# Patient Record
Sex: MALE | Race: WHITE | Employment: OTHER | URBAN - METROPOLITAN AREA
[De-identification: names, ages, dates, MRNs, and addresses within clinical notes are randomized per-mention and may not be internally consistent; named-entity substitution may affect disease eponyms.]

---

## 2021-01-07 ENCOUNTER — HOSPITAL ENCOUNTER (EMERGENCY)
Age: 86
Discharge: ANOTHER HOSPITAL | DRG: 177 | End: 2021-01-08
Attending: EMERGENCY MEDICINE
Payer: MEDICARE

## 2021-01-07 ENCOUNTER — APPOINTMENT (OUTPATIENT)
Dept: CT IMAGING | Age: 86
DRG: 177 | End: 2021-01-07
Attending: EMERGENCY MEDICINE
Payer: MEDICARE

## 2021-01-07 ENCOUNTER — APPOINTMENT (OUTPATIENT)
Dept: GENERAL RADIOLOGY | Age: 86
DRG: 177 | End: 2021-01-07
Attending: EMERGENCY MEDICINE
Payer: MEDICARE

## 2021-01-07 DIAGNOSIS — R41.82 ALTERED MENTAL STATUS, UNSPECIFIED ALTERED MENTAL STATUS TYPE: ICD-10-CM

## 2021-01-07 DIAGNOSIS — J12.82 PNEUMONIA DUE TO COVID-19 VIRUS: Primary | ICD-10-CM

## 2021-01-07 DIAGNOSIS — R09.02 HYPOXIA: ICD-10-CM

## 2021-01-07 DIAGNOSIS — U07.1 PNEUMONIA DUE TO COVID-19 VIRUS: Primary | ICD-10-CM

## 2021-01-07 PROBLEM — J96.01 ACUTE RESPIRATORY FAILURE WITH HYPOXIA (HCC): Status: ACTIVE | Noted: 2021-01-07

## 2021-01-07 PROBLEM — Z79.4 TYPE 2 DIABETES MELLITUS, WITH LONG-TERM CURRENT USE OF INSULIN (HCC): Status: ACTIVE | Noted: 2021-01-07

## 2021-01-07 PROBLEM — G93.41 ACUTE METABOLIC ENCEPHALOPATHY: Status: ACTIVE | Noted: 2021-01-07

## 2021-01-07 PROBLEM — E11.9 TYPE 2 DIABETES MELLITUS, WITH LONG-TERM CURRENT USE OF INSULIN (HCC): Status: ACTIVE | Noted: 2021-01-07

## 2021-01-07 LAB
ALBUMIN SERPL-MCNC: 2.8 G/DL (ref 3.2–4.6)
ALBUMIN/GLOB SERPL: 0.7 {RATIO} (ref 1.2–3.5)
ALP SERPL-CCNC: 84 U/L (ref 50–136)
ALT SERPL-CCNC: 22 U/L (ref 12–65)
ANION GAP SERPL CALC-SCNC: 8 MMOL/L (ref 7–16)
AST SERPL-CCNC: 41 U/L (ref 15–37)
BACTERIA URNS QL MICRO: NORMAL /HPF
BASOPHILS # BLD: 0 K/UL (ref 0–0.2)
BASOPHILS NFR BLD: 0 % (ref 0–2)
BILIRUB SERPL-MCNC: 0.6 MG/DL (ref 0.2–1.1)
BUN SERPL-MCNC: 16 MG/DL (ref 8–23)
CALCIUM SERPL-MCNC: 8.3 MG/DL (ref 8.3–10.4)
CASTS URNS QL MICRO: NORMAL /LPF
CHLORIDE SERPL-SCNC: 101 MMOL/L (ref 98–107)
CO2 SERPL-SCNC: 29 MMOL/L (ref 21–32)
COVID-19 RAPID TEST, COVR: DETECTED
CREAT SERPL-MCNC: 1.3 MG/DL (ref 0.8–1.5)
D DIMER PPP FEU-MCNC: 2.44 UG/ML(FEU)
DIFFERENTIAL METHOD BLD: ABNORMAL
EOSINOPHIL # BLD: 0.1 K/UL (ref 0–0.8)
EOSINOPHIL NFR BLD: 2 % (ref 0.5–7.8)
EPI CELLS #/AREA URNS HPF: NORMAL /HPF
ERYTHROCYTE [DISTWIDTH] IN BLOOD BY AUTOMATED COUNT: 16.4 % (ref 11.9–14.6)
EST. AVERAGE GLUCOSE BLD GHB EST-MCNC: 169 MG/DL
GLOBULIN SER CALC-MCNC: 3.8 G/DL (ref 2.3–3.5)
GLUCOSE BLD STRIP.AUTO-MCNC: 148 MG/DL (ref 65–100)
GLUCOSE SERPL-MCNC: 161 MG/DL (ref 65–100)
HBA1C MFR BLD: 7.5 % (ref 4.2–6.3)
HCT VFR BLD AUTO: 42 % (ref 41.1–50.3)
HGB BLD-MCNC: 13.4 G/DL (ref 13.6–17.2)
IMM GRANULOCYTES # BLD AUTO: 0 K/UL (ref 0–0.5)
IMM GRANULOCYTES NFR BLD AUTO: 0 % (ref 0–5)
LACTATE SERPL-SCNC: 2 MMOL/L (ref 0.4–2)
LACTATE SERPL-SCNC: 2.2 MMOL/L (ref 0.4–2)
LYMPHOCYTES # BLD: 0.5 K/UL (ref 0.5–4.6)
LYMPHOCYTES NFR BLD: 7 % (ref 13–44)
MCH RBC QN AUTO: 27.1 PG (ref 26.1–32.9)
MCHC RBC AUTO-ENTMCNC: 31.9 G/DL (ref 31.4–35)
MCV RBC AUTO: 85 FL (ref 79.6–97.8)
MONOCYTES # BLD: 0.3 K/UL (ref 0.1–1.3)
MONOCYTES NFR BLD: 5 % (ref 4–12)
NEUTS SEG # BLD: 6.6 K/UL (ref 1.7–8.2)
NEUTS SEG NFR BLD: 87 % (ref 43–78)
NRBC # BLD: 0 K/UL (ref 0–0.2)
PLATELET # BLD AUTO: 155 K/UL (ref 150–450)
PMV BLD AUTO: 11.1 FL (ref 9.4–12.3)
POTASSIUM SERPL-SCNC: 3.1 MMOL/L (ref 3.5–5.1)
PROCALCITONIN SERPL-MCNC: 0.07 NG/ML
PROT SERPL-MCNC: 6.6 G/DL (ref 6.3–8.2)
RBC # BLD AUTO: 4.94 M/UL (ref 4.23–5.6)
RBC #/AREA URNS HPF: NORMAL /HPF
SODIUM SERPL-SCNC: 138 MMOL/L (ref 136–145)
SOURCE, COVRS: ABNORMAL
T4 FREE SERPL-MCNC: 1.2 NG/DL (ref 0.78–1.46)
TROPONIN-HIGH SENSITIVITY: 26.8 PG/ML (ref 0–14)
TSH SERPL DL<=0.005 MIU/L-ACNC: 1.38 UIU/ML
WBC # BLD AUTO: 7.6 K/UL (ref 4.3–11.1)
WBC URNS QL MICRO: NORMAL /HPF

## 2021-01-07 PROCEDURE — 87088 URINE BACTERIA CULTURE: CPT

## 2021-01-07 PROCEDURE — 87040 BLOOD CULTURE FOR BACTERIA: CPT

## 2021-01-07 PROCEDURE — 83036 HEMOGLOBIN GLYCOSYLATED A1C: CPT

## 2021-01-07 PROCEDURE — 84439 ASSAY OF FREE THYROXINE: CPT

## 2021-01-07 PROCEDURE — 70450 CT HEAD/BRAIN W/O DYE: CPT

## 2021-01-07 PROCEDURE — 85379 FIBRIN DEGRADATION QUANT: CPT

## 2021-01-07 PROCEDURE — 84443 ASSAY THYROID STIM HORMONE: CPT

## 2021-01-07 PROCEDURE — 96365 THER/PROPH/DIAG IV INF INIT: CPT

## 2021-01-07 PROCEDURE — 74011250637 HC RX REV CODE- 250/637: Performed by: INTERNAL MEDICINE

## 2021-01-07 PROCEDURE — 80053 COMPREHEN METABOLIC PANEL: CPT

## 2021-01-07 PROCEDURE — 83605 ASSAY OF LACTIC ACID: CPT

## 2021-01-07 PROCEDURE — 81015 MICROSCOPIC EXAM OF URINE: CPT

## 2021-01-07 PROCEDURE — 87186 SC STD MICRODIL/AGAR DIL: CPT

## 2021-01-07 PROCEDURE — 84484 ASSAY OF TROPONIN QUANT: CPT

## 2021-01-07 PROCEDURE — 85025 COMPLETE CBC W/AUTO DIFF WBC: CPT

## 2021-01-07 PROCEDURE — 84145 PROCALCITONIN (PCT): CPT

## 2021-01-07 PROCEDURE — 96375 TX/PRO/DX INJ NEW DRUG ADDON: CPT

## 2021-01-07 PROCEDURE — 82962 GLUCOSE BLOOD TEST: CPT

## 2021-01-07 PROCEDURE — 74011250636 HC RX REV CODE- 250/636: Performed by: INTERNAL MEDICINE

## 2021-01-07 PROCEDURE — 93005 ELECTROCARDIOGRAM TRACING: CPT | Performed by: EMERGENCY MEDICINE

## 2021-01-07 PROCEDURE — 87086 URINE CULTURE/COLONY COUNT: CPT

## 2021-01-07 PROCEDURE — 96372 THER/PROPH/DIAG INJ SC/IM: CPT

## 2021-01-07 PROCEDURE — 87635 SARS-COV-2 COVID-19 AMP PRB: CPT

## 2021-01-07 PROCEDURE — 81003 URINALYSIS AUTO W/O SCOPE: CPT

## 2021-01-07 PROCEDURE — 74011000258 HC RX REV CODE- 258: Performed by: INTERNAL MEDICINE

## 2021-01-07 PROCEDURE — 96368 THER/DIAG CONCURRENT INF: CPT

## 2021-01-07 PROCEDURE — 99285 EMERGENCY DEPT VISIT HI MDM: CPT

## 2021-01-07 PROCEDURE — 71045 X-RAY EXAM CHEST 1 VIEW: CPT

## 2021-01-07 RX ORDER — DEXAMETHASONE SODIUM PHOSPHATE 100 MG/10ML
6 INJECTION INTRAMUSCULAR; INTRAVENOUS EVERY 24 HOURS
Status: DISCONTINUED | OUTPATIENT
Start: 2021-01-07 | End: 2021-01-08 | Stop reason: HOSPADM

## 2021-01-07 RX ORDER — INSULIN LISPRO 100 [IU]/ML
INJECTION, SOLUTION INTRAVENOUS; SUBCUTANEOUS
Status: DISCONTINUED | OUTPATIENT
Start: 2021-01-07 | End: 2021-01-08 | Stop reason: HOSPADM

## 2021-01-07 RX ORDER — ENOXAPARIN SODIUM 100 MG/ML
40 INJECTION SUBCUTANEOUS EVERY 24 HOURS
Status: DISCONTINUED | OUTPATIENT
Start: 2021-01-07 | End: 2021-01-08 | Stop reason: HOSPADM

## 2021-01-07 RX ORDER — POTASSIUM CHLORIDE 20 MEQ/1
40 TABLET, EXTENDED RELEASE ORAL EVERY 4 HOURS
Status: DISCONTINUED | OUTPATIENT
Start: 2021-01-07 | End: 2021-01-08 | Stop reason: HOSPADM

## 2021-01-07 RX ADMIN — DOXYCYCLINE 100 MG: 100 INJECTION, POWDER, LYOPHILIZED, FOR SOLUTION INTRAVENOUS at 23:32

## 2021-01-07 RX ADMIN — DEXAMETHASONE SODIUM PHOSPHATE 6 MG: 10 INJECTION INTRAMUSCULAR; INTRAVENOUS at 23:07

## 2021-01-07 RX ADMIN — ENOXAPARIN SODIUM 40 MG: 40 INJECTION SUBCUTANEOUS at 23:14

## 2021-01-07 RX ADMIN — CEFTRIAXONE 1 G: 1 INJECTION, POWDER, FOR SOLUTION INTRAMUSCULAR; INTRAVENOUS at 23:20

## 2021-01-07 RX ADMIN — POTASSIUM CHLORIDE 40 MEQ: 1500 TABLET, EXTENDED RELEASE ORAL at 23:08

## 2021-01-07 NOTE — ED NOTES
Patient's wife call ems today after she noted increased confusion and weakness, fall of unknown origin 3 days ago has been complaining of left rib area pain,  with EMS, patient with no complaints at this time. Patient told EMS he takes a pain medication for knee, noted to be with pinpoint pupils at this time. Noted with fever in triage.

## 2021-01-07 NOTE — ED PROVIDER NOTES
Mask was worn during the entire patient examination. Alice Sheldon is a 80 y.o. male who presents to the ED with a chief complaint of AMS. Patient has some baseline dementia but per report was having increased confusion and weakness over the last 3 days. He has had a fall with some left rib pain and he really does not complain of anything to me. He is able to give me limited history. He does report a cough when asked about it. He did not know he had a fever. Past Medical History:   Diagnosis Date    Acquired hallux valgus of left foot     Arthritis     OA- shoulders, knees, toes    CAD (coronary artery disease)     MI in 1980, no c/o - no stents or surgery    Diabetes ([facility] [address]     Type 2 Insulin Dep-Flexpen (started 2010), average AM sugar -   today in -   hypo- < 80    High cholesterol     takes Crestor    Nausea & vomiting     with anesthesia- none with previous 2 foot surgeries    TIA 2010    temporarily lost sight in RIGHT eye, lost hearing RIGHT ear, denies deficits at this time, \"everything is back to normal now\"    Tinnitus     Unspecified adverse effect of anesthesia     difficulty waking up- stays very sleepy- pt states \"Easy to put out\"    Unspecified sleep apnea     no C-PAP use. Past Surgical History:   Procedure Laterality Date    FOOT/TOES SURGERY PROC UNLISTED      LEFT (for hammer toe, bunion)    HX CATARACT REMOVAL  2010    LEFT, denies implant- wife states iol    HX MOHS PROCEDURES  1990's    Rt.      HX ORTHOPAEDIC  2011    plate in left foot and then repair of broken plate (2 surg)    HX OTHER SURGICAL  1980s    michael under the chin     HX OTHER SURGICAL      had nose surgery for rosacea    HX TONSILLECTOMY  as child    T&A         Family History:   Problem Relation Age of Onset   Keeley [employer] Arthritis-rheumatoid Mother     Arthritis-rheumatoid Father     Malignant Hyperthermia Neg Hx     Pseudocholinesterase Deficiency Neg Hx     Delayed Awakening Neg Hx     Post-op Nausea/Vomiting Neg Hx     Emergence Delirium Neg Hx     Post-op Cognitive Dysfunction Neg Hx     Other Neg Hx        Social History     Socioeconomic History    Marital status:      Spouse name: Not on file    Number of children: Not on file    Years of education: Not on file    Highest education level: Not on file   Occupational History    Not on file   Social Needs    Financial resource strain: Not on file    Food insecurity     Worry: Not on file     Inability: Not on file    Transportation needs     Medical: Not on file     Non-medical: Not on file   Tobacco Use    Smoking status: Former Smoker     Packs/day: 1.00     Years: 25.00     Pack years: 25.00     Quit date: 2/10/1956     Years since quittin.9    Smokeless tobacco: Former User   Substance and Sexual Activity    Alcohol use: No    Drug use: No    Sexual activity: Not on file   Lifestyle    Physical activity     Days per week: Not on file     Minutes per session: Not on file    Stress: Not on file   Relationships    Social connections     Talks on phone: Not on file     Gets together: Not on file     Attends Gnosticism service: Not on file     Active member of club or organization: Not on file     Attends meetings of clubs or organizations: Not on file     Relationship status: Not on file    Intimate partner violence     Fear of current or ex partner: Not on file     Emotionally abused: Not on file     Physically abused: Not on file     Forced sexual activity: Not on file   Other Topics Concern    Not on file   Social History Narrative    Not on file         ALLERGIES: Betadine [povidone-iodine]    Review of Systems   Unable to perform ROS: Mental status change       Vitals:    21 1408   BP: (!) 152/68   Pulse: 65   Resp: 16   Temp: (!) 100.9 °F (38.3 °C)   SpO2: 92%   Weight: 77.1 kg (170 lb)   Height: 5' 6\" (1.676 m)            Physical Exam  Vitals signs and nursing note reviewed. Constitutional:       General: He is not in acute distress. Appearance: Normal appearance. He is well-developed. He is not ill-appearing, toxic-appearing or diaphoretic. HENT:      Head: Normocephalic and atraumatic. Nose: No congestion or rhinorrhea. Mouth/Throat:      Mouth: Mucous membranes are moist.   Eyes:      General: No scleral icterus. Conjunctiva/sclera: Conjunctivae normal.   Neck:      Musculoskeletal: Normal range of motion. No neck rigidity or muscular tenderness. Trachea: No tracheal deviation. Cardiovascular:      Rate and Rhythm: Normal rate and regular rhythm. Pulmonary:      Effort: Pulmonary effort is normal. No respiratory distress. Breath sounds: No stridor. No wheezing, rhonchi or rales. Chest:      Chest wall: No tenderness. Abdominal:      General: Abdomen is flat. There is no distension. Tenderness: There is no abdominal tenderness. There is no guarding or rebound. Hernia: No hernia is present. Musculoskeletal:      Comments: I have palpated all long bones and there was no tenderness present. I have ranged all extremity joints as well and there was full range of motion and no pain when ranging the joints. Lymphadenopathy:      Cervical: No cervical adenopathy. Skin:     General: Skin is warm. Capillary Refill: Capillary refill takes less than 2 seconds. Coloration: Skin is not jaundiced or pale. Findings: No bruising, erythema or rash. Neurological:      General: No focal deficit present. Mental Status: He is alert. Mental status is at baseline. GCS: GCS eye subscore is 4. GCS verbal subscore is 5. GCS motor subscore is 6. Comments: Oriented to person and thought he was at [facility] and answered the year as 2012.      Psychiatric:         Mood and Affect: Mood normal.         Behavior: Behavior normal.          MDM  Number of Diagnoses or Management Options  Altered mental status, unspecified altered mental status type  Hypoxia  Pneumonia due to COVID-19 virus  Diagnosis management comments: Became hypoxic while in the ED he was also febrile so I added a rapid Covid on which tested positive. He is doing better on nasal cannula. Given his oxygen level and altered mental status I have called hospitalist and they have agreed to admit patient. Angy Huffman MD; 1/7/2021 @7:59 PM Voice dictation software was used during the making of this note. This software is not perfect and grammatical and other typographical errors may be present.   This note has not been proofread for errors.  ===================================================================            Procedures

## 2021-01-07 NOTE — ACP (ADVANCE CARE PLANNING)
Patient tested for [address]. Altered at this time, no family at the bedside, no ACP docs in the chart.      GEE Montes    [facility]    * [email or web address]

## 2021-01-08 ENCOUNTER — HOSPITAL ENCOUNTER (INPATIENT)
Age: 86
LOS: 30 days | Discharge: NURSING, REHAB OR LONG-TERM CARE | DRG: 177 | End: 2021-02-07
Attending: HOSPITALIST | Admitting: INTERNAL MEDICINE
Payer: MEDICARE

## 2021-01-08 VITALS
BODY MASS INDEX: 27.32 KG/M2 | TEMPERATURE: 100.9 F | SYSTOLIC BLOOD PRESSURE: 130 MMHG | OXYGEN SATURATION: 92 % | HEART RATE: 117 BPM | WEIGHT: 170 LBS | DIASTOLIC BLOOD PRESSURE: 71 MMHG | RESPIRATION RATE: 34 BRPM | HEIGHT: 66 IN

## 2021-01-08 LAB
ABO + RH BLD: NORMAL
ALBUMIN SERPL-MCNC: 2.6 G/DL (ref 3.2–4.6)
ALBUMIN/GLOB SERPL: 0.8 {RATIO} (ref 1.2–3.5)
ALP SERPL-CCNC: 83 U/L (ref 50–136)
ALT SERPL-CCNC: 21 U/L (ref 12–65)
AMORPH CRY URNS QL MICRO: ABNORMAL
ANION GAP SERPL CALC-SCNC: 10 MMOL/L (ref 7–16)
APPEARANCE UR: CLEAR
AST SERPL-CCNC: 45 U/L (ref 15–37)
ATRIAL RATE: 66 BPM
BACTERIA URNS QL MICRO: 0 /HPF
BASOPHILS # BLD: 0 K/UL (ref 0–0.2)
BASOPHILS NFR BLD: 0 % (ref 0–2)
BILIRUB SERPL-MCNC: 0.6 MG/DL (ref 0.2–1.1)
BILIRUB UR QL: ABNORMAL
BLOOD GROUP ANTIBODIES SERPL: NORMAL
BUN SERPL-MCNC: 21 MG/DL (ref 8–23)
CALCIUM SERPL-MCNC: 8 MG/DL (ref 8.3–10.4)
CALCULATED P AXIS, ECG09: 55 DEGREES
CALCULATED R AXIS, ECG10: -26 DEGREES
CALCULATED T AXIS, ECG11: -7 DEGREES
CASTS URNS QL MICRO: ABNORMAL /LPF
CHLORIDE SERPL-SCNC: 105 MMOL/L (ref 98–107)
CO2 SERPL-SCNC: 24 MMOL/L (ref 21–32)
COLOR UR: YELLOW
CREAT SERPL-MCNC: 1.27 MG/DL (ref 0.8–1.5)
DIAGNOSIS, 93000: NORMAL
DIFFERENTIAL METHOD BLD: ABNORMAL
EOSINOPHIL # BLD: 0 K/UL (ref 0–0.8)
EOSINOPHIL NFR BLD: 0 % (ref 0.5–7.8)
ERYTHROCYTE [DISTWIDTH] IN BLOOD BY AUTOMATED COUNT: 16.6 % (ref 11.9–14.6)
GLOBULIN SER CALC-MCNC: 3.4 G/DL (ref 2.3–3.5)
GLUCOSE BLD STRIP.AUTO-MCNC: 186 MG/DL (ref 65–100)
GLUCOSE BLD STRIP.AUTO-MCNC: 250 MG/DL (ref 65–100)
GLUCOSE BLD STRIP.AUTO-MCNC: 290 MG/DL (ref 65–100)
GLUCOSE BLD STRIP.AUTO-MCNC: 312 MG/DL (ref 65–100)
GLUCOSE SERPL-MCNC: 268 MG/DL (ref 65–100)
GLUCOSE UR STRIP.AUTO-MCNC: NEGATIVE MG/DL
HCT VFR BLD AUTO: 40.7 % (ref 41.1–50.3)
HGB BLD-MCNC: 13.4 G/DL (ref 13.6–17.2)
HGB UR QL STRIP: NEGATIVE
IMM GRANULOCYTES # BLD AUTO: 0.1 K/UL (ref 0–0.5)
IMM GRANULOCYTES NFR BLD AUTO: 1 % (ref 0–5)
KETONES UR QL STRIP.AUTO: 15 MG/DL
LEUKOCYTE ESTERASE UR QL STRIP.AUTO: NEGATIVE
LYMPHOCYTES # BLD: 0.3 K/UL (ref 0.5–4.6)
LYMPHOCYTES NFR BLD: 5 % (ref 13–44)
MAGNESIUM SERPL-MCNC: 1.9 MG/DL (ref 1.8–2.4)
MCH RBC QN AUTO: 27.7 PG (ref 26.1–32.9)
MCHC RBC AUTO-ENTMCNC: 32.9 G/DL (ref 31.4–35)
MCV RBC AUTO: 84.1 FL (ref 79.6–97.8)
MONOCYTES # BLD: 0.1 K/UL (ref 0.1–1.3)
MONOCYTES NFR BLD: 1 % (ref 4–12)
MUCOUS THREADS URNS QL MICRO: ABNORMAL /LPF
NEUTS SEG # BLD: 5.2 K/UL (ref 1.7–8.2)
NEUTS SEG NFR BLD: 92 % (ref 43–78)
NITRITE UR QL STRIP.AUTO: NEGATIVE
NRBC # BLD: 0 K/UL (ref 0–0.2)
P-R INTERVAL, ECG05: 222 MS
PH UR STRIP: 5.5 [PH] (ref 5–9)
PLATELET # BLD AUTO: 164 K/UL (ref 150–450)
PMV BLD AUTO: 11.2 FL (ref 9.4–12.3)
POTASSIUM SERPL-SCNC: 4.1 MMOL/L (ref 3.5–5.1)
PROT SERPL-MCNC: 6 G/DL (ref 6.3–8.2)
PROT UR STRIP-MCNC: 100 MG/DL
Q-T INTERVAL, ECG07: 418 MS
QRS DURATION, ECG06: 98 MS
QTC CALCULATION (BEZET), ECG08: 438 MS
RBC # BLD AUTO: 4.84 M/UL (ref 4.23–5.6)
SODIUM SERPL-SCNC: 139 MMOL/L (ref 136–145)
SP GR UR REFRACTOMETRY: 1.02 (ref 1–1.02)
SPECIMEN EXP DATE BLD: NORMAL
UROBILINOGEN UR QL STRIP.AUTO: 1 EU/DL (ref 0.2–1)
VENTRICULAR RATE, ECG03: 66 BPM
WBC # BLD AUTO: 5.7 K/UL (ref 4.3–11.1)

## 2021-01-08 PROCEDURE — 87086 URINE CULTURE/COLONY COUNT: CPT

## 2021-01-08 PROCEDURE — 82962 GLUCOSE BLOOD TEST: CPT

## 2021-01-08 PROCEDURE — 74011250637 HC RX REV CODE- 250/637: Performed by: INTERNAL MEDICINE

## 2021-01-08 PROCEDURE — 2709999900 HC NON-CHARGEABLE SUPPLY

## 2021-01-08 PROCEDURE — 74011636637 HC RX REV CODE- 636/637: Performed by: INTERNAL MEDICINE

## 2021-01-08 PROCEDURE — 81001 URINALYSIS AUTO W/SCOPE: CPT

## 2021-01-08 PROCEDURE — 97162 PT EVAL MOD COMPLEX 30 MIN: CPT

## 2021-01-08 PROCEDURE — 80053 COMPREHEN METABOLIC PANEL: CPT

## 2021-01-08 PROCEDURE — XW033E5 INTRODUCTION OF REMDESIVIR ANTI-INFECTIVE INTO PERIPHERAL VEIN, PERCUTANEOUS APPROACH, NEW TECHNOLOGY GROUP 5: ICD-10-PCS | Performed by: INTERNAL MEDICINE

## 2021-01-08 PROCEDURE — 74011000250 HC RX REV CODE- 250: Performed by: INTERNAL MEDICINE

## 2021-01-08 PROCEDURE — 74011000258 HC RX REV CODE- 258: Performed by: INTERNAL MEDICINE

## 2021-01-08 PROCEDURE — 65270000029 HC RM PRIVATE

## 2021-01-08 PROCEDURE — 86901 BLOOD TYPING SEROLOGIC RH(D): CPT

## 2021-01-08 PROCEDURE — 85025 COMPLETE CBC W/AUTO DIFF WBC: CPT

## 2021-01-08 PROCEDURE — 97116 GAIT TRAINING THERAPY: CPT

## 2021-01-08 PROCEDURE — 74011250636 HC RX REV CODE- 250/636: Performed by: INTERNAL MEDICINE

## 2021-01-08 PROCEDURE — 83735 ASSAY OF MAGNESIUM: CPT

## 2021-01-08 PROCEDURE — 36415 COLL VENOUS BLD VENIPUNCTURE: CPT

## 2021-01-08 PROCEDURE — 97110 THERAPEUTIC EXERCISES: CPT

## 2021-01-08 PROCEDURE — 97165 OT EVAL LOW COMPLEX 30 MIN: CPT

## 2021-01-08 RX ORDER — ASCORBIC ACID 500 MG
1000 TABLET ORAL 2 TIMES DAILY
Status: DISCONTINUED | OUTPATIENT
Start: 2021-01-08 | End: 2021-01-16

## 2021-01-08 RX ORDER — PROMETHAZINE HYDROCHLORIDE 25 MG/1
12.5 TABLET ORAL
Status: DISCONTINUED | OUTPATIENT
Start: 2021-01-08 | End: 2021-01-16

## 2021-01-08 RX ORDER — FUROSEMIDE 10 MG/ML
20 INJECTION INTRAMUSCULAR; INTRAVENOUS
Status: ACTIVE | OUTPATIENT
Start: 2021-01-08 | End: 2021-01-09

## 2021-01-08 RX ORDER — SENNOSIDES 8.6 MG/1
1 TABLET ORAL DAILY PRN
Status: DISCONTINUED | OUTPATIENT
Start: 2021-01-08 | End: 2021-02-07 | Stop reason: HOSPADM

## 2021-01-08 RX ORDER — DEXAMETHASONE SODIUM PHOSPHATE 100 MG/10ML
6 INJECTION INTRAMUSCULAR; INTRAVENOUS EVERY 24 HOURS
Status: DISCONTINUED | OUTPATIENT
Start: 2021-01-08 | End: 2021-01-11

## 2021-01-08 RX ORDER — ACETAMINOPHEN 650 MG/1
650 SUPPOSITORY RECTAL
Status: DISCONTINUED | OUTPATIENT
Start: 2021-01-08 | End: 2021-02-07 | Stop reason: HOSPADM

## 2021-01-08 RX ORDER — ROSUVASTATIN CALCIUM 10 MG/1
10 TABLET, COATED ORAL
Status: DISCONTINUED | OUTPATIENT
Start: 2021-01-08 | End: 2021-02-07 | Stop reason: HOSPADM

## 2021-01-08 RX ORDER — ONDANSETRON 2 MG/ML
4 INJECTION INTRAMUSCULAR; INTRAVENOUS
Status: DISCONTINUED | OUTPATIENT
Start: 2021-01-08 | End: 2021-02-07 | Stop reason: HOSPADM

## 2021-01-08 RX ORDER — FUROSEMIDE 10 MG/ML
20 INJECTION INTRAMUSCULAR; INTRAVENOUS ONCE
Status: DISCONTINUED | OUTPATIENT
Start: 2021-01-08 | End: 2021-01-08

## 2021-01-08 RX ORDER — SODIUM CHLORIDE 0.9 % (FLUSH) 0.9 %
5-40 SYRINGE (ML) INJECTION EVERY 8 HOURS
Status: DISCONTINUED | OUTPATIENT
Start: 2021-01-08 | End: 2021-02-07 | Stop reason: HOSPADM

## 2021-01-08 RX ORDER — INSULIN LISPRO 100 [IU]/ML
INJECTION, SOLUTION INTRAVENOUS; SUBCUTANEOUS
Status: DISCONTINUED | OUTPATIENT
Start: 2021-01-08 | End: 2021-02-07 | Stop reason: HOSPADM

## 2021-01-08 RX ORDER — ACETAMINOPHEN 325 MG/1
650 TABLET ORAL
Status: DISCONTINUED | OUTPATIENT
Start: 2021-01-08 | End: 2021-02-07 | Stop reason: HOSPADM

## 2021-01-08 RX ORDER — SODIUM CHLORIDE 9 MG/ML
250 INJECTION, SOLUTION INTRAVENOUS AS NEEDED
Status: DISCONTINUED | OUTPATIENT
Start: 2021-01-08 | End: 2021-02-07 | Stop reason: HOSPADM

## 2021-01-08 RX ORDER — UREA 10 %
220 LOTION (ML) TOPICAL DAILY
Status: DISCONTINUED | OUTPATIENT
Start: 2021-01-09 | End: 2021-01-16

## 2021-01-08 RX ORDER — ASPIRIN 81 MG/1
81 TABLET ORAL
Status: DISCONTINUED | OUTPATIENT
Start: 2021-01-08 | End: 2021-02-07 | Stop reason: HOSPADM

## 2021-01-08 RX ORDER — SODIUM CHLORIDE 0.9 % (FLUSH) 0.9 %
5-40 SYRINGE (ML) INJECTION AS NEEDED
Status: DISCONTINUED | OUTPATIENT
Start: 2021-01-08 | End: 2021-02-07 | Stop reason: HOSPADM

## 2021-01-08 RX ORDER — ENOXAPARIN SODIUM 100 MG/ML
40 INJECTION SUBCUTANEOUS DAILY
Status: DISCONTINUED | OUTPATIENT
Start: 2021-01-08 | End: 2021-01-13

## 2021-01-08 RX ADMIN — Medication 10 ML: at 21:19

## 2021-01-08 RX ADMIN — Medication 10 ML: at 05:17

## 2021-01-08 RX ADMIN — INSULIN LISPRO 2 UNITS: 100 INJECTION, SOLUTION INTRAVENOUS; SUBCUTANEOUS at 21:18

## 2021-01-08 RX ADMIN — INSULIN LISPRO 8 UNITS: 100 INJECTION, SOLUTION INTRAVENOUS; SUBCUTANEOUS at 11:17

## 2021-01-08 RX ADMIN — Medication 10 ML: at 14:00

## 2021-01-08 RX ADMIN — OXYCODONE HYDROCHLORIDE AND ACETAMINOPHEN 1000 MG: 500 TABLET ORAL at 17:27

## 2021-01-08 RX ADMIN — ENOXAPARIN SODIUM 40 MG: 40 INJECTION SUBCUTANEOUS at 17:27

## 2021-01-08 RX ADMIN — REMDESIVIR 200 MG: 100 INJECTION, POWDER, LYOPHILIZED, FOR SOLUTION INTRAVENOUS at 05:18

## 2021-01-08 RX ADMIN — INSULIN LISPRO 6 UNITS: 100 INJECTION, SOLUTION INTRAVENOUS; SUBCUTANEOUS at 07:40

## 2021-01-08 RX ADMIN — DEXAMETHASONE SODIUM PHOSPHATE 6 MG: 10 INJECTION INTRAMUSCULAR; INTRAVENOUS at 22:00

## 2021-01-08 RX ADMIN — CEFTRIAXONE SODIUM 1 G: 1 INJECTION, POWDER, FOR SOLUTION INTRAMUSCULAR; INTRAVENOUS at 21:17

## 2021-01-08 RX ADMIN — ASPIRIN 81 MG: 81 TABLET ORAL at 21:18

## 2021-01-08 RX ADMIN — Medication 10 ML: at 02:41

## 2021-01-08 RX ADMIN — ROSUVASTATIN 10 MG: 10 TABLET, FILM COATED ORAL at 21:18

## 2021-01-08 RX ADMIN — OXYCODONE HYDROCHLORIDE AND ACETAMINOPHEN 1000 MG: 500 TABLET ORAL at 12:34

## 2021-01-08 RX ADMIN — INSULIN LISPRO 6 UNITS: 100 INJECTION, SOLUTION INTRAVENOUS; SUBCUTANEOUS at 17:26

## 2021-01-08 RX ADMIN — DOXYCYCLINE 100 MG: 100 INJECTION, POWDER, LYOPHILIZED, FOR SOLUTION INTRAVENOUS at 21:17

## 2021-01-08 NOTE — PROGRESS NOTES
ACUTE OT GOALS:  (Developed with and agreed upon by patient and/or caregiver.)  1. Patient will complete lower body bathing and dressing with Mod I and adaptive equipment as needed. 2. Patient will complete toileting with Mod I and adaptive equipment as needed. 3. Patient will complete seated ADL for at least 8 minutes with good static and good dynamic seated balance. 4. Patient will complete functional transfers with Mod I and adaptive equipment as needed. 5. Patient will complete standing ADL with Mod I and good static and good dynamic standing balance. Timeframe: 7 visits     OCCUPATIONAL THERAPY ASSESSMENT: Initial Assessment, Daily Note and AM OT Treatment Day # 1    German Sams is a 80 y.o. male   PRIMARY DIAGNOSIS: <principal problem not specified>  Acute respiratory failure with hypoxia ([facility] [address] [J96.01]    Reason for Referral:  Generalized Weakness  ICD-10: Treatment Diagnosis: Generalized Muscle Weakness (M62.81)  Difficulty in walking, Not elsewhere classified (R26.2)  INPATIENT: Payor: [employer] MEDICARE / Plan: [facility] [employer] MEDICARE HMO / Product Type: [employer] Medicare /   ASSESSMENT:     REHAB RECOMMENDATIONS:   Recommendation to date pending progress:  Setting:   No further skilled therapy ; will see during acute care stay and defer to PT for HHPT following d/c. Equipment:    None     PRIOR LEVEL OF FUNCTION:  (Prior to Hospitalization)  INITIAL/CURRENT LEVEL OF FUNCTION:  (Most Recently Demonstrated)   Bathing:   Modified Independent  Dressing:   Modified Independent  Feeding/Grooming:   Independent  Toileting:   Modified Independent  Functional Mobility:   Modified Independent with cane Bathing:   Not tested  Dressing:   Standby Assistance  Feeding/Grooming:   Not tested  To[address] for transfers with use of BHR. Functional Mobility:   Contact Guard Assistance/Min A     ASSESSMENT:  Mr. River Gonzalez was admitted for acute respiratory failure and is C19+. Pt presents with decreased balance, strength, and activity tolerance for performance of ADLs and functional mobility. Pt is requiring Min A to CGA to complete functional transfers/functional mobility. Pt would benefit from skilled OT during acute care stay to increase independence and safety for performance of ADLs and functional mobility. SUBJECTIVE:   Mr. Coretta Álvarez states, \"I use the HR in one hand and my cane in the other. \"    SOCIAL HISTORY/LIVING ENVIRONMENT: Pt lives with spouse in two story home (all needs met on first floor) with 4 YVROSE. Pt is typically Mod I for performance of ADLs and Mod I for functional mobility with use of SPC. Pt reports one fall and no home O2 use. OBJECTIVE:     PAIN: VITAL SIGNS: LINES/DRAINS:   Pre Treatment: Pain Screen  Pain Scale 1: Numeric (0 - 10)  Pain Intensity 1: 0  Post Treatment: Unchanged Vital Signs  O2 Sat (%): 94 %  O2 Device: Nasal cannula  O2 Flow Rate (L/min): 2 l/min O2 Device: Nasal cannula     GROSS EVALUATION:  BUE Within Functional Limits Abnormal/ Functional Abnormal/ Non-Functional (see comments) Not Tested Comments:   AROM [x] [] [] []    PROM [] [] [] [x]    Strength [] [x] [] []    Balance [] [x] [] []    Posture [] [x] [] []    Sensation [] [] [] [x]    Coordination [x] [] [] []    Tone [] [] [] [x]    Edema [] [] [] [x]    Activity Tolerance [] [x] [] []     [] [] [] []      COGNITION/  PERCEPTION: Intact Impaired   (see comments) Comments:   Orientation [x] []    Vision [x] []    Hearing [x] []    Judgment/ Insight [] [x] Impulsive with transfers/functional mobility. Attention [] [x]    Memory [x] []    Command Following [] [x] Requires additional cueing.    Emotional Regulation [x] []     [] []      ACTIVITIES OF DAILY LIVING: I Mod I S SBA CGA Min Mod Max Total NT Comments   BASIC ADLs:              Bathing/ Showering [] [] [] [] [] [] [] [] [] [x]    Toileting [] [] [] [] [x] [] [] [] [] [] For transfer   Dressing [] [] [] [x] [] [] [] [] [] [] Feeding [] [] [] [] [] [] [] [] [] [x]    Grooming [] [] [] [] [] [] [] [] [] [x]    Personal Device Care [] [] [] [] [] [] [] [] [] [x]    Functional Mobility [] [] [] [] [x] [x] [] [] [] [] With use of RW   I=Independent, Mod I=Modified Independent, S=Supervision, SBA=Standby Assistance, CGA=Contact Guard Assistance,   Min=Minimal Assistance, Mod=Moderate Assistance, Max=Maximal Assistance, Total=Total Assistance, NT=Not Tested    MOBILITY: I Mod I S SBA CGA Min Mod Max Total  NT x2 Comments:   Supine to sit [] [] [] [] [] [x] [] [] [] [] []    Sit to supine [] [] [] [] [] [] [] [] [] [x] []    Sit to stand [] [] [] [] [x] [x] [] [] [] [] []    Bed to chair [] [] [] [] [] [x] [] [] [] [] [] With use of RW   I=Independent, Mod I=Modified Independent, S=Supervision, SBA=Standby Assistance, CGA=Contact Guard Assistance,   Min=Minimal Assistance, Mod=Moderate Assistance, Max=Maximal Assistance, Total=Total Assistance, NT=Not [facility] 6 Clicks   Daily Activity Inpatient Short Form        How much help from another person does the patient currently need. .. Total A Lot A Little None   1. Putting on and taking off regular lower body clothing? [] 1   [] 2   [x] 3   [] 4   2. Bathing (including washing, rinsing, drying)? [] 1   [] 2   [x] 3   [] 4   3. Toileting, which includes using toilet, bedpan or urinal?   [] 1   [] 2   [x] 3   [] 4   4. Putting on and taking off regular upper body clothing? [] 1   [] 2   [x] 3   [] 4   5. Taking care of personal grooming such as brushing teeth? [] 1   [] 2   [x] 3   [] 4   6. Eating meals? [] 1   [] 2   [] 3   [x] 4   © 2007, Trustees of [address] [place] [address] [address] [id number], under license to [employer]. All rights reserved     Score:  Initial: 19, completed, 1/8/2021 Most Recent: X (Date: -- )   Interpretation of Tool:  Represents activities that are increasingly more difficult (i.e. Bed mobility, Transfers, Gait).     PLAN:   FREQUENCY/DURATION: OT Plan of Care: 3 times/week for duration of hospital stay or until stated goals are met, whichever comes first.    PROBLEM LIST:   (Skilled intervention is medically necessary to address:)  1. Decreased ADL/Functional Activities  2. Decreased Activity Tolerance  3. Decreased AROM/PROM  4. Decreased Balance  5. Decreased Gait Ability  6. Decreased Strength  7. Decreased Transfer Abilities   INTERVENTIONS PLANNED:   (Benefits and precautions of occupational therapy have been discussed with the patient.)  1. Self Care Training  2. Therapeutic Activity  3. Therapeutic Exercise/HEP  4. Neuromuscular Re-education  5. Education     TREATMENT:     EVALUATION: Low Complexity : (Untimed Charge)    TREATMENT:   Therapeutic Exercise (23 Minutes): Therapeutic exercises noted below to improve functional activity tolerance, strength and mobility. Pt requires CGA/Min A with use of BHR to complete sit to stand from commode and uses grab bars to assist with bathroom and in room mobility. Pt requires CGA/Min A to complete functional transfer from sit to stand edge of bed and walk from bed to chair. Pt does present with fast cameron and slightly impulsive behavior when completing functional transfers and in room mobility and requires increased cueing to maintain a steady pace. Today's treatment session addressed Decreased Strength, Decreased ADL/Functional Activities, Decreased Transfer Abilities, Decreased Ambulation Ability/Technique, Decreased Activity Tolerance and Decreased Flexibility/Joint Mobility to progress towards achieving goal(s) listed above. During this session, Physical Therapy addressed  Balance to progress towards their discipline specific goal(s). Co-treatment was necessary to improve patient's ability to increase activity demands and ability to return to normal functional activity.     AFTER TREATMENT POSITION/PRECAUTIONS:  Alarm Activated, Chair, Needs within reach and RN notified    INTERDISCIPLINARY COLLABORATION:  RN/PCT, PT/PTA and OT/PETIT    TOTAL TREATMENT DURATION:  OT Patient Time In/Time Out  Time In: 0930  Time Out: 1003    AMADOR Underwood/L

## 2021-01-08 NOTE — PROGRESS NOTES
Progress Note    Patient: Garrett Burn MRN: [id number]  SSN: [id number]    YOB: 1933  Age: 80 y.o. Sex: male      Admit Date: 1/8/2021    LOS: 0 days     Subjective:     Patient with past medical history of   CAD  Diabetes  Some cognitive impairment    Came in for Acute metabolic encephalopathy     Found to have hypoxia with saturation of 86% on room air. This morning, patient kept saying that he did not have COVID infection, when I was telling that. He is on oxygen cannula. Objective:     Vitals:    01/08/21 0214 01/08/21 0817 01/08/21 0930 01/08/21 1003   BP: 137/71 135/70     Pulse: 69 73     Resp: 21 20     Temp: 97.9 °F (36.6 °C) 98.1 °F (36.7 °C)     SpO2: 94% 93% 94% 95%        Intake and Output:  Current Shift: 01/08 0701 - 01/08 1900  In: 100 [P.O.:100]  Out: -   Last three shifts: No intake/output data recorded. Physical Exam:     General:                    The patient is a mildly confused elderly male in mild acute respiratory distress. On oxygen cannula. Head:                                   Normocephalic/atraumatic. Eyes:                                   No palpebral pallor or scleral icterus. ENT:                                    External auricular and nasal exam within normal limits. Mucous membranes are moist.  Neck:                                   Supple, non-tender, no JVD. Lungs:                       decreased to auscultation bilaterally without wheezes or crackles. No respiratory distress or accessory muscle use. Heart:                                  Regular rate and rhythm, without murmurs, rubs, or gallops. Abdomen:                  Soft, non-tender, non-distended with normoactive bowel sounds. Genitourinary:           No tenderness over the bladder or bilateral CVAs. Extremities:               Without clubbing, cyanosis, or edema.   Skin: Normal color, texture, and turgor. No rashes, lesions, or jaundice. Pulses:                      Radial and dorsalis pedis pulses present 2+ bilaterally. Capillary refill <2s. Neurologic:                CN II-XII grossly intact and symmetrical.                                               Moving all four extremities well with no focal deficits. Psychiatric:                confused. Argumentative. Lab/Data Review:    Recent Results (from the past 24 hour(s))   URINE MICROSCOPIC    Collection Time: 01/07/21  3:11 PM   Result Value Ref Range    WBC 5-10 0 /hpf    RBC 0-3 0 /hpf    Epithelial cells 5-10 0 /hpf    Bacteria TRACE 0 /hpf    Casts 3-5 0 /lpf   D DIMER    Collection Time: 01/07/21  3:36 PM   Result Value Ref Range    D DIMER 2.44 (H) <0.56 ug/ml(FEU)   TROPONIN-HIGH SENSITIVITY    Collection Time: 01/07/21  3:36 PM   Result Value Ref Range    Troponin-High Sensitivity 26.8 (H) 0 - 14 pg/mL   CULTURE, BLOOD    Collection Time: 01/07/21  3:37 PM    Specimen: Blood   Result Value Ref Range    Special Requests: RIGHT  FOREARM        Culture result: NO GROWTH AFTER 15 HOURS     LACTIC ACID    Collection Time: 01/07/21  3:37 PM   Result Value Ref Range    Lactic acid 2.0 0.4 - 2.0 MMOL/L   CBC WITH AUTOMATED DIFF    Collection Time: 01/07/21  3:37 PM   Result Value Ref Range    WBC 7.6 4.3 - 11.1 K/uL    RBC 4.94 4.23 - 5.6 M/uL    HGB 13.4 (L) 13.6 - 17.2 g/dL    HCT 42.0 41.1 - 50.3 %    MCV 85.0 79.6 - 97.8 FL    MCH 27.1 26.1 - 32.9 PG    MCHC 31.9 31.4 - 35.0 g/dL    RDW 16.4 (H) 11.9 - 14.6 %    PLATELET 311 605 - 553 K/uL    MPV 11.1 9.4 - 12.3 FL    ABSOLUTE NRBC 0.00 0.0 - 0.2 K/uL    DF AUTOMATED      NEUTROPHILS 87 (H) 43 - 78 %    LYMPHOCYTES 7 (L) 13 - 44 %    MONOCYTES 5 4.0 - 12.0 %    EOSINOPHILS 2 0.5 - 7.8 %    BASOPHILS 0 0.0 - 2.0 %    IMMATURE GRANULOCYTES 0 0.0 - 5.0 %    ABS.  NEUTROPHILS 6.6 1.7 - 8.2 K/UL ABS. LYMPHOCYTES 0.5 0.5 - 4.6 K/UL    ABS. MONOCYTES 0.3 0.1 - 1.3 K/UL    ABS. EOSINOPHILS 0.1 0.0 - 0.8 K/UL    ABS. BASOPHILS 0.0 0.0 - 0.2 K/UL    ABS. IMM. GRANS. 0.0 0.0 - 0.5 K/UL   METABOLIC PANEL, COMPREHENSIVE    Collection Time: 01/07/21  3:37 PM   Result Value Ref Range    Sodium 138 136 - 145 mmol/L    Potassium 3.1 (L) 3.5 - 5.1 mmol/L    Chloride 101 98 - 107 mmol/L    CO2 29 21 - 32 mmol/L    Anion gap 8 7 - 16 mmol/L    Glucose 161 (H) 65 - 100 mg/dL    BUN 16 8 - 23 MG/DL    Creatinine 1.30 0.8 - 1.5 MG/DL    GFR est AA >60 >60 ml/min/1.73m2    GFR est non-AA 56 (L) >60 ml/min/1.73m2    Calcium 8.3 8.3 - 10.4 MG/DL    Bilirubin, total 0.6 0.2 - 1.1 MG/DL    ALT (SGPT) 22 12 - 65 U/L    AST (SGOT) 41 (H) 15 - 37 U/L    Alk.  phosphatase 84 50 - 136 U/L    Protein, total 6.6 6.3 - 8.2 g/dL    Albumin 2.8 (L) 3.2 - 4.6 g/dL    Globulin 3.8 (H) 2.3 - 3.5 g/dL    A-G Ratio 0.7 (L) 1.2 - 3.5     HEMOGLOBIN A1C WITH EAG    Collection Time: 01/07/21  3:37 PM   Result Value Ref Range    Hemoglobin A1c 7.5 (H) 4.20 - 6.30 %    Est. average glucose 169 mg/dL   TSH 3RD GENERATION    Collection Time: 01/07/21  3:37 PM   Result Value Ref Range    TSH 1.380 uIU/mL   T4, FREE    Collection Time: 01/07/21  3:37 PM   Result Value Ref Range    T4, Free 1.2 0.78 - 1.46 NG/DL   PROCALCITONIN    Collection Time: 01/07/21  3:38 PM   Result Value Ref Range    Procalcitonin 0.07 ng/mL   EKG, 12 LEAD, INITIAL    Collection Time: 01/07/21  3:45 PM   Result Value Ref Range    Ventricular Rate 66 BPM    Atrial Rate 66 BPM    P-R Interval 222 ms    QRS Duration 98 ms    Q-T Interval 418 ms    QTC Calculation (Bezet) 438 ms    Calculated P Axis 55 degrees    Calculated R Axis -26 degrees    Calculated T Axis -7 degrees    Diagnosis       Sinus rhythm with 1st degree A-V block  Septal infarct , age undetermined  Abnormal ECG  No previous ECGs available  Confirmed by ARELY THOMPSON (), Megan Matt ([id number]) on 1/8/2021 5:56:00 AM CULTURE, BLOOD    Collection Time: 01/07/21  5:21 PM    Specimen: Blood   Result Value Ref Range    Special Requests: RIGHT  Antecubital        Culture result: NO GROWTH AFTER 13 HOURS     LACTIC ACID    Collection Time: 01/07/21  5:21 PM   Result Value Ref Range    Lactic acid 2.2 (H) 0.4 - 2.0 MMOL/L   SARS-COV-2    Collection Time: 01/07/21  5:47 PM   Result Value Ref Range    Specimen source Nasopharyngeal      COVID-19 rapid test Detected (AA) NOTD     GLUCOSE, POC    Collection Time: 01/07/21 11:13 PM   Result Value Ref Range    Glucose (POC) 148 (H) 65 - 100 mg/dL   CONVALESCENT PLASMA, ALLOCATE    Collection Time: 01/08/21  2:00 AM   Result Value Ref Range    Comment       [facility] ON 6TH NOTIFIED PLASMA READY AT 1029 1.8.21 1150 [facility]    Unit number [id number]     Blood component type ConvPls,Th1     Unit division 00     Status of unit ALLOCATED    URINALYSIS W/ RFLX MICROSCOPIC    Collection Time: 01/08/21  4:00 AM   Result Value Ref Range    Color YELLOW      Appearance CLEAR      Specific gravity 1.021 1.001 - 1.023      pH (UA) 5.5 5.0 - 9.0      Protein 100 (A) NEG mg/dL    Glucose Negative mg/dL    Ketone 15 (A) NEG mg/dL    Bilirubin SMALL (A) NEG      Blood Negative NEG      Urobilinogen 1.0 0.2 - 1.0 EU/dL    Nitrites Negative NEG      Leukocyte Esterase Negative NEG      Bacteria 0 0 /hpf    Casts GRANULAR 0 /lpf    Amorphous Crystals TRACE 0      Mucus TRACE 0 /lpf   TYPE & SCREEN    Collection Time: 01/08/21  6:22 AM   Result Value Ref Range    Crossmatch Expiration 01/11/2021,2359     ABO/Rh(D) A POSITIVE     Antibody screen NEG    CBC WITH AUTOMATED DIFF    Collection Time: 01/08/21  6:29 AM   Result Value Ref Range    WBC 5.7 4.3 - 11.1 K/uL    RBC 4.84 4.23 - 5.6 M/uL    HGB 13.4 (L) 13.6 - 17.2 g/dL    HCT 40.7 (L) 41.1 - 50.3 %    MCV 84.1 79.6 - 97.8 FL    MCH 27.7 26.1 - 32.9 PG    MCHC 32.9 31.4 - 35.0 g/dL    RDW 16.6 (H) 11.9 - 14.6 %    PLATELET 909 019 - 601 K/uL    MPV 11.2 9.4 - 12.3 FL    ABSOLUTE NRBC 0.00 0.0 - 0.2 K/uL    DF AUTOMATED      NEUTROPHILS 92 (H) 43 - 78 %    LYMPHOCYTES 5 (L) 13 - 44 %    MONOCYTES 1 (L) 4.0 - 12.0 %    EOSINOPHILS 0 (L) 0.5 - 7.8 %    BASOPHILS 0 0.0 - 2.0 %    IMMATURE GRANULOCYTES 1 0.0 - 5.0 %    ABS. NEUTROPHILS 5.2 1.7 - 8.2 K/UL    ABS. LYMPHOCYTES 0.3 (L) 0.5 - 4.6 K/UL    ABS. MONOCYTES 0.1 0.1 - 1.3 K/UL    ABS. EOSINOPHILS 0.0 0.0 - 0.8 K/UL    ABS. BASOPHILS 0.0 0.0 - 0.2 K/UL    ABS. IMM. GRANS. 0.1 0.0 - 0.5 K/UL   METABOLIC PANEL, COMPREHENSIVE    Collection Time: 01/08/21  6:29 AM   Result Value Ref Range    Sodium 139 136 - 145 mmol/L    Potassium 4.1 3.5 - 5.1 mmol/L    Chloride 105 98 - 107 mmol/L    CO2 24 21 - 32 mmol/L    Anion gap 10 7 - 16 mmol/L    Glucose 268 (H) 65 - 100 mg/dL    BUN 21 8 - 23 MG/DL    Creatinine 1.27 0.8 - 1.5 MG/DL    GFR est AA >60 >60 ml/min/1.73m2    GFR est non-AA 57 (L) >60 ml/min/1.73m2    Calcium 8.0 (L) 8.3 - 10.4 MG/DL    Bilirubin, total 0.6 0.2 - 1.1 MG/DL    ALT (SGPT) 21 12 - 65 U/L    AST (SGOT) 45 (H) 15 - 37 U/L    Alk. phosphatase 83 50 - 136 U/L    Protein, total 6.0 (L) 6.3 - 8.2 g/dL    Albumin 2.6 (L) 3.2 - 4.6 g/dL    Globulin 3.4 2.3 - 3.5 g/dL    A-G Ratio 0.8 (L) 1.2 - 3.5     MAGNESIUM    Collection Time: 01/08/21  6:29 AM   Result Value Ref Range    Magnesium 1.9 1.8 - 2.4 mg/dL   GLUCOSE, POC    Collection Time: 01/08/21  7:49 AM   Result Value Ref Range    Glucose (POC) 250 (H) 65 - 100 mg/dL   GLUCOSE, POC    Collection Time: 01/08/21 11:12 AM   Result Value Ref Range    Glucose (POC) 312 (H) 65 - 100 mg/dL     CT head   1-7-2021  IMPRESSION:     1. No acute intracranial abnormality. XR chest   1-7-2021  Impression:       1. Predominantly right basilar and peripheral groundglass infiltrates.       Current Facility-Administered Medications:     aspirin delayed-release tablet 81 mg, 81 mg, Oral, QHS, Himanshu Cevallos MD    rosuvastatin (CRESTOR) tablet 10 mg, 10 mg, Oral, QHS, Dilia Alonzo, MD Himanshu    sodium chloride (NS) flush 5-40 mL, 5-40 mL, IntraVENous, Q8H, Himanshu Cevallos MD, 10 mL at 01/08/21 0517    sodium chloride (NS) flush 5-40 mL, 5-40 mL, IntraVENous, PRN, Himanshu Patterson MD    acetaminophen (TYLENOL) tablet 650 mg, 650 mg, Oral, Q6H PRN **OR** acetaminophen (TYLENOL) suppository 650 mg, 650 mg, Rectal, Q6H PRN, Himanshu Patterson MD    promethazine (PHENERGAN) tablet 12.5 mg, 12.5 mg, Oral, Q6H PRN **OR** ondansetron (ZOFRAN) injection 4 mg, 4 mg, IntraVENous, Q6H PRN, Himanshu Patterson MD    enoxaparin (LOVENOX) injection 40 mg, 40 mg, SubCUTAneous, DAILY, Himanshu Patterson MD    senna (SENOKOT) tablet 8.6 mg, 1 Tab, Oral, DAILY PRN, Himanshu Patterson MD    cefTRIAXone (ROCEPHIN) 1 g in 0.9% sodium chloride (MBP/ADV) 50 mL MBP, 1 g, IntraVENous, Q24H, Himanshu Cevallos MD    doxycycline (VIBRAMYCIN) 100 mg in 0.9% sodium chloride (MBP/ADV) 100 mL MBP, 100 mg, IntraVENous, Q12H, Himanshu Cevallos MD    dexamethasone (DECADRON) 10 mg/mL injection 6 mg, 6 mg, IntraVENous, Q24H, Himanshu Cevallos MD    [COMPLETED] remdesivir 200 mg in 0.9% sodium chloride 250 mL IVPB, 200 mg, IntraVENous, ONCE, 200 mg at 01/08/21 0518 **FOLLOWED BY** [START ON 1/9/2021] remdesivir 100 mg in 0.9% sodium chloride 250 mL IVPB, 100 mg, IntraVENous, Q24H, Himanshu Cevallos MD    0.9% sodium chloride infusion 250 mL, 250 mL, IntraVENous, PRN, Himanshu Patterson MD    insulin lispro (HUMALOG) injection, , SubCUTAneous, AC&HS, Himanshu Patterson MD    furosemide (LASIX) injection 20 mg, 20 mg, IntraVENous, ONCE PRN, Merary Mcintyre MD          Assessment:     Active Problems:    Acute respiratory failure with hypoxia ([facility] (1/7/2021)      COVID-19 (1/7/2021)      Acute metabolic encephalopathy (1/3/0068)      Type 2 diabetes mellitus, with long-term current use of insulin ([facility] (1/7/2021)        Plan:     Acute respiratory failure with hypoxia   Acute metabolic encephalopathy   From COVID pneumonia   Continue with Remdesivir   Oxygen Dexamethasone   Empiric IV antibiotics. Diabetes mellitus type 2  Monitor blood sugar. Cover with insulin sliding scale accordingly. I have discussed the plan of care with patient.       DVT prophylaxis : Lovenox SC     Signed By: Chito Rodriguez MD     January 8, 2021

## 2021-01-08 NOTE — ED NOTES
Report from Batool Silva, [facility]. Assumed care of pt at this time. Pt lying on stretcher on full cardiac monitoring. Pt VSS. Awaiting room assignment at DT facility. Will continue to monitor.

## 2021-01-08 NOTE — PROGRESS NOTES
Care Management Interventions  PCP Verified by CM: Yes  Discharge Durable Medical Equipment: No  Physical Therapy Consult: Yes  Occupational Therapy Consult: Yes  Current Support Network: Lives with Spouse, Own Home  Discharge Location  Discharge Placement: Unable to determine at this time    CM attempt to reach patient in room; no answer. CM placed several calls to his wife and received busy signal. CM will follow-up at later time.

## 2021-01-08 NOTE — PROGRESS NOTES
01/08/21 0150   Dual Skin Pressure Injury Assessment   Dual Skin Pressure Injury Assessment X   Second Care Provider (Based on [address] [place] [address]) Christa Murray RN   Skin Integumentary   Skin Integumentary (WDL) X    Pressure  Injury Documentation Pressure Injury Noted-See Wound LDA to Document   Skin Color Ecchymosis (comment)   Skin Condition/Temp Dry;Warm;Fragile   Skin Integrity Scars (comment); Other (comment)  (scattered bruises BUE and BLE and redness on buttocks)   Turgor Epidermis thin w/ loss of subcut tissue   Hair Growth Present   Wound Prevention and Protection Methods   Orientation of Wound Prevention Posterior   Location of Wound Prevention Sacrum/Coccyx   Dressing Present  Yes   Dressing Status Other (comment)  (barium cream)   Wound Offloading (Prevention Methods) Bed, pressure reduction mattress

## 2021-01-08 NOTE — ED NOTES
TRANSFER - OUT REPORT:    Verbal report given to Candice Milian on [facility]  being transferred to [phone] for routine progression of care       Report consisted of patients Situation, Background, Assessment and   Recommendations(SBAR). Information from the following report(s) SBAR, ED Summary and MAR was reviewed with the receiving nurse. Lines:   Peripheral IV 01/07/21 (Active)        Opportunity for questions and clarification was provided.       Patient transported with:   O2 @ 2 liters   [facility]

## 2021-01-08 NOTE — PROGRESS NOTES
TRANSFER - IN REPORT:    Verbal report received from [place], RN(name) on [facility]  being received from [facility](unit) for routine progression of care      Report consisted of patients Situation, Background, Assessment and   Recommendations(SBAR). Information from the following report(s) SBAR was reviewed with the receiving nurse. Opportunity for questions and clarification was provided. Assessment completed upon patients arrival to unit and care assumed.

## 2021-01-08 NOTE — PROGRESS NOTES
Consent for Plasma transfusion obtained over the phone from pt's wife Media Older at [phone], by this RN and witnessed by Rea Rivero.

## 2021-01-08 NOTE — PROGRESS NOTES
ACUTE PHYSICAL THERAPY GOALS:  (Developed with and agreed upon by patient and/or caregiver.)  (1.) Darien Berry  will move from supine to sit and sit to supine , scoot up and down and roll side to side with MODIFIED INDEPENDENCE within 7 treatment day(s). (2.) Darien Berry will transfer from bed to chair and chair to bed with MODIFIED INDEPENDENCE using the least restrictive device within 7 treatment day(s). (3.) Darien Berry will ambulate with MODIFIED INDEPENDENCE for 300 feet with the least restrictive device within 7 treatment day(s). (4.) Darien Berry will perform standing static and dynamic balance activities x 25 minutes with MODIFIED INDEPENDENCE to improve safety and activity tolerance within 7 treatment day(s). (5.) Darien Berry will ascend and descend 4 stairs using one hand rail(s) with MODIFIED INDEPENDENCE to improve functional mobility and safety within 7 treatment day(s). (6.) Darien Berry will perform bilateral lower extremity exercises x 20 min for HEP with INDEPENDENCE to improve strength, endurance, and functional mobility within 7 treatment day(s).      PHYSICAL THERAPY ASSESSMENT: Initial Assessment PT Treatment Day # 1      Darien Berry is a 80 y.o. male   PRIMARY DIAGNOSIS: <principal problem not specified>  Acute [facility] with [facility] ([facility] [J96.01]       Reason for Referral:  Weakness, SOB, hx falling  ICD-10: Treatment Diagnosis: Generalized Muscle Weakness (M62.81)  Difficulty in walking, Not elsewhere classified (R26.2)  Other abnormalities of gait and mobility (R26.89)  INPATIENT: Payor: /     ASSESSMENT:     REHAB RECOMMENDATIONS:   Recommendation to date pending progress:  [address] [place] [address]  Equipment:    None     PRIOR LEVEL OF FUNCTION:  (Prior to Hospitalization) INITIAL/CURRENT LEVEL OF FUNCTION:  (Most Recently Demonstrated)   Bed Mobility:   Modified Independent  Sit to Stand:   Modified Independent  Transfers:   Modified Independent  Gait/Mobility:   Modified Independent Bed Mobility:   Minimal Assistance  Sit to Stand:   Contact Guard Assistance  Transfers:   Minimal Assistance  Gait/Mobility:   Contact Guard Assistance     ASSESSMENT:  Mr. Ajit Adams is an 80year old M who presents to hospital after fall at home earlier this week, SOB and weakness. This date pt performs mobility including bed mobility, sitting and standing balance activities, sit <> stand transfers, and ambulation in room with CGA/Min A and RW. Pt on 2L O2, does de-sat with exertion, but has R sided posterior rib pain which is affecting his breath depth; when cued to take deeper breaths and for proper activity pacing, SpO2 gaurang back to 95%. Pt demonstrated understanding with use of IS. Pt presents as functioning below his baseline, with deficits in mobility including transfers, gait, balance, and activity tolerance. Pt will benefit from skilled therapy services to address stated deficits to promote return to highest level of function, independence, and safety. Will continue to follow. SUBJECTIVE:   Mr. Ajit Adams states, \"I want to go home. \"    SOCIAL HISTORY/LIVING ENVIRONMENT: Lives with wife in one story home with 4 YVROSE. Uses SPC for mobility. 1 fall this past Monday. Doesn't wear O2 at baseline.      OBJECTIVE:     PAIN: VITAL SIGNS: LINES/DRAINS:   Pre Treatment: Pain Screen  Pain Scale 1: Numeric (0 - 10)  Pain Intensity 1: 0  Post Treatment: 0/10 Vital Signs  O2 Sat (%): 95 %  O2 Device: Nasal cannula  O2 Flow Rate (L/min): 2 l/min None  O2 Device: Nasal cannula     GROSS EVALUATION:  BLE Within Functional Limits Abnormal/ Functional Abnormal/ Non-Functional (see comments) Not Tested Comments:   AROM [x] [] [] []    PROM [x] [] [] []    Strength [] [x] [] []  4/5 BLE   Balance [] [x] [] []  decreased standing baalnce   Posture [] [x] [] []  forward head, rounded shoulders   Sensation [x] [] [] []    Coordination [x] [] [] []    Tone [] [] [] [x]    Edema [] [] [] [x]    Activity Tolerance [] [x] [] []  GARCIA    [] [] [] []      COGNITION/  PERCEPTION: Intact Impaired   (see comments) Comments:   Orientation [x] []    Vision [x] []    Hearing [] [x]    Command Following [x] []    Safety Awareness [x] []     [] []      MOBILITY: I Mod I S SBA CGA Min Mod Max Total  NT x2 Comments:   Bed Mobility    Rolling [] [] [] [] [x] [] [] [] [] [] []    Supine to Sit [] [] [] [] [] [x] [] [] [] [] []    Scooting [] [] [] [] [x] [] [] [] [] [] []    Sit to Supine [] [] [] [] [x] [] [] [] [] [] []    Transfers    Sit to Stand [] [] [] [] [x] [] [] [] [] [] []    Bed to Chair [] [] [] [] [] [x] [] [] [] [] []    Stand to Sit [] [] [] [] [x] [] [] [] [] [] []    I=Independent, Mod I=Modified Independent, S=Supervision, SBA=Standby Assistance, CGA=Contact Guard Assistance,   Min=Minimal Assistance, Mod=Moderate Assistance, Max=Maximal Assistance, Total=Total Assistance, NT=Not Tested  GAIT: I Mod I S SBA CGA Min Mod Max Total  NT x2 Comments:   Level of Assistance [] [] [] [] [x] [x] [] [] [] [] []    Distance 2 x 30 ft    DME Rolling Walker    Gait Quality Shuffled, shortened steps    I=Independent, Mod I=Modified Independent, S=Supervision, SBA=Standby Assistance, CGA=Contact Guard Assistance,   Min=Minimal Assistance, Mod=Moderate Assistance, Max=Maximal Assistance, Total=Total Assistance, NT=Not Tested    [facility] Form       How much difficulty does the patient currently have. .. Unable A Lot A Little None   1. Turning over in bed (including adjusting bedclothes, sheets and blankets)? [] 1   [] 2   [] 3   [x] 4   2. Sitting down on and standing up from a chair with arms ( e.g., wheelchair, bedside commode, etc.)   [] 1   [] 2   [x] 3   [] 4   3. Moving from lying on back to sitting on the side of the bed? [] 1   [] 2   [x] 3   [] 4   How much help from another person does the patient currently need. ..  Total A Lot A Little None   4. Moving to and from a bed to a chair (including a wheelchair)? [] 1   [] 2   [x] 3   [] 4   5. Need to walk in hospital room? [] 1   [] 2   [x] 3   [] 4   6. Climbing 3-5 steps with a railing? [] 1   [] 2   [x] 3   [] 4   © , Trustees of [address] [place] [address] [address] [id number], under license to [employer]. All rights reserved     Score:  Initial: 19 Most Recent: X (Date: -- )    Interpretation of Tool:  Represents activities that are increasingly more difficult (i.e. Bed mobility, Transfers, Gait). PLAN:   FREQUENCY/DURATION: PT Plan of Care: 3 times/week for duration of hospital stay or until stated goals are met, whichever comes first.    PROBLEM LIST:   (Skilled intervention is medically necessary to address:)  1. Decreased Activity Tolerance  2. Decreased Balance  3. Decreased Coordination  4. Decreased Gait Ability  5. Decreased Strength  6. Decreased Transfer Abilities   INTERVENTIONS PLANNED:   (Benefits and precautions of physical therapy have been discussed with the patient.)  1. Therapeutic Activity  2. Therapeutic Exercise/HEP  3. Neuromuscular Re-education  4. Gait Training  5. Education     TREATMENT:     EVALUATION: Moderate Complexity : (Untimed Charge)    TREATMENT:   ($$ Gait Trainin-37 mins    )  Gait Training (25 Minutes): Pre-gait activities including sit <> stand transfers, standing balance activities, and gait training for 2 x 30 feet utilizing [address] [place] [address]. Patient required Tactile and Verbal cueing to improve Activity Pacing, Assistive Device Utilization, Dynamic Standing Balance and Gait Mechanics.      AFTER TREATMENT POSITION/PRECAUTIONS:  Alarm Activated, Chair, Needs within reach and RN notified    INTERDISCIPLINARY COLLABORATION:  RN/PCT, PT/PTA and OT/PETIT    TOTAL TREATMENT DURATION:  PT Patient Time In/Time Out  Time In: 930  Time Out: [address], PT

## 2021-01-08 NOTE — H&P
Hospitalist Note     Admit Date:  2021  2:07 PM   Name:  Steven Robles   Age:  80 y.o.  :  1933   MRN:  [id number]   PCP:  Uday Bob MD  Treatment Team: Attending Provider: Christiana Campbell MD; Primary Nurse: Sudheer Marinelli, RN; : Emilie Puentes    Chief complaint: Change in mental status. HPI/Subjective:   59-year-old male with significant past medical history of coronary artery disease, diabetes mellitus presented to emergency room with altered mental status. Patient is AO x3 with cues and able to provide only limited history. He is aware that he is being admitted with coronavirus infection. Most of the history obtained from the wife. As per wife, patient has mild cognitive impairment but it has not impaired his daily activities or instrumental activities of daily living. Patient was at normal mental status 3 days ago. She has noticed that slowly patient is speaking less and was getting more confused. Today morning patient was not talking. Patient has history of diabetes. Wife thought patient blood sugar was low so she gave him sugar solution. Patient is speaking less but conversing fine. Given his mental status, patient was transferred to the emergency room. In the emergency room, patient found to have hypoxia with saturation 86% on room air. He was placed on 2 L oxygen. Also he was febrile. Patient tested positive for Covid. CT head was negative. Hospitalist service consulted to admit the patient for further evaluation and management. Patient's wife denied any history of fever, shortness of breath or cough. As per patient's wife, they are following social distancing and she is not sure how patient got Covid. Patient has history of MI long time ago but not not taking aspirin given his bruising. She is not sure about patient dosing of insulin.     Past medical history, surgical history, home medication [as informed by wife], social history, family history, emergency room course and old record reviewed. Review of systems limited secondary to patient mental status. 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 Past Surgical History:   Procedure Laterality Date    FOOT/TOES SURGERY PROC UNLISTED      LEFT (for hammer toe, bunion)    HX CATARACT REMOVAL      LEFT, denies implant- wife states iol    HX MOHS PROCEDURES      Rt.      HX ORTHOPAEDIC  2011    plate in left foot and then repair of broken plate (2 surg)    HX OTHER SURGICAL      michael under the chin     HX OTHER SURGICAL      had nose surgery for rosacea    HX TONSILLECTOMY  as child    T&A      Allergies   Allergen Reactions    Betadine [Povidone-Iodine] Swelling     And Blisters on skin       Social History     Tobacco Use    Smoking status: Former Smoker     Packs/day: 1.00     Years: 25.00     Pack years: 25.00     Quit date: 2/10/1956     Years since quittin.9    Smokeless tobacco: Former User   Substance Use Topics    Alcohol use: No      Family History   Problem Relation Age of Onset    Arthritis-rheumatoid Mother     Arthritis-rheumatoid Father     Malignant Hyperthermia Neg Hx     Pseudocholinesterase Deficiency Neg Hx     Delayed Awakening Neg Hx     Post-op Nausea/Vomiting Neg Hx     Emergence Delirium Neg Hx     Post-op Cognitive Dysfunction Neg Hx     Other Neg Hx       Family history reviewed and noncontributory. There is no immunization history on file for this patient. PTA Medications:  Prior to Admission Medications   Prescriptions Last Dose Informant Patient Reported? Taking? HYDROcodone-acetaminophen (LORTAB 7.5) 7.5-500 mg per tablet   Yes No   Sig: Take 1 Tab by mouth every six (6) hours as needed. aspirin 81 mg tablet   Yes No   Sig: Take 81 mg by mouth nightly. \"doesnt take regularly\" per pt  Indications: MYOCARDIAL INFARCTION   cetirizine (ZYRTEC) 10 mg tablet   Yes Yes   Sig: Take 10 mg by mouth nightly. chlorhexidine (HIBICLENS) 4 % liquid   Yes No   Sig: Apply 1 Each to affected area as directed. Wash left foot with Hibiclens and leave on 1 minute then rinse on morning of surgery   Indications: Wash left foot with Hibiclens and leave on 1 minute then rinse on morning of surgery   insulin detemir (LEVEMIR FLEXPEN) 100 unit/mL flexpen   Yes No   Si Units by SubCUTAneous route every morning. TAKE HALF DOSE ON AM OF SURGERY  Indications: Take 12 units morning  of surgery   metformin (GLUCOPHAGE) 500 mg tablet   Yes No   Sig: Take 500 mg by mouth two (2) times daily (with meals). rosuvastatin (CRESTOR) 10 mg tablet   Yes Yes   Sig: Take 10 mg by mouth nightly. Facility-Administered Medications: None       Objective:     Patient Vitals for the past 24 hrs:   Temp Pulse Resp BP SpO2   21 180  (!) 117 (!) 51  (!) 89 %   21 1709  87 (!) 34  (!) 86 %   21 1408 (!) 100.9 °F (38.3 °C) 65 16 (!) 152/68 92 %     Oxygen Therapy  O2 Sat (%): (!) 89 % (21)  Pulse via Oximetry: 127 beats per minute (21)  O2 Device: Room air (21 1408)    Estimated body mass index is 27.44 kg/m² as calculated from the following:    Height as of this encounter: 5' 6\" (1.676 m). Weight as of this encounter: 77.1 kg (170 lb). No intake or output data in the 24 hours ending 01/07/21 2059    *Note that automatically entered I/Os may not be accurate; dependent on patient compliance with collection and accurate  by assistants. Physical Exam:  General:    Well nourished. AO x3. Eyes:   Normal sclerae. Extraocular movements intact. HENT:  Normocephalic, atraumatic. Moist mucous membranes  CV:   RRR. No m/r/g. Lungs:  Operatory distress, decreased air entry bilateral lower lobe otherwise CTAB. No wheezing, rhonchi, or rales. Abdomen: Soft, nontender, nondistended. Extremities: Warm and dry. No cyanosis or edema. Neurologic: CN II-XII grossly intact. Following commands. Strength 5 out of 5 everywhere. Skin:     No rashes or jaundice. Normal coloration  Psych:  Mild cognitive impairment noted. I reviewed the labs, imaging, EKGs, telemetry, and other studies done this admission. Data Reviewed:   Recent Results (from the past 24 hour(s))   URINE MICROSCOPIC    Collection Time: 01/07/21  3:11 PM   Result Value Ref Range    WBC 5-10 0 /hpf    RBC 0-3 0 /hpf    Epithelial cells 5-10 0 /hpf    Bacteria TRACE 0 /hpf    Casts 3-5 0 /lpf   LACTIC ACID    Collection Time: 01/07/21  3:37 PM   Result Value Ref Range    Lactic acid 2.0 0.4 - 2.0 MMOL/L   CBC WITH AUTOMATED DIFF    Collection Time: 01/07/21  3:37 PM   Result Value Ref Range    WBC 7.6 4.3 - 11.1 K/uL    RBC 4.94 4.23 - 5.6 M/uL    HGB 13.4 (L) 13.6 - 17.2 g/dL    HCT 42.0 41.1 - 50.3 %    MCV 85.0 79.6 - 97.8 FL    MCH 27.1 26.1 - 32.9 PG    MCHC 31.9 31.4 - 35.0 g/dL    RDW 16.4 (H) 11.9 - 14.6 %    PLATELET 801 105 - 897 K/uL    MPV 11.1 9.4 - 12.3 FL    ABSOLUTE NRBC 0.00 0.0 - 0.2 K/uL    DF AUTOMATED      NEUTROPHILS 87 (H) 43 - 78 %    LYMPHOCYTES 7 (L) 13 - 44 %    MONOCYTES 5 4.0 - 12.0 %    EOSINOPHILS 2 0.5 - 7.8 %    BASOPHILS 0 0.0 - 2.0 %    IMMATURE GRANULOCYTES 0 0.0 - 5.0 %    ABS.  NEUTROPHILS 6. 6 1.7 - 8.2 K/UL    ABS. LYMPHOCYTES 0.5 0.5 - 4.6 K/UL    ABS. MONOCYTES 0.3 0.1 - 1.3 K/UL    ABS. EOSINOPHILS 0.1 0.0 - 0.8 K/UL    ABS. BASOPHILS 0.0 0.0 - 0.2 K/UL    ABS. IMM. GRANS. 0.0 0.0 - 0.5 K/UL   METABOLIC PANEL, COMPREHENSIVE    Collection Time: 01/07/21  3:37 PM   Result Value Ref Range    Sodium 138 136 - 145 mmol/L    Potassium 3.1 (L) 3.5 - 5.1 mmol/L    Chloride 101 98 - 107 mmol/L    CO2 29 21 - 32 mmol/L    Anion gap 8 7 - 16 mmol/L    Glucose 161 (H) 65 - 100 mg/dL    BUN 16 8 - 23 MG/DL    Creatinine 1.30 0.8 - 1.5 MG/DL    GFR est AA >60 >60 ml/min/1.73m2    GFR est non-AA 56 (L) >60 ml/min/1.73m2    Calcium 8.3 8.3 - 10.4 MG/DL    Bilirubin, total 0.6 0.2 - 1.1 MG/DL    ALT (SGPT) 22 12 - 65 U/L    AST (SGOT) 41 (H) 15 - 37 U/L    Alk.  phosphatase 84 50 - 136 U/L    Protein, total 6.6 6.3 - 8.2 g/dL    Albumin 2.8 (L) 3.2 - 4.6 g/dL    Globulin 3.8 (H) 2.3 - 3.5 g/dL    A-G Ratio 0.7 (L) 1.2 - 3.5     LACTIC ACID    Collection Time: 01/07/21  5:21 PM   Result Value Ref Range    Lactic acid 2.2 (H) 0.4 - 2.0 MMOL/L   SARS-COV-2    Collection Time: 01/07/21  5:47 PM   Result Value Ref Range    Specimen source Nasopharyngeal      COVID-19 rapid test Detected (AA) NOTD         All Micro Results     Procedure Component Value Units Date/Time    CULTURE, URINE [[id number]]     Order Status: Sent Specimen: Urine from Clean catch     BLOOD CULTURE [[id number]] Collected: 01/07/21 1721    Order Status: Completed Specimen: Blood Updated: 01/07/21 1731    BLOOD CULTURE [[id number]] Collected: 01/07/21 1537    Order Status: Completed Specimen: Blood Updated: 01/07/21 1554          Current Facility-Administered Medications   Medication Dose Route Frequency    potassium chloride (K-DUR, KLOR-CON) SR tablet 40 mEq  40 mEq Oral Q4H    cefTRIAXone (ROCEPHIN) 1 g in 0.9% sodium chloride (MBP/ADV) 50 mL MBP  1 g IntraVENous Q24H    doxycycline (VIBRAMYCIN) 100 mg in 0.9% sodium chloride (MBP/ADV) 100 mL MBP 100 mg IntraVENous Q12H    dexamethasone (DECADRON) 10 mg/mL injection 6 mg  6 mg IntraVENous Q24H    insulin lispro (HUMALOG) injection   SubCUTAneous AC&HS    enoxaparin (LOVENOX) injection 40 mg  40 mg SubCUTAneous Q24H     Current Outpatient Medications   Medication Sig    cetirizine (ZYRTEC) 10 mg tablet Take 10 mg by mouth nightly.  rosuvastatin (CRESTOR) 10 mg tablet Take 10 mg by mouth nightly.  HYDROcodone-acetaminophen (LORTAB 7.5) 7.5-500 mg per tablet Take 1 Tab by mouth every six (6) hours as needed.  chlorhexidine (HIBICLENS) 4 % liquid Apply 1 Each to affected area as directed. Wash left foot with Hibiclens and leave on 1 minute then rinse on morning of surgery   Indications: Wash left foot with Hibiclens and leave on 1 minute then rinse on morning of surgery    insulin detemir (LEVEMIR FLEXPEN) 100 unit/mL flexpen 25 Units by SubCUTAneous route every morning. TAKE HALF DOSE ON AM OF SURGERY  Indications: Take 12 units morning  of surgery    aspirin 81 mg tablet Take 81 mg by mouth nightly. \"doesnt take regularly\" per pt  Indications: MYOCARDIAL INFARCTION    metformin (GLUCOPHAGE) 500 mg tablet Take 500 mg by mouth two (2) times daily (with meals). Other Studies:  No results found for this visit on 01/07/21. Ct Head Wo Cont    Result Date: 1/7/2021  Exam: CT HEAD WO CONT on 1/7/2021 3:10 PM Clinical History: The Male patient is 80years old  presenting for confusion and weakness with multiple falls Technique: Thin slice axial CT images through the brain were obtained. All CT scans at this facility are performed using dose reduction/dose modulation techniques, as appropriate the performed exam, including the following: Automated Exposure Control; Adjustment of the mA and/or kV according to patient size (this includes techniques or standardized protocols for targeted exams where dose is matched to indication/reason for exam); and Use of Iterative Reconstruction Technique. Radiation Exposure Indices: Reference Air Kerma (Ka,r) = 1006 mGy-cm Comparison:  None. Findings:  Cerebrum: Age-related cortical involutional changes are seen. There is chronic periventricular white matter disease. No evidence of intracranial hemorrhage, mass, or other space-occupying lesion is seen. There are no abnormal extra-axial fluid collections. Cerebellum: Involutional changes. CSF spaces: The ventricular system is within normal limits. The basilar cisterns are unremarkable. Brainstem: No evidence of ischemia, hemorrhage, or mass. Extracranial tissues: Visualized orbits and extracranial soft tissues are unremarkable. Paranasal sinuses/Mastoids: Well-pneumatized and aerated. . Calvarium: No acute osseous abnormality. IMPRESSION: 1. No acute intracranial abnormality. CPT code 50248     Xr Chest Port    Result Date: 1/7/2021  Exam: XR CHEST PORT on 1/7/2021 2:42 PM Clinical History: The Male patient is 80years old  presenting for meets SIRS criteria. Comparison:  Chest x-ray 5/9/2013 Findings:  Frontal view of the chest was obtained. There are groundglass infiltrates throughout the right peripheral lung base and minimally at the left base. No pleural effusions are demonstrated. The cardiomediastinal silhouette is within normal limits. There are no acute osseous abnormalities. A right shoulder prosthesis is in place. Impression:  1. Predominantly right basilar and peripheral groundglass infiltrates.  CPT code(s) 59271       SARS-CoV-2 Lab Results  \"Novel Coronavirus\" Test: No results found for: COV2NT   \"Emergent Disease\" Test: No results found for: EDPR  \"SARS-COV-2\" Test: No results found for: XGCOVT  \"[facility] Labs\" Test: No results found for: RSLT  Rapid Test:   Lab Results   Component Value Date/Time    COVR Detected (AA) 01/07/2021 05:47 PM              Assessment and Plan:     Hospital Problems as of 1/7/2021 Never Reviewed          Codes Class Noted - Resolved POA    Acute respiratory failure with hypoxia [facility]) ICD-10-CM: J96.01  ICD-9-CM: 518.81  1/7/2021 - Present Unknown        COVID-19 ICD-10-CM: U07.1  ICD-9-CM: 079.89  1/7/2021 - Present Unknown        Acute metabolic encephalopathy SBS-70-: G93.41  ICD-9-CM: 348.31  1/7/2021 - Present Unknown        Type 2 diabetes mellitus, with long-term current use of insulin ([facility]) ICD-10-CM: E11.9, Z79.4  ICD-9-CM: 250.00, V58.67  1/7/2021 - Present Unknown              Plan:    #Acute hypoxic respiratory failure: Likely secondary to COVID-19 pneumonia. Side effects and benefit of dexamethasone, remdesivir and plasma therapy discussed with patient's wife and she would like patient to get all 3. Dexamethasone started here. Remdesivir and plasma therapy ordered in the admission order. Cannot rule out bacterial pneumonia so started patient on ceftriaxone and dexamethasone. Panculture ordered. Lovenox for DVT prophylaxis. Self proning discussed with the patient. #COVID-19 infection: See above. #Acute metabolic encephalopathy: Improving. Due to neurochecks. We will continue to monitor. Further work-up based on patient date of improvement. No focal neurological exam.  No acute changes. Cardiac series pending. TSH pending. #Coronary artery disease: We will continue home aspirin and statin. #Diabetes mellitus: Unclear dose of insulin. Will start on sliding scale and will adjust as needed. DVT ppx ordered  Code status:  Full  Estimated LOS:  Greater than 2 midnights  Risk:  high    Patient is AO x3 but has limited ability at present. Talk to patient's wife. She wants patient to be full code and she is power of . I have tried to call patient's wife later on in to give her update but her phone number keeps ringing. She verbalized understanding that patient condition may deteriorate despite of treatment. She is aware that patient needs to go to [facility] [place].     Signed:  Pastor Fawad MD

## 2021-01-08 NOTE — ED NOTES
Report given to [facility] for transport to DT facility.

## 2021-01-09 LAB
ALBUMIN SERPL-MCNC: 2.4 G/DL (ref 3.2–4.6)
ALBUMIN/GLOB SERPL: 0.8 {RATIO} (ref 1.2–3.5)
ALP SERPL-CCNC: 69 U/L (ref 50–136)
ALT SERPL-CCNC: 21 U/L (ref 12–65)
ANION GAP SERPL CALC-SCNC: 12 MMOL/L (ref 7–16)
AST SERPL-CCNC: 37 U/L (ref 15–37)
BILIRUB SERPL-MCNC: 0.6 MG/DL (ref 0.2–1.1)
BUN SERPL-MCNC: 31 MG/DL (ref 8–23)
CALCIUM SERPL-MCNC: 8.1 MG/DL (ref 8.3–10.4)
CHLORIDE SERPL-SCNC: 105 MMOL/L (ref 98–107)
CO2 SERPL-SCNC: 24 MMOL/L (ref 21–32)
CREAT SERPL-MCNC: 1.13 MG/DL (ref 0.8–1.5)
GLOBULIN SER CALC-MCNC: 3.2 G/DL (ref 2.3–3.5)
GLUCOSE BLD STRIP.AUTO-MCNC: 198 MG/DL (ref 65–100)
GLUCOSE BLD STRIP.AUTO-MCNC: 242 MG/DL (ref 65–100)
GLUCOSE BLD STRIP.AUTO-MCNC: 331 MG/DL (ref 65–100)
GLUCOSE SERPL-MCNC: 234 MG/DL (ref 65–100)
POTASSIUM SERPL-SCNC: 3.9 MMOL/L (ref 3.5–5.1)
PROT SERPL-MCNC: 5.6 G/DL (ref 6.3–8.2)
SODIUM SERPL-SCNC: 141 MMOL/L (ref 138–145)

## 2021-01-09 PROCEDURE — 74011000250 HC RX REV CODE- 250: Performed by: INTERNAL MEDICINE

## 2021-01-09 PROCEDURE — 65270000029 HC RM PRIVATE

## 2021-01-09 PROCEDURE — 36415 COLL VENOUS BLD VENIPUNCTURE: CPT

## 2021-01-09 PROCEDURE — 74011000258 HC RX REV CODE- 258: Performed by: INTERNAL MEDICINE

## 2021-01-09 PROCEDURE — 74011250636 HC RX REV CODE- 250/636: Performed by: INTERNAL MEDICINE

## 2021-01-09 PROCEDURE — 77010033678 HC OXYGEN DAILY

## 2021-01-09 PROCEDURE — 2709999900 HC NON-CHARGEABLE SUPPLY

## 2021-01-09 PROCEDURE — 82962 GLUCOSE BLOOD TEST: CPT

## 2021-01-09 PROCEDURE — XW13325 TRANSFUSION OF CONVALESCENT PLASMA (NONAUTOLOGOUS) INTO PERIPHERAL VEIN, PERCUTANEOUS APPROACH, NEW TECHNOLOGY GROUP 5: ICD-10-PCS | Performed by: INTERNAL MEDICINE

## 2021-01-09 PROCEDURE — 36430 TRANSFUSION BLD/BLD COMPNT: CPT

## 2021-01-09 PROCEDURE — 94760 N-INVAS EAR/PLS OXIMETRY 1: CPT

## 2021-01-09 PROCEDURE — 74011250637 HC RX REV CODE- 250/637: Performed by: INTERNAL MEDICINE

## 2021-01-09 PROCEDURE — 80053 COMPREHEN METABOLIC PANEL: CPT

## 2021-01-09 PROCEDURE — 74011636637 HC RX REV CODE- 636/637: Performed by: INTERNAL MEDICINE

## 2021-01-09 RX ADMIN — DEXAMETHASONE SODIUM PHOSPHATE 6 MG: 10 INJECTION INTRAMUSCULAR; INTRAVENOUS at 22:14

## 2021-01-09 RX ADMIN — DOXYCYCLINE 100 MG: 100 INJECTION, POWDER, LYOPHILIZED, FOR SOLUTION INTRAVENOUS at 11:00

## 2021-01-09 RX ADMIN — ASPIRIN 81 MG: 81 TABLET ORAL at 22:13

## 2021-01-09 RX ADMIN — ENOXAPARIN SODIUM 40 MG: 40 INJECTION SUBCUTANEOUS at 09:20

## 2021-01-09 RX ADMIN — DOXYCYCLINE 100 MG: 100 INJECTION, POWDER, LYOPHILIZED, FOR SOLUTION INTRAVENOUS at 22:14

## 2021-01-09 RX ADMIN — INSULIN LISPRO 4 UNITS: 100 INJECTION, SOLUTION INTRAVENOUS; SUBCUTANEOUS at 08:20

## 2021-01-09 RX ADMIN — Medication 220 MG: at 09:20

## 2021-01-09 RX ADMIN — CEFTRIAXONE SODIUM 1 G: 1 INJECTION, POWDER, FOR SOLUTION INTRAMUSCULAR; INTRAVENOUS at 23:04

## 2021-01-09 RX ADMIN — REMDESIVIR 100 MG: 100 INJECTION, POWDER, LYOPHILIZED, FOR SOLUTION INTRAVENOUS at 05:21

## 2021-01-09 RX ADMIN — Medication 10 ML: at 22:00

## 2021-01-09 RX ADMIN — OXYCODONE HYDROCHLORIDE AND ACETAMINOPHEN 1000 MG: 500 TABLET ORAL at 09:20

## 2021-01-09 RX ADMIN — Medication 10 ML: at 05:22

## 2021-01-09 RX ADMIN — INSULIN LISPRO 8 UNITS: 100 INJECTION, SOLUTION INTRAVENOUS; SUBCUTANEOUS at 12:24

## 2021-01-09 RX ADMIN — ROSUVASTATIN 10 MG: 10 TABLET, FILM COATED ORAL at 22:16

## 2021-01-09 RX ADMIN — Medication 10 ML: at 14:22

## 2021-01-09 RX ADMIN — INSULIN LISPRO 2 UNITS: 100 INJECTION, SOLUTION INTRAVENOUS; SUBCUTANEOUS at 17:16

## 2021-01-09 RX ADMIN — OXYCODONE HYDROCHLORIDE AND ACETAMINOPHEN 1000 MG: 500 TABLET ORAL at 18:06

## 2021-01-09 NOTE — PROGRESS NOTES
Progress Note    Patient: King Ian MRN: [id number]  SSN: [id number]    YOB: 1933  Age: 80 y.o. Sex: male      Admit Date: 1/8/2021    LOS: 1 day     Subjective:     Patient with past medical history of   CAD  Diabetes  Some cognitive impairment    Came in for Acute metabolic encephalopathy     Found to have hypoxia with saturation of 86% on room air. CXR shows pulmonary infiltrates. His COVID rapid test is positive. This morning, patient is eating well. He is on oxygen cannula 2 LPM    Objective:     Vitals:    01/09/21 0040 01/09/21 0143 01/09/21 0330 01/09/21 0748   BP: (!) 129/59 (!) 117/53 135/69 132/67   Pulse: 60 (!) 58 (!) 59    Resp: 18 16 17 18   Temp: 98.9 °F (37.2 °C) 97.7 °F (36.5 °C) 97.6 °F (36.4 °C) 97.8 °F (36.6 °C)   SpO2: 93% 92% 96% 92%        Intake and Output:  Current Shift: No intake/output data recorded. Last three shifts: 01/07 1901 - 01/09 0700  In: 340 [P.O.:340]  Out: 250 [Urine:250]    Physical Exam:     General:                    The patient is a mildly confused elderly male in mild acute respiratory distress. On oxygen cannula. Head:                                   Normocephalic/atraumatic. Eyes:                                   No palpebral pallor or scleral icterus. ENT:                                    External auricular and nasal exam within normal limits. Mucous membranes are moist.  Neck:                                   Supple, non-tender, no JVD. Lungs:                       decreased to auscultation bilaterally without wheezes or crackles. No respiratory distress or accessory muscle use. Heart:                                  Regular rate and rhythm, without murmurs, rubs, or gallops. Abdomen:                  Soft, non-tender, non-distended with normoactive bowel sounds.    Genitourinary:           No tenderness over the bladder or bilateral CVAs. Extremities:               Without clubbing, cyanosis, or edema. Skin:                                    Normal color, texture, and turgor. No rashes, lesions, or jaundice. Pulses:                      Radial and dorsalis pedis pulses present 2+ bilaterally. Capillary refill <2s. Neurologic:                CN II-XII grossly intact and symmetrical.                                               Moving all four extremities well with no focal deficits. Psychiatric:                confused. Argumentative. Lab/Data Review:    Recent Results (from the past 24 hour(s))   GLUCOSE, POC    Collection Time: 01/08/21 11:12 AM   Result Value Ref Range    Glucose (POC) 312 (H) 65 - 100 mg/dL   GLUCOSE, POC    Collection Time: 01/08/21  3:08 PM   Result Value Ref Range    Glucose (POC) 290 (H) 65 - 100 mg/dL   GLUCOSE, POC    Collection Time: 01/08/21  8:55 PM   Result Value Ref Range    Glucose (POC) 186 (H) 65 - 100 mg/dL   GLUCOSE, POC    Collection Time: 01/09/21  7:53 AM   Result Value Ref Range    Glucose (POC) 242 (H) 65 - 100 mg/dL     CT head   1-7-2021  IMPRESSION:     1. No acute intracranial abnormality. XR chest   1-7-2021  Impression:       1. Predominantly right basilar and peripheral groundglass infiltrates.       Current Facility-Administered Medications:     aspirin delayed-release tablet 81 mg, 81 mg, Oral, QHS, Himanshu Cevallos MD, 81 mg at 01/08/21 2118    rosuvastatin (CRESTOR) tablet 10 mg, 10 mg, Oral, QHS, Himanshu Cevallos MD, 10 mg at 01/08/21 2118    sodium chloride (NS) flush 5-40 mL, 5-40 mL, IntraVENous, Q8H, Himanshu Cevallos MD, 10 mL at 01/09/21 0522    sodium chloride (NS) flush 5-40 mL, 5-40 mL, IntraVENous, PRN, Himanshu Caldwell MD    acetaminophen (TYLENOL) tablet 650 mg, 650 mg, Oral, Q6H PRN **OR** acetaminophen (TYLENOL) suppository 650 mg, 650 mg, Rectal, Q6H PRN, Himanshu Caldwell MD    promethazine (PHENERGAN) tablet 12.5 mg, 12.5 mg, Oral, Q6H PRN **OR** ondansetron (ZOFRAN) injection 4 mg, 4 mg, IntraVENous, Q6H PRN, Himanshu Pinzon MD    enoxaparin (LOVENOX) injection 40 mg, 40 mg, SubCUTAneous, DAILY, Himanshu Cevallos MD, 40 mg at 01/09/21 0920    senna (SENOKOT) tablet 8.6 mg, 1 Tab, Oral, DAILY PRN, Himanshu Pinzon MD    cefTRIAXone (ROCEPHIN) 1 g in 0.9% sodium chloride (MBP/ADV) 50 mL MBP, 1 g, IntraVENous, Q24H, Himanshu Cevallos MD, Last Rate: 100 mL/hr at 01/08/21 2117, 1 g at 01/08/21 2117    doxycycline (VIBRAMYCIN) 100 mg in 0.9% sodium chloride (MBP/ADV) 100 mL MBP, 100 mg, IntraVENous, Q12H, Himanshu Cevallos MD, Last Rate: 100 mL/hr at 01/08/21 2117, 100 mg at 01/08/21 2117    dexamethasone (DECADRON) 10 mg/mL injection 6 mg, 6 mg, IntraVENous, Q24H, Himanshu Cevallos MD, 6 mg at 01/08/21 2200    [COMPLETED] remdesivir 200 mg in 0.9% sodium chloride 250 mL IVPB, 200 mg, IntraVENous, ONCE, 200 mg at 01/08/21 0518 **FOLLOWED BY** remdesivir 100 mg in 0.9% sodium chloride 250 mL IVPB, 100 mg, IntraVENous, Q24H, Himanshu Cevallos MD, 100 mg at 01/09/21 0521    0.9% sodium chloride infusion 250 mL, 250 mL, IntraVENous, PRN, Himanshu Pinzon MD    insulin lispro (HUMALOG) injection, , SubCUTAneous, AC&HS, Himanshu Pinzon MD, 4 Units at 01/09/21 0820    ascorbic acid (vitamin C) (VITAMIN C) tablet 1,000 mg, 1,000 mg, Oral, BID, Radhames Barba MD, 1,000 mg at 01/09/21 0920    zinc sulfate tablet 220 mg, 220 mg, Oral, DAILY, Radhames Barba MD, 220 mg at 01/09/21 0920          Assessment:     Active Problems:    Acute respiratory failure with hypoxia ([address] (1/7/2021)      COVID-19 (1/7/2021)      Acute metabolic encephalopathy (3/4/6552)      Type 2 diabetes mellitus, with long-term current use of insulin ([facility] [address] (1/7/2021)        Plan:     Acute respiratory failure with hypoxia   Acute metabolic encephalopathy   From COVID pneumonia   Continue with Remdesivir   Oxygen   Dexamethasone   Empiric IV antibiotics.      Diabetes mellitus type 2  Monitor blood sugar. Cover with insulin sliding scale accordingly. I have discussed the plan of care with patient.       DVT prophylaxis : Lovenox SC     Disposition plan :  PT and OT to see patient and assess plan for discharge     Signed By: Daniel Sousa MD     January 9, 2021

## 2021-01-09 NOTE — PROGRESS NOTES
Hourly rounds completed throughout this shift. Pt resting in bed; denies needs at this time. Bed low and locked. Call light within pt's reach. Bed alarm turned on. Will continue to monitor and report to oncoming night shift nurse.

## 2021-01-10 LAB
ALBUMIN SERPL-MCNC: 2.9 G/DL (ref 3.2–4.6)
ALBUMIN/GLOB SERPL: 0.7 {RATIO} (ref 1.2–3.5)
ALP SERPL-CCNC: 94 U/L (ref 50–136)
ALT SERPL-CCNC: 28 U/L (ref 12–65)
ANION GAP SERPL CALC-SCNC: 9 MMOL/L (ref 7–16)
AST SERPL-CCNC: 39 U/L (ref 15–37)
BACTERIA SPEC CULT: NORMAL
BILIRUB SERPL-MCNC: 0.7 MG/DL (ref 0.2–1.1)
BLD PROD TYP BPU: NORMAL
BLOOD BANK CMNT PATIENT-IMP: NORMAL
BPU ID: NORMAL
BUN SERPL-MCNC: 33 MG/DL (ref 8–23)
CALCIUM SERPL-MCNC: 8.4 MG/DL (ref 8.3–10.4)
CHLORIDE SERPL-SCNC: 102 MMOL/L (ref 98–107)
CO2 SERPL-SCNC: 27 MMOL/L (ref 21–32)
CREAT SERPL-MCNC: 1.44 MG/DL (ref 0.8–1.5)
GLOBULIN SER CALC-MCNC: 3.9 G/DL (ref 2.3–3.5)
GLUCOSE BLD STRIP.AUTO-MCNC: 161 MG/DL (ref 65–100)
GLUCOSE BLD STRIP.AUTO-MCNC: 269 MG/DL (ref 65–100)
GLUCOSE BLD STRIP.AUTO-MCNC: 281 MG/DL (ref 65–100)
GLUCOSE BLD STRIP.AUTO-MCNC: 350 MG/DL (ref 65–100)
GLUCOSE SERPL-MCNC: 326 MG/DL (ref 65–100)
POTASSIUM SERPL-SCNC: 3.5 MMOL/L (ref 3.5–5.1)
PROT SERPL-MCNC: 6.8 G/DL (ref 6.3–8.2)
SERVICE CMNT-IMP: NORMAL
SODIUM SERPL-SCNC: 138 MMOL/L (ref 138–145)
STATUS OF UNIT,%ST: NORMAL
UNIT DIVISION, %UDIV: 0

## 2021-01-10 PROCEDURE — 74011250636 HC RX REV CODE- 250/636: Performed by: INTERNAL MEDICINE

## 2021-01-10 PROCEDURE — 80053 COMPREHEN METABOLIC PANEL: CPT

## 2021-01-10 PROCEDURE — 74011000250 HC RX REV CODE- 250: Performed by: INTERNAL MEDICINE

## 2021-01-10 PROCEDURE — 74011636637 HC RX REV CODE- 636/637: Performed by: INTERNAL MEDICINE

## 2021-01-10 PROCEDURE — 74011250637 HC RX REV CODE- 250/637: Performed by: INTERNAL MEDICINE

## 2021-01-10 PROCEDURE — 65270000029 HC RM PRIVATE

## 2021-01-10 PROCEDURE — 82962 GLUCOSE BLOOD TEST: CPT

## 2021-01-10 PROCEDURE — 74011000258 HC RX REV CODE- 258: Performed by: INTERNAL MEDICINE

## 2021-01-10 PROCEDURE — 36415 COLL VENOUS BLD VENIPUNCTURE: CPT

## 2021-01-10 RX ORDER — INSULIN GLARGINE 100 [IU]/ML
14 INJECTION, SOLUTION SUBCUTANEOUS
Status: DISCONTINUED | OUTPATIENT
Start: 2021-01-10 | End: 2021-01-10

## 2021-01-10 RX ORDER — INSULIN GLARGINE 100 [IU]/ML
25 INJECTION, SOLUTION SUBCUTANEOUS
Status: DISCONTINUED | OUTPATIENT
Start: 2021-01-10 | End: 2021-01-11

## 2021-01-10 RX ADMIN — DOXYCYCLINE 100 MG: 100 INJECTION, POWDER, LYOPHILIZED, FOR SOLUTION INTRAVENOUS at 23:05

## 2021-01-10 RX ADMIN — ROSUVASTATIN 10 MG: 10 TABLET, FILM COATED ORAL at 21:35

## 2021-01-10 RX ADMIN — Medication 220 MG: at 08:31

## 2021-01-10 RX ADMIN — CEFTRIAXONE SODIUM 1 G: 1 INJECTION, POWDER, FOR SOLUTION INTRAMUSCULAR; INTRAVENOUS at 23:05

## 2021-01-10 RX ADMIN — Medication 10 ML: at 13:44

## 2021-01-10 RX ADMIN — INSULIN LISPRO 6 UNITS: 100 INJECTION, SOLUTION INTRAVENOUS; SUBCUTANEOUS at 17:00

## 2021-01-10 RX ADMIN — INSULIN LISPRO 10 UNITS: 100 INJECTION, SOLUTION INTRAVENOUS; SUBCUTANEOUS at 11:54

## 2021-01-10 RX ADMIN — ASPIRIN 81 MG: 81 TABLET ORAL at 21:35

## 2021-01-10 RX ADMIN — Medication 10 ML: at 21:35

## 2021-01-10 RX ADMIN — ENOXAPARIN SODIUM 40 MG: 40 INJECTION SUBCUTANEOUS at 08:31

## 2021-01-10 RX ADMIN — Medication 10 ML: at 05:23

## 2021-01-10 RX ADMIN — DOXYCYCLINE 100 MG: 100 INJECTION, POWDER, LYOPHILIZED, FOR SOLUTION INTRAVENOUS at 11:55

## 2021-01-10 RX ADMIN — OXYCODONE HYDROCHLORIDE AND ACETAMINOPHEN 1000 MG: 500 TABLET ORAL at 08:31

## 2021-01-10 RX ADMIN — OXYCODONE HYDROCHLORIDE AND ACETAMINOPHEN 1000 MG: 500 TABLET ORAL at 17:00

## 2021-01-10 RX ADMIN — REMDESIVIR 100 MG: 100 INJECTION, POWDER, LYOPHILIZED, FOR SOLUTION INTRAVENOUS at 05:22

## 2021-01-10 RX ADMIN — INSULIN GLARGINE 25 UNITS: 100 INJECTION, SOLUTION SUBCUTANEOUS at 21:36

## 2021-01-10 RX ADMIN — ACETAMINOPHEN 650 MG: 325 TABLET, FILM COATED ORAL at 18:35

## 2021-01-10 RX ADMIN — INSULIN LISPRO 6 UNITS: 100 INJECTION, SOLUTION INTRAVENOUS; SUBCUTANEOUS at 08:31

## 2021-01-10 RX ADMIN — DEXAMETHASONE SODIUM PHOSPHATE 6 MG: 10 INJECTION INTRAMUSCULAR; INTRAVENOUS at 23:08

## 2021-01-10 RX ADMIN — INSULIN LISPRO 2 UNITS: 100 INJECTION, SOLUTION INTRAVENOUS; SUBCUTANEOUS at 21:35

## 2021-01-10 NOTE — PROGRESS NOTES
Hospitalist Progress Note     Admit Date:  2021  1:50 AM   Name:  Cierra Dominguez   Age:  80 y.o.  :  1933   MRN:  [id number]   PCP:  Esperanza Pearce MD  Treatment Team: Attending Provider: Ashley Castillo DO; Utilization Review: Durr, Donzetta Rinne    Subjective:   HPI and or CC:  Chief complaint: Dyspnea    26-year-old male history of diabetes and coronary disease admitted with altered mental status found to have hypoxia chest x-ray evidence of bilateral infiltrates and patient has COVID-19 pneumonia. Unclear onset but on oxygen mental status is improved. He wants to go home-still requiring 5 L nasal cannula. Try to explain treatment and inability to discharge at present time. He appeared to understand. Objective:     Patient Vitals for the past 24 hrs:   Temp Pulse Resp BP SpO2   01/10/21 1130 98.1 °F (36.7 °C) 61 19 128/65 (!) 88 %   01/10/21 0805 97.9 °F (36.6 °C) 62 17 (!) 154/72 92 %   01/10/21 0231 98.2 °F (36.8 °C) (!) 59 18 (!) 147/68 95 %   21 2316 98.1 °F (36.7 °C) 61 18 137/64 90 %   21 1950 97.7 °F (36.5 °C) 67 18 115/63 90 %   21 1631 98.2 °F (36.8 °C) 67 18 (!) 127/50 91 %     Oxygen Therapy  O2 Sat (%): (!) 88 % (01/10/21 1130)  Pulse via Oximetry: 92 beats per minute (21 1347)  O2 Device: Nasal cannula (01/10/21 0750)  O2 Flow Rate (L/min): 5 l/min (01/10/21 0750)    Intake/Output Summary (Last 24 hours) at 1/10/2021 1301  Last data filed at 1/10/2021 1235  Gross per 24 hour   Intake 260 ml   Output 440 ml   Net -180 ml         REVIEW OF SYSTEMS: Comprehensive ROS performed and negative except as stated in HPI. Physical Examination:  General:    Well nourished. No gross distress. Head:  Normocephalic, atraumatic, nares patent  Eyes:  Extraocular movements intact, normal sclera  CV:   RRR. No  Murmurs, clicks, or gallops, distal pulses intact  Lungs:   Decreased breath sounds bilateral, scattered rhonchi and faint mid zone pulmonary rales.   No wheezing  Abdomen:   Soft, nondistended, nontender. Extremities: Warm and dry. No cyanosis or edema. Skin:     No rashes or jaundice. Neuro:  No gross focal deficits, no tremor. Data Review:  I have reviewed all labs, meds, telemetry events, and studies from the last 24 hours.     Recent Results (from the past 24 hour(s))   GLUCOSE, POC    Collection Time: 01/09/21  4:32 PM   Result Value Ref Range    Glucose (POC) 198 (H) 65 - 100 mg/dL   GLUCOSE, POC    Collection Time: 01/10/21  7:34 AM   Result Value Ref Range    Glucose (POC) 281 (H) 65 - 100 mg/dL   GLUCOSE, POC    Collection Time: 01/10/21 11:19 AM   Result Value Ref Range    Glucose (POC) 350 (H) 65 - 100 mg/dL        All Micro Results     Procedure Component Value Units Date/Time    CULTURE, URINE [[id number]] Collected: 01/08/21 0400    Order Status: Completed Specimen: Urine from Clean catch Updated: 01/10/21 0802     Special Requests: NO SPECIAL REQUESTS        Culture result:       <10,000 COLONIES/mL MIXED SKIN KIM ISOLATED                Current Meds:  Current Facility-Administered Medications   Medication Dose Route Frequency    aspirin delayed-release tablet 81 mg  81 mg Oral QHS    rosuvastatin (CRESTOR) tablet 10 mg  10 mg Oral QHS    sodium chloride (NS) flush 5-40 mL  5-40 mL IntraVENous Q8H    sodium chloride (NS) flush 5-40 mL  5-40 mL IntraVENous PRN    acetaminophen (TYLENOL) tablet 650 mg  650 mg Oral Q6H PRN    Or    acetaminophen (TYLENOL) suppository 650 mg  650 mg Rectal Q6H PRN    promethazine (PHENERGAN) tablet 12.5 mg  12.5 mg Oral Q6H PRN    Or    ondansetron (ZOFRAN) injection 4 mg  4 mg IntraVENous Q6H PRN    enoxaparin (LOVENOX) injection 40 mg  40 mg SubCUTAneous DAILY    senna (SENOKOT) tablet 8.6 mg  1 Tab Oral DAILY PRN    cefTRIAXone (ROCEPHIN) 1 g in 0.9% sodium chloride (MBP/ADV) 50 mL MBP  1 g IntraVENous Q24H    doxycycline (VIBRAMYCIN) 100 mg in 0.9% sodium chloride (MBP/ADV) 100 mL MBP  100 mg IntraVENous Q12H    dexamethasone (DECADRON) 10 mg/mL injection 6 mg  6 mg IntraVENous Q24H    remdesivir 100 mg in 0.9% sodium chloride 250 mL IVPB  100 mg IntraVENous Q24H    0.9% sodium chloride infusion 250 mL  250 mL IntraVENous PRN    insulin lispro (HUMALOG) injection   SubCUTAneous AC&HS    ascorbic acid (vitamin C) (VITAMIN C) tablet 1,000 mg  1,000 mg Oral BID    zinc sulfate tablet 220 mg  220 mg Oral DAILY       Diet:  DIET DIABETIC CONSISTENT CARB    Other Studies (last 24 hours):  No results found. Assessment and Plan:     Hospital Problems as of 1/10/2021 Never Reviewed          Codes Class Noted - Resolved POA    * (Principal) Acute respiratory failure with hypoxia ([facility] [address] ICD-10-CM: J96.01  ICD-9-CM: 518.81  1/7/2021 - Present Unknown        COVID-19 ICD-10-CM: U07.1  ICD-9-CM: 079.89  1/7/2021 - Present Yes        Acute metabolic encephalopathy VDA-12-OH: G93.41  ICD-9-CM: 348.31  1/7/2021 - Present Yes        Type 2 diabetes mellitus, with long-term current use of insulin (HCC) ICD-10-CM: E11.9, Z79.4  ICD-9-CM: 250.00, V58.67  1/7/2021 - Present Yes              A/P:      Acute respiratory failure with [id number]  Acute metabolic encephalopathy-likely related to hypoxemia  COVID pneumonia   Continue with Remdesivir, Decadron  , Oxygenation support as needed  Empiric IV antibiotics.      Diabetes mellitus type 2  Monitor blood sugar.-Uncontrolled-add/titrate upward long-acting insulin  Cover with insulin sliding scale accordingly.       I have discussed the plan of care with patient.       DVT prophylaxis : Lovenox SC    Signed:  Silas SHOEMAKER

## 2021-01-11 LAB
ANION GAP SERPL CALC-SCNC: 7 MMOL/L (ref 7–16)
BACTERIA SPEC CULT: ABNORMAL
BACTERIA SPEC CULT: ABNORMAL
BASOPHILS # BLD: 0 K/UL (ref 0–0.2)
BASOPHILS NFR BLD: 0 % (ref 0–2)
BUN SERPL-MCNC: 28 MG/DL (ref 8–23)
CALCIUM SERPL-MCNC: 8 MG/DL (ref 8.3–10.4)
CHLORIDE SERPL-SCNC: 111 MMOL/L (ref 98–107)
CO2 SERPL-SCNC: 24 MMOL/L (ref 21–32)
CREAT SERPL-MCNC: 1.04 MG/DL (ref 0.8–1.5)
DIFFERENTIAL METHOD BLD: ABNORMAL
EOSINOPHIL # BLD: 0 K/UL (ref 0–0.8)
EOSINOPHIL NFR BLD: 0 % (ref 0.5–7.8)
ERYTHROCYTE [DISTWIDTH] IN BLOOD BY AUTOMATED COUNT: 17 % (ref 11.9–14.6)
GLUCOSE BLD STRIP.AUTO-MCNC: 146 MG/DL (ref 65–100)
GLUCOSE BLD STRIP.AUTO-MCNC: 198 MG/DL (ref 65–100)
GLUCOSE BLD STRIP.AUTO-MCNC: 231 MG/DL (ref 65–100)
GLUCOSE BLD STRIP.AUTO-MCNC: 299 MG/DL (ref 65–100)
GLUCOSE BLD STRIP.AUTO-MCNC: 495 MG/DL (ref 65–100)
GLUCOSE BLD STRIP.AUTO-MCNC: 88 MG/DL (ref 65–100)
GLUCOSE SERPL-MCNC: 170 MG/DL (ref 65–100)
HCT VFR BLD AUTO: 38.1 % (ref 41.1–50.3)
HGB BLD-MCNC: 12.6 G/DL (ref 13.6–17.2)
IMM GRANULOCYTES # BLD AUTO: 0.1 K/UL (ref 0–0.5)
IMM GRANULOCYTES NFR BLD AUTO: 1 % (ref 0–5)
LYMPHOCYTES # BLD: 0.4 K/UL (ref 0.5–4.6)
LYMPHOCYTES NFR BLD: 4 % (ref 13–44)
MCH RBC QN AUTO: 27.6 PG (ref 26.1–32.9)
MCHC RBC AUTO-ENTMCNC: 33.1 G/DL (ref 31.4–35)
MCV RBC AUTO: 83.4 FL (ref 79.6–97.8)
MONOCYTES # BLD: 0.3 K/UL (ref 0.1–1.3)
MONOCYTES NFR BLD: 3 % (ref 4–12)
NEUTS SEG # BLD: 9.3 K/UL (ref 1.7–8.2)
NEUTS SEG NFR BLD: 92 % (ref 43–78)
NRBC # BLD: 0 K/UL (ref 0–0.2)
PLATELET # BLD AUTO: 260 K/UL (ref 150–450)
PLATELET COMMENTS,PCOM: ADEQUATE
PMV BLD AUTO: 11.6 FL (ref 9.4–12.3)
POTASSIUM SERPL-SCNC: 4.2 MMOL/L (ref 3.5–5.1)
RBC # BLD AUTO: 4.57 M/UL (ref 4.23–5.6)
RBC MORPH BLD: ABNORMAL
SERVICE CMNT-IMP: ABNORMAL
SODIUM SERPL-SCNC: 142 MMOL/L (ref 136–145)
WBC # BLD AUTO: 10.1 K/UL (ref 4.3–11.1)

## 2021-01-11 PROCEDURE — 74011636637 HC RX REV CODE- 636/637: Performed by: INTERNAL MEDICINE

## 2021-01-11 PROCEDURE — 82962 GLUCOSE BLOOD TEST: CPT

## 2021-01-11 PROCEDURE — 80048 BASIC METABOLIC PNL TOTAL CA: CPT

## 2021-01-11 PROCEDURE — 74011250637 HC RX REV CODE- 250/637: Performed by: INTERNAL MEDICINE

## 2021-01-11 PROCEDURE — 74011000250 HC RX REV CODE- 250: Performed by: INTERNAL MEDICINE

## 2021-01-11 PROCEDURE — 97530 THERAPEUTIC ACTIVITIES: CPT

## 2021-01-11 PROCEDURE — 74011250636 HC RX REV CODE- 250/636: Performed by: INTERNAL MEDICINE

## 2021-01-11 PROCEDURE — 65270000029 HC RM PRIVATE

## 2021-01-11 PROCEDURE — 2709999900 HC NON-CHARGEABLE SUPPLY

## 2021-01-11 PROCEDURE — 85025 COMPLETE CBC W/AUTO DIFF WBC: CPT

## 2021-01-11 PROCEDURE — 74011000258 HC RX REV CODE- 258: Performed by: INTERNAL MEDICINE

## 2021-01-11 RX ORDER — DEXAMETHASONE SODIUM PHOSPHATE 4 MG/ML
4 INJECTION, SOLUTION INTRA-ARTICULAR; INTRALESIONAL; INTRAMUSCULAR; INTRAVENOUS; SOFT TISSUE EVERY 24 HOURS
Status: DISCONTINUED | OUTPATIENT
Start: 2021-01-11 | End: 2021-01-16

## 2021-01-11 RX ORDER — INSULIN LISPRO 100 [IU]/ML
10 INJECTION, SOLUTION INTRAVENOUS; SUBCUTANEOUS ONCE
Status: COMPLETED | OUTPATIENT
Start: 2021-01-11 | End: 2021-01-11

## 2021-01-11 RX ORDER — INSULIN GLARGINE 100 [IU]/ML
32 INJECTION, SOLUTION SUBCUTANEOUS
Status: DISCONTINUED | OUTPATIENT
Start: 2021-01-11 | End: 2021-01-12

## 2021-01-11 RX ADMIN — HUMAN INSULIN 20 UNITS: 100 INJECTION, SUSPENSION SUBCUTANEOUS at 12:00

## 2021-01-11 RX ADMIN — DEXAMETHASONE SODIUM PHOSPHATE 4 MG: 4 INJECTION, SOLUTION INTRAMUSCULAR; INTRAVENOUS at 22:23

## 2021-01-11 RX ADMIN — Medication 220 MG: at 08:53

## 2021-01-11 RX ADMIN — OXYCODONE HYDROCHLORIDE AND ACETAMINOPHEN 1000 MG: 500 TABLET ORAL at 17:14

## 2021-01-11 RX ADMIN — Medication 10 ML: at 22:25

## 2021-01-11 RX ADMIN — DOXYCYCLINE 100 MG: 100 INJECTION, POWDER, LYOPHILIZED, FOR SOLUTION INTRAVENOUS at 12:12

## 2021-01-11 RX ADMIN — Medication 10 ML: at 05:20

## 2021-01-11 RX ADMIN — REMDESIVIR 100 MG: 100 INJECTION, POWDER, LYOPHILIZED, FOR SOLUTION INTRAVENOUS at 04:25

## 2021-01-11 RX ADMIN — OXYCODONE HYDROCHLORIDE AND ACETAMINOPHEN 1000 MG: 500 TABLET ORAL at 08:53

## 2021-01-11 RX ADMIN — INSULIN GLARGINE 32 UNITS: 100 INJECTION, SOLUTION SUBCUTANEOUS at 22:23

## 2021-01-11 RX ADMIN — ASPIRIN 81 MG: 81 TABLET ORAL at 22:23

## 2021-01-11 RX ADMIN — INSULIN LISPRO 4 UNITS: 100 INJECTION, SOLUTION INTRAVENOUS; SUBCUTANEOUS at 08:29

## 2021-01-11 RX ADMIN — INSULIN LISPRO 2 UNITS: 100 INJECTION, SOLUTION INTRAVENOUS; SUBCUTANEOUS at 17:14

## 2021-01-11 RX ADMIN — INSULIN LISPRO 10 UNITS: 100 INJECTION, SOLUTION INTRAVENOUS; SUBCUTANEOUS at 12:13

## 2021-01-11 RX ADMIN — INSULIN LISPRO 10 UNITS: 100 INJECTION, SOLUTION INTRAVENOUS; SUBCUTANEOUS at 12:14

## 2021-01-11 RX ADMIN — ACETAMINOPHEN 650 MG: 325 TABLET, FILM COATED ORAL at 22:23

## 2021-01-11 RX ADMIN — CEFTRIAXONE SODIUM 1 G: 1 INJECTION, POWDER, FOR SOLUTION INTRAMUSCULAR; INTRAVENOUS at 22:23

## 2021-01-11 RX ADMIN — ROSUVASTATIN 10 MG: 10 TABLET, FILM COATED ORAL at 22:23

## 2021-01-11 RX ADMIN — ACETAMINOPHEN 650 MG: 325 TABLET, FILM COATED ORAL at 12:17

## 2021-01-11 RX ADMIN — DOXYCYCLINE 100 MG: 100 INJECTION, POWDER, LYOPHILIZED, FOR SOLUTION INTRAVENOUS at 23:25

## 2021-01-11 RX ADMIN — ENOXAPARIN SODIUM 40 MG: 40 INJECTION SUBCUTANEOUS at 08:53

## 2021-01-11 RX ADMIN — Medication 10 ML: at 14:16

## 2021-01-11 NOTE — PROGRESS NOTES
LMSW spoke with pt's spouse Maria Fernanda Amador by phone this afternoon. Discussed pt care planning. Per spouse pt is normally independent with ADL's. She prefers for him to come home with [facility] follow up if at all possible. Will proceed with a referral to [facility] in anticipation of possible D/C in about 3 days per CM chart review. Will continue to follow pt plan of care and assist further as needed.

## 2021-01-11 NOTE — PROGRESS NOTES
ACUTE PHYSICAL THERAPY GOALS:  (Developed with and agreed upon by patient and/or caregiver.)  (1.) Steven Robles  will move from supine to sit and sit to supine , scoot up and down and roll side to side with MODIFIED INDEPENDENCE within 7 treatment day(s). (2.) Stevne Robles will transfer from bed to chair and chair to bed with MODIFIED INDEPENDENCE using the least restrictive device within 7 treatment day(s). (3.) Steven Robles will ambulate with MODIFIED INDEPENDENCE for 300 feet with the least restrictive device within 7 treatment day(s). (4.) Steven Robles will perform standing static and dynamic balance activities x 25 minutes with MODIFIED INDEPENDENCE to improve safety and activity tolerance within 7 treatment day(s). (5.) Steven Robles will ascend and descend 4 stairs using one hand rail(s) with MODIFIED INDEPENDENCE to improve functional mobility and safety within 7 treatment day(s). (6.) Steven Robles will perform bilateral lower extremity exercises x 20 min for HEP with INDEPENDENCE to improve strength, endurance, and functional mobility within 7 treatment day(s). PHYSICAL THERAPY: Daily Note Treatment Day # 2    Steven Robles is a 80 y.o. male   PRIMARY DIAGNOSIS: Acute respiratory failure with hypoxia (HCC)  Acute respiratory failure with hypoxia ([facility] [address] [J96.01]         ASSESSMENT:     REHAB RECOMMENDATIONS: CURRENT LEVEL OF FUNCTION:  (Most Recently Demonstrated)   Recommendation to date pending progress:  [address] [place] [address] Therapy vs STR  Equipment:    None Bed Mobility:   Contact Guard Assistance  Sit to Stand:   Minimal Assistance  Transfers:   Minimal Assistance  Gait/Mobility:   Minimal Assistance     ASSESSMENT:  Mr. Angelina Brower continues to require 5L oxygen for mobility. Overall he is CGA/Min A for mobility. He declines sitting up in chair due to fatigue after working with therapy and mobilizing in room.  Pt presents as functioning below his baseline, with deficits in mobility including transfers, gait, balance, and activity tolerance. Pt will benefit from skilled therapy services to address stated deficits to promote return to highest level of function, independence, and safety. Will continue therapy efforts. Overall slow progress towards stated goals. SUBJECTIVE:   Mr. aMria Garcia states, \"Please help me. \"    SOCIAL HISTORY/ LIVING ENVIRONMENT: See initial evaluation     OBJECTIVE:     PAIN: VITAL SIGNS: LINES/DRAINS:   Pre Treatment: Pain Screen  Pain Scale 1: Numeric (0 - 10)  Pain Intensity 1: 0  Post Treatment: 0/10 Vital Signs  O2 Device: Nasal cannula  O2 Flow Rate (L/min): 5 l/min IV  O2 Device: Nasal cannula     MOBILITY: I Mod I S SBA CGA Min Mod Max Total  NT x2 Comments:   Bed Mobility    Rolling [] [] [] [] [x] [] [] [] [] [] []    Supine to Sit [] [] [] [] [x] [] [] [] [] [] []    Scooting [] [] [] [] [x] [] [] [] [] [] []    Sit to Supine [] [] [] [] [x] [] [] [] [] [] []    Transfers    Sit to Stand [] [] [] [] [] [x] [] [] [] [] []    Bed to Chair [] [] [] [] [] [x] [] [] [] [] []    Stand to Sit [] [] [] [] [] [x] [] [] [] [] []    I=Independent, Mod I=Modified Independent, S=Supervision, SBA=Standby Assistance, CGA=Contact Guard Assistance,   Min=Minimal Assistance, Mod=Moderate Assistance, Max=Maximal Assistance, Total=Total Assistance, NT=Not Tested    GAIT: I Mod I S SBA CGA Min Mod Max Total  NT x2 Comments:   Level of Assistance [] [] [] [] [] [x] [] [] [] [] []    Distance 2 x 15 ft    DME hand held assist    Gait Quality Shuffled, shortened steps    Weightbearing  Status N/A     I=Independent, Mod I=Modified Independent, S=Supervision, SBA=Standby Assistance, CGA=Contact Guard Assistance,   Min=Minimal Assistance, Mod=Moderate Assistance, Max=Maximal Assistance, Total=Total Assistance, NT=Not Tested    PLAN:   FREQUENCY/DURATION: PT Plan of Care: 3 times/week for duration of hospital stay or until stated goals are met, whichever comes first.  TREATMENT: TREATMENT:   ($$ Therapeutic Activity: 23-37 mins    )  Therapeutic Activity (25 Minutes): Therapeutic activity included Supine to Sit, Sit to Supine, Scooting, Transfer Training, Ambulation on level ground and Standing balance to improve functional Mobility, Strength and Activity tolerance.     AFTER TREATMENT POSITION/PRECAUTIONS:  Bed, Needs within reach and RN notified    INTERDISCIPLINARY COLLABORATION:  RN/PCT and PT/PTA    TOTAL TREATMENT DURATION:  PT Patient Time In/Time Out  Time In: 1500  Time Out: [address], PT

## 2021-01-11 NOTE — PROGRESS NOTES
Hospitalist Progress Note     Admit Date:  2021  1:50 AM   Name:  Jesusita Benson   Age:  80 y.o.  :  1933   MRN:  [id number]   PCP:  Corine Aleman MD  Treatment Team: Attending Provider: Blade Mora DO; Utilization Review: Ford Yeung; Physical Therapist: Lawrence Lee PT; Occupational Therapy Assistant: BUTCH Phillips; Utilization Review: Austyn Mattson RN    Subjective:   HPI and or CC:  Increased agitation today he was panicking when told his blood sugar was high and called his wife claiming he was going to have a stroke if it was not treated urgently. Suspect altered mental status related to Covid-oxygenation improved with supportive oxygen. Still on 5 L nasal cannula. Tried calling spouse 3:40 PM and no answer-no answering machine. Objective:     Patient Vitals for the past 24 hrs:   Temp Pulse Resp BP SpO2   21 1453 98.1 °F (36.7 °C) (!) 59 19 (!) 115/55 90 %   21 1129 97.6 °F (36.4 °C) (!) 59 18 121/64 93 %   21 0754 97.9 °F (36.6 °C) (!) 54 19 127/62 91 %   21 0503 98.4 °F (36.9 °C) 80 18 118/84 98 %   01/10/21 2320 98.2 °F (36.8 °C) (!) 59 18 125/63 91 %   01/10/21 2000 98.1 °F (36.7 °C) 61 20 (!) 147/67 94 %   01/10/21 1557 -- -- -- -- 91 %   01/10/21 1542 98.2 °F (36.8 °C) 62 19 (!) 166/67 (!) 86 %     Oxygen Therapy  O2 Sat (%): 90 % (21 1453)  Pulse via Oximetry: 92 beats per minute (21 1347)  O2 Device: Nasal cannula (01/10/21 1557)  O2 Flow Rate (L/min): 7 l/min (01/10/21 1557)    Intake/Output Summary (Last 24 hours) at 2021 1539  Last data filed at 2021 1214  Gross per 24 hour   Intake 720 ml   Output 525 ml   Net 195 ml         REVIEW OF SYSTEMS: Comprehensive ROS performed and negative except as stated in HPI. Physical Examination:  General:    Well nourished. No gross distress. Head:  Normocephalic, atraumatic, nares patent  Eyes:  Extraocular movements intact, normal sclera  CV:   RRR.  No  Murmurs, clicks, or gallops, distal pulses intact  Lungs:   Unlabored, no cyanosis, no wheeze  Abdomen:   Soft, nondistended, nontender.   Extremities: Warm and dry.  No cyanosis or edema.   Skin:     No rashes or jaundice.   Neuro:  No gross focal deficits, no tremor  Psych:  Mood and affect appropriate    Data Review:  I have reviewed all labs, meds, telemetry events, and studies from the last 24 hours.    Recent Results (from the past 24 hour(s))   GLUCOSE, POC    Collection Time: 01/10/21  4:14 PM   Result Value Ref Range    Glucose (POC) 269 (H) 65 - 100 mg/dL   GLUCOSE, POC    Collection Time: 01/10/21  8:26 PM   Result Value Ref Range    Glucose (POC) 161 (H) 65 - 100 mg/dL   METABOLIC PANEL, BASIC    Collection Time: 01/11/21  4:21 AM   Result Value Ref Range    Sodium 142 136 - 145 mmol/L    Potassium 4.2 3.5 - 5.1 mmol/L    Chloride 111 (H) 98 - 107 mmol/L    CO2 24 21 - 32 mmol/L    Anion gap 7 7 - 16 mmol/L    Glucose 170 (H) 65 - 100 mg/dL    BUN 28 (H) 8 - 23 MG/DL    Creatinine 1.04 0.8 - 1.5 MG/DL    GFR est AA >60 >60 ml/min/1.73m2    GFR est non-AA >60 >60 ml/min/1.73m2    Calcium 8.0 (L) 8.3 - 10.4 MG/DL   CBC WITH AUTOMATED DIFF    Collection Time: 01/11/21  4:21 AM   Result Value Ref Range    WBC 10.1 4.3 - 11.1 K/uL    RBC 4.57 4.23 - 5.6 M/uL    HGB 12.6 (L) 13.6 - 17.2 g/dL    HCT 38.1 (L) 41.1 - 50.3 %    MCV 83.4 79.6 - 97.8 FL    MCH 27.6 26.1 - 32.9 PG    MCHC 33.1 31.4 - 35.0 g/dL    RDW 17.0 (H) 11.9 - 14.6 %    PLATELET 260 150 - 450 K/uL    MPV 11.6 9.4 - 12.3 FL    ABSOLUTE NRBC 0.00 0.0 - 0.2 K/uL    NEUTROPHILS 92 (H) 43 - 78 %    LYMPHOCYTES 4 (L) 13 - 44 %    MONOCYTES 3 (L) 4.0 - 12.0 %    EOSINOPHILS 0 (L) 0.5 - 7.8 %    BASOPHILS 0 0.0 - 2.0 %    IMMATURE GRANULOCYTES 1 0.0 - 5.0 %    ABS. NEUTROPHILS 9.3 (H) 1.7 - 8.2 K/UL    ABS. LYMPHOCYTES 0.4 (L) 0.5 - 4.6 K/UL    ABS. MONOCYTES 0.3 0.1 - 1.3 K/UL    ABS. EOSINOPHILS 0.0 0.0 - 0.8 K/UL    ABS. BASOPHILS 0.0 0.0 - 0.2  K/UL    ABS. IMM.  GRANS. 0.1 0.0 - 0.5 K/UL    RBC COMMENTS SLIGHT  ANISOCYTOSIS + POIKILOCYTOSIS        RBC COMMENTS OCCASIONAL  OVALOCYTES        RBC COMMENTS OCCASIONAL  BRIA CELLS        PLATELET COMMENTS ADEQUATE      DF AUTOMATED     GLUCOSE, POC    Collection Time: 01/11/21  7:53 AM   Result Value Ref Range    Glucose (POC) 231 (H) 65 - 100 mg/dL   GLUCOSE, POC    Collection Time: 01/11/21 11:26 AM   Result Value Ref Range    Glucose (POC) 495 (HH) 65 - 100 mg/dL   GLUCOSE, POC    Collection Time: 01/11/21  1:23 PM   Result Value Ref Range    Glucose (POC) 299 (H) 65 - 100 mg/dL   GLUCOSE, POC    Collection Time: 01/11/21  2:52 PM   Result Value Ref Range    Glucose (POC) 198 (H) 65 - 100 mg/dL        All Micro Results     Procedure Component Value Units Date/Time    CULTURE, URINE [[id number]] Collected: 01/08/21 0400    Order Status: Completed Specimen: Urine from Clean catch Updated: 01/10/21 0802     Special Requests: NO SPECIAL REQUESTS        Culture result:       <10,000 COLONIES/mL MIXED SKIN KIM ISOLATED                Current Meds:  Current Facility-Administered Medications   Medication Dose Route Frequency    doxycycline (VIBRAMYCIN) 100 mg in 0.9% sodium chloride (MBP/ADV) 100 mL MBP  100 mg IntraVENous Q12H    dexamethasone (DECADRON) 4 mg/mL injection 4 mg  4 mg IntraVENous Q24H    insulin glargine (LANTUS) injection 32 Units  32 Units SubCUTAneous QHS    aspirin delayed-release tablet 81 mg  81 mg Oral QHS    rosuvastatin (CRESTOR) tablet 10 mg  10 mg Oral QHS    sodium chloride (NS) flush 5-40 mL  5-40 mL IntraVENous Q8H    sodium chloride (NS) flush 5-40 mL  5-40 mL IntraVENous PRN    acetaminophen (TYLENOL) tablet 650 mg  650 mg Oral Q6H PRN    Or    acetaminophen (TYLENOL) suppository 650 mg  650 mg Rectal Q6H PRN    promethazine (PHENERGAN) tablet 12.5 mg  12.5 mg Oral Q6H PRN    Or    ondansetron (ZOFRAN) injection 4 mg  4 mg IntraVENous Q6H PRN    enoxaparin (LOVENOX) injection 40 mg  40 mg SubCUTAneous DAILY    senna (SENOKOT) tablet 8.6 mg  1 Tab Oral DAILY PRN    cefTRIAXone (ROCEPHIN) 1 g in 0.9% sodium chloride (MBP/ADV) 50 mL MBP  1 g IntraVENous Q24H    remdesivir 100 mg in 0.9% sodium chloride 250 mL IVPB  100 mg IntraVENous Q24H    0.9% sodium chloride infusion 250 mL  250 mL IntraVENous PRN    insulin lispro (HUMALOG) injection   SubCUTAneous AC&HS    ascorbic acid (vitamin C) (VITAMIN C) tablet 1,000 mg  1,000 mg Oral BID    zinc sulfate tablet 220 mg  220 mg Oral DAILY       Diet:  DIET DIABETIC CONSISTENT CARB    Other Studies (last 24 hours):  No results found. Assessment and Plan:     Hospital Problems as of 1/11/2021 Never Reviewed          Codes Class Noted - Resolved POA    * (Principal) Acute respiratory failure with hypoxia ([facility] [address] ICD-10-CM: J96.01  ICD-9-CM: 518.81  1/7/2021 - Present Unknown        COVID-19 ICD-10-CM: U07.1  ICD-9-CM: 079.89  1/7/2021 - Present Yes        Acute metabolic encephalopathy JDO-11-VT: G93.41  ICD-9-CM: 348.31  1/7/2021 - Present Yes        Type 2 diabetes mellitus, with long-term current use of insulin ([facility]) ICD-10-CM: E11.9, Z79.4  ICD-9-CM: 250.00, V58.67  1/7/2021 - Present Yes              A/P:    Acute respiratory failure with [id number]  Acute metabolic encephalopathy-likely related to hypoxemia and covid infection  COVID pneumonia   Continue with Remdesivir, Decadron  , Oxygenation support as needed  Empiric IV antibiotics.   Unchanged today     Diabetes mellitus type 2  Monitor blood sugar.-Uncontrolled-add/titrate upward long-acting insulin  Cover with insulin sliding scale accordingly. Titrate insulin prn     I have discussed the plan of care with patient.       DVT prophylaxis : Lovenox [facility], Karen Dickens Karen [phone] this number 3:40pm and no answer    Signed:  Silas SHOEMAKER

## 2021-01-11 NOTE — PROGRESS NOTES
Pt has skin tear from I.V tape to his right hand. Wound covered with gauze and mepilex. Will continue to monitor.

## 2021-01-12 ENCOUNTER — HOME HEALTH ADMISSION (OUTPATIENT)
Dept: HOME HEALTH SERVICES | Facility: HOME HEALTH | Age: 86
End: 2021-01-12

## 2021-01-12 LAB
BACTERIA SPEC CULT: NORMAL
BACTERIA SPEC CULT: NORMAL
GLUCOSE BLD STRIP.AUTO-MCNC: 103 MG/DL (ref 65–100)
GLUCOSE BLD STRIP.AUTO-MCNC: 71 MG/DL (ref 65–100)
GLUCOSE BLD STRIP.AUTO-MCNC: 88 MG/DL (ref 65–100)
GLUCOSE BLD STRIP.AUTO-MCNC: 90 MG/DL (ref 65–100)
SERVICE CMNT-IMP: NORMAL
SERVICE CMNT-IMP: NORMAL

## 2021-01-12 PROCEDURE — 74011250636 HC RX REV CODE- 250/636: Performed by: INTERNAL MEDICINE

## 2021-01-12 PROCEDURE — 82962 GLUCOSE BLOOD TEST: CPT

## 2021-01-12 PROCEDURE — 2709999900 HC NON-CHARGEABLE SUPPLY

## 2021-01-12 PROCEDURE — 65270000029 HC RM PRIVATE

## 2021-01-12 PROCEDURE — 74011000258 HC RX REV CODE- 258: Performed by: INTERNAL MEDICINE

## 2021-01-12 PROCEDURE — 74011000250 HC RX REV CODE- 250: Performed by: INTERNAL MEDICINE

## 2021-01-12 PROCEDURE — 74011250637 HC RX REV CODE- 250/637: Performed by: INTERNAL MEDICINE

## 2021-01-12 RX ORDER — DEXTROSE 40 %
15 GEL (GRAM) ORAL AS NEEDED
Status: DISCONTINUED | OUTPATIENT
Start: 2021-01-12 | End: 2021-02-07 | Stop reason: HOSPADM

## 2021-01-12 RX ORDER — DEXTROSE 50 % IN WATER (D50W) INTRAVENOUS SYRINGE
25-50 AS NEEDED
Status: DISCONTINUED | OUTPATIENT
Start: 2021-01-12 | End: 2021-02-07 | Stop reason: HOSPADM

## 2021-01-12 RX ORDER — INSULIN GLARGINE 100 [IU]/ML
22 INJECTION, SOLUTION SUBCUTANEOUS
Status: DISCONTINUED | OUTPATIENT
Start: 2021-01-12 | End: 2021-01-13

## 2021-01-12 RX ORDER — INSULIN GLARGINE 100 [IU]/ML
26 INJECTION, SOLUTION SUBCUTANEOUS
Status: DISCONTINUED | OUTPATIENT
Start: 2021-01-12 | End: 2021-01-12

## 2021-01-12 RX ADMIN — Medication 220 MG: at 08:40

## 2021-01-12 RX ADMIN — ROSUVASTATIN 10 MG: 10 TABLET, FILM COATED ORAL at 22:03

## 2021-01-12 RX ADMIN — Medication 10 ML: at 05:53

## 2021-01-12 RX ADMIN — ASPIRIN 81 MG: 81 TABLET ORAL at 22:03

## 2021-01-12 RX ADMIN — DEXAMETHASONE SODIUM PHOSPHATE 4 MG: 4 INJECTION, SOLUTION INTRAMUSCULAR; INTRAVENOUS at 22:50

## 2021-01-12 RX ADMIN — CEFTRIAXONE SODIUM 1 G: 1 INJECTION, POWDER, FOR SOLUTION INTRAMUSCULAR; INTRAVENOUS at 22:49

## 2021-01-12 RX ADMIN — OXYCODONE HYDROCHLORIDE AND ACETAMINOPHEN 1000 MG: 500 TABLET ORAL at 08:40

## 2021-01-12 RX ADMIN — DOXYCYCLINE 100 MG: 100 INJECTION, POWDER, LYOPHILIZED, FOR SOLUTION INTRAVENOUS at 23:49

## 2021-01-12 RX ADMIN — OXYCODONE HYDROCHLORIDE AND ACETAMINOPHEN 1000 MG: 500 TABLET ORAL at 17:01

## 2021-01-12 RX ADMIN — ENOXAPARIN SODIUM 40 MG: 40 INJECTION SUBCUTANEOUS at 08:40

## 2021-01-12 RX ADMIN — Medication 10 ML: at 22:05

## 2021-01-12 RX ADMIN — DOXYCYCLINE 100 MG: 100 INJECTION, POWDER, LYOPHILIZED, FOR SOLUTION INTRAVENOUS at 11:16

## 2021-01-12 RX ADMIN — REMDESIVIR 100 MG: 100 INJECTION, POWDER, LYOPHILIZED, FOR SOLUTION INTRAVENOUS at 05:53

## 2021-01-12 NOTE — PROGRESS NOTES
Hospitalist Progress Note     Admit Date:  2021  1:50 AM   Name:  Estrella Crawford   Age:  80 y.o.  :  1933   MRN:  [id number]   PCP:  Kenn Alford MD  Treatment Team: Attending Provider: Andrew Dhillon DO; Utilization Review: Criss Dawson; Utilization Review: Annie Ackerman RN; Occupational Therapy Assistant: BUTCH Garcia    Subjective:   HPI and or CC:  He is much calmer today and sugars are much better in fact he has documented hypoglycemia at [id number]. Fingerstick blood sugar 71. Hypoglycemia protocol. He is much calmer and discussed possibility he may be hospitalized at least a few more days and possibly longer. Still requiring 6 L nasal cannula oxygen support. Denies chest pain. He does have a nonproductive cough        Objective:     Patient Vitals for the past 24 hrs:   Temp Pulse Resp BP SpO2   21 1432 97.8 °F (36.6 °C) (!) 57 20 (!) 155/70 92 %   21 1103 97.7 °F (36.5 °C) (!) 57 20 (!) 154/74 91 %   21 0753 97.7 °F (36.5 °C) (!) 57 20 (!) 146/63 90 %   21 0356 98.2 °F (36.8 °C) 89 20 139/67 90 %   21 2237 98 °F (36.7 °C) 62 20 (!) 126/58 91 %   21 1950 98.4 °F (36.9 °C) (!) 56 20 (!) 116/58 90 %     Oxygen Therapy  O2 Sat (%): 92 % (21 1432)  Pulse via Oximetry: 92 beats per minute (21 1347)  O2 Device: Nasal cannula (21 0753)  O2 Flow Rate (L/min): 6 l/min (21 0753)    Intake/Output Summary (Last 24 hours) at 2021 1755  Last data filed at 2021 1419  Gross per 24 hour   Intake --   Output 400 ml   Net -400 ml         REVIEW OF SYSTEMS: Comprehensive ROS performed and negative except as stated in HPI. Physical Examination:  General-alert and oriented x3, calm and cooperative today. Head-oral mucosa moist nares are patent no facial asymmetry  Heart-regular rate and rhythm no rubs gallops clicks or murmurs.   No JVD  Lungs-decreased breath sounds bilateral, no wheezing and normal symmetric excursion noted  Abdomen-no distention, no rebound tenderness. Neuromuscular-no deformity of the extremities upper or lower, moves all extremities symmetrically        Data Review:  I have reviewed all labs, meds, telemetry events, and studies from the last 24 hours.     Recent Results (from the past 24 hour(s))   GLUCOSE, POC    Collection Time: 01/11/21  7:55 PM   Result Value Ref Range    Glucose (POC) 88 65 - 100 mg/dL   GLUCOSE, POC    Collection Time: 01/12/21  7:08 AM   Result Value Ref Range    Glucose (POC) 90 65 - 100 mg/dL   GLUCOSE, POC    Collection Time: 01/12/21 11:06 AM   Result Value Ref Range    Glucose (POC) 103 (H) 65 - 100 mg/dL   GLUCOSE, POC    Collection Time: 01/12/21  3:09 PM   Result Value Ref Range    Glucose (POC) 71 65 - 100 mg/dL        All Micro Results     Procedure Component Value Units Date/Time    CULTURE, URINE [[id number]] Collected: 01/08/21 0400    Order Status: Completed Specimen: Urine from Clean catch Updated: 01/10/21 0802     Special Requests: NO SPECIAL REQUESTS        Culture result:       <10,000 COLONIES/mL MIXED SKIN KIM ISOLATED                Current Meds:  Current Facility-Administered Medications   Medication Dose Route Frequency    insulin glargine (LANTUS) injection 26 Units  26 Units SubCUTAneous QHS    doxycycline (VIBRAMYCIN) 100 mg in 0.9% sodium chloride (MBP/ADV) 100 mL MBP  100 mg IntraVENous Q12H    dexamethasone (DECADRON) 4 mg/mL injection 4 mg  4 mg IntraVENous Q24H    aspirin delayed-release tablet 81 mg  81 mg Oral QHS    rosuvastatin (CRESTOR) tablet 10 mg  10 mg Oral QHS    sodium chloride (NS) flush 5-40 mL  5-40 mL IntraVENous Q8H    sodium chloride (NS) flush 5-40 mL  5-40 mL IntraVENous PRN    acetaminophen (TYLENOL) tablet 650 mg  650 mg Oral Q6H PRN    Or    acetaminophen (TYLENOL) suppository 650 mg  650 mg Rectal Q6H PRN    promethazine (PHENERGAN) tablet 12.5 mg  12.5 mg Oral Q6H PRN    Or    ondansetron (ZOFRAN) injection 4 mg  4 mg IntraVENous Q6H PRN    enoxaparin (LOVENOX) injection 40 mg  40 mg SubCUTAneous DAILY    senna (SENOKOT) tablet 8.6 mg  1 Tab Oral DAILY PRN    cefTRIAXone (ROCEPHIN) 1 g in 0.9% sodium chloride (MBP/ADV) 50 mL MBP  1 g IntraVENous Q24H    0.9% sodium chloride infusion 250 mL  250 mL IntraVENous PRN    insulin lispro (HUMALOG) injection   SubCUTAneous AC&HS    ascorbic acid (vitamin C) (VITAMIN C) tablet 1,000 mg  1,000 mg Oral BID    zinc sulfate tablet 220 mg  220 mg Oral DAILY       Diet:  DIET DIABETIC CONSISTENT CARB    Other Studies (last 24 hours):  No results found. Assessment and Plan:     Hospital Problems as of 1/12/2021 Never Reviewed          Codes Class Noted - Resolved POA    * (Principal) Acute respiratory failure with hypoxia ([facility] [address] ICD-10-CM: J96.01  ICD-9-CM: 518.81  1/7/2021 - Present Unknown        COVID-19 ICD-10-CM: U07.1  ICD-9-CM: 079.89  1/7/2021 - Present Yes        Acute metabolic encephalopathy XUI-67-AK: G93.41  ICD-9-CM: 348.31  1/7/2021 - Present Yes        Type 2 diabetes mellitus, with long-term current use of insulin ([facility]) ICD-10-CM: E11.9, Z79.4  ICD-9-CM: 250.00, V58.67  1/7/2021 - Present Yes              A/P:    Acute respiratory failure with [id number]  Acute metabolic encephalopathy-likely related to hypoxemia and covid infection-this is improved today  COVID pneumonia   Continue with Remdesivir, Decadron  , Oxygenation support as needed  Empiric IV antibiotics. -Blood cultures no growth to date greater than 5 days-final blood urine culture no growth to date     Diabetes mellitus type 2  Monitor blood sugar.-Uncontrolled-add/titrate upward long-acting insulin  Cover with insulin sliding scale accordingly. Need to titrate long-acting insulin lower.   Would rather have sugars 300s then hypoglycemic     I have discussed the plan of care with patient.       DVT prophylaxis : Lovenox SC    Flores, Karen Varma [phone]   Called this number 3:40pm and no answer    Signed:  Silas SHOEMAKER

## 2021-01-12 NOTE — PROGRESS NOTES
Hourly rounds completed, pt denies needs at this time. Pt doing well this shift, still at 7L. Pt did not eat lunch on time and BS was in the 70s. Pt felt fine and ate a meal at that time. Pt with no complaints at this time.

## 2021-01-13 LAB
ANION GAP SERPL CALC-SCNC: 7 MMOL/L (ref 7–16)
BASOPHILS # BLD: 0 K/UL (ref 0–0.2)
BASOPHILS NFR BLD: 0 % (ref 0–2)
BUN SERPL-MCNC: 20 MG/DL (ref 8–23)
CALCIUM SERPL-MCNC: 7.8 MG/DL (ref 8.3–10.4)
CHLORIDE SERPL-SCNC: 111 MMOL/L (ref 98–107)
CO2 SERPL-SCNC: 26 MMOL/L (ref 21–32)
CREAT SERPL-MCNC: 0.87 MG/DL (ref 0.8–1.5)
DIFFERENTIAL METHOD BLD: ABNORMAL
EOSINOPHIL # BLD: 0 K/UL (ref 0–0.8)
EOSINOPHIL NFR BLD: 0 % (ref 0.5–7.8)
ERYTHROCYTE [DISTWIDTH] IN BLOOD BY AUTOMATED COUNT: 17.2 % (ref 11.9–14.6)
GLUCOSE BLD STRIP.AUTO-MCNC: 110 MG/DL (ref 65–100)
GLUCOSE BLD STRIP.AUTO-MCNC: 112 MG/DL (ref 65–100)
GLUCOSE BLD STRIP.AUTO-MCNC: 217 MG/DL (ref 65–100)
GLUCOSE BLD STRIP.AUTO-MCNC: 81 MG/DL (ref 65–100)
GLUCOSE SERPL-MCNC: 62 MG/DL (ref 65–100)
HCT VFR BLD AUTO: 36.6 % (ref 41.1–50.3)
HGB BLD-MCNC: 10.7 G/DL (ref 13.6–17.2)
HGB BLD-MCNC: 12.2 G/DL (ref 13.6–17.2)
IMM GRANULOCYTES # BLD AUTO: 0.1 K/UL (ref 0–0.5)
IMM GRANULOCYTES NFR BLD AUTO: 1 % (ref 0–5)
LYMPHOCYTES # BLD: 0.3 K/UL (ref 0.5–4.6)
LYMPHOCYTES NFR BLD: 4 % (ref 13–44)
MCH RBC QN AUTO: 28 PG (ref 26.1–32.9)
MCHC RBC AUTO-ENTMCNC: 33.3 G/DL (ref 31.4–35)
MCV RBC AUTO: 83.9 FL (ref 79.6–97.8)
MONOCYTES # BLD: 0.3 K/UL (ref 0.1–1.3)
MONOCYTES NFR BLD: 3 % (ref 4–12)
NEUTS SEG # BLD: 8.7 K/UL (ref 1.7–8.2)
NEUTS SEG NFR BLD: 92 % (ref 43–78)
NRBC # BLD: 0 K/UL (ref 0–0.2)
PLATELET # BLD AUTO: 265 K/UL (ref 150–450)
PMV BLD AUTO: 10.7 FL (ref 9.4–12.3)
POTASSIUM SERPL-SCNC: 3.9 MMOL/L (ref 3.5–5.1)
RBC # BLD AUTO: 4.36 M/UL (ref 4.23–5.6)
SODIUM SERPL-SCNC: 144 MMOL/L (ref 136–145)
WBC # BLD AUTO: 9.5 K/UL (ref 4.3–11.1)

## 2021-01-13 PROCEDURE — 85018 HEMOGLOBIN: CPT

## 2021-01-13 PROCEDURE — 74011250637 HC RX REV CODE- 250/637: Performed by: INTERNAL MEDICINE

## 2021-01-13 PROCEDURE — 97530 THERAPEUTIC ACTIVITIES: CPT

## 2021-01-13 PROCEDURE — 65270000029 HC RM PRIVATE

## 2021-01-13 PROCEDURE — 74011636637 HC RX REV CODE- 636/637: Performed by: INTERNAL MEDICINE

## 2021-01-13 PROCEDURE — 74011000258 HC RX REV CODE- 258: Performed by: INTERNAL MEDICINE

## 2021-01-13 PROCEDURE — 80048 BASIC METABOLIC PNL TOTAL CA: CPT

## 2021-01-13 PROCEDURE — 74011250636 HC RX REV CODE- 250/636: Performed by: INTERNAL MEDICINE

## 2021-01-13 PROCEDURE — 85025 COMPLETE CBC W/AUTO DIFF WBC: CPT

## 2021-01-13 PROCEDURE — 82962 GLUCOSE BLOOD TEST: CPT

## 2021-01-13 RX ORDER — INSULIN GLARGINE 100 [IU]/ML
16 INJECTION, SOLUTION SUBCUTANEOUS
Status: DISCONTINUED | OUTPATIENT
Start: 2021-01-13 | End: 2021-01-14

## 2021-01-13 RX ORDER — PANTOPRAZOLE SODIUM 40 MG/1
40 TABLET, DELAYED RELEASE ORAL
Status: DISCONTINUED | OUTPATIENT
Start: 2021-01-13 | End: 2021-02-07 | Stop reason: HOSPADM

## 2021-01-13 RX ADMIN — OXYCODONE HYDROCHLORIDE AND ACETAMINOPHEN 1000 MG: 500 TABLET ORAL at 09:12

## 2021-01-13 RX ADMIN — ENOXAPARIN SODIUM 40 MG: 40 INJECTION SUBCUTANEOUS at 09:12

## 2021-01-13 RX ADMIN — DOXYCYCLINE 100 MG: 100 INJECTION, POWDER, LYOPHILIZED, FOR SOLUTION INTRAVENOUS at 12:04

## 2021-01-13 RX ADMIN — ROSUVASTATIN 10 MG: 10 TABLET, FILM COATED ORAL at 22:00

## 2021-01-13 RX ADMIN — Medication 10 ML: at 21:09

## 2021-01-13 RX ADMIN — OXYCODONE HYDROCHLORIDE AND ACETAMINOPHEN 1000 MG: 500 TABLET ORAL at 16:46

## 2021-01-13 RX ADMIN — Medication 10 ML: at 05:21

## 2021-01-13 RX ADMIN — INSULIN LISPRO 4 UNITS: 100 INJECTION, SOLUTION INTRAVENOUS; SUBCUTANEOUS at 16:30

## 2021-01-13 RX ADMIN — DEXAMETHASONE SODIUM PHOSPHATE 4 MG: 4 INJECTION, SOLUTION INTRAMUSCULAR; INTRAVENOUS at 22:09

## 2021-01-13 RX ADMIN — WITCH HAZEL 1 PAD: 500 SOLUTION RECTAL; TOPICAL at 21:27

## 2021-01-13 RX ADMIN — Medication 220 MG: at 09:12

## 2021-01-13 RX ADMIN — PANTOPRAZOLE SODIUM 40 MG: 40 TABLET, DELAYED RELEASE ORAL at 15:22

## 2021-01-13 NOTE — PROGRESS NOTES
Hospitalist Progress Note     Admit Date:  2021  1:50 AM   Name:  Erna Rodriguez   Age:  80 y.o.  :  1933   MRN:  [id number]   PCP:  Isabell Guzman MD  Treatment Team: Attending Provider: Stepehn Livingston DO; Utilization Review: Christiana Dawson Duty; Utilization Review: Emely Chris RN; Physical Therapy Assistant: Cem Rizvi PTA; Occupational Therapist: Lemuel Torres    Subjective:   HPI and or CC:    Around 2:30 PM patient experienced bright red rectal bleeding when he got up to go to the bathroom. Vital signs stable. Not currently active further bleeding. By description hemorrhoidal versus lower GI other. Earlier today no new complaints and affect improved and oxygenation noted to be 6 L nasal cannula. Aspirin held Lovenox discontinued and GI asked to see in consultation. Serial hemoglobin with stat hemoglobin ordered. Objective:     Patient Vitals for the past 24 hrs:   Temp Pulse Resp BP SpO2   21 1210 97.8 °F (36.6 °C) 63 18 (!) 156/72 91 %   21 0816 98.4 °F (36.9 °C) (!) 59 18 (!) 148/77 94 %   21 0418 98 °F (36.7 °C) 63 18 (!) 156/76 95 %   21 0049 -- -- -- (!) 154/72 --   21 2317 98 °F (36.7 °C) 61 18 (!) 182/85 95 %   21 1811 97.8 °F (36.6 °C) 62 20 (!) 159/71 93 %     Oxygen Therapy  O2 Sat (%): 91 % (21 1210)  Pulse via Oximetry: 92 beats per minute (21 1347)  O2 Device: Nasal cannula (21)  O2 Flow Rate (L/min): 6 l/min (21 08)    Intake/Output Summary (Last 24 hours) at 2021 1447  Last data filed at 2021 1210  Gross per 24 hour   Intake 320 ml   Output 900 ml   Net -580 ml         REVIEW OF SYSTEMS: Comprehensive ROS performed and negative except as stated in HPI. Physical Examination:  General-prior to above alert and oriented person place and time. No acute distress earlier today. Anxious regarding lower GI bleed.   Heart-regular rate rhythm no gallop  Lungs-clear no rales rhonchi or wheezing  Abdomen-nontender no rebound tenderness, bowel sounds in all 4 quadrants  Neuromuscular-moves all extremities symmetrically without deformity of extremities. Data Review:  I have reviewed all labs, meds, telemetry events, and studies from the last 24 hours. Recent Results (from the past 24 hour(s))   GLUCOSE, POC    Collection Time: 01/12/21  3:09 PM   Result Value Ref Range    Glucose (POC) 71 65 - 100 mg/dL   GLUCOSE, POC    Collection Time: 01/12/21  8:14 PM   Result Value Ref Range    Glucose (POC) 88 65 - 699 mg/dL   METABOLIC PANEL, BASIC    Collection Time: 01/13/21  4:56 AM   Result Value Ref Range    Sodium 144 136 - 145 mmol/L    Potassium 3.9 3.5 - 5.1 mmol/L    Chloride 111 (H) 98 - 107 mmol/L    CO2 26 21 - 32 mmol/L    Anion gap 7 7 - 16 mmol/L    Glucose 62 (L) 65 - 100 mg/dL    BUN 20 8 - 23 MG/DL    Creatinine 0.87 0.8 - 1.5 MG/DL    GFR est AA >60 >60 ml/min/1.73m2    GFR est non-AA >60 >60 ml/min/1.73m2    Calcium 7.8 (L) 8.3 - 10.4 MG/DL   CBC WITH AUTOMATED DIFF    Collection Time: 01/13/21  4:56 AM   Result Value Ref Range    WBC 9.5 4.3 - 11.1 K/uL    RBC 4.36 4.23 - 5.6 M/uL    HGB 12.2 (L) 13.6 - 17.2 g/dL    HCT 36.6 (L) 41.1 - 50.3 %    MCV 83.9 79.6 - 97.8 FL    MCH 28.0 26.1 - 32.9 PG    MCHC 33.3 31.4 - 35.0 g/dL    RDW 17.2 (H) 11.9 - 14.6 %    PLATELET 186 625 - 948 K/uL    MPV 10.7 9.4 - 12.3 FL    ABSOLUTE NRBC 0.00 0.0 - 0.2 K/uL    DF AUTOMATED      NEUTROPHILS 92 (H) 43 - 78 %    LYMPHOCYTES 4 (L) 13 - 44 %    MONOCYTES 3 (L) 4.0 - 12.0 %    EOSINOPHILS 0 (L) 0.5 - 7.8 %    BASOPHILS 0 0.0 - 2.0 %    IMMATURE GRANULOCYTES 1 0.0 - 5.0 %    ABS. NEUTROPHILS 8.7 (H) 1.7 - 8.2 K/UL    ABS. LYMPHOCYTES 0.3 (L) 0.5 - 4.6 K/UL    ABS. MONOCYTES 0.3 0.1 - 1.3 K/UL    ABS. EOSINOPHILS 0.0 0.0 - 0.8 K/UL    ABS. BASOPHILS 0.0 0.0 - 0.2 K/UL    ABS. IMM.  GRANS. 0.1 0.0 - 0.5 K/UL   GLUCOSE, POC    Collection Time: 01/13/21  7:27 AM   Result Value Ref Range    Glucose (POC) 81 65 - 100 mg/dL   GLUCOSE, POC    Collection Time: 01/13/21 11:01 AM   Result Value Ref Range    Glucose (POC) 110 (H) 65 - 100 mg/dL        All Micro Results     Procedure Component Value Units Date/Time    CULTURE, URINE [[id number]] Collected: 01/08/21 0400    Order Status: Completed Specimen: Urine from Clean catch Updated: 01/10/21 0802     Special Requests: NO SPECIAL REQUESTS        Culture result:       <10,000 COLONIES/mL MIXED SKIN KIM ISOLATED                Current Meds:  Current Facility-Administered Medications   Medication Dose Route Frequency    pantoprazole (PROTONIX) tablet 40 mg  40 mg Oral ACB&D    dextrose 40% (GLUTOSE) oral gel 1 Tube  15 g Oral PRN    glucagon (GLUCAGEN) injection 1 mg  1 mg IntraMUSCular PRN    dextrose (D50W) injection syrg 12.5-25 g  25-50 mL IntraVENous PRN    insulin glargine (LANTUS) injection 22 Units  22 Units SubCUTAneous QHS    influenza vaccine 2020-21 (6 mos+)(PF) (FLUARIX/FLULAVAL/FLUZONE QUAD) injection 0.5 mL  0.5 mL IntraMUSCular PRIOR TO DISCHARGE    dexamethasone (DECADRON) 4 mg/mL injection 4 mg  4 mg IntraVENous Q24H    [Held by provider] aspirin delayed-release tablet 81 mg  81 mg Oral QHS    rosuvastatin (CRESTOR) tablet 10 mg  10 mg Oral QHS    sodium chloride (NS) flush 5-40 mL  5-40 mL IntraVENous Q8H    sodium chloride (NS) flush 5-40 mL  5-40 mL IntraVENous PRN    acetaminophen (TYLENOL) tablet 650 mg  650 mg Oral Q6H PRN    Or    acetaminophen (TYLENOL) suppository 650 mg  650 mg Rectal Q6H PRN    promethazine (PHENERGAN) tablet 12.5 mg  12.5 mg Oral Q6H PRN    Or    ondansetron (ZOFRAN) injection 4 mg  4 mg IntraVENous Q6H PRN    senna (SENOKOT) tablet 8.6 mg  1 Tab Oral DAILY PRN    0.9% sodium chloride infusion 250 mL  250 mL IntraVENous PRN    insulin lispro (HUMALOG) injection   SubCUTAneous AC&HS    ascorbic acid (vitamin C) (VITAMIN C) tablet 1,000 mg  1,000 mg Oral BID    zinc sulfate tablet 220 mg  220 mg Oral DAILY       Diet:  DIET DIABETIC CONSISTENT CARB  DIET NPO    Other Studies (last 24 hours):  No results found. Assessment and Plan:     Hospital Problems as of 1/13/2021 Never Reviewed          Codes Class Noted - Resolved POA    * (Principal) Acute respiratory failure with hypoxia ([facility] [address] ICD-10-CM: J96.01  ICD-9-CM: 518.81  1/7/2021 - Present Unknown        COVID-19 ICD-10-CM: U07.1  ICD-9-CM: 079.89  1/7/2021 - Present Yes        Acute metabolic encephalopathy VEU-85-YN: G93.41  ICD-9-CM: 348.31  1/7/2021 - Present Yes        Type 2 diabetes mellitus, with long-term current use of insulin ([facility]) ICD-10-CM: E11.9, Z79.4  ICD-9-CM: 250.00, V58.67  1/7/2021 - Present Yes              A/P:    Acute respiratory failure with [id number]  Acute metabolic encephalopathy-likely related to hypoxemia and covid infection-this is improved today  COVID pneumonia   Continue with Remdesivir, Decadron  , Oxygenation support as needed  Empiric IV antibiotics. -Blood cultures no growth to date greater than 5 days-final blood urine culture no growth to date-I am going to stop doxycycline-risk/benefit issues.     Diabetes mellitus type 2  Monitor blood sugar.-Uncontrolled-add/titrate upward long-acting insulin  Cover with insulin sliding scale accordingly. Continue to wean long-acting insulin-continue current dose Decadron as tolerated. If suggestion of upper GI bleeding will need to discontinue steroids. GI bleed-character and self-limited nature of bright red rectal bleeding suggest lower GI hemorrhoidal versus diverticular probably top differential diagnosis-every 8 hours hemoglobin with stat hemoglobin now. Twice daily PPI, stop Lovenox hold baby aspirin GI consult. Monitor vitals. Notify of recurrence          DVT prophylaxis : SCDs    Mark Karen Reddy Thomas [phone]   Called this number 3:40pm and no answer    Signed:  Silas SHOEMAKER

## 2021-01-13 NOTE — PROGRESS NOTES
Problem: Falls - Risk of  Goal: *Absence of Falls  Description: Document Jeff Perdomo Fall Risk and appropriate interventions in the flowsheet. Outcome: Progressing Towards Goal  Note: Fall Risk Interventions:  Mobility Interventions: Patient to call before getting OOB, PT Consult for mobility concerns         Medication Interventions: Patient to call before getting OOB, Teach patient to arise slowly    Elimination Interventions: Call light in reach, Elevated toilet seat    History of Falls Interventions:  Investigate reason for fall

## 2021-01-13 NOTE — CONSULTS
[facility] Consult Note       Primary GI Physician:  [place] - Dr. Carolyn Martinez    Referring Provider:  Dr. Rikki Gomez Date:  1/13/2021    Admit Date:  1/8/2021    Chief Complaint:  Large volume bright red rectal bleed per nursing    Subjective:     History of Present Illness:  Patient is a 80 y.o. male with PMH of including but not limited to DM 2, sleep apnea, CAD s/p MI - on ASA, HLD, arthritis, TIA, who is seen in consultation at the request of Dr. Janice Ruiz for large volume bright red rectal bleed per nursing. He was admitted for COVID, testing positive 7 Jan 2021. Today about 1430, he had an episode of passing a large amount of BRB with small amount of stool. Hgb has been 13.4, 13.4, 12.6 over this admission, and today 12.2 earlier today. He was not having any abd pain, nausea, vomiting, chest pain, or increased SOB. He has not had prior episodes of bleeding. He had not had a BM for 2 days, but did not have a hard BM with the next BM. His vital signs had been stable, BP 150s/70s and HR in 60s today, with decrease with most recent vitals of /51 and HR 72. He has been on O2 6L over this admission, but has not required increased O2 since the bleeding. Repeat hgb pending. He has had history of hemorrhoids. He does not recall if he has had a colonoscopy.       PMH:  Past Medical History:   Diagnosis Date    Acquired hallux valgus of left foot     Arthritis     OA- shoulders, knees, toes    CAD (coronary artery disease)     MI in 1980, no c/o - no stents or surgery    Diabetes (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 PSH:  Past Surgical History:   Procedure Laterality Date    FOOT/TOES SURGERY PROC UNLISTED      LEFT (for hammer toe, bunion)    HX CATARACT REMOVAL  2010    LEFT, denies implant- wife states iol    HX MOHS PROCEDURES  1990's    Rt.  HX ORTHOPAEDIC  2011    plate in left foot and then repair of broken plate (2 surg)    HX OTHER SURGICAL  1980s    michael under the chin     HX OTHER SURGICAL      had nose surgery for rosacea    HX TONSILLECTOMY  as child    T&A       Allergies: Allergies   Allergen Reactions    Betadine [Povidone-Iodine] Swelling     And Blisters on skin        Home Medications:  Prior to Admission medications    Medication Sig Start Date End Date Taking? Authorizing Provider   HYDROcodone-acetaminophen (LORTAB 7.5) 7.5-500 mg per tablet Take 1 Tab by mouth every six (6) hours as needed. Provider, Historical   chlorhexidine (HIBICLENS) 4 % liquid Apply 1 Each to affected area as directed. Wash left foot with Hibiclens and leave on 1 minute then rinse on morning of surgery   Indications: Wash left foot with Hibiclens and leave on 1 minute then rinse on morning of surgery    Provider, Historical   insulin detemir (LEVEMIR FLEXPEN) 100 unit/mL flexpen 25 Units by SubCUTAneous route every morning. TAKE HALF DOSE ON AM OF SURGERY  Indications: Take 12 units morning  of surgery 2/10/11   Provider, Historical   aspirin 81 mg tablet Take 81 mg by mouth nightly. \"doesnt take regularly\" per pt  Indications: MYOCARDIAL INFARCTION 2/10/11   Provider, Historical   cetirizine (ZYRTEC) 10 mg tablet Take 10 mg by mouth nightly. Provider, Historical   rosuvastatin (CRESTOR) 10 mg tablet Take 10 mg by mouth nightly. Provider, Historical   metformin (GLUCOPHAGE) 500 mg tablet Take 500 mg by mouth two (2) times daily (with meals).     Provider, Historical Hospital Medications:  Current Facility-Administered Medications   Medication Dose Route Frequency    pantoprazole (PROTONIX) tablet 40 mg  40 mg Oral ACB&D    insulin glargine (LANTUS) injection 16 Units  16 Units SubCUTAneous QHS    dextrose 40% (GLUTOSE) oral gel 1 Tube  15 g Oral PRN    glucagon (GLUCAGEN) injection 1 mg  1 mg IntraMUSCular PRN    dextrose (D50W) injection syrg 12.5-25 g  25-50 mL IntraVENous PRN    influenza vaccine - (6 mos+)(PF) (FLUARIX/FLULAVAL/FLUZONE QUAD) injection 0.5 mL  0.5 mL IntraMUSCular PRIOR TO DISCHARGE    dexamethasone (DECADRON) 4 mg/mL injection 4 mg  4 mg IntraVENous Q24H    [Held by provider] aspirin delayed-release tablet 81 mg  81 mg Oral QHS    rosuvastatin (CRESTOR) tablet 10 mg  10 mg Oral QHS    sodium chloride (NS) flush 5-40 mL  5-40 mL IntraVENous Q8H    sodium chloride (NS) flush 5-40 mL  5-40 mL IntraVENous PRN    acetaminophen (TYLENOL) tablet 650 mg  650 mg Oral Q6H PRN    Or    acetaminophen (TYLENOL) suppository 650 mg  650 mg Rectal Q6H PRN    promethazine (PHENERGAN) tablet 12.5 mg  12.5 mg Oral Q6H PRN    Or    ondansetron (ZOFRAN) injection 4 mg  4 mg IntraVENous Q6H PRN    senna (SENOKOT) tablet 8.6 mg  1 Tab Oral DAILY PRN    0.9% sodium chloride infusion 250 mL  250 mL IntraVENous PRN    insulin lispro (HUMALOG) injection   SubCUTAneous AC&HS    ascorbic acid (vitamin C) (VITAMIN C) tablet 1,000 mg  1,000 mg Oral BID    zinc sulfate tablet 220 mg  220 mg Oral DAILY       Social History:  Social History     Tobacco Use    Smoking status: Former Smoker     Packs/day: 1.00     Years: 25.00     Pack years: 25.00     Quit date: 2/10/1956     Years since quittin.9    Smokeless tobacco: Former User   Substance Use Topics    Alcohol use: No       Family History:  Family History   Problem Relation Age of Onset    Arthritis-rheumatoid Mother     Arthritis-rheumatoid Father     Malignant Hyperthermia Neg Hx     Pseudocholinesterase Deficiency Neg Hx     Delayed Awakening Neg Hx     Post-op Nausea/Vomiting Neg Hx     Emergence Delirium Neg Hx     Post-op Cognitive Dysfunction Neg Hx     Other Neg Hx        Review of Systems:  A detailed 10 system ROS is obtained, with pertinent positives as listed above. All others are negative. Diet:  DM    Objective:     Physical Exam:  Vitals:  Visit Vitals  BP (!) 115/51   Pulse 72   Temp 97.8 °F (36.6 °C)   Resp 18   SpO2 91%     Gen:  Pt is alert, cooperative, no acute distress, O2 NC, lying in bed  Skin:  Extremities and face reveal no rashes. HEENT: Sclerae anicteric. Extra-occular muscles are intact. No oral ulcers. No abnormal pigmentation of the lips. The neck is supple. Cardiovascular: Regular rate and rhythm. No murmurs, gallops, or rubs. Respiratory:  Comfortable breathing with no accessory muscle use. Coarse breath sounds anteriorly. GI:  Abdomen nondistended, soft, and nontender. Normal active bowel sounds. No enlargement of the liver or spleen. No masses palpable. Rectal:  Small internal hemorrhoid noted with no bleeding. Musculoskeletal:  No pitting edema of the lower legs. Neurological:  Gross memory appears intact. Patient is alert and oriented. Psychiatric:  Mood appears appropriate with judgement intact. Lymphatic:  No cervical or supraclavicular adenopathy. Laboratory:    Recent Labs     01/13/21  0456 01/11/21  0421   WBC 9.5 10.1   HGB 12.2* 12.6*   HCT 36.6* 38.1*    260   MCV 83.9 83.4    142   K 3.9 4.2   * 111*   CO2 26 24   BUN 20 28*   CREA 0.87 1.04   CA 7.8* 8.0*   GLU 62* 170*      Ref. Range 1/7/2021 15:36   D DIMER Latest Ref Range: <0.56 ug/ml(FEU) 2.44 (H)      Ref.  Range 1/7/2021 15:36 1/7/2021 15:37 1/7/2021 15:38 1/7/2021 17:21 1/8/2021 06:29 1/9/2021 07:34 1/10/2021 12:54   AST Latest Ref Range: 15 - 37 U/L  41 (H)   45 (H) 37 39 (H)   Lactic acid Latest Ref Range: 0.4 - 2.0 MMOL/L  2.0  2.2 (H) Procalcitonin Latest Units: ng/mL   0.07       Troponin-High Sensitivity Latest Ref Range: 0 - 14 pg/mL 26.8 (H)         Hemoglobin A1c, (calculated) Latest Ref Range: 4.20 - 6.30 %  7.5 (H)        Est. average glucose Latest Units: mg/dL  169           Ref. Range 1/7/2021 15:37   T4, Free Latest Ref Range: 0.78 - 1.46 NG/DL 1.2   TSH Latest Units: uIU/mL 1.380     Assessment:     Principal Problem:    Acute respiratory failure with hypoxia ([facility] [address] (1/7/2021)    Active Problems:    COVID-19 (1/7/2021)      Acute metabolic encephalopathy (8/7/0750)      Type 2 diabetes mellitus, with long-term current use of insulin ([facility] [address] (1/7/2021)    81 yo male, now pt of Dr. Alicia Welch, with PMH of including but not limited to DM 2, sleep apnea, CAD s/p MI - on ASA, HLD, arthritis, TIA, who is seen in consultation 13 Jan 2021 at the request of Dr. Colleen Anderson for large volume bright red rectal bleed per nursing, who was admitted for COVID, testing positive on 7 Jan 2021, and today had 1 BM with large amount of red blood. Hgb was has been stable, 12.2 earlier today. He was noted to have hemorrhoid on rectal exam.  The bleeding is likely related to hemorrhoids, but differential includes diverticular bleeding, ischemia, polyps, other inflammation. Unclear if he has had a prior colonoscopy. Plan:     - Supportive care, IVF.  - Recheck hgb pending.  - Monitor for any recurrent bleeding.  - Tucks wipes. - Follow. Patient is seen and examined in collaboration with Dr. Maryann Flores. Assessment and plan as per Dr. Alicia Welch. Brie Puentes  [facility]

## 2021-01-13 NOTE — PROGRESS NOTES
Pt's FS of 88. Has been low thoughout the day with highest of 103. Will hold scheduled lantus tonight.  Day team will adjust lantus in the AM.

## 2021-01-13 NOTE — PROGRESS NOTES
ACUTE PHYSICAL THERAPY GOALS:  (Developed with and agreed upon by patient and/or caregiver.)  (1.) Damian Flores  will move from supine to sit and sit to supine , scoot up and down and roll side to side with MODIFIED INDEPENDENCE within 7 treatment day(s).    (2.) Damian Flores will transfer from bed to chair and chair to bed with MODIFIED INDEPENDENCE using the least restrictive device within 7 treatment day(s).    (3.) Damian Flores will ambulate with MODIFIED INDEPENDENCE for 300 feet with the least restrictive device within 7 treatment day(s). (4.) Damian Flores will perform standing static and dynamic balance activities x 25 minutes with MODIFIED INDEPENDENCE to improve safety and activity tolerance within 7 treatment day(s). (5.) Damian Flores will ascend and descend 4 stairs using one hand rail(s) with MODIFIED INDEPENDENCE to improve functional mobility and safety within 7 treatment day(s). (6.) Damian Flores will perform bilateral lower extremity exercises x 20 min for HEP with INDEPENDENCE to improve strength, endurance, and functional mobility within 7 treatment day(s). PHYSICAL THERAPY: Daily Note and AM Treatment Day # 3    Iraida Wren is a 80 y.o. male   PRIMARY DIAGNOSIS: Acute respiratory failure with hypoxia (HCC)  Acute respiratory failure with hypoxia ([facility] [facility] [J96.01]         ASSESSMENT:     REHAB RECOMMENDATIONS: CURRENT LEVEL OF FUNCTION:  (Most Recently Demonstrated)   Recommendation to date pending progress:  [address] [place] [address] Therapy  Equipment:    Rolling Walker   To Be Determined Bed Mobility:   Standby Assistance  Sit to Stand:  [employer] Assistance  Transfers:   Contact Guard Assistance  Gait/Mobility:   Contact Guard Assistance     ASSESSMENT:  Mr. Niki Chisholm is making slow, steady progress towards PT goals. O2 sats drop with ambulation needing additional time to recover. Will continue PT efforts.      SUBJECTIVE:   Mr. Niki Chisholm states, \"I am ready to get home to my wife.\"    SOCIAL HISTORY/ LIVING ENVIRONMENT: See initial evaluation     OBJECTIVE:     PAIN: VITAL SIGNS: LINES/DRAINS:   Pre Treatment: Pain Screen  Pain Scale 1: FLACC  Pain Intensity 1: 0  Post Treatment: 0   None  O2 Device: Nasal cannula     MOBILITY: I Mod I S SBA CGA Min Mod Max Total  NT x2 Comments:   Bed Mobility    Rolling [] [] [] [] [] [] [] [] [] [] []    Supine to Sit [] [] [] [x] [] [] [] [] [] [] []    Scooting [] [] [] [] [] [] [] [] [] [] []    Sit to Supine [] [] [] [] [] [] [] [] [] [] []    Transfers    Sit to Stand [] [] [] [x] [] [] [] [] [] [] []    Bed to Chair [] [] [] [] [x] [] [] [] [] [] []    Stand to Sit [] [] [] [x] [] [] [] [] [] [] []    I=Independent, Mod I=Modified Independent, S=Supervision, SBA=Standby Assistance, CGA=Contact Guard Assistance,   Min=Minimal Assistance, Mod=Moderate Assistance, Max=Maximal Assistance, Total=Total Assistance, NT=Not Tested    GAIT: I Mod I S SBA CGA Min Mod Max Total  NT x2 Comments:   Level of Assistance [] [] [] [] [x] [] [] [] [] [] []    Distance 30' x 2    DME Rolling Walker    Gait Quality     Weightbearing  Status N/A     I=Independent, Mod I=Modified Independent, S=Supervision, SBA=Standby Assistance, CGA=Contact Guard Assistance,   Min=Minimal Assistance, Mod=Moderate Assistance, Max=Maximal Assistance, Total=Total Assistance, NT=Not Tested    PLAN:   FREQUENCY/DURATION: PT Plan of Care: 3 times/week for duration of hospital stay or until stated goals are met, whichever comes first.  TREATMENT:     TREATMENT:   ($$ Therapeutic Activity: 23-37 mins    )  Therapeutic Activity (25 Minutes): Therapeutic activity included Supine to Sit, Transfer Training, Ambulation on level ground, Standing balance and LE exercises to improve functional Mobility, Strength and Activity tolerance.     AFTER TREATMENT POSITION/PRECAUTIONS:  Alarm Activated, Chair, Needs within reach and RN notified    INTERDISCIPLINARY COLLABORATION:  PT/PTA    TOTAL TREATMENT DURATION:  PT Patient Time In/Time Out  Time In: 1025  Time Out: 06294  27 Shaye, PTA

## 2021-01-13 NOTE — PROGRESS NOTES
Attempted to see pt for OT treatment this PM. Per chart review and interdisciplinary collaboration with RN, pt had rectal bleed at 1430 this PM. RN kindly asked to hold OT treatment at this time and follow up tomorrow. Will attempt to see pt at later time and as schedule permits and pt is medically appropriate to be seen.     Thank you,    AMADOR Underwood/KRYSTINA Telephone Encounter by Georgette Bearden at 02/07/18 10:36 AM     Author:  Georgette Bearden Service:  (none) Author Type:  Patient      Filed:  02/07/18 10:44 AM Encounter Date:  2/7/2018 Status:  Addendum     :  Georgette Bearden (Patient )              MATY NIEVES    Patient Age: 58 year old    ACCT STATUS:   MESSAGE:[SW1.1T]   Pt's wife Velia (VOF) states she spoke with insurance and was advised the office needs to call and state medical necessity for VAS on Friday 2/16. Velia states insurance advised the process could take 7-10 business days and would like to reschedule procedure.[SW1.1M] Velia states to call [facility] medical review at [phone][SW1.2M] Velia states she would like to know how much procedure would cost if not covered by insurance. Velia was transferred to .[SW1.1M]    Next and Last Visit with Provider and Department  Next visit with HODA NGUYEN is on 02/16/2018 at  2:00 PM in UROLOGY HLD  Next visit with UROLOGY is on 02/16/2018 at  2:00 PM in UROLOGY HLD  Last visit with HODA NGUYEN was on 01/31/2018 at  3:00 PM in UROLOGY HLD  Last visit with UROLOGY was on 01/31/2018 at  3:00 PM in UROLOGY HLD     WEIGHT AND HEIGHT: As of 01/31/2018 weight is 206 lbs.(93.441 kg). Height is 5' 10\"(1.778 m).   BMI is 29.56 kg/(m^2) calculated from:     Height 5' 10\" (1.778 m) as of 1/31/18     Weight 206 lb (93.441 kg) as of 1/31/18      No Known Allergies  Current outpatient prescriptions       Medication  Sig Dispense Refill   • diazepam (VALIUM) 10 MG tablet Take 1 tab 30 minutes prior to procedure 1 Tab 0   • sildenafil (VIAGRA) 100 MG tablet Take 1 Tab by mouth as needed for Erectile Dysfunction. 8 Tab 11   • L-ARGININE-500 OR Take 500 mg by mouth daily.     • GINSENG OR Take 500 mg by mouth daily.     • CALCIUM-VITAMIN D OR Take  by mouth daily.     • IBUPROFEN 200 OR Take 400 mg by mouth as needed.     • Ascorbic Acid (VITAMIN C) 500 MG tablet  Take 500 mg by mouth daily.     • Multiple Vitamins-Minerals (MENS 50+ MULTI VITAMIN/MIN OR) Take  by mouth daily.     • Omega-3 Fatty Acids (FISH OIL) 500 MG CAPS Take  by mouth daily.     • Lysine 500 MG TABS Take  by mouth daily.        PHARMACY to use: [facility] [place] [id number]          Pharmacy preference(s) on file: [facility] [address]    CALL BACK INFO:[SW1.1T] Ok to leave response (including medical information) on answering machine[SW1.1M]  ROUTING:[SW1.1T] Patient's physician/staff[SW1.1M]        PCP: Isidra Patel MD         INS: Payor: [employer] PPO / Plan: *No Plan* / Product Type: *No Product type* / Note: This is the primary coverage, but no account was found for this location or the patient's primary location.   ADDRESS:  [address] [place] [id number][SW1.1T]         Revision History        User Key Date/Time User Provider Type Action    > SW1.2 02/07/18 10:44 AM Georgette Bearden Patient  Addend     SW1.1 02/07/18 10:39 AM Georgette Bearden Patient  Sign    M - Manual, T - Template

## 2021-01-14 PROBLEM — K62.5 GASTROINTESTINAL HEMORRHAGE ASSOCIATED WITH ANORECTAL SOURCE: Status: ACTIVE | Noted: 2021-01-14

## 2021-01-14 LAB
ALBUMIN SERPL-MCNC: 2 G/DL (ref 3.2–4.6)
ALBUMIN/GLOB SERPL: 0.8 {RATIO} (ref 1.2–3.5)
ALP SERPL-CCNC: 85 U/L (ref 50–136)
ALT SERPL-CCNC: 18 U/L (ref 12–65)
ANION GAP SERPL CALC-SCNC: 7 MMOL/L (ref 7–16)
AST SERPL-CCNC: 22 U/L (ref 15–37)
BASOPHILS # BLD: 0 K/UL (ref 0–0.2)
BASOPHILS NFR BLD: 0 % (ref 0–2)
BILIRUB SERPL-MCNC: 0.5 MG/DL (ref 0.2–1.1)
BUN SERPL-MCNC: 32 MG/DL (ref 8–23)
CALCIUM SERPL-MCNC: 7.6 MG/DL (ref 8.3–10.4)
CHLORIDE SERPL-SCNC: 111 MMOL/L (ref 98–107)
CO2 SERPL-SCNC: 23 MMOL/L (ref 21–32)
CREAT SERPL-MCNC: 0.93 MG/DL (ref 0.8–1.5)
DIFFERENTIAL METHOD BLD: ABNORMAL
EOSINOPHIL # BLD: 0 K/UL (ref 0–0.8)
EOSINOPHIL NFR BLD: 0 % (ref 0.5–7.8)
ERYTHROCYTE [DISTWIDTH] IN BLOOD BY AUTOMATED COUNT: 17.7 % (ref 11.9–14.6)
GLOBULIN SER CALC-MCNC: 2.5 G/DL (ref 2.3–3.5)
GLUCOSE BLD STRIP.AUTO-MCNC: 145 MG/DL (ref 65–100)
GLUCOSE BLD STRIP.AUTO-MCNC: 165 MG/DL (ref 65–100)
GLUCOSE BLD STRIP.AUTO-MCNC: 189 MG/DL (ref 65–100)
GLUCOSE BLD STRIP.AUTO-MCNC: 189 MG/DL (ref 65–100)
GLUCOSE SERPL-MCNC: 162 MG/DL (ref 65–100)
HCT VFR BLD AUTO: 29.7 % (ref 41.1–50.3)
HGB BLD-MCNC: 10 G/DL (ref 13.6–17.2)
HGB BLD-MCNC: 9 G/DL (ref 13.6–17.2)
HGB BLD-MCNC: 9.8 G/DL (ref 13.6–17.2)
IMM GRANULOCYTES # BLD AUTO: 0.2 K/UL (ref 0–0.5)
IMM GRANULOCYTES NFR BLD AUTO: 2 % (ref 0–5)
LYMPHOCYTES # BLD: 0.7 K/UL (ref 0.5–4.6)
LYMPHOCYTES NFR BLD: 7 % (ref 13–44)
MCH RBC QN AUTO: 28.2 PG (ref 26.1–32.9)
MCHC RBC AUTO-ENTMCNC: 33 G/DL (ref 31.4–35)
MCV RBC AUTO: 85.3 FL (ref 79.6–97.8)
MONOCYTES # BLD: 0.6 K/UL (ref 0.1–1.3)
MONOCYTES NFR BLD: 6 % (ref 4–12)
NEUTS SEG # BLD: 8.6 K/UL (ref 1.7–8.2)
NEUTS SEG NFR BLD: 85 % (ref 43–78)
NRBC # BLD: 0 K/UL (ref 0–0.2)
PLATELET # BLD AUTO: 261 K/UL (ref 150–450)
PMV BLD AUTO: 11.3 FL (ref 9.4–12.3)
POTASSIUM SERPL-SCNC: 4.9 MMOL/L (ref 3.5–5.1)
PROT SERPL-MCNC: 4.5 G/DL (ref 6.3–8.2)
RBC # BLD AUTO: 3.48 M/UL (ref 4.23–5.6)
SODIUM SERPL-SCNC: 141 MMOL/L (ref 138–145)
WBC # BLD AUTO: 10.1 K/UL (ref 4.3–11.1)

## 2021-01-14 PROCEDURE — 74011636637 HC RX REV CODE- 636/637: Performed by: INTERNAL MEDICINE

## 2021-01-14 PROCEDURE — 85018 HEMOGLOBIN: CPT

## 2021-01-14 PROCEDURE — 97530 THERAPEUTIC ACTIVITIES: CPT

## 2021-01-14 PROCEDURE — 74011250637 HC RX REV CODE- 250/637: Performed by: INTERNAL MEDICINE

## 2021-01-14 PROCEDURE — 36415 COLL VENOUS BLD VENIPUNCTURE: CPT

## 2021-01-14 PROCEDURE — 82962 GLUCOSE BLOOD TEST: CPT

## 2021-01-14 PROCEDURE — 74011250637 HC RX REV CODE- 250/637: Performed by: PHYSICIAN ASSISTANT

## 2021-01-14 PROCEDURE — 80053 COMPREHEN METABOLIC PANEL: CPT

## 2021-01-14 PROCEDURE — 85025 COMPLETE CBC W/AUTO DIFF WBC: CPT

## 2021-01-14 PROCEDURE — 74011250636 HC RX REV CODE- 250/636: Performed by: INTERNAL MEDICINE

## 2021-01-14 PROCEDURE — 65270000029 HC RM PRIVATE

## 2021-01-14 RX ORDER — INSULIN GLARGINE 100 [IU]/ML
14 INJECTION, SOLUTION SUBCUTANEOUS
Status: DISCONTINUED | OUTPATIENT
Start: 2021-01-14 | End: 2021-01-15

## 2021-01-14 RX ADMIN — Medication 10 ML: at 20:25

## 2021-01-14 RX ADMIN — WITCH HAZEL 1 PAD: 500 SOLUTION RECTAL; TOPICAL at 06:32

## 2021-01-14 RX ADMIN — OXYCODONE HYDROCHLORIDE AND ACETAMINOPHEN 1000 MG: 500 TABLET ORAL at 17:24

## 2021-01-14 RX ADMIN — INSULIN LISPRO 2 UNITS: 100 INJECTION, SOLUTION INTRAVENOUS; SUBCUTANEOUS at 21:29

## 2021-01-14 RX ADMIN — PANTOPRAZOLE SODIUM 40 MG: 40 TABLET, DELAYED RELEASE ORAL at 05:18

## 2021-01-14 RX ADMIN — DEXAMETHASONE SODIUM PHOSPHATE 4 MG: 4 INJECTION, SOLUTION INTRAMUSCULAR; INTRAVENOUS at 21:00

## 2021-01-14 RX ADMIN — PANTOPRAZOLE SODIUM 40 MG: 40 TABLET, DELAYED RELEASE ORAL at 17:24

## 2021-01-14 RX ADMIN — ACETAMINOPHEN 650 MG: 325 TABLET, FILM COATED ORAL at 19:30

## 2021-01-14 RX ADMIN — OXYCODONE HYDROCHLORIDE AND ACETAMINOPHEN 1000 MG: 500 TABLET ORAL at 09:29

## 2021-01-14 RX ADMIN — PHENYLEPHRINE HYDROCHLORIDE AND FAT, HARD 1 SUPPOSITORY: 5; 1.77 SUPPOSITORY RECTAL at 15:25

## 2021-01-14 RX ADMIN — INSULIN LISPRO 2 UNITS: 100 INJECTION, SOLUTION INTRAVENOUS; SUBCUTANEOUS at 09:30

## 2021-01-14 RX ADMIN — PHENYLEPHRINE HYDROCHLORIDE AND FAT, HARD 1 SUPPOSITORY: 5; 1.77 SUPPOSITORY RECTAL at 21:29

## 2021-01-14 RX ADMIN — INSULIN GLARGINE 14 UNITS: 100 INJECTION, SOLUTION SUBCUTANEOUS at 21:29

## 2021-01-14 RX ADMIN — ACETAMINOPHEN 650 MG: 325 TABLET, FILM COATED ORAL at 15:25

## 2021-01-14 RX ADMIN — Medication 220 MG: at 09:29

## 2021-01-14 RX ADMIN — ROSUVASTATIN 10 MG: 10 TABLET, FILM COATED ORAL at 20:25

## 2021-01-14 RX ADMIN — INSULIN LISPRO 2 UNITS: 100 INJECTION, SOLUTION INTRAVENOUS; SUBCUTANEOUS at 17:24

## 2021-01-14 RX ADMIN — Medication 10 ML: at 05:18

## 2021-01-14 RX ADMIN — Medication 10 ML: at 15:50

## 2021-01-14 NOTE — PROGRESS NOTES
Hospitalist Progress Note     Admit Date:  2021  1:50 AM   Name:  Jose Rucker   Age:  80 y.o.  :  1933   MRN:  [id number]   PCP:  Khadra Nolen MD  Treatment Team: Attending Provider: Zulema Collins DO; Utilization Review: Alejandra Olson; Utilization Review: Niki Chao RN; Consulting Provider: Nichole Godwin MD; Primary Nurse: Wanda Maldonado RN; Occupational Therapist: Tutu Fragoso; Physical Therapy Assistant: Orin Waters PTA; Charge Nurse: Lauren Marcum RN    Subjective:   HPI and or CC:  Chief complaint-dyspnea  No further anorectal or GI bleeding obvious. Hemoglobin dropped around 10 and has remained stable. Hemorrhoids isolated by GI on evaluation. Tucks pads ordered. Oxygenation unchanged around 6 L nasal cannula. Admitted with acute hypoxemic respiratory failure secondary to Covid pneumonia. Had significant difficulty with profound hyperglycemia due to Decadron and diabetes. Now borderline hypoglycemia and monitoring closely. Last 3 blood sugars by fingerstick AC are 112, 189 and 145. Discussed with patient and he is pleased. Subjectively his respiratory status feels improved.         Objective:     Patient Vitals for the past 24 hrs:   Temp Pulse Resp BP SpO2   21 1145 97.4 °F (36.3 °C) 68 18 137/60 94 %   21 0715 97.4 °F (36.3 °C) 69 18 132/60 90 %   21 0307 98.2 °F (36.8 °C) 70 18 128/61 90 %   21 2239 97.6 °F (36.4 °C) 72 18 122/66 90 %   21 98 °F (36.7 °C) 73 18 118/65 94 %   21 1636 97.8 °F (36.6 °C) 89 18 117/66 (!) 89 %   21 1503 -- 72 -- (!) 115/51 91 %     Oxygen Therapy  O2 Sat (%): 94 % (21 1145)  Pulse via Oximetry: 92 beats per minute (21 1347)  O2 Device: Nasal cannula (21 0307)  O2 Flow Rate (L/min): 6 l/min (21 0307)    Intake/Output Summary (Last 24 hours) at 2021 1442  Last data filed at 2021 0801  Gross per 24 hour   Intake 250 ml Output 1000 ml   Net -750 ml         REVIEW OF SYSTEMS: Comprehensive ROS performed and negative except as stated in HPI. Physical Examination:  General-alert and oriented x3 without acute distress. Pleasant cooperative. No further bleeding reported by him  HEENT-nares are patent, no facial asymmetry  Heart-regular rate and rhythm no rubs gallops or clicks. No JVD  Lungs-no wheezing. Symmetric excursion of the chest wall. No rhonchi. Abdomen-no localized tenderness. No gross distention. Neuromuscular-no focal motor deficits, cranial nerves II through XII grossly intact        Data Review:  I have reviewed all labs, meds, telemetry events, and studies from the last 24 hours. Recent Results (from the past 24 hour(s))   HEMOGLOBIN    Collection Time: 01/13/21  3:58 PM   Result Value Ref Range    HGB 10.7 (L) 13.6 - 17.2 g/dL   GLUCOSE, POC    Collection Time: 01/13/21  4:25 PM   Result Value Ref Range    Glucose (POC) 217 (H) 65 - 100 mg/dL   GLUCOSE, POC    Collection Time: 01/13/21  9:04 PM   Result Value Ref Range    Glucose (POC) 112 (H) 65 - 100 mg/dL   HEMOGLOBIN    Collection Time: 01/13/21 10:37 PM   Result Value Ref Range    HGB 10.0 (L) 13.6 - 83.7 g/dL   METABOLIC PANEL, COMPREHENSIVE    Collection Time: 01/14/21  6:11 AM   Result Value Ref Range    Sodium 141 138 - 145 mmol/L    Potassium 4.9 3.5 - 5.1 mmol/L    Chloride 111 (H) 98 - 107 mmol/L    CO2 23 21 - 32 mmol/L    Anion gap 7 7 - 16 mmol/L    Glucose 162 (H) 65 - 100 mg/dL    BUN 32 (H) 8 - 23 MG/DL    Creatinine 0.93 0.8 - 1.5 MG/DL    GFR est AA >60 >60 ml/min/1.73m2    GFR est non-AA >60 >60 ml/min/1.73m2    Calcium 7.6 (L) 8.3 - 10.4 MG/DL    Bilirubin, total 0.5 0.2 - 1.1 MG/DL    ALT (SGPT) 18 12 - 65 U/L    AST (SGOT) 22 15 - 37 U/L    Alk.  phosphatase 85 50 - 136 U/L    Protein, total 4.5 (L) 6.3 - 8.2 g/dL    Albumin 2.0 (L) 3.2 - 4.6 g/dL    Globulin 2.5 2.3 - 3.5 g/dL    A-G Ratio 0.8 (L) 1.2 - 3.5     CBC WITH AUTOMATED DIFF    Collection Time: 01/14/21  6:11 AM   Result Value Ref Range    WBC 10.1 4.3 - 11.1 K/uL    RBC 3.48 (L) 4.23 - 5.6 M/uL    HGB 9.8 (L) 13.6 - 17.2 g/dL    HCT 29.7 (L) 41.1 - 50.3 %    MCV 85.3 79.6 - 97.8 FL    MCH 28.2 26.1 - 32.9 PG    MCHC 33.0 31.4 - 35.0 g/dL    RDW 17.7 (H) 11.9 - 14.6 %    PLATELET 564 225 - 091 K/uL    MPV 11.3 9.4 - 12.3 FL    ABSOLUTE NRBC 0.00 0.0 - 0.2 K/uL    DF AUTOMATED      NEUTROPHILS 85 (H) 43 - 78 %    LYMPHOCYTES 7 (L) 13 - 44 %    MONOCYTES 6 4.0 - 12.0 %    EOSINOPHILS 0 (L) 0.5 - 7.8 %    BASOPHILS 0 0.0 - 2.0 %    IMMATURE GRANULOCYTES 2 0.0 - 5.0 %    ABS. NEUTROPHILS 8.6 (H) 1.7 - 8.2 K/UL    ABS. LYMPHOCYTES 0.7 0.5 - 4.6 K/UL    ABS. MONOCYTES 0.6 0.1 - 1.3 K/UL    ABS. EOSINOPHILS 0.0 0.0 - 0.8 K/UL    ABS. BASOPHILS 0.0 0.0 - 0.2 K/UL    ABS. IMM.  GRANS. 0.2 0.0 - 0.5 K/UL   GLUCOSE, POC    Collection Time: 01/14/21  7:24 AM   Result Value Ref Range    Glucose (POC) 189 (H) 65 - 100 mg/dL   GLUCOSE, POC    Collection Time: 01/14/21 11:52 AM   Result Value Ref Range    Glucose (POC) 145 (H) 65 - 100 mg/dL        All Micro Results     Procedure Component Value Units Date/Time    CULTURE, URINE [[id number]] Collected: 01/08/21 0400    Order Status: Completed Specimen: Urine from Clean catch Updated: 01/10/21 0802     Special Requests: NO SPECIAL REQUESTS        Culture result:       <10,000 COLONIES/mL MIXED SKIN KIM ISOLATED                Current Meds:  Current Facility-Administered Medications   Medication Dose Route Frequency    phenylephrine suppository 1 Suppository  1 Suppository Rectal Q12H    pantoprazole (PROTONIX) tablet 40 mg  40 mg Oral ACB&D    insulin glargine (LANTUS) injection 16 Units  16 Units SubCUTAneous QHS    witch hazel-glycerin (TUCKS) 12.5-50 % pads 1 Pad  1 Pad PeriANAL PRN    dextrose 40% (GLUTOSE) oral gel 1 Tube  15 g Oral PRN    glucagon (GLUCAGEN) injection 1 mg  1 mg IntraMUSCular PRN    dextrose (D50W) injection syrg 12.5-25 g  25-50 mL IntraVENous PRN    influenza vaccine 2020-21 (6 mos+)(PF) (FLUARIX/FLULAVAL/FLUZONE QUAD) injection 0.5 mL  0.5 mL IntraMUSCular PRIOR TO DISCHARGE    dexamethasone (DECADRON) 4 mg/mL injection 4 mg  4 mg IntraVENous Q24H    [Held by provider] aspirin delayed-release tablet 81 mg  81 mg Oral QHS    rosuvastatin (CRESTOR) tablet 10 mg  10 mg Oral QHS    sodium chloride (NS) flush 5-40 mL  5-40 mL IntraVENous Q8H    sodium chloride (NS) flush 5-40 mL  5-40 mL IntraVENous PRN    acetaminophen (TYLENOL) tablet 650 mg  650 mg Oral Q6H PRN    Or    acetaminophen (TYLENOL) suppository 650 mg  650 mg Rectal Q6H PRN    promethazine (PHENERGAN) tablet 12.5 mg  12.5 mg Oral Q6H PRN    Or    ondansetron (ZOFRAN) injection 4 mg  4 mg IntraVENous Q6H PRN    senna (SENOKOT) tablet 8.6 mg  1 Tab Oral DAILY PRN    0.9% sodium chloride infusion 250 mL  250 mL IntraVENous PRN    insulin lispro (HUMALOG) injection   SubCUTAneous AC&HS    ascorbic acid (vitamin C) (VITAMIN C) tablet 1,000 mg  1,000 mg Oral BID    zinc sulfate tablet 220 mg  220 mg Oral DAILY       Diet:  DIET DIABETIC WITH OPTIONS    Other Studies (last 24 hours):  No results found.     Assessment and Plan:     Hospital Problems as of 1/14/2021 Never Reviewed          Codes Class Noted - Resolved POA    Gastrointestinal hemorrhage associated with anorectal source ICD-10-CM: K62.5  ICD-9-CM: 569.3  1/14/2021 - Present Unknown        * (Principal) Acute respiratory failure with hypoxia (HCC) ICD-10-CM: J96.01  ICD-9-CM: 518.81  1/7/2021 - Present Unknown        COVID-19 ICD-10-CM: U07.1  ICD-9-CM: 079.89  1/7/2021 - Present Yes        Acute metabolic encephalopathy GBG-34-WQ: G93.41  ICD-9-CM: 348.31  1/7/2021 - Present Yes        Type 2 diabetes mellitus, with long-term current use of insulin (HCC) ICD-10-CM: E11.9, Z79.4  ICD-9-CM: 250.00, V58.67  1/7/2021 - Present Yes              A/P:    Acute respiratory failure with [id number]  Acute metabolic encephalopathy-likely related to hypoxemia and covid infection-this is improved today  COVID pneumonia   Continue with Decadron course-completed remdesivir.  , Oxygenation support as needed-hopefully can wean soon. Empiric IV antibiotics. -Blood cultures no growth to date greater than 5 days-final blood urine culture no growth to date-empiric doxycycline ordered on admission discontinued.         Diabetes mellitus type 2  Controlled with current insulin regimen-when weaned Decadron will need attenuation of insulin back to prehospital diabetic management-risk of hypoglycemia. GI bleed-character and self-limited nature of bright red rectal bleeding suggest lower GI hemorrhoidal --isolated hemorrhoids on examination by gastroenterology. Tucks pads-continue to hold Lovenox-risk-benefit regarding COVID-19. Continue to hold aspirin for now. Every 12 hour hemoglobin. For now appears stable around 10 with witnessed laboratory 2 g hemoglobin drop with event.          DVT prophylaxis : [facility]    Flores, Karen Kedaraissatou [facility] [phone] this number 3:40pm and no answer    Signed:  Silas SHOEMAKER

## 2021-01-14 NOTE — PROGRESS NOTES
Chart reviewed for discharge planning. Referral has been placed to [facility] for RN, PT, OT. CM will continue to follow to assist with any new discharge needs.

## 2021-01-14 NOTE — PROGRESS NOTES
Hourly rounds completed, pt denies needs at this time. Pt with episode of GI bleed this afternoon. Hgb recheck was 10, recheck q8 and transfuse if less than 8. Tucks ordered to keep at bedside.

## 2021-01-14 NOTE — PROGRESS NOTES
[facility] Progress Note         Admit Date:  1/8/2021    Today's Date:  1/14/2021    CC:  Rectal bleeding    Subjective:     Patient had a small amount of rectal bleeding today with red blood, but no other episodes of bleeding. He has been having increased rectal discomfort. He has had decreased appetite. He has not had abd pain, nausea, or vomiting. O2 remains at 6L.     Medications:   Current Facility-Administered Medications   Medication Dose Route Frequency    pantoprazole (PROTONIX) tablet 40 mg  40 mg Oral ACB&D    insulin glargine (LANTUS) injection 16 Units  16 Units SubCUTAneous QHS    witch hazel-glycerin (TUCKS) 12.5-50 % pads 1 Pad  1 Pad PeriANAL PRN    dextrose 40% (GLUTOSE) oral gel 1 Tube  15 g Oral PRN    glucagon (GLUCAGEN) injection 1 mg  1 mg IntraMUSCular PRN    dextrose (D50W) injection syrg 12.5-25 g  25-50 mL IntraVENous PRN    influenza vaccine 2020-21 (6 mos+)(PF) (FLUARIX/FLULAVAL/FLUZONE QUAD) injection 0.5 mL  0.5 mL IntraMUSCular PRIOR TO DISCHARGE    dexamethasone (DECADRON) 4 mg/mL injection 4 mg  4 mg IntraVENous Q24H    [Held by provider] aspirin delayed-release tablet 81 mg  81 mg Oral QHS    rosuvastatin (CRESTOR) tablet 10 mg  10 mg Oral QHS    sodium chloride (NS) flush 5-40 mL  5-40 mL IntraVENous Q8H    sodium chloride (NS) flush 5-40 mL  5-40 mL IntraVENous PRN    acetaminophen (TYLENOL) tablet 650 mg  650 mg Oral Q6H PRN    Or    acetaminophen (TYLENOL) suppository 650 mg  650 mg Rectal Q6H PRN    promethazine (PHENERGAN) tablet 12.5 mg  12.5 mg Oral Q6H PRN    Or    ondansetron (ZOFRAN) injection 4 mg  4 mg IntraVENous Q6H PRN    senna (SENOKOT) tablet 8.6 mg  1 Tab Oral DAILY PRN    0.9% sodium chloride infusion 250 mL  250 mL IntraVENous PRN    insulin lispro (HUMALOG) injection   SubCUTAneous AC&HS    ascorbic acid (vitamin C) (VITAMIN C) tablet 1,000 mg  1,000 mg Oral BID    zinc sulfate tablet 220 mg  220 mg Oral DAILY Review of Systems:  ROS was obtained, with pertinent positives as listed above. No chest pain or SOB. Diet:  NPO, sips of clears    Objective:   Vitals:  Visit Vitals  /60   Pulse 69   Temp 97.4 °F (36.3 °C)   Resp 18   SpO2 90%     Intake/Output:  01/14 0701 - 01/14 1900  In: -   Out: 100 [Urine:100]  01/12 1901 - 01/14 0700  In: 690 [P.O.:690]  Out: 1800 [Urine:1800]  Exam:  Per Dr. Enrrique Patten appearance: alert, cooperative, no distress, O2 NC  Lungs: clear to auscultation bilaterally anteriorly  Heart: regular rate and rhythm  Abdomen: soft, non-tender. Bowel sounds normal. No masses, no organomegaly  Neuro:  alert and oriented    Data Review (Labs):    Recent Labs     01/14/21  0611 01/13/21  2237 01/13/21  1558 01/13/21  0456   WBC 10.1  --   --  9.5   HGB 9.8* 10.0* 10.7* 12.2*   HCT 29.7*  --   --  36.6*     --   --  265   MCV 85.3  --   --  83.9     --   --  144   K 4.9  --   --  3.9   *  --   --  111*   CO2 23  --   --  26   BUN 32*  --   --  20   CREA 0.93  --   --  0.87   CA 7.6*  --   --  7.8*   *  --   --  62*   AP 85  --   --   --    ALT 18  --   --   --    TBILI 0.5  --   --   --    ALB 2.0*  --   --   --    TP 4.5*  --   --   --        Ref. Range 1/7/2021 15:36   D DIMER Latest Ref Range: <0.56 ug/ml(FEU) 2.44 (H)        Ref. Range 1/7/2021 15:36 1/7/2021 15:37 1/7/2021 15:38 1/7/2021 17:21 1/8/2021 06:29 1/9/2021 07:34 1/10/2021 12:54   AST Latest Ref Range: 15 - 37 U/L   41 (H)     45 (H) 37 39 (H)   Lactic acid Latest Ref Range: 0.4 - 2.0 MMOL/L   2.0   2.2 (H)         Procalcitonin Latest Units: ng/mL     0.07           Troponin-High Sensitivity Latest Ref Range: 0 - 14 pg/mL 26.8 (H)               Hemoglobin A1c, (calculated) Latest Ref Range: 4.20 - 6.30 %   7.5 (H)             Est. average glucose Latest Units: mg/dL   169                  Ref.  Range 1/7/2021 15:37   T4, Free Latest Ref Range: 0.78 - 1.46 NG/DL 1.2   TSH Latest Units: uIU/mL 1.380 Ref. Range 1/14/2021 06:11   AST Latest Ref Range: 15 - 37 U/L 22     SARS-COV-2 [[id number]] (Order [id number]) Lab  Date: 1/7/2021 Department: [facility] Released By:  (auto-released) Authorizing: Monica Schroeder MD   Component Value Flag Ref Range Units Status   Specimen source Nasopharyngeal      Final   COVID-19 rapid test Detected  Panic   NOTD   Final     Assessment:     Principal Problem:    Acute respiratory failure with hypoxia ([facility] [address] (1/7/2021)    Active Problems:    COVID-19 (1/7/2021)      Acute metabolic encephalopathy (6/6/6037)      Type 2 diabetes mellitus, with long-term current use of insulin ([facility] [address] (1/7/2021)    81 yo male, now pt of Dr. Alcides Lopez, with PMH of including but not limited to DM 2, sleep apnea, CAD s/p MI - on ASA, HLD, arthritis, TIA, who was seen in consultation 13 Jan 2021 at the request of Dr. Omar Segovia for large volume bright red rectal bleed per nursing, who was admitted for COVID, testing positive on 7 Jan 2021 and has received convalescent plasma, and had 1 BM with large amount of red blood on 13 Jan 2020. Hgb had been stable, 12.2 on 13 Jan 2021, but has decreased to 10 range and is stable. He was noted to have hemorrhoid on rectal exam.  The bleeding is likely related to hemorrhoids, but differential includes diverticular bleeding, ischemia, polyps, other inflammation. He has been having increased rectal discomfort. Unclear if he has had a prior colonoscopy. Plan:     - Supportive care, IVF. - Monitor for any recurrent bleeding.  - Tucks wipes. - Anusol supp 1 PA BID.  - Monitor labs and transfuse if hgb <8.  - Clear liquid diet, no reds. - Follow. Patient is seen and examined in collaboration with Dr. Kathrine Gresham. Assessment and plan as per Dr. Alcides Lopez. Brie Jain [facility]  [facility]

## 2021-01-14 NOTE — PROGRESS NOTES
lantus held last pm due to  and NPO after MN. Gave ice cream snack.    hgb at MN 10. Had a small bloody BM this am; bright red and dark red mixed, 1 small clot seen. Lab will draw his am labs. 02 6 liter. Hourly rounds completed. Resting in bed. Denies needs or pain at this time. Bed in low locked position. Call light and personal items within reach. Will continue to monitor and give report to oncoming day shift nurse.

## 2021-01-14 NOTE — PROGRESS NOTES
ACUTE PHYSICAL THERAPY GOALS:  (Developed with and agreed upon by patient and/or caregiver.)  (1.) Damian Flores  will move from supine to sit and sit to supine , scoot up and down and roll side to side with MODIFIED INDEPENDENCE within 7 treatment day(s).    (2.) Damian Flores will transfer from bed to chair and chair to bed with MODIFIED INDEPENDENCE using the least restrictive device within 7 treatment day(s).    (3.) Damian Flores will ambulate with MODIFIED INDEPENDENCE for 300 feet with the least restrictive device within 7 treatment day(s). (4.) Damian Flores will perform standing static and dynamic balance activities x 25 minutes with MODIFIED INDEPENDENCE to improve safety and activity tolerance within 7 treatment day(s). (5.) Damian Flores will ascend and descend 4 stairs using one hand rail(s) with MODIFIED INDEPENDENCE to improve functional mobility and safety within 7 treatment day(s). (6.) Damian Flores will perform bilateral lower extremity exercises x 20 min for HEP with INDEPENDENCE to improve strength, endurance, and functional mobility within 7 treatment day(s). PHYSICAL THERAPY: Daily Note and AM Treatment Day # 4    Anu James is a 80 y.o. male   PRIMARY DIAGNOSIS: Acute respiratory failure with hypoxia (HCC)  Acute respiratory failure with hypoxia ([facility] [address] [J96.01]         ASSESSMENT:     REHAB RECOMMENDATIONS: CURRENT LEVEL OF FUNCTION:  (Most Recently Demonstrated)   Recommendation to date pending progress:  [address] [place] [address] Therapy  Equipment:    Rolling Walker   To Be Determined Bed Mobility:   Standby Assistance  Sit to Stand:   Minimal Assistance  Transfers:   Minimal Assistance  Gait/Mobility:   Minimal Assistance     ASSESSMENT:  Mr. Pablo Nguyen is not doing as well today. Patient states he is \"worn out\". Sats dropped to low 80s needing additional time to recover. Will continue PT efforts.      SUBJECTIVE:   Mr. Pablo Nguyen states, \"I want to go home tomorrow\"    SOCIAL HISTORY/ LIVING ENVIRONMENT: See initial evaluation     OBJECTIVE:     PAIN: VITAL SIGNS: LINES/DRAINS:   Pre Treatment: Pain Screen  Pain Scale 1: FLACC  Pain Intensity 1: 0  Post Treatment: 0   None  O2 Device: Nasal cannula     MOBILITY: I Mod I S SBA CGA Min Mod Max Total  NT x2 Comments:   Bed Mobility    Rolling [] [] [] [] [] [] [] [] [] [] []    Supine to Sit [] [] [] [x] [] [] [] [] [] [] []    Scooting [] [] [] [] [] [] [] [] [] [] []    Sit to Supine [] [] [] [] [] [] [] [] [] [] []    Transfers    Sit to Stand [] [] [] [] [] [x] [] [] [] [] []    Bed to Chair [] [] [] [] [] [x] [] [] [] [] []    Stand to Sit [] [] [] [] [] [x] [] [] [] [] []    I=Independent, Mod I=Modified Independent, S=Supervision, SBA=Standby Assistance, CGA=Contact Guard Assistance,   Min=Minimal Assistance, Mod=Moderate Assistance, Max=Maximal Assistance, Total=Total Assistance, NT=Not Tested    GAIT: I Mod I S SBA CGA Min Mod Max Total  NT x2 Comments:   Level of Assistance [] [] [] [] [] [x] [] [] [] [] []    Distance 15' x 2    DME HHA    Gait Quality     Weightbearing  Status N/A     I=Independent, Mod I=Modified Independent, S=Supervision, SBA=Standby Assistance, CGA=Contact Guard Assistance,   Min=Minimal Assistance, Mod=Moderate Assistance, Max=Maximal Assistance, Total=Total Assistance, NT=Not Tested    PLAN:   FREQUENCY/DURATION: PT Plan of Care: 3 times/week for duration of hospital stay or until stated goals are met, whichever comes first.  TREATMENT:     TREATMENT:   ($$ Therapeutic Activity: 23-37 mins    )  Therapeutic Activity (24 Minutes): Therapeutic activity included Supine to Sit, Transfer Training, Ambulation on level ground, Standing balance and LE exercises to improve functional Mobility, Strength and Activity tolerance.     AFTER TREATMENT POSITION/PRECAUTIONS:  Alarm Activated, Bed, Needs within reach and RN notified    INTERDISCIPLINARY COLLABORATION:  PT/PTA    TOTAL TREATMENT DURATION:  PT Patient Time In/Time Out  Time In: 1117  Time Out: 016 [facility], \[facility] [place] [facility]\""

## 2021-01-15 LAB
ANION GAP SERPL CALC-SCNC: 8 MMOL/L (ref 7–16)
BUN SERPL-MCNC: 37 MG/DL (ref 8–23)
CALCIUM SERPL-MCNC: 8.1 MG/DL (ref 8.3–10.4)
CHLORIDE SERPL-SCNC: 110 MMOL/L (ref 98–107)
CO2 SERPL-SCNC: 23 MMOL/L (ref 21–32)
CREAT SERPL-MCNC: 1.12 MG/DL (ref 0.8–1.5)
ERYTHROCYTE [DISTWIDTH] IN BLOOD BY AUTOMATED COUNT: 17.7 % (ref 11.9–14.6)
GLUCOSE BLD STRIP.AUTO-MCNC: 108 MG/DL (ref 65–100)
GLUCOSE BLD STRIP.AUTO-MCNC: 217 MG/DL (ref 65–100)
GLUCOSE BLD STRIP.AUTO-MCNC: 221 MG/DL (ref 65–100)
GLUCOSE BLD STRIP.AUTO-MCNC: 242 MG/DL (ref 65–100)
GLUCOSE SERPL-MCNC: 185 MG/DL (ref 65–100)
HCT VFR BLD AUTO: 27.2 % (ref 41.1–50.3)
HGB BLD-MCNC: 8.8 G/DL (ref 13.6–17.2)
HGB BLD-MCNC: 9.4 G/DL (ref 13.6–17.2)
MCH RBC QN AUTO: 27.6 PG (ref 26.1–32.9)
MCHC RBC AUTO-ENTMCNC: 32.4 G/DL (ref 31.4–35)
MCV RBC AUTO: 85.3 FL (ref 79.6–97.8)
NRBC # BLD: 0 K/UL (ref 0–0.2)
PLATELET # BLD AUTO: 318 K/UL (ref 150–450)
PMV BLD AUTO: 11.4 FL (ref 9.4–12.3)
POTASSIUM SERPL-SCNC: 4.2 MMOL/L (ref 3.5–5.1)
RBC # BLD AUTO: 3.19 M/UL (ref 4.23–5.6)
SODIUM SERPL-SCNC: 141 MMOL/L (ref 138–145)
WBC # BLD AUTO: 13.1 K/UL (ref 4.3–11.1)

## 2021-01-15 PROCEDURE — 82962 GLUCOSE BLOOD TEST: CPT

## 2021-01-15 PROCEDURE — 80048 BASIC METABOLIC PNL TOTAL CA: CPT

## 2021-01-15 PROCEDURE — 36415 COLL VENOUS BLD VENIPUNCTURE: CPT

## 2021-01-15 PROCEDURE — 74011250637 HC RX REV CODE- 250/637: Performed by: PHYSICIAN ASSISTANT

## 2021-01-15 PROCEDURE — 85027 COMPLETE CBC AUTOMATED: CPT

## 2021-01-15 PROCEDURE — 2709999900 HC NON-CHARGEABLE SUPPLY

## 2021-01-15 PROCEDURE — 74011250636 HC RX REV CODE- 250/636: Performed by: INTERNAL MEDICINE

## 2021-01-15 PROCEDURE — 74011250637 HC RX REV CODE- 250/637: Performed by: INTERNAL MEDICINE

## 2021-01-15 PROCEDURE — 74011636637 HC RX REV CODE- 636/637: Performed by: INTERNAL MEDICINE

## 2021-01-15 PROCEDURE — 97530 THERAPEUTIC ACTIVITIES: CPT

## 2021-01-15 PROCEDURE — 85018 HEMOGLOBIN: CPT

## 2021-01-15 PROCEDURE — 65270000029 HC RM PRIVATE

## 2021-01-15 RX ORDER — INSULIN GLARGINE 100 [IU]/ML
18 INJECTION, SOLUTION SUBCUTANEOUS
Status: DISCONTINUED | OUTPATIENT
Start: 2021-01-15 | End: 2021-01-18

## 2021-01-15 RX ADMIN — OXYCODONE HYDROCHLORIDE AND ACETAMINOPHEN 1000 MG: 500 TABLET ORAL at 16:31

## 2021-01-15 RX ADMIN — ACETAMINOPHEN 650 MG: 325 TABLET, FILM COATED ORAL at 21:58

## 2021-01-15 RX ADMIN — INSULIN LISPRO 4 UNITS: 100 INJECTION, SOLUTION INTRAVENOUS; SUBCUTANEOUS at 12:05

## 2021-01-15 RX ADMIN — ROSUVASTATIN 10 MG: 10 TABLET, FILM COATED ORAL at 21:58

## 2021-01-15 RX ADMIN — DEXAMETHASONE SODIUM PHOSPHATE 4 MG: 4 INJECTION, SOLUTION INTRAMUSCULAR; INTRAVENOUS at 23:42

## 2021-01-15 RX ADMIN — PANTOPRAZOLE SODIUM 40 MG: 40 TABLET, DELAYED RELEASE ORAL at 05:22

## 2021-01-15 RX ADMIN — INSULIN LISPRO 4 UNITS: 100 INJECTION, SOLUTION INTRAVENOUS; SUBCUTANEOUS at 21:59

## 2021-01-15 RX ADMIN — OXYCODONE HYDROCHLORIDE AND ACETAMINOPHEN 1000 MG: 500 TABLET ORAL at 08:50

## 2021-01-15 RX ADMIN — Medication 10 ML: at 21:58

## 2021-01-15 RX ADMIN — Medication 220 MG: at 08:50

## 2021-01-15 RX ADMIN — Medication 10 ML: at 05:20

## 2021-01-15 RX ADMIN — PANTOPRAZOLE SODIUM 40 MG: 40 TABLET, DELAYED RELEASE ORAL at 16:31

## 2021-01-15 RX ADMIN — ACETAMINOPHEN 650 MG: 325 TABLET, FILM COATED ORAL at 08:50

## 2021-01-15 RX ADMIN — INSULIN GLARGINE 18 UNITS: 100 INJECTION, SOLUTION SUBCUTANEOUS at 22:01

## 2021-01-15 RX ADMIN — INSULIN LISPRO 4 UNITS: 100 INJECTION, SOLUTION INTRAVENOUS; SUBCUTANEOUS at 09:00

## 2021-01-15 RX ADMIN — PHENYLEPHRINE HYDROCHLORIDE AND FAT, HARD 1 SUPPOSITORY: 5; 1.77 SUPPOSITORY RECTAL at 11:36

## 2021-01-15 NOTE — PROGRESS NOTES
Disaster charting initiated    Pt resting and stable    Current O2 needs: 6L, attempted to wean O2 to 4L and pt dropped 83%

## 2021-01-15 NOTE — PROGRESS NOTES
appears more frail and worn out tonight. Has not called as much, resting more. VSS. Last hgb was 9. Due for am check that will be drawn by lab.  02 at 6 liters. No BM this shift. Hourly rounds completed. Resting in bed. Denies needs or pain at this time. Bed in low locked position. Bed alarm on. Call light and personal items within reach. Will continue to monitor and give report to oncoming day shift nurse.

## 2021-01-15 NOTE — PROGRESS NOTES
GI DAILY PROGRESS NOTE    Admit Date:  1/8/2021    Today's Date:  1/15/2021    CC:  Rectal bleeding, covid, hypoxia    Subjective:     Patient without ongoing rectal bleeding. Is having some rectal skin breakdown from being sedentary. Is having hypoxia when O2 decreased. Despite all this appears younger than stated age. On reg diet but eating minimal sec to issues with teeth    1/14; Patient had a small amount of rectal bleeding today with red blood, but no other episodes of bleeding. He has been having increased rectal discomfort. He has had decreased appetite. He has not had abd pain, nausea, or vomiting. O2 remains at 6L.     Medications:   Current Facility-Administered Medications   Medication Dose Route Frequency    phenylephrine suppository 1 Suppository  1 Suppository Rectal Q12H    insulin glargine (LANTUS) injection 14 Units  14 Units SubCUTAneous QHS    pantoprazole (PROTONIX) tablet 40 mg  40 mg Oral ACB&D    witch hazel-glycerin (TUCKS) 12.5-50 % pads 1 Pad  1 Pad PeriANAL PRN    dextrose 40% (GLUTOSE) oral gel 1 Tube  15 g Oral PRN    glucagon (GLUCAGEN) injection 1 mg  1 mg IntraMUSCular PRN    dextrose (D50W) injection syrg 12.5-25 g  25-50 mL IntraVENous PRN    influenza vaccine 2020-21 (6 mos+)(PF) (FLUARIX/FLULAVAL/FLUZONE QUAD) injection 0.5 mL  0.5 mL IntraMUSCular PRIOR TO DISCHARGE    dexamethasone (DECADRON) 4 mg/mL injection 4 mg  4 mg IntraVENous Q24H    [Held by provider] aspirin delayed-release tablet 81 mg  81 mg Oral QHS    rosuvastatin (CRESTOR) tablet 10 mg  10 mg Oral QHS    sodium chloride (NS) flush 5-40 mL  5-40 mL IntraVENous Q8H    sodium chloride (NS) flush 5-40 mL  5-40 mL IntraVENous PRN    acetaminophen (TYLENOL) tablet 650 mg  650 mg Oral Q6H PRN    Or    acetaminophen (TYLENOL) suppository 650 mg  650 mg Rectal Q6H PRN    promethazine (PHENERGAN) tablet 12.5 mg  12.5 mg Oral Q6H PRN    Or    ondansetron (ZOFRAN) injection 4 mg  4 mg IntraVENous Q6H PRN    senna (SENOKOT) tablet 8.6 mg  1 Tab Oral DAILY PRN    0.9% sodium chloride infusion 250 mL  250 mL IntraVENous PRN    insulin lispro (HUMALOG) injection   SubCUTAneous AC&HS    ascorbic acid (vitamin C) (VITAMIN C) tablet 1,000 mg  1,000 mg Oral BID    zinc sulfate tablet 220 mg  220 mg Oral DAILY       Review of Systems:  ROS was obtained, with pertinent positives as listed above. No chest pain or SOB. Diet:      Objective:   Vitals:  Visit Vitals  /60 (BP 1 Location: Right arm, BP Patient Position: At rest)   Pulse (!) 55   Temp 97.4 °F (36.3 °C)   Resp 18   SpO2 94%     Intake/Output:  No intake/output data recorded. 01/13 1901 - 01/15 0700  In: 18 [P.O.:570]  Out: 1475 [Urine:1475]  Exam:  General appearance: alert, cooperative, no distress - appears younger htan stated age - O2 canula in place  Lungs: clear to auscultation bilaterally anteriorly  Heart: regular rate and rhythm  Abdomen: soft, non-tender.  Bowel sounds normal. No masses, no organomegaly  Extremities: extremities normal, atraumatic, no cyanosis or edema  Neuro:  alert and oriented    Data Review (Labs):    Recent Labs     01/14/21  2129 01/14/21  0611 01/13/21  2237 01/13/21  1558 01/13/21  0456   WBC  --  10.1  --   --  9.5   HGB 9.0* 9.8* 10.0* 10.7* 12.2*   HCT  --  29.7*  --   --  36.6*   PLT  --  261  --   --  265   MCV  --  85.3  --   --  83.9   NA  --  141  --   --  144   K  --  4.9  --   --  3.9   CL  --  111*  --   --  111*   CO2  --  23  --   --  26   BUN  --  32*  --   --  20   CREA  --  0.93  --   --  0.87   CA  --  7.6*  --   --  7.8*   GLU  --  162*  --   --  62*   AP  --  85  --   --   --    ALB  --  2.0*  --   --   --    TP  --  4.5*  --   --   --        Assessment:     Principal Problem:    Acute respiratory failure with hypoxia (HCC) (1/7/2021)    Active Problems:    COVID-19 (1/7/2021)      Acute metabolic encephalopathy (3/6/0922)      Type 2 diabetes mellitus, with long-term current use of insulin [facility]) (1/7/2021)      Gastrointestinal hemorrhage associated with anorectal source (1/14/2021)         88 yo male, now pt of Dr. Bao Greene PMH of including but not limited to DM 2, sleep apnea, CAD s/p MI - on ASA, HLD, arthritis, TIA, who was seen in consultation 13 Jan 2021 at the request of Dr. Bower Setting volume bright red rectal bleed per nursing, who was admitted for COVID, testing positive on 7 Jan 2021 and has received convalescent plasma, and had 1 BM with large amount of red blood on 13 Jan 2020.  Hgb had been stable, 12.2 on 13 Jan 2021, but has decreased to 10 range and is stable.  He was noted to have hemorrhoid on rectal exam.  The bleeding is likely related to hemorrhoids, but differential includes diverticular bleeding, ischemia, polyps, other inflammation.  He has been having increased rectal discomfort. Unclear if he has had a prior colonoscopy. Plan:     - mechanical soft diet  - Monitor for any recurrent bleeding - daily CBC ok at this poinit  - Tucks wipes.   - Anusol supp 1 ME BID.  - Monitor labs and transfuse if hgb <8.  - will sign off and please call back if needed    Brook Combs MD  [facility], [place]

## 2021-01-15 NOTE — PROGRESS NOTES
Hospitalist Progress Note     Admit Date:  2021  1:50 AM   Name:  Kam Hernandez   Age:  80 y.o.  :  1933   MRN:  [id number]   PCP:  Joseph Chapman MD  Treatment Team: Attending Provider: Jessica Rondon DO; Utilization Review: Mary Jane White; Utilization Review: Mariano Saeed RN; Charge Nurse: Elida Alva RN; Occupational Therapist: Rosario Henriquez; Physical Therapy Assistant: Nicho Smiley PTA    Subjective:   HPI and or CC:  Chief complaint-dyspnea, rectal bleed    Patient had episode of fairly large volume rectal bleed believed to be hemorrhoidal appears to have not recurred-1 small scant episode of noted bleeding on toilet tissue today. Hemoglobin fairly stable.  was 12.6-with acute episode dropped to around 10 and daily CBC for monitoring ordered. Gastroenterology follow-up appreciated. Respiratory status stable with COVID-19 pneumonia. Still requiring significant nasal cannula oxygen support 6 L nasal cannula. Fingerstick blood sugars increased with attenuation of Lantus. He wants to go home-explained nature of Covid pneumonia and sometimes delayed clinical improvement. He appears to understand.         Objective:     Patient Vitals for the past 24 hrs:   Temp Pulse Resp BP SpO2   01/15/21 1200 98.1 °F (36.7 °C) 62 20 136/74 92 %   01/15/21 0251 97.4 °F (36.3 °C) (!) 55 18 125/60 94 %   21 2200 98 °F (36.7 °C) (!) 58 20 133/65 95 %   21 1931 97.5 °F (36.4 °C) 69 18 117/60 93 %   21 1648 98.2 °F (36.8 °C) 65 18 135/60 95 %     Oxygen Therapy  O2 Sat (%): 92 % (01/15/21 1200)  Pulse via Oximetry: 92 beats per minute (21 1347)  O2 Device: Nasal cannula (01/15/21 0756)  O2 Flow Rate (L/min): 6 l/min (01/15/21 0756)    Intake/Output Summary (Last 24 hours) at 1/15/2021 1356  Last data filed at 1/15/2021 1323  Gross per 24 hour   Intake 320 ml   Output 825 ml   Net -505 ml         REVIEW OF SYSTEMS: Comprehensive ROS performed and negative except as stated in HPI. Physical Examination:  General-Pleasant, cooperative, alert to place and time, person. Heart/cardiovascular -regular rate and rhythm, no increased lower extremity edema, no JVD or HJR  Pulmonary-lungs are clear apices with faint mid zone pulmonary rales appear to be improving clinically on auscultation. No wheezing. Gastrointestinal-abdomen is not distended, there is no rebound tenderness. Bowel sounds are present all 4 quadrants  Neuromuscular-no gross deformity of the extremities, no tremor. Data Review:  I have reviewed all labs, meds, telemetry events, and studies from the last 24 hours.     Recent Results (from the past 24 hour(s))   GLUCOSE, POC    Collection Time: 01/14/21  4:39 PM   Result Value Ref Range    Glucose (POC) 165 (H) 65 - 100 mg/dL   GLUCOSE, POC    Collection Time: 01/14/21  8:22 PM   Result Value Ref Range    Glucose (POC) 189 (H) 65 - 100 mg/dL   HEMOGLOBIN    Collection Time: 01/14/21  9:29 PM   Result Value Ref Range    HGB 9.0 (L) 13.6 - 17.2 g/dL   GLUCOSE, POC    Collection Time: 01/15/21  7:29 AM   Result Value Ref Range    Glucose (POC) 242 (H) 65 - 380 mg/dL   METABOLIC PANEL, BASIC    Collection Time: 01/15/21 11:21 AM   Result Value Ref Range    Sodium 141 138 - 145 mmol/L    Potassium 4.2 3.5 - 5.1 mmol/L    Chloride 110 (H) 98 - 107 mmol/L    CO2 23 21 - 32 mmol/L    Anion gap 8 7 - 16 mmol/L    Glucose 185 (H) 65 - 100 mg/dL    BUN 37 (H) 8 - 23 MG/DL    Creatinine 1.12 0.8 - 1.5 MG/DL    GFR est AA >60 >60 ml/min/1.73m2    GFR est non-AA >60 >60 ml/min/1.73m2    Calcium 8.1 (L) 8.3 - 10.4 MG/DL   HEMOGLOBIN    Collection Time: 01/15/21 11:21 AM   Result Value Ref Range    HGB 9.4 (L) 13.6 - 17.2 g/dL   GLUCOSE, POC    Collection Time: 01/15/21 11:26 AM   Result Value Ref Range    Glucose (POC) 217 (H) 65 - 100 mg/dL        All Micro Results     Procedure Component Value Units Date/Time    CULTURE, URINE [[id number]] Collected: 01/08/21 0400    Order Status: Completed Specimen: Urine from Clean catch Updated: 01/10/21 0802     Special Requests: NO SPECIAL REQUESTS        Culture result:       <10,000 COLONIES/mL MIXED SKIN KIM ISOLATED                Current Meds:  Current Facility-Administered Medications   Medication Dose Route Frequency    insulin glargine (LANTUS) injection 18 Units  18 Units SubCUTAneous QHS    phenylephrine suppository 1 Suppository  1 Suppository Rectal Q12H    pantoprazole (PROTONIX) tablet 40 mg  40 mg Oral ACB&D    witch hazel-glycerin (TUCKS) 12.5-50 % pads 1 Pad  1 Pad PeriANAL PRN    dextrose 40% (GLUTOSE) oral gel 1 Tube  15 g Oral PRN    glucagon (GLUCAGEN) injection 1 mg  1 mg IntraMUSCular PRN    dextrose (D50W) injection syrg 12.5-25 g  25-50 mL IntraVENous PRN    influenza vaccine 2020-21 (6 mos+)(PF) (FLUARIX/FLULAVAL/FLUZONE QUAD) injection 0.5 mL  0.5 mL IntraMUSCular PRIOR TO DISCHARGE    dexamethasone (DECADRON) 4 mg/mL injection 4 mg  4 mg IntraVENous Q24H    [Held by provider] aspirin delayed-release tablet 81 mg  81 mg Oral QHS    rosuvastatin (CRESTOR) tablet 10 mg  10 mg Oral QHS    sodium chloride (NS) flush 5-40 mL  5-40 mL IntraVENous Q8H    sodium chloride (NS) flush 5-40 mL  5-40 mL IntraVENous PRN    acetaminophen (TYLENOL) tablet 650 mg  650 mg Oral Q6H PRN    Or    acetaminophen (TYLENOL) suppository 650 mg  650 mg Rectal Q6H PRN    promethazine (PHENERGAN) tablet 12.5 mg  12.5 mg Oral Q6H PRN    Or    ondansetron (ZOFRAN) injection 4 mg  4 mg IntraVENous Q6H PRN    senna (SENOKOT) tablet 8.6 mg  1 Tab Oral DAILY PRN    0.9% sodium chloride infusion 250 mL  250 mL IntraVENous PRN    insulin lispro (HUMALOG) injection   SubCUTAneous AC&HS    ascorbic acid (vitamin C) (VITAMIN C) tablet 1,000 mg  1,000 mg Oral BID    zinc sulfate tablet 220 mg  220 mg Oral DAILY       Diet:  DIET DIABETIC WITH OPTIONS    Other Studies (last 24 hours):  No results found.    Assessment and Plan:     Hospital Problems as of 1/15/2021 Never Reviewed          Codes Class Noted - Resolved POA    Gastrointestinal hemorrhage associated with anorectal source ICD-10-CM: K62.5  ICD-9-CM: 569.3  1/14/2021 - Present Unknown        * (Principal) Acute respiratory failure with hypoxia (HCC) ICD-10-CM: J96.01  ICD-9-CM: 518.81  1/7/2021 - Present Unknown        COVID-19 ICD-10-CM: U07.1  ICD-9-CM: 079.89  1/7/2021 - Present Yes        Acute metabolic encephalopathy HNW-25-XC: G93.41  ICD-9-CM: 348.31  1/7/2021 - Present Yes        Type 2 diabetes mellitus, with long-term current use of insulin (HCC) ICD-10-CM: E11.9, Z79.4  ICD-9-CM: 250.00, V58.67  1/7/2021 - Present Yes              A/P:    Acute respiratory failure with hypoxia-COVID-19  COVID pneumonia   Continuing with Decadron course-completed remdesivir. Still 6 L nasal cannula. Consider low-dose Lasix. Empiric IV antibiotics. -Blood cultures no growth to date greater than 5 days-final blood urine culture no growth to date-empiric doxycycline ordered on admission discontinued on January 14. .    Acute metabolic encephalopathy-likely related to hypoxemia and covid infection--resolved     Diabetes mellitus type 2  Titrate current insulin regimen-when weaned Decadron will need attenuation of insulin back to prehospital diabetic management-risk of hypoglycemia. GI bleed-character and self-limited nature of bright red rectal bleeding suggest lower GI hemorrhoidal --isolated hemorrhoids on examination by gastroenterology. Tucks pads-continue to hold Lovenox-risk-benefit regarding COVID-19. Continue to hold aspirin for now. Decrease hemoglobin monitoring to daily CBC.         DVT prophylaxis : [facility]    Flores, Karen De Los Santoscolton Barahona [phone]   Called this number 3:40pm and no answer    Signed:  Silas SHOEMAKER

## 2021-01-15 NOTE — PROGRESS NOTES
ACUTE PHYSICAL THERAPY GOALS:  (Developed with and agreed upon by patient and/or caregiver.)  (1.) Damian Flores  will move from supine to sit and sit to supine , scoot3 up and down and roll side to side with MODIFIED INDEPENDENCE within 7 treatment day(s).    (2.) Damian Flores will transfer from bed to chair and chair to bed with MODIFIED INDEPENDENCE using the least restrictive device within 7 treatment day(s).    (3.) Damian Flores will ambulate with MODIFIED INDEPENDENCE for 300 feet with the least restrictive device within 7 treatment day(s). (4.) Damian Flores will perform standing static and dynamic balance activities x 25 minutes with MODIFIED INDEPENDENCE to improve safety and activity tolerance within 7 treatment day(s). (5.) Damian Flores will ascend and descend 4 stairs using one hand rail(s) with MODIFIED INDEPENDENCE to improve functional mobility and safety within 7 treatment day(s). (6.) Damian Flores will perform bilateral lower extremity exercises x 20 min for HEP with INDEPENDENCE to improve strength, endurance, and functional mobility within 7 treatment day(s). PHYSICAL THERAPY: Daily Note and AM Treatment Day # 5    Yusef Manzo is a 80 y.o. male   PRIMARY DIAGNOSIS: Acute respiratory failure with hypoxia (HCC)  [facility] [J96.01]         ASSESSMENT:     REHAB RECOMMENDATIONS: CURRENT LEVEL OF FUNCTION:  (Most Recently Demonstrated)   Recommendation to date pending progress:  [address] [place] [id number] vs rehab pending progress  Equipment:    Rolling Walker   To Be Determined Bed Mobility:   Not tested  Sit to Stand:   Minimal Assistance  Transfers:   Minimal Assistance  Gait/Mobility:   Minimal Assistance     ASSESSMENT:  Mr. Katheryn Pitt continues to progress slowly towards PT goals. Patient attempted ambulation but unable to due to dizziness. Patient participates in exercises with good ability.  Main complaint is feeling \"worn out\" and Hemorroidal pain. SUBJECTIVE:   Mr. Seema Harrison states, \"I want to go home tomorrow\"    SOCIAL HISTORY/ LIVING ENVIRONMENT: See initial evaluation     OBJECTIVE:     PAIN: VITAL SIGNS: LINES/DRAINS:   Pre Treatment: Pain Screen  Pain Scale 1: FLACC  Pain Intensity 1: 0  Post Treatment: 0   None  O2 Device: Nasal cannula     MOBILITY: I Mod I S SBA CGA Min Mod Max Total  NT x2 Comments:   Bed Mobility    Rolling [] [] [] [] [] [] [] [] [] [] []    Supine to Sit [] [] [] [] [] [] [] [] [] [] []    Scooting [] [] [] [] [] [] [] [] [] [] []    Sit to Supine [] [] [] [] [] [] [] [] [] [] []    Transfers    Sit to Stand [] [] [] [] [] [x] [] [] [] [] []    Bed to Chair [] [] [] [] [] [x] [] [] [] [] []    Stand to Sit [] [] [] [] [] [x] [] [] [] [] []    I=Independent, Mod I=Modified Independent, S=Supervision, SBA=Standby Assistance, CGA=Contact Guard Assistance,   Min=Minimal Assistance, Mod=Moderate Assistance, Max=Maximal Assistance, Total=Total Assistance, NT=Not Tested    GAIT: I Mod I S SBA CGA Min Mod Max Total  NT x2 Comments:   Level of Assistance [] [] [] [] [] [x] [] [] [] [] []    Distance 5' x 2    DME HHA    Gait Quality     Weightbearing  Status N/A     I=Independent, Mod I=Modified Independent, S=Supervision, SBA=Standby Assistance, CGA=Contact Guard Assistance,   Min=Minimal Assistance, Mod=Moderate Assistance, Max=Maximal Assistance, Total=Total Assistance, NT=Not Tested    PLAN:   FREQUENCY/DURATION: PT Plan of Care: 3 times/week for duration of hospital stay or until stated goals are met, whichever comes first.  TREATMENT:     TREATMENT:   ($$ Therapeutic Activity: 23-37 mins    )  Therapeutic Activity (25 Minutes): Therapeutic activity included Transfer Training, Ambulation on level ground, Standing balance and LE exercises to improve functional Mobility, Strength and Activity tolerance.     AFTER TREATMENT POSITION/PRECAUTIONS:  Alarm Activated, Chair, Needs within reach and RN notified    INTERDISCIPLINARY COLLABORATION:  PT/PTA    TOTAL TREATMENT DURATION:  PT Patient Time In/Time Out  Time In: 1014  Time Out: [address], PTA

## 2021-01-15 NOTE — PROGRESS NOTES
Comprehensive Nutrition Assessment    Type and Reason for Visit: Initial, RD nutrition re-screen/LOS    Nutrition Recommendations/Plan:   Meals and Snacks:  Continue current diet. Nutrition Supplement Therapy:   Medical food supplement therapy:  Initiate Glucerna Shake three times per day (this provides 220 kcal and 10 grams protein per bottle)      Malnutrition Assessment:  Malnutrition Status: Insufficient data    Nutrition Assessment:   Nutrition History: Unable to obtain hx. Nutrition Background: PMH remarkable for coronary artery disease, diabetes mellitus presented to emergency room with altered mental status. Admitted with acute respiratory failure with hypoxia, Covid PNA, ALFRED resolved, DM, GI. Daily Update:  Pt did not answer phone times 2 attempts. When discussing with RN pt was sleeping. He remains on 6 liters of oxygen. His diet was down graded to mechanical soft by GI yesterday as pt ws eating poorly and has poor dentition. His intake remains limited s/p change. RN reports his rectal discomfort is also impacting his oral intake. Nutrition Related Findings:   NFPE deferred due to isolation Wound Type: (rectal skin breakdown per RN)    Current Nutrition Therapies:  DIET DIABETIC WITH OPTIONS Consistent Carb 1500-1600kcal; Mechanical Soft    Current Intake:   Average Meal Intake: 1-25% Average Supplement Intake: None ordered      Anthropometric Measures:  Height: 5' 6\" (167.6 cm)  Current Body Wt: 77.1 kg (169 lb 15.6 oz)(1/7), Weight source: Stated  BMI: 27.4, Overweight (BMI 25.0-29. 9)  Admission Body Weight: 169 lb 15.6 oz(stated)  Ideal Body Wt: 142 lbs (65 kg), 119.7 %  Usual Body Wt: (no hx available), Percent weight change:            Estimated Daily Nutrient Needs:  Energy (kcal/day): 5443-0091 ((20-25), Weight Used: Admission(77.1 kg))  Protein (g/day): 77-93 ( 1-1.2g/kg) Weight Used: (Admission)  Fluid (ml/day):   (1 ml/kcal)    Nutrition Diagnosis:   · Inadequate oral intake related to altered GI function(fatigue, poor appetite) as evidenced by (po intake minimla durign admission potentially 25% needs)    Nutrition Interventions:   Food and/or Nutrient Delivery: Continue current diet, Start oral nutrition supplement     Coordination of Nutrition Care: Continue to monitor while inpatient  Plan of Care discussed with Ganesh Mac RN. Goals: Active Goal: Meet >75% estimated needs by nutrition follow-up    Nutrition Monitoring and Evaluation:      Food/Nutrient Intake Outcomes: Food and nutrient intake, Supplement intake  Physical Signs/Symptoms Outcomes: Biochemical data, GI status    Discharge Planning:     Too soon to determine    Trung Davenport RD, LDN on 1/15/2021 at 2:58 PM  Contact: [phone]    Disaster Mode active

## 2021-01-16 LAB
ANION GAP SERPL CALC-SCNC: 9 MMOL/L (ref 7–16)
APPEARANCE UR: CLEAR
BACTERIA URNS QL MICRO: 0 /HPF
BILIRUB UR QL: NEGATIVE
BUN SERPL-MCNC: 38 MG/DL (ref 8–23)
CALCIUM SERPL-MCNC: 8.2 MG/DL (ref 8.3–10.4)
CASTS URNS QL MICRO: 0 /LPF
CHLORIDE SERPL-SCNC: 108 MMOL/L (ref 98–107)
CO2 SERPL-SCNC: 24 MMOL/L (ref 21–32)
COLOR UR: YELLOW
CREAT SERPL-MCNC: 1.33 MG/DL (ref 0.8–1.5)
CRYSTALS URNS QL MICRO: 0 /LPF
EPI CELLS #/AREA URNS HPF: NORMAL /HPF
ERYTHROCYTE [DISTWIDTH] IN BLOOD BY AUTOMATED COUNT: 18.2 % (ref 11.9–14.6)
GLUCOSE BLD STRIP.AUTO-MCNC: 174 MG/DL (ref 65–100)
GLUCOSE BLD STRIP.AUTO-MCNC: 242 MG/DL (ref 65–100)
GLUCOSE BLD STRIP.AUTO-MCNC: 253 MG/DL (ref 65–100)
GLUCOSE BLD STRIP.AUTO-MCNC: 295 MG/DL (ref 65–100)
GLUCOSE SERPL-MCNC: 259 MG/DL (ref 65–100)
GLUCOSE UR STRIP.AUTO-MCNC: NEGATIVE MG/DL
HCT VFR BLD AUTO: 30.3 % (ref 41.1–50.3)
HGB BLD-MCNC: 9.9 G/DL (ref 13.6–17.2)
HGB UR QL STRIP: NEGATIVE
KETONES UR QL STRIP.AUTO: ABNORMAL MG/DL
LEUKOCYTE ESTERASE UR QL STRIP.AUTO: ABNORMAL
MCH RBC QN AUTO: 27.5 PG (ref 26.1–32.9)
MCHC RBC AUTO-ENTMCNC: 32.7 G/DL (ref 31.4–35)
MCV RBC AUTO: 84.2 FL (ref 79.6–97.8)
MUCOUS THREADS URNS QL MICRO: 0 /LPF
NITRITE UR QL STRIP.AUTO: NEGATIVE
NRBC # BLD: 0.02 K/UL (ref 0–0.2)
OTHER OBSERVATIONS,UCOM: NORMAL
PH UR STRIP: 5 [PH] (ref 5–9)
PLATELET # BLD AUTO: 350 K/UL (ref 150–450)
PMV BLD AUTO: 10.8 FL (ref 9.4–12.3)
POTASSIUM SERPL-SCNC: 4.4 MMOL/L (ref 3.5–5.1)
PROT UR STRIP-MCNC: NEGATIVE MG/DL
RBC # BLD AUTO: 3.6 M/UL (ref 4.23–5.6)
RBC #/AREA URNS HPF: NORMAL /HPF
SODIUM SERPL-SCNC: 141 MMOL/L (ref 136–145)
SP GR UR REFRACTOMETRY: 1.03 (ref 1–1.02)
UROBILINOGEN UR QL STRIP.AUTO: 1 EU/DL (ref 0.2–1)
WBC # BLD AUTO: 13.8 K/UL (ref 4.3–11.1)
WBC URNS QL MICRO: NORMAL /HPF

## 2021-01-16 PROCEDURE — 74011250637 HC RX REV CODE- 250/637: Performed by: INTERNAL MEDICINE

## 2021-01-16 PROCEDURE — 81003 URINALYSIS AUTO W/O SCOPE: CPT

## 2021-01-16 PROCEDURE — 74011636637 HC RX REV CODE- 636/637: Performed by: INTERNAL MEDICINE

## 2021-01-16 PROCEDURE — 85027 COMPLETE CBC AUTOMATED: CPT

## 2021-01-16 PROCEDURE — 65270000029 HC RM PRIVATE

## 2021-01-16 PROCEDURE — 81015 MICROSCOPIC EXAM OF URINE: CPT

## 2021-01-16 PROCEDURE — 36415 COLL VENOUS BLD VENIPUNCTURE: CPT

## 2021-01-16 PROCEDURE — 82962 GLUCOSE BLOOD TEST: CPT

## 2021-01-16 PROCEDURE — 74011250637 HC RX REV CODE- 250/637: Performed by: FAMILY MEDICINE

## 2021-01-16 PROCEDURE — 80048 BASIC METABOLIC PNL TOTAL CA: CPT

## 2021-01-16 RX ORDER — DEXAMETHASONE 4 MG/1
6 TABLET ORAL DAILY
Status: DISCONTINUED | OUTPATIENT
Start: 2021-01-17 | End: 2021-01-18

## 2021-01-16 RX ORDER — NYSTATIN 100000 [USP'U]/G
POWDER TOPICAL 2 TIMES DAILY
Status: DISCONTINUED | OUTPATIENT
Start: 2021-01-16 | End: 2021-02-07 | Stop reason: HOSPADM

## 2021-01-16 RX ADMIN — NYSTATIN: 100000 POWDER TOPICAL at 08:44

## 2021-01-16 RX ADMIN — INSULIN GLARGINE 18 UNITS: 100 INJECTION, SOLUTION SUBCUTANEOUS at 22:35

## 2021-01-16 RX ADMIN — WITCH HAZEL 1 PAD: 500 SOLUTION RECTAL; TOPICAL at 08:45

## 2021-01-16 RX ADMIN — OXYCODONE HYDROCHLORIDE AND ACETAMINOPHEN 1000 MG: 500 TABLET ORAL at 08:44

## 2021-01-16 RX ADMIN — Medication 10 ML: at 13:13

## 2021-01-16 RX ADMIN — PANTOPRAZOLE SODIUM 40 MG: 40 TABLET, DELAYED RELEASE ORAL at 17:09

## 2021-01-16 RX ADMIN — GLYCERIN, PETROLATUM, PHENYLEPHRINE HCL, PRAMOXINE HCL: 144; 2.5; 10; 15 CREAM TOPICAL at 22:36

## 2021-01-16 RX ADMIN — NYSTATIN: 100000 POWDER TOPICAL at 17:09

## 2021-01-16 RX ADMIN — ROSUVASTATIN 10 MG: 10 TABLET, FILM COATED ORAL at 22:34

## 2021-01-16 RX ADMIN — INSULIN LISPRO 2 UNITS: 100 INJECTION, SOLUTION INTRAVENOUS; SUBCUTANEOUS at 08:43

## 2021-01-16 RX ADMIN — Medication 10 ML: at 05:52

## 2021-01-16 RX ADMIN — INSULIN LISPRO 4 UNITS: 100 INJECTION, SOLUTION INTRAVENOUS; SUBCUTANEOUS at 17:08

## 2021-01-16 RX ADMIN — Medication 220 MG: at 08:44

## 2021-01-16 RX ADMIN — INSULIN LISPRO 6 UNITS: 100 INJECTION, SOLUTION INTRAVENOUS; SUBCUTANEOUS at 12:19

## 2021-01-16 RX ADMIN — INSULIN LISPRO 6 UNITS: 100 INJECTION, SOLUTION INTRAVENOUS; SUBCUTANEOUS at 22:35

## 2021-01-16 RX ADMIN — Medication 10 ML: at 22:00

## 2021-01-16 RX ADMIN — ACETAMINOPHEN 650 MG: 325 TABLET, FILM COATED ORAL at 05:50

## 2021-01-16 RX ADMIN — WITCH HAZEL 1 PAD: 500 SOLUTION RECTAL; TOPICAL at 18:43

## 2021-01-16 RX ADMIN — PANTOPRAZOLE SODIUM 40 MG: 40 TABLET, DELAYED RELEASE ORAL at 06:32

## 2021-01-16 NOTE — PROGRESS NOTES
Message to provider. From: Tequila Lr RE: Lance Navarrete 01/30/1933 RM: [id number] Pt c/o burning sensation with urination. Penis red, and yeast like rash when fold skin is pulled. Please advise. Need Callback: NO CALLBACK REQ 6TH FL ROUTINE  Unread  Please check UA. Apply nystatin powder.

## 2021-01-16 NOTE — PROGRESS NOTES
Problem: Diabetes Self-Management  Goal: *Disease process and treatment process  Description: Define diabetes and identify own type of diabetes; list 3 options for treating diabetes. Outcome: Progressing Towards Goal  Goal: *Incorporating nutritional management into lifestyle  Description: Describe effect of type, amount and timing of food on blood glucose; list 3 methods for planning meals. Outcome: Progressing Towards Goal  Goal: *Incorporating physical activity into lifestyle  Description: State effect of exercise on blood glucose levels. Outcome: Progressing Towards Goal  Goal: *Developing strategies to promote health/change behavior  Description: Define the ABC's of diabetes; identify appropriate screenings, schedule and personal plan for screenings. Outcome: Progressing Towards Goal  Goal: *Using medications safely  Description: State effect of diabetes medications on diabetes; name diabetes medication taking, action and side effects. Outcome: Progressing Towards Goal  Goal: *Monitoring blood glucose, interpreting and using results  Description: Identify recommended blood glucose targets  and personal targets. Outcome: Progressing Towards Goal  Goal: *Prevention, detection, treatment of acute complications  Description: List symptoms of hyper- and hypoglycemia; describe how to treat low blood sugar and actions for lowering  high blood glucose level. Outcome: Progressing Towards Goal  Goal: *Prevention, detection and treatment of chronic complications  Description: Define the natural course of diabetes and describe the relationship of blood glucose levels to long term complications of diabetes.   Outcome: Progressing Towards Goal  Goal: *Developing strategies to address psychosocial issues  Description: Describe feelings about living with diabetes; identify support needed and support network  Outcome: Progressing Towards Goal  Goal: *Insulin pump training  Outcome: Progressing Towards Goal  Goal: *Sick day guidelines  Outcome: Progressing Towards Goal  Goal: *Patient Specific Goal (EDIT GOAL, INSERT TEXT)  Outcome: Progressing Towards Goal     Problem: Patient Education: Go to Patient Education Activity  Goal: Patient/Family Education  Outcome: Progressing Towards Goal     Problem: Pressure Injury - Risk of  Goal: *Prevention of pressure injury  Description: Document Franklin Scale and appropriate interventions in the flowsheet. Outcome: Progressing Towards Goal  Note: Pressure Injury Interventions:  Sensory Interventions: Assess changes in LOC, Avoid rigorous massage over bony prominences, Keep linens dry and wrinkle-free, Float heels, Pad between skin to skin, Monitor skin under medical devices    Moisture Interventions: Absorbent underpads    Activity Interventions: Pressure redistribution bed/mattress(bed type)    Mobility Interventions: Pressure redistribution bed/mattress (bed type)    Nutrition Interventions: Document food/fluid/supplement intake    Friction and Shear Interventions: Apply protective barrier, creams and emollients                Problem: Patient Education: Go to Patient Education Activity  Goal: Patient/Family Education  Outcome: Progressing Towards Goal     Problem: Falls - Risk of  Goal: *Absence of Falls  Description: Document Joy Fall Risk and appropriate interventions in the flowsheet.   Outcome: Progressing Towards Goal  Note: Fall Risk Interventions:  Mobility Interventions: Bed/chair exit alarm         Medication Interventions: Bed/chair exit alarm, Patient to call before getting OOB    Elimination Interventions: Call light in reach    History of Falls Interventions: Bed/chair exit alarm         Problem: Patient Education: Go to Patient Education Activity  Goal: Patient/Family Education  Outcome: Progressing Towards Goal     Problem: Patient Education: Go to Patient Education Activity  Goal: Patient/Family Education  Outcome: Progressing Towards Goal     Problem: Patient Education: Go to Patient Education Activity  Goal: Patient/Family Education  Outcome: Progressing Towards Goal

## 2021-01-16 NOTE — PROGRESS NOTES
Oxygen Qualifier       Room air: SpO2 with O2 and liter flow   Resting SpO2  90%     Ambulating SpO2  82% 84% on 3L  87% on 5L  90% on 7L      Patient unable to walk, patient has limited mobility. Encouraged patient to walk in place. Patient tolerated and gave great effort.      Completed by:    Sherri Muñoz

## 2021-01-16 NOTE — PROGRESS NOTES
This nurse spoke with pt wife, Jah Wray, and updates given. Jah Wray verbalized understanding of updates.

## 2021-01-16 NOTE — PROGRESS NOTES
Shift Summary  Hourly rounds performed on pt, pt resting in the bed quietly with head the bed elevated and eyes cosed. Patient remained alert and oriented x 4 on 5 L humidified high flow via NC O2 sats at 92 %. R FA IV site remained patent. Patient denies pain or any other concern this shift. Patient remained afebrile, VSS, see chart, adequate output and 0 BM noted and charted. See chart. No acute distress noted. Patient c/o pain with urination, and penis is red. MD made aware, UA and nystatin powder ordered.

## 2021-01-16 NOTE — PROGRESS NOTES
Progress Note    Patient: Berry Tello MRN: [id number]  SSN: [id number]    YOB: 1933  Age: 80 y.o. Sex: male      Admit Date: 1/8/2021    LOS: 8 days     Subjective:   F/U rectal bleeding, COVID    81 yo hx of admitted for COVID and rectal bleeding. GI consulted who believed from external hemorrhoids. Recommends Tuck pads and anusol CO BID. Hg stable. S/p convalescent plasma on 1/8/21. Also received remdesivir 1/8-1/12. 2L NC. No SOB. No chest pain. Feeling much better.    Current Facility-Administered Medications   Medication Dose Route Frequency    nystatin (MYCOSTATIN) 100,000 unit/gram powder   Topical BID    insulin glargine (LANTUS) injection 18 Units  18 Units SubCUTAneous QHS    phenylephrine suppository 1 Suppository  1 Suppository Rectal Q12H    pantoprazole (PROTONIX) tablet 40 mg  40 mg Oral ACB&D    witch hazel-glycerin (TUCKS) 12.5-50 % pads 1 Pad  1 Pad PeriANAL PRN    dextrose 40% (GLUTOSE) oral gel 1 Tube  15 g Oral PRN    glucagon (GLUCAGEN) injection 1 mg  1 mg IntraMUSCular PRN    dextrose (D50W) injection syrg 12.5-25 g  25-50 mL IntraVENous PRN    influenza vaccine 2020-21 (6 mos+)(PF) (FLUARIX/FLULAVAL/FLUZONE QUAD) injection 0.5 mL  0.5 mL IntraMUSCular PRIOR TO DISCHARGE    dexamethasone (DECADRON) 4 mg/mL injection 4 mg  4 mg IntraVENous Q24H    [Held by provider] aspirin delayed-release tablet 81 mg  81 mg Oral QHS    rosuvastatin (CRESTOR) tablet 10 mg  10 mg Oral QHS    sodium chloride (NS) flush 5-40 mL  5-40 mL IntraVENous Q8H    sodium chloride (NS) flush 5-40 mL  5-40 mL IntraVENous PRN    acetaminophen (TYLENOL) tablet 650 mg  650 mg Oral Q6H PRN    Or    acetaminophen (TYLENOL) suppository 650 mg  650 mg Rectal Q6H PRN    promethazine (PHENERGAN) tablet 12.5 mg  12.5 mg Oral Q6H PRN    Or    ondansetron (ZOFRAN) injection 4 mg  4 mg IntraVENous Q6H PRN    senna (SENOKOT) tablet 8.6 mg  1 Tab Oral DAILY PRN    0.9% sodium chloride infusion 250 mL  250 mL IntraVENous PRN    insulin lispro (HUMALOG) injection   SubCUTAneous AC&HS    ascorbic acid (vitamin C) (VITAMIN C) tablet 1,000 mg  1,000 mg Oral BID    zinc sulfate tablet 220 mg  220 mg Oral DAILY       Objective:     Vitals:    01/15/21 2350 01/16/21 0235 01/16/21 0808 01/16/21 1202   BP: 118/71 (!) 118/58 135/64 (!) 142/70   Pulse: 79 100 73 78   Resp: 18 18 18 18   Temp: 98.9 °F (37.2 °C) 98.2 °F (36.8 °C) 98.7 °F (37.1 °C) 98.1 °F (36.7 °C)   SpO2: 92% 100% 98% 95%   Height:             Intake and Output:  Current Shift: 01/16 0701 - 01/16 1900  In: 720 [P.O.:720]  Out: 100 [Urine:100]  Last three shifts: 01/14 1901 - 01/16 0700  In: 320 [P.O.:320]  Out: 1625 [Urine:1625]    Physical Exam:   General:  Alert, cooperative, no distress, appears stated age. Eyes:  Conjunctivae/corneas clear. Ears:  Normal TMs and external ear canals both ears. Nose: Nares normal. Septum midline. Mouth/Throat: Lips, mucosa, and tongue normal.    Neck:  no JVD. Back:   deferred. Lungs:   Clear to auscultation bilaterally with limited breathe sounds. Heart:  Regular rate and rhythm, S1, S2 normal   Abdomen:   Soft, non-tender. Bowel sounds normal.    Extremities: Extremities normal, atraumatic, no cyanosis or edema. Pulses: 2+ and symmetric all extremities. Skin: Skin color, texture, turgor normal. No rashes or lesions   Lymph nodes: Cervical, supraclavicular, and axillary nodes normal.   Neurologic: CNII-XII intact.         Lab/Data Review:    Recent Results (from the past 24 hour(s))   GLUCOSE, POC    Collection Time: 01/15/21  4:21 PM   Result Value Ref Range    Glucose (POC) 108 (H) 65 - 100 mg/dL   CBC W/O DIFF    Collection Time: 01/15/21  4:31 PM   Result Value Ref Range    WBC 13.1 (H) 4.3 - 11.1 K/uL    RBC 3.19 (L) 4.23 - 5.6 M/uL    HGB 8.8 (L) 13.6 - 17.2 g/dL    HCT 27.2 (L) 41.1 - 50.3 %    MCV 85.3 79.6 - 97.8 FL    MCH 27.6 26.1 - 32.9 PG    MCHC 32.4 31.4 - 35.0 g/dL RDW 17.7 (H) 11.9 - 14.6 %    PLATELET 750 165 - 181 K/uL    MPV 11.4 9.4 - 12.3 FL    ABSOLUTE NRBC 0.00 0.0 - 0.2 K/uL   GLUCOSE, POC    Collection Time: 01/15/21  9:13 PM   Result Value Ref Range    Glucose (POC) 221 (H) 65 - 100 mg/dL   GLUCOSE, POC    Collection Time: 01/16/21  7:39 AM   Result Value Ref Range    Glucose (POC) 174 (H) 65 - 100 mg/dL   GLUCOSE, POC    Collection Time: 01/16/21 11:38 AM   Result Value Ref Range    Glucose (POC) 253 (H) 65 - 100 mg/dL   CBC W/O DIFF    Collection Time: 01/16/21 12:53 PM   Result Value Ref Range    WBC 13.8 (H) 4.3 - 11.1 K/uL    RBC 3.60 (L) 4.23 - 5.6 M/uL    HGB 9.9 (L) 13.6 - 17.2 g/dL    HCT 30.3 (L) 41.1 - 50.3 %    MCV 84.2 79.6 - 97.8 FL    MCH 27.5 26.1 - 32.9 PG    MCHC 32.7 31.4 - 35.0 g/dL    RDW 18.2 (H) 11.9 - 14.6 %    PLATELET 003 637 - 733 K/uL    MPV 10.8 9.4 - 12.3 FL    ABSOLUTE NRBC 0.02 0.0 - 0.2 K/uL   METABOLIC PANEL, BASIC    Collection Time: 01/16/21 12:53 PM   Result Value Ref Range    Sodium 141 136 - 145 mmol/L    Potassium 4.4 3.5 - 5.1 mmol/L    Chloride 108 (H) 98 - 107 mmol/L    CO2 24 21 - 32 mmol/L    Anion gap 9 7 - 16 mmol/L    Glucose 259 (H) 65 - 100 mg/dL    BUN 38 (H) 8 - 23 MG/DL    Creatinine 1.33 0.8 - 1.5 MG/DL    GFR est AA >60 >60 ml/min/1.73m2    GFR est non-AA 54 (L) >60 ml/min/1.73m2    Calcium 8.2 (L) 8.3 - 10.4 MG/DL       Assessment/ Plan:     Principal Problem:    Acute respiratory failure with hypoxia (HCC) (1/7/2021)    Active Problems:    COVID-19 (1/7/2021)      Acute metabolic encephalopathy (6/7/5059)      Type 2 diabetes mellitus, with long-term current use of insulin ([facility] [address] (1/7/2021)      Gastrointestinal hemorrhage associated with anorectal source (1/14/2021)    Acute respiratory failure secondary to COVID - decadron to oral. S/p CP and remdesivir. Order 6 minute walk    Rectal bleeding - phenylephrine suppository. Goal treatment for two weeks. DM type II - Lantus 18 units daily. SS.     Likely can dc in AM depending on how much oxygen supplement needed.      DVT  Prophylaxis - SCDs  Signed By: Princess Soto DO     January 16, 2021

## 2021-01-17 LAB
GLUCOSE BLD STRIP.AUTO-MCNC: 106 MG/DL (ref 65–100)
GLUCOSE BLD STRIP.AUTO-MCNC: 192 MG/DL (ref 65–100)
GLUCOSE BLD STRIP.AUTO-MCNC: 247 MG/DL (ref 65–100)
GLUCOSE BLD STRIP.AUTO-MCNC: 342 MG/DL (ref 65–100)

## 2021-01-17 PROCEDURE — 74011250636 HC RX REV CODE- 250/636: Performed by: FAMILY MEDICINE

## 2021-01-17 PROCEDURE — 74011636637 HC RX REV CODE- 636/637: Performed by: INTERNAL MEDICINE

## 2021-01-17 PROCEDURE — 82962 GLUCOSE BLOOD TEST: CPT

## 2021-01-17 PROCEDURE — 74011250637 HC RX REV CODE- 250/637: Performed by: INTERNAL MEDICINE

## 2021-01-17 PROCEDURE — 65270000029 HC RM PRIVATE

## 2021-01-17 RX ADMIN — PANTOPRAZOLE SODIUM 40 MG: 40 TABLET, DELAYED RELEASE ORAL at 17:09

## 2021-01-17 RX ADMIN — ACETAMINOPHEN 650 MG: 325 TABLET, FILM COATED ORAL at 19:41

## 2021-01-17 RX ADMIN — INSULIN GLARGINE 18 UNITS: 100 INJECTION, SOLUTION SUBCUTANEOUS at 21:34

## 2021-01-17 RX ADMIN — NYSTATIN: 100000 POWDER TOPICAL at 17:10

## 2021-01-17 RX ADMIN — NYSTATIN: 100000 POWDER TOPICAL at 10:30

## 2021-01-17 RX ADMIN — PANTOPRAZOLE SODIUM 40 MG: 40 TABLET, DELAYED RELEASE ORAL at 05:29

## 2021-01-17 RX ADMIN — DEXAMETHASONE 6 MG: 4 TABLET ORAL at 10:24

## 2021-01-17 RX ADMIN — INSULIN LISPRO 4 UNITS: 100 INJECTION, SOLUTION INTRAVENOUS; SUBCUTANEOUS at 17:09

## 2021-01-17 RX ADMIN — GLYCERIN, PETROLATUM, PHENYLEPHRINE HCL, PRAMOXINE HCL: 144; 2.5; 10; 15 CREAM TOPICAL at 21:00

## 2021-01-17 RX ADMIN — Medication 10 ML: at 13:52

## 2021-01-17 RX ADMIN — Medication 10 ML: at 20:35

## 2021-01-17 RX ADMIN — INSULIN LISPRO 2 UNITS: 100 INJECTION, SOLUTION INTRAVENOUS; SUBCUTANEOUS at 11:57

## 2021-01-17 RX ADMIN — INSULIN LISPRO 8 UNITS: 100 INJECTION, SOLUTION INTRAVENOUS; SUBCUTANEOUS at 21:34

## 2021-01-17 RX ADMIN — GLYCERIN, PETROLATUM, PHENYLEPHRINE HCL, PRAMOXINE HCL: 144; 2.5; 10; 15 CREAM TOPICAL at 10:31

## 2021-01-17 RX ADMIN — ROSUVASTATIN 10 MG: 10 TABLET, FILM COATED ORAL at 20:31

## 2021-01-17 NOTE — PROGRESS NOTES
Problem: Diabetes Self-Management  Goal: *Disease process and treatment process  Description: Define diabetes and identify own type of diabetes; list 3 options for treating diabetes. Outcome: Progressing Towards Goal  Goal: *Incorporating nutritional management into lifestyle  Description: Describe effect of type, amount and timing of food on blood glucose; list 3 methods for planning meals. Outcome: Progressing Towards Goal  Goal: *Incorporating physical activity into lifestyle  Description: State effect of exercise on blood glucose levels. Outcome: Progressing Towards Goal  Goal: *Developing strategies to promote health/change behavior  Description: Define the ABC's of diabetes; identify appropriate screenings, schedule and personal plan for screenings. Outcome: Progressing Towards Goal  Goal: *Using medications safely  Description: State effect of diabetes medications on diabetes; name diabetes medication taking, action and side effects. Outcome: Progressing Towards Goal  Goal: *Monitoring blood glucose, interpreting and using results  Description: Identify recommended blood glucose targets  and personal targets. Outcome: Progressing Towards Goal  Goal: *Prevention, detection, treatment of acute complications  Description: List symptoms of hyper- and hypoglycemia; describe how to treat low blood sugar and actions for lowering  high blood glucose level. Outcome: Progressing Towards Goal  Goal: *Prevention, detection and treatment of chronic complications  Description: Define the natural course of diabetes and describe the relationship of blood glucose levels to long term complications of diabetes.   Outcome: Progressing Towards Goal  Goal: *Developing strategies to address psychosocial issues  Description: Describe feelings about living with diabetes; identify support needed and support network  Outcome: Progressing Towards Goal  Goal: *Insulin pump training  Outcome: Progressing Towards Goal  Goal: *Sick day guidelines  Outcome: Progressing Towards Goal  Goal: *Patient Specific Goal (EDIT GOAL, INSERT TEXT)  Outcome: Progressing Towards Goal     Problem: Patient Education: Go to Patient Education Activity  Goal: Patient/Family Education  Outcome: Progressing Towards Goal     Problem: Pressure Injury - Risk of  Goal: *Prevention of pressure injury  Description: Document Franklin Scale and appropriate interventions in the flowsheet. Outcome: Progressing Towards Goal  Note: Pressure Injury Interventions:  Sensory Interventions: Keep linens dry and wrinkle-free, Maintain/enhance activity level, Minimize linen layers, Monitor skin under medical devices, Pad between skin to skin, Turn and reposition approx. every two hours (pillows and wedges if needed), Use 30-degree side-lying position    Moisture Interventions: Absorbent underpads    Activity Interventions: Increase time out of bed, PT/OT evaluation    Mobility Interventions: HOB 30 degrees or less, PT/OT evaluation, Turn and reposition approx. every two hours(pillow and wedges)    Nutrition Interventions: Document food/fluid/supplement intake, Offer support with meals,snacks and hydration    Friction and Shear Interventions: Apply protective barrier, creams and emollients, Foam dressings/transparent film/skin sealants, HOB 30 degrees or less, Lift sheet, Minimize layers                Problem: Patient Education: Go to Patient Education Activity  Goal: Patient/Family Education  Outcome: Progressing Towards Goal     Problem: Falls - Risk of  Goal: *Absence of Falls  Description: Document Joy Fall Risk and appropriate interventions in the flowsheet. Outcome: Progressing Towards Goal  Note: Fall Risk Interventions:  Mobility Interventions: Patient to call before getting OOB         Medication Interventions: Patient to call before getting OOB    Elimination Interventions: Call light in reach    History of Falls Interventions:  Investigate reason for fall         Problem: Patient Education: Go to Patient Education Activity  Goal: Patient/Family Education  Outcome: Progressing Towards Goal     Problem: Patient Education: Go to Patient Education Activity  Goal: Patient/Family Education  Outcome: Progressing Towards Goal     Problem: Patient Education: Go to Patient Education Activity  Goal: Patient/Family Education  Outcome: Progressing Towards Goal

## 2021-01-17 NOTE — PROGRESS NOTES
Progress Note    Patient: Cat Moser MRN: [id number]  SSN: [id number]    YOB: 1933  Age: 80 y.o. Sex: male      Admit Date: 1/8/2021    LOS: 9 days     Subjective:   F/U rectal bleeding, COVID    79 yo hx of admitted for COVID and rectal bleeding. GI consulted who believed from external hemorrhoids. Recommends Tuck pads and anusol DC BID. Hg stable. S/p convalescent plasma on 1/8/21. Also received remdesivir 1/8-1/12. Very weak and willing to go to rehab. Respiratory therapy has ambulated and needs 7L during exertion. 5L NC. No SOB. No chest pain. Feeling much better.    Current Facility-Administered Medications   Medication Dose Route Frequency    nystatin (MYCOSTATIN) 100,000 unit/gram powder   Topical BID    dexAMETHasone (DECADRON) tablet 6 mg  6 mg Oral DAILY    hqvgifxzi-Buuyacmd-Zytbc-W.Pet (PREPARATION H MAXIMUM STRENGTH) 0.25-1 % cream   Rectal Q12H    insulin glargine (LANTUS) injection 18 Units  18 Units SubCUTAneous QHS    phenylephrine suppository 1 Suppository  1 Suppository Rectal Q12H    pantoprazole (PROTONIX) tablet 40 mg  40 mg Oral ACB&D    witch hazel-glycerin (TUCKS) 12.5-50 % pads 1 Pad  1 Pad PeriANAL PRN    dextrose 40% (GLUTOSE) oral gel 1 Tube  15 g Oral PRN    glucagon (GLUCAGEN) injection 1 mg  1 mg IntraMUSCular PRN    dextrose (D50W) injection syrg 12.5-25 g  25-50 mL IntraVENous PRN    influenza vaccine 2020-21 (6 mos+)(PF) (FLUARIX/FLULAVAL/FLUZONE QUAD) injection 0.5 mL  0.5 mL IntraMUSCular PRIOR TO DISCHARGE    [Held by provider] aspirin delayed-release tablet 81 mg  81 mg Oral QHS    rosuvastatin (CRESTOR) tablet 10 mg  10 mg Oral QHS    sodium chloride (NS) flush 5-40 mL  5-40 mL IntraVENous Q8H    sodium chloride (NS) flush 5-40 mL  5-40 mL IntraVENous PRN    acetaminophen (TYLENOL) tablet 650 mg  650 mg Oral Q6H PRN    Or    acetaminophen (TYLENOL) suppository 650 mg  650 mg Rectal Q6H PRN    ondansetron (ZOFRAN) injection 4 mg  4 mg IntraVENous Q6H PRN    senna (SENOKOT) tablet 8.6 mg  1 Tab Oral DAILY PRN    0.9% sodium chloride infusion 250 mL  250 mL IntraVENous PRN    insulin lispro (HUMALOG) injection   SubCUTAneous AC&HS       Objective:     Vitals:    01/16/21 2326 01/17/21 0244 01/17/21 0803 01/17/21 1153   BP: (!) 141/65 132/71 127/62 117/60   Pulse: 72 79 78 75   Resp: 18 18 18 18   Temp: 98.9 °F (37.2 °C) 98.2 °F (36.8 °C) 98.4 °F (36.9 °C) 98.5 °F (36.9 °C)   SpO2: 92% 92% 90% (!) 86%   Height:             Intake and Output:  Current Shift: 01/17 0701 - 01/17 1900  In: -   Out: 200 [Urine:200]  Last three shifts: 01/15 1901 - 01/17 0700  In: 960 [P.O.:960]  Out: 1625 [Urine:1625]    Physical Exam:   General:  Alert, cooperative, no distress, appears stated age. Talkative with no SOB   Eyes:  Conjunctivae/corneas clear. Ears:  Normal TMs and external ear canals both ears. Nose: Nares normal. Septum midline. Mouth/Throat: Lips, mucosa, and tongue normal.    Neck:  no JVD. Back:   deferred. Lungs:   Clear to auscultation bilaterally with limited breathe sounds. Heart:  Regular rate and rhythm, S1, S2 normal   Abdomen:   Soft, non-tender. Bowel sounds normal.    Extremities: Extremities normal, atraumatic, no cyanosis or edema. Pulses: 2+ and symmetric all extremities. Skin: Skin color, texture, turgor normal. No rashes or lesions   Lymph nodes: Cervical, supraclavicular, and axillary nodes normal.   Neurologic: CNII-XII intact.         Lab/Data Review:    Recent Results (from the past 24 hour(s))   CBC W/O DIFF    Collection Time: 01/16/21 12:53 PM   Result Value Ref Range    WBC 13.8 (H) 4.3 - 11.1 K/uL    RBC 3.60 (L) 4.23 - 5.6 M/uL    HGB 9.9 (L) 13.6 - 17.2 g/dL    HCT 30.3 (L) 41.1 - 50.3 %    MCV 84.2 79.6 - 97.8 FL    MCH 27.5 26.1 - 32.9 PG    MCHC 32.7 31.4 - 35.0 g/dL    RDW 18.2 (H) 11.9 - 14.6 %    PLATELET 398 002 - 388 K/uL    MPV 10.8 9.4 - 12.3 FL    ABSOLUTE NRBC 0.02 0.0 - 0.2 K/uL METABOLIC PANEL, BASIC    Collection Time: 01/16/21 12:53 PM   Result Value Ref Range    Sodium 141 136 - 145 mmol/L    Potassium 4.4 3.5 - 5.1 mmol/L    Chloride 108 (H) 98 - 107 mmol/L    CO2 24 21 - 32 mmol/L    Anion gap 9 7 - 16 mmol/L    Glucose 259 (H) 65 - 100 mg/dL    BUN 38 (H) 8 - 23 MG/DL    Creatinine 1.33 0.8 - 1.5 MG/DL    GFR est AA >60 >60 ml/min/1.73m2    GFR est non-AA 54 (L) >60 ml/min/1.73m2    Calcium 8.2 (L) 8.3 - 10.4 MG/DL   URINALYSIS W/ RFLX MICROSCOPIC    Collection Time: 01/16/21  1:21 PM   Result Value Ref Range    Color YELLOW      Appearance CLEAR      Specific gravity 1.027 (H) 1.001 - 1.023      pH (UA) 5.0 5.0 - 9.0      Protein Negative NEG mg/dL    Glucose Negative mg/dL    Ketone TRACE (A) NEG mg/dL    Bilirubin Negative NEG      Blood Negative NEG      Urobilinogen 1.0 0.2 - 1.0 EU/dL    Nitrites Negative NEG      Leukocyte Esterase SMALL (A) NEG     URINE MICROSCOPIC    Collection Time: 01/16/21  1:21 PM   Result Value Ref Range    WBC 0-3 0 /hpf    RBC 0-3 0 /hpf    Epithelial cells 0-3 0 /hpf    Bacteria 0 0 /hpf    Casts 0 0 /lpf    Crystals, urine 0 0 /LPF    Mucus 0 0 /lpf    Other observations RESULTS VERIFIED MANUALLY     GLUCOSE, POC    Collection Time: 01/16/21  4:34 PM   Result Value Ref Range    Glucose (POC) 242 (H) 65 - 100 mg/dL   GLUCOSE, POC    Collection Time: 01/16/21  9:00 PM   Result Value Ref Range    Glucose (POC) 295 (H) 65 - 100 mg/dL   GLUCOSE, POC    Collection Time: 01/17/21  7:19 AM   Result Value Ref Range    Glucose (POC) 106 (H) 65 - 100 mg/dL   GLUCOSE, POC    Collection Time: 01/17/21 11:06 AM   Result Value Ref Range    Glucose (POC) 192 (H) 65 - 100 mg/dL       Assessment/ Plan:     Principal Problem:    Acute respiratory failure with hypoxia (HCC) (1/7/2021)    Active Problems:    COVID-19 (1/7/2021)      Acute metabolic encephalopathy (2/9/9601)      Type 2 diabetes mellitus, with long-term current use of insulin ([facility] [address] (1/7/2021) Gastrointestinal hemorrhage associated with anorectal source (1/14/2021)    Acute respiratory failure secondary to COVID - decadron. S/p CP and remdesivir. Rectal bleeding - phenylephrine suppository. Goal treatment for two weeks. DM type II - Lantus 18 units daily. SS. Wants rehab. Reached out to  to help get bed. I have attempted to call the wife with no answer. No option to leave voicemail.      DVT  Prophylaxis - SCDs  Signed By: Ellen Nguyen DO     January 17, 2021

## 2021-01-18 LAB
GLUCOSE BLD STRIP.AUTO-MCNC: 229 MG/DL (ref 65–100)
GLUCOSE BLD STRIP.AUTO-MCNC: 250 MG/DL (ref 65–100)
GLUCOSE BLD STRIP.AUTO-MCNC: 278 MG/DL (ref 65–100)
GLUCOSE BLD STRIP.AUTO-MCNC: 300 MG/DL (ref 65–100)
GLUCOSE BLD STRIP.AUTO-MCNC: 314 MG/DL (ref 65–100)

## 2021-01-18 PROCEDURE — 86580 TB INTRADERMAL TEST: CPT | Performed by: FAMILY MEDICINE

## 2021-01-18 PROCEDURE — 74011250637 HC RX REV CODE- 250/637: Performed by: INTERNAL MEDICINE

## 2021-01-18 PROCEDURE — 74011250636 HC RX REV CODE- 250/636: Performed by: FAMILY MEDICINE

## 2021-01-18 PROCEDURE — 97530 THERAPEUTIC ACTIVITIES: CPT

## 2021-01-18 PROCEDURE — 65270000029 HC RM PRIVATE

## 2021-01-18 PROCEDURE — 74011636637 HC RX REV CODE- 636/637: Performed by: INTERNAL MEDICINE

## 2021-01-18 PROCEDURE — 74011636637 HC RX REV CODE- 636/637: Performed by: FAMILY MEDICINE

## 2021-01-18 PROCEDURE — 82962 GLUCOSE BLOOD TEST: CPT

## 2021-01-18 PROCEDURE — 74011000302 HC RX REV CODE- 302: Performed by: FAMILY MEDICINE

## 2021-01-18 PROCEDURE — 74011250637 HC RX REV CODE- 250/637: Performed by: FAMILY MEDICINE

## 2021-01-18 RX ORDER — DEXAMETHASONE 4 MG/1
6 TABLET ORAL EVERY 12 HOURS
Status: DISCONTINUED | OUTPATIENT
Start: 2021-01-18 | End: 2021-01-19

## 2021-01-18 RX ORDER — GUAIFENESIN 600 MG/1
600 TABLET, EXTENDED RELEASE ORAL EVERY 12 HOURS
Status: DISCONTINUED | OUTPATIENT
Start: 2021-01-18 | End: 2021-01-30

## 2021-01-18 RX ORDER — INSULIN GLARGINE 100 [IU]/ML
30 INJECTION, SOLUTION SUBCUTANEOUS
Status: DISCONTINUED | OUTPATIENT
Start: 2021-01-18 | End: 2021-01-19

## 2021-01-18 RX ADMIN — GUAIFENESIN 600 MG: 600 TABLET ORAL at 16:16

## 2021-01-18 RX ADMIN — ACETAMINOPHEN 650 MG: 325 TABLET, FILM COATED ORAL at 19:24

## 2021-01-18 RX ADMIN — INSULIN GLARGINE 30 UNITS: 100 INJECTION, SOLUTION SUBCUTANEOUS at 21:21

## 2021-01-18 RX ADMIN — Medication 10 ML: at 05:55

## 2021-01-18 RX ADMIN — DEXAMETHASONE 6 MG: 4 TABLET ORAL at 20:17

## 2021-01-18 RX ADMIN — INSULIN LISPRO 6 UNITS: 100 INJECTION, SOLUTION INTRAVENOUS; SUBCUTANEOUS at 12:41

## 2021-01-18 RX ADMIN — GLYCERIN, PETROLATUM, PHENYLEPHRINE HCL, PRAMOXINE HCL: 144; 2.5; 10; 15 CREAM TOPICAL at 21:00

## 2021-01-18 RX ADMIN — NYSTATIN: 100000 POWDER TOPICAL at 18:00

## 2021-01-18 RX ADMIN — PANTOPRAZOLE SODIUM 40 MG: 40 TABLET, DELAYED RELEASE ORAL at 16:16

## 2021-01-18 RX ADMIN — INSULIN LISPRO 8 UNITS: 100 INJECTION, SOLUTION INTRAVENOUS; SUBCUTANEOUS at 21:21

## 2021-01-18 RX ADMIN — DEXAMETHASONE 6 MG: 4 TABLET ORAL at 09:30

## 2021-01-18 RX ADMIN — GLYCERIN, PETROLATUM, PHENYLEPHRINE HCL, PRAMOXINE HCL: 144; 2.5; 10; 15 CREAM TOPICAL at 09:00

## 2021-01-18 RX ADMIN — NYSTATIN: 100000 POWDER TOPICAL at 09:00

## 2021-01-18 RX ADMIN — INSULIN LISPRO 4 UNITS: 100 INJECTION, SOLUTION INTRAVENOUS; SUBCUTANEOUS at 08:00

## 2021-01-18 RX ADMIN — TUBERCULIN PURIFIED PROTEIN DERIVATIVE 5 UNITS: 5 INJECTION, SOLUTION INTRADERMAL at 17:30

## 2021-01-18 RX ADMIN — ROSUVASTATIN 10 MG: 10 TABLET, FILM COATED ORAL at 20:18

## 2021-01-18 RX ADMIN — PANTOPRAZOLE SODIUM 40 MG: 40 TABLET, DELAYED RELEASE ORAL at 05:56

## 2021-01-18 RX ADMIN — Medication 10 ML: at 16:21

## 2021-01-18 RX ADMIN — INSULIN LISPRO 8 UNITS: 100 INJECTION, SOLUTION INTRAVENOUS; SUBCUTANEOUS at 16:24

## 2021-01-18 RX ADMIN — Medication 10 ML: at 20:19

## 2021-01-18 RX ADMIN — GUAIFENESIN 600 MG: 600 TABLET ORAL at 20:18

## 2021-01-18 NOTE — PROGRESS NOTES
Working with OT and sats dropped to 88% when sat on side of bed then quickly dropped to 70s per OT  O2 at increased from 6l to 10l NC and resp notified. Sats continue to remain in the 80s and oxygen increased to 15l N/C sats up to low 90s resp .

## 2021-01-18 NOTE — PROGRESS NOTES
Pt wife called to desk at shift change wanting to talk, upset that no one called her today. [place]/RN spoke with her a while and told her I would call her. I tried x 2 during rounds while in room with patient and no answer.

## 2021-01-18 NOTE — PROGRESS NOTES
END OF SHIFT NOTE:Pt with SOB especially when sitting and standing Remains on O2 9l high flow sat 94%. Hourly rounds completed, all needsmet this shift. Will continue to monitor until night shift nurse takes over. INTAKE/OUTPUT  01/17 0701 - 01/18 0700  In: 660 [P.O.:660]  Out: 1075 [Urine:1075]  Voiding: YES  Catheter: NO  Color: clear  Drain:              DIET  GI soft    Flatus: Patient does have flatus present. Stool:  0 occurrences. Characteristics:  Stool Assessment  Stool Color: Red  Stool Appearance: Bloody(per pt and night shift)  Stool Amount: Small  Stool Source/Status: Rectum    Ambulating  No    Emesis: 0 occurrences.     Characteristics:          VITAL SIGNS  Patient Vitals for the past 12 hrs:   Temp Pulse Resp BP SpO2   01/18/21 1457 98.1 °F (36.7 °C) 66 18 (!) 148/70 98 %   01/18/21 1119 98.2 °F (36.8 °C) 71 18 (!) 127/55 (!) 87 %   01/18/21 1109 -- -- -- (!) 127/55 91 %   01/18/21 0704 98.2 °F (36.8 °C) 67 18 (!) 147/65 92 %       Pain Assessment  Pain Intensity 1: 0 (01/18/21 1420)  Pain Location 1: Buttocks  Pain Intervention(s) 1: Medication (see MAR), Repositioned  Patient Stated Pain Goal: 0            Maura Huff RN

## 2021-01-18 NOTE — PROGRESS NOTES
Pt yelled out for nurse in a panic saying he's dying. VSS, BS ok. Skin w/d, no distress noted. Reassured and light left on.

## 2021-01-18 NOTE — PROGRESS NOTES
Progress Note    Patient: Soraya Sosa MRN: [id number]  SSN: [id number]    YOB: 1933  Age: 80 y.o. Sex: male      Admit Date: 1/8/2021    LOS: 10 days     Subjective:   F/U rectal bleeding, COVID    81 yo hx of DM type II, GERD, admitted for COVID and rectal bleeding. GI consulted who believed from external hemorrhoids. Recommends Tuck pads and anusol CO BID. Hg stable. S/p convalescent plasma on 1/8/21. Also received remdesivir 1/8-1/12. Very weak and willing to go to rehab. 10L high flow. Had mucous he coughed up this AM. No SOB. No chest pain. Feeling much better. Glucose elevated.    Current Facility-Administered Medications   Medication Dose Route Frequency    dexAMETHasone (DECADRON) tablet 6 mg  6 mg Oral Q12H    nystatin (MYCOSTATIN) 100,000 unit/gram powder   Topical BID    mgzzgagpa-Ztdpluxm-Eztcc-W.Pet (PREPARATION H MAXIMUM STRENGTH) 0.25-1 % cream   Rectal Q12H    insulin glargine (LANTUS) injection 18 Units  18 Units SubCUTAneous QHS    phenylephrine suppository 1 Suppository  1 Suppository Rectal Q12H    pantoprazole (PROTONIX) tablet 40 mg  40 mg Oral ACB&D    witch hazel-glycerin (TUCKS) 12.5-50 % pads 1 Pad  1 Pad PeriANAL PRN    dextrose 40% (GLUTOSE) oral gel 1 Tube  15 g Oral PRN    glucagon (GLUCAGEN) injection 1 mg  1 mg IntraMUSCular PRN    dextrose (D50W) injection syrg 12.5-25 g  25-50 mL IntraVENous PRN    influenza vaccine 2020-21 (6 mos+)(PF) (FLUARIX/FLULAVAL/FLUZONE QUAD) injection 0.5 mL  0.5 mL IntraMUSCular PRIOR TO DISCHARGE    [Held by provider] aspirin delayed-release tablet 81 mg  81 mg Oral QHS    rosuvastatin (CRESTOR) tablet 10 mg  10 mg Oral QHS    sodium chloride (NS) flush 5-40 mL  5-40 mL IntraVENous Q8H    sodium chloride (NS) flush 5-40 mL  5-40 mL IntraVENous PRN    acetaminophen (TYLENOL) tablet 650 mg  650 mg Oral Q6H PRN    Or    acetaminophen (TYLENOL) suppository 650 mg  650 mg Rectal Q6H PRN    ondansetron [facility]) injection 4 mg  4 mg IntraVENous Q6H PRN    senna (SENOKOT) tablet 8.6 mg  1 Tab Oral DAILY PRN    0.9% sodium chloride infusion 250 mL  250 mL IntraVENous PRN    insulin lispro (HUMALOG) injection   SubCUTAneous AC&HS       Objective:     Vitals:    01/18/21 0211 01/18/21 0704 01/18/21 1109 01/18/21 1119   BP: (!) 152/61 (!) 147/65 (!) 127/55 (!) 127/55   Pulse: 63 67  71   Resp: 18 18 18   Temp: 98 °F (36.7 °C) 98.2 °F (36.8 °C)  98.2 °F (36.8 °C)   SpO2: 93% 92% 91% (!) 87%   Height:             Intake and Output:  Current Shift: 01/18 0701 - 01/18 1900  In: 480 [P.O.:480]  Out: 500 [Urine:500]  Last three shifts: 01/16 1901 - 01/18 0700  In: 660 [P.O.:660]  Out: 1700 [Urine:1700]    Physical Exam:   General:  Alert, cooperative, no distress, appears stated age. Talkative with no SOB   Eyes:  Conjunctivae/corneas clear. Ears:  Normal TMs and external ear canals both ears. Nose: Nares normal. Septum midline. Mouth/Throat: Lips, mucosa, and tongue normal.    Neck:  no JVD. Back:   deferred. Lungs:   Clear to auscultation bilaterally with limited breathe sounds. Heart:  Regular rate and rhythm, S1, S2 normal   Abdomen:   Soft, non-tender. Bowel sounds normal.    Extremities: Extremities normal, atraumatic, no cyanosis or edema. Pulses: 2+ and symmetric all extremities. Skin: Skin color, texture, turgor normal. No rashes or lesions   Lymph nodes: Cervical, supraclavicular, and axillary nodes normal.   Neurologic: CNII-XII intact.         Lab/Data Review:    Recent Results (from the past 24 hour(s))   GLUCOSE, POC    Collection Time: 01/17/21  3:49 PM   Result Value Ref Range    Glucose (POC) 247 (H) 65 - 100 mg/dL   GLUCOSE, POC    Collection Time: 01/17/21  9:12 PM   Result Value Ref Range    Glucose (POC) 342 (H) 65 - 100 mg/dL   GLUCOSE, POC    Collection Time: 01/18/21  2:06 AM   Result Value Ref Range    Glucose (POC) 229 (H) 65 - 100 mg/dL   GLUCOSE, POC    Collection Time: 01/18/21  7:07 AM   Result Value Ref Range    Glucose (POC) 250 (H) 65 - 100 mg/dL   GLUCOSE, POC    Collection Time: 01/18/21 11:21 AM   Result Value Ref Range    Glucose (POC) 278 (H) 65 - 100 mg/dL       Assessment/ Plan:     Principal Problem:    Acute respiratory failure with hypoxia ([address] (1/7/2021)    Active Problems:    COVID-19 (1/7/2021)      Acute metabolic encephalopathy (0/9/0743)      Type 2 diabetes mellitus, with long-term current use of insulin ([facility] [address] (1/7/2021)      Gastrointestinal hemorrhage associated with anorectal source (1/14/2021)    Acute respiratory failure secondary to COVID - decadron that I will increase to BID today. S/p CP and remdesivir. Add mucinex. Rectal bleeding - phenylephrine suppository. Goal treatment for two weeks. DM type II - Lantus 18 units daily that I will increase to 30 units. SS. Wants rehab. Reached out to  to help get bed.  Wife called nursing staff over weekend very tearful saying she could not take care of him since she herself is sick from [facility]    DVT  Prophylaxis - SCDs  Signed By: Carla Carter DO     January 18, 2021

## 2021-01-18 NOTE — PROGRESS NOTES
CM discussed discharge planning with spouse [phone] and patient's niece Jessica Jones [phone]. CM informed niece and spouse, Bobby Butt is unable to accept the patient for [facility]. CM provided SNF list to patient's niece Brent Soto via e-mail. CM will follow up 1/19, to obtain SNF choices. Patient's niece requested CM speak with attending DO, regarding referral for [facility]. CM will discuss this matter with DO Dhillon. CM continues to monitor plan of care.

## 2021-01-18 NOTE — PROGRESS NOTES
Chart reviewed by CM for discharge planning. Patient's Spouse Renu Orourke [phone] requested CM send referral to [facility], as STR has been recommended by PT this day. The patient has been denied for STR placement at [facility], due to insurance plan out of network. CM will obtain additional SNF Choices.  CM continues to monitor

## 2021-01-18 NOTE — PROGRESS NOTES
ACUTE OT GOALS:  (Developed with and agreed upon by patient and/or caregiver.)  1. Patient will complete lower body bathing and dressing with Mod I and adaptive equipment as needed. 2. Patient will complete toileting with Mod I and adaptive equipment as needed. 3. Patient will complete seated ADL for at least 8 minutes with good static and good dynamic seated balance. 4. Patient will complete functional transfers with Mod I and adaptive equipment as needed. 5. Patient will complete standing ADL with Mod I and good static and good dynamic standing balance.     Timeframe: 7 visits     OCCUPATIONAL THERAPY: Daily Note and AM OT Treatment Day # 2    Ata Alcocer is a 80 y.o. male   PRIMARY DIAGNOSIS: Acute respiratory failure with hypoxia (HCC)  Acute respiratory failure with hypoxia ([facility] [address] [J96.01]       Payor: [employer] MEDICARE / Plan: [address] [place] [address] HMO / Product Type: Managed Care Medicare /   ASSESSMENT:     REHAB RECOMMENDATIONS: CURRENT LEVEL OF FUNCTION:  (Most Recently Demonstrated)   Recommendation to date pending progress:  Setting:   Short-term Rehab   Equipment:    To Be Determined Bathing:   Total Assistance due to decreased O2 sats with mobility. Dressing:   Total Assistance due to decreased O2 sats with mobility. Feeding/Grooming:   Set Up for container management in supine with head of bed elevated. Toileting:   Total Assistance due to decreased O2 sats with mobility. Functional Mobility:   Minimal Assistance be mobility. ASSESSMENT:  Mr. Cong Cervantes was admitted for Acute respiratory failure with hypoxia and C19+. Pt on 6L O2 upon arrival with O2 sats at 92% at rest. Pt requires Min A to complete supine to sit. Seated edge of bed, pt initially with O2 sats at 88%. Pt reminded to use pursed-lip breathing technique to improve but continues to present with decreasing O2 sats. Pt O2 increased to 10L O2 but pt O2 sats observed to decrease to 79%. Pt requires Min A for sit to supine. Once in supine, O2 sats fluctuating from 82-85%. RN notified and in contact with RT. Pt requires increase to 14L O2 to improve O2 sats to 88% at rest. RT in room and placed pt on 10L O2 hi flow nasal cannula. Pt O2 sats improve to 96% but decreased to 91% within a few minutes. OT continued to monitor pt O2 sats while pt drank meal replacement shake and pt able to maintain O2 sats at 91%. SUBJECTIVE:   Mr. Pablo Nguyen states, \"I feel okay. \"    SOCIAL HISTORY/LIVING ENVIRONMENT: Pt lives with spouse in two story home (all needs met on first floor) with 4 YVROSE. Pt is typically Mod I for performance of ADLs and Mod I for functional mobility with use of SPC. Pt reports one fall and no home O2 use.     OBJECTIVE:     PAIN: VITAL SIGNS: LINES/DRAINS:   Pre Treatment: Pain Screen  Pain Scale 1: Numeric (0 - 10)  Pain Intensity 1: 0  Post Treatment: Unchanged Vital Signs  BP: (!) 127/55  MAP (Calculated): 79  BP 1 Location: Right arm  BP Patient Position: At rest;Supine  More BP/Pulse rows needed?: Yes  O2 Sat (%): 91 %  O2 Device: Hi flow nasal cannula  O2 Flow Rate (L/min): 10 l/min  Additional Blood Pressure/Pulse Data  BP 2: 128/62  MAP 2 (Calculated): 84  BP 2 Location: Right arm  Patient Position 2: Sitting O2 Device: Hi flow nasal cannula   Pt O2 lead left on to allow for continuous monitoring by RN staff. ACTIVITIES OF DAILY LIVING: I Mod I S SBA CGA Min Mod Max Total NT Comments   BASIC ADLs:              Bathing/ Showering [] [] [] [] [] [] [] [] [x] []    Toileting [] [] [] [] [] [] [] [] [x] []    Dressing [] [] [] [] [] [] [] [] [x] []    Feeding [] [] [x] [] [] [] [] [] [] [] In supine with head of bed elevated. Grooming [] [] [x] [] [] [] [] [] [] [] In supine with head of bed elevated.    Personal Device Care [] [] [] [] [] [] [] [] [] [x]    Functional Mobility [] [] [] [] [] [x] [] [] [] []    I=Independent, Mod I=Modified Independent, S=Supervision, SBA=Standby Assistance, CGA=Contact Guard Assistance, Min=Minimal Assistance, Mod=Moderate Assistance, Max=Maximal Assistance, Total=Total Assistance, NT=Not Tested    MOBILITY: I Mod I S SBA CGA Min Mod Max Total  NT x2 Comments:   Supine to sit [] [] [] [] [] [x] [] [] [] [] []    Sit to supine [] [] [] [] [] [x] [] [] [] [] []    Sit to stand [] [] [] [] [] [] [] [] [] [x] []    Bed to chair [] [] [] [] [] [] [] [] [] [x] []    I=Independent, Mod I=Modified Independent, S=Supervision, SBA=Standby Assistance, CGA=Contact Guard Assistance,   Min=Minimal Assistance, Mod=Moderate Assistance, Max=Maximal Assistance, Total=Total Assistance, NT=Not Tested    PLAN:   FREQUENCY/DURATION: OT Plan of Care: 3 times/week for duration of hospital stay or until stated goals are met, whichever comes first.    TREATMENT:   TREATMENT:   ( $$ Therapeutic Activity: 38-52 mins   )  Therapeutic Activity (51 Minutes): Therapeutic activity included Supine to Sit and Sit to Supine to improve functional Mobility, Strength and Activity tolerance. Pt requires Min A to complete supine to sit. Once seated edge of bed, pt with decreased O2 sats and reminded to use pursed-lip breathing technique to improve. Pt unable to improve and requires increase to 10L O2 with O2 sats unable to rise above 90%. Pt requires Min A to complete sit to supine. Once in supine, pt worked on pursed-lip breathing technique in attempt to improve O2. Pt still with decreased O2 sats in low to Mid 80s. RT notified and in room. Pt left on 10L O2 hi flow nasal cannula and maintains O2 sats at 91%. AFTER TREATMENT POSITION/PRECAUTIONS:  Alarm Activated, Bed, Needs within reach, RN notified and O2 lead left on pt digit to allow for continuous O2 monitoring by RN.     INTERDISCIPLINARY COLLABORATION:  RN/PCT, PT/PTA and OT/PETIT    TOTAL TREATMENT DURATION:  OT Patient Time In/Time Out  Time In: 1109  Time Out: 1200    Gisele Palmer OTR/L

## 2021-01-18 NOTE — PROGRESS NOTES
ACUTE PHYSICAL THERAPY GOALS:  (Developed with and agreed upon by patient and/or caregiver.)  (1.) Damian Flores  will move from supine to sit and sit to supine , scoot3 up and down and roll side to side with MODIFIED INDEPENDENCE within 7 treatment day(s).    (2.) Damian Flores will transfer from bed to chair and chair to bed with MODIFIED INDEPENDENCE using the least restrictive device within 7 treatment day(s).    (3.) Damian Flores will ambulate with MODIFIED INDEPENDENCE for 300 feet with the least restrictive device within 7 treatment day(s).   (4.) Damian Flores will perform standing static and dynamic balance activities x 25 minutes with MODIFIED INDEPENDENCE to improve safety and activity tolerance within 7 treatment day(s).  (5.) Damian Flores will ascend and descend 4 stairs using one hand rail(s) with MODIFIED INDEPENDENCE to improve functional mobility and safety within 7 treatment day(s).  (6.) Damian Flores will perform bilateral lower extremity exercises x 20 min for HEP with INDEPENDENCE to improve strength, endurance, and functional mobility within 7 treatment day(s).     PHYSICAL THERAPY: Daily Note and AM Treatment Day # 6    Damian Flores is a 87 y.o. male   PRIMARY DIAGNOSIS: Acute respiratory failure with hypoxia (HCC)  Acute respiratory failure with hypoxia (HCC) [J96.01]         ASSESSMENT:     REHAB RECOMMENDATIONS: CURRENT LEVEL OF FUNCTION:  (Most Recently Demonstrated)   Recommendation to date pending progress:  Setting:  • Short-term Rehab  Equipment:   • Rolling Walker  • To Be Determined Bed Mobility:  • Not tested  Sit to Stand:  • Minimal Assistance  Transfers:  • Minimal Assistance  Gait/Mobility:  • Minimal Assistance     ASSESSMENT:  Mr. Flores continues to progress slowly towards PT goals. Patient unable to ambulate today due to dizziness once seated EOB. Sats drop to 79% with exertion needing additional time to recover. Tolerates supine exercises with good ability. Will  continue PT efforts. SUBJECTIVE:   Mr. Feliz [place] states, \"I'm dizzy\"    SOCIAL HISTORY/ LIVING ENVIRONMENT: See initial evaluation     OBJECTIVE:     PAIN: VITAL SIGNS: LINES/DRAINS:   Pre Treatment: Pain Screen  Pain Scale 1: FLACC  Pain Intensity 1: 0  Post Treatment: 0   None  O2 Device: Hi flow nasal cannula     MOBILITY: I Mod I S SBA CGA Min Mod Max Total  NT x2 Comments:   Bed Mobility    Rolling [] [] [] [] [] [] [] [] [] [] []    Supine to Sit [] [] [x] [] [] [] [] [] [] [] []    Scooting [] [] [] [] [] [] [] [] [] [] []    Sit to Supine [] [] [x] [] [] [] [] [] [] [] []    Transfers    Sit to Stand [] [] [] [] [] [] [] [] [] [] []    Bed to Chair [] [] [] [] [] [] [] [] [] [] []    Stand to Sit [] [] [] [] [] [] [] [] [] [] []    I=Independent, Mod I=Modified Independent, S=Supervision, SBA=Standby Assistance, CGA=Contact Guard Assistance,   Min=Minimal Assistance, Mod=Moderate Assistance, Max=Maximal Assistance, Total=Total Assistance, NT=Not Tested    GAIT: I Mod I S SBA CGA Min Mod Max Total  NT x2 Comments:   Level of Assistance [] [] [] [] [] [] [] [] [] [] []    Distance     DME N/A    Gait Quality     Weightbearing  Status N/A     I=Independent, Mod I=Modified Independent, S=Supervision, SBA=Standby Assistance, CGA=Contact Guard Assistance,   Min=Minimal Assistance, Mod=Moderate Assistance, Max=Maximal Assistance, Total=Total Assistance, NT=Not Tested    PLAN:   FREQUENCY/DURATION: PT Plan of Care: 3 times/week for duration of hospital stay or until stated goals are met, whichever comes first.  TREATMENT:     TREATMENT:   ($$ Therapeutic Activity: 23-37 mins    )  Therapeutic Activity (23 Minutes): Therapeutic activity included Supine to Sit, Sit to Supine, Scooting, Sitting balance  and LE exercises to improve functional Mobility, Strength, ROM and Activity tolerance.     AFTER TREATMENT POSITION/PRECAUTIONS:  Alarm Activated, Bed, Needs within reach and RN notified    INTERDISCIPLINARY COLLABORATION:  PT/PTA    TOTAL TREATMENT DURATION:  PT Patient Time In/Time Out  Time In: 1046  Time Out: 504 [facility]

## 2021-01-18 NOTE — ACP (ADVANCE CARE PLANNING)
Chart reviewed by CM for ACP. Per chart review, no HCPOA or Advance Directive on file or previously completed. CM unable to reach patient via phone, to complete ACP. CM then contacted spouse, to discuss ACP. Spouse confirmed she is the primary decision maker for the patient and secondary decision maker is listed as Lena Citizen (Niece) [phone]. Spouse declined discussion, as she would like CM to discuss this information with the patient, at the appropriate time. CM was receptive to this request. CM remains available.

## 2021-01-18 NOTE — PROGRESS NOTES
Patient panics at times and calms down when you spend time with him. He calls often for things he can do on his own. He is adamant about going home. His wife wants to speak with  today. Still on 5 liters 02;  IS encouraged. Hourly rounds completed. Resting in bed. Denies needs or pain at this time. Bed in low locked position. Call light and personal items within reach. Will continue to monitor and give report to oncoming day shift nurse.

## 2021-01-19 PROBLEM — L89.302 DECUBITUS ULCER OF BUTTOCK, STAGE 2 (HCC): Status: ACTIVE | Noted: 2021-01-19

## 2021-01-19 LAB
ALBUMIN SERPL-MCNC: 2.1 G/DL (ref 3.2–4.6)
ALBUMIN/GLOB SERPL: 0.7 {RATIO} (ref 1.2–3.5)
ALP SERPL-CCNC: 95 U/L (ref 50–136)
ALT SERPL-CCNC: 34 U/L (ref 12–65)
ANION GAP SERPL CALC-SCNC: 5 MMOL/L (ref 7–16)
AST SERPL-CCNC: 32 U/L (ref 15–37)
BASOPHILS # BLD: 0 K/UL (ref 0–0.2)
BASOPHILS NFR BLD: 0 % (ref 0–2)
BILIRUB SERPL-MCNC: 0.4 MG/DL (ref 0.2–1.1)
BUN SERPL-MCNC: 29 MG/DL (ref 8–23)
CALCIUM SERPL-MCNC: 7.8 MG/DL (ref 8.3–10.4)
CHLORIDE SERPL-SCNC: 109 MMOL/L (ref 98–107)
CO2 SERPL-SCNC: 24 MMOL/L (ref 21–32)
CREAT SERPL-MCNC: 0.95 MG/DL (ref 0.8–1.5)
DIFFERENTIAL METHOD BLD: ABNORMAL
EOSINOPHIL # BLD: 0 K/UL (ref 0–0.8)
EOSINOPHIL NFR BLD: 0 % (ref 0.5–7.8)
ERYTHROCYTE [DISTWIDTH] IN BLOOD BY AUTOMATED COUNT: 18.8 % (ref 11.9–14.6)
GLOBULIN SER CALC-MCNC: 3 G/DL (ref 2.3–3.5)
GLUCOSE BLD STRIP.AUTO-MCNC: 168 MG/DL (ref 65–100)
GLUCOSE BLD STRIP.AUTO-MCNC: 270 MG/DL (ref 65–100)
GLUCOSE BLD STRIP.AUTO-MCNC: 357 MG/DL (ref 65–100)
GLUCOSE BLD STRIP.AUTO-MCNC: 363 MG/DL (ref 65–100)
GLUCOSE SERPL-MCNC: 228 MG/DL (ref 65–100)
HCT VFR BLD AUTO: 29.2 % (ref 41.1–50.3)
HGB BLD-MCNC: 9.2 G/DL (ref 13.6–17.2)
IMM GRANULOCYTES # BLD AUTO: 0.2 K/UL (ref 0–0.5)
IMM GRANULOCYTES NFR BLD AUTO: 2 % (ref 0–5)
LYMPHOCYTES # BLD: 0.5 K/UL (ref 0.5–4.6)
LYMPHOCYTES NFR BLD: 4 % (ref 13–44)
MCH RBC QN AUTO: 27.5 PG (ref 26.1–32.9)
MCHC RBC AUTO-ENTMCNC: 31.5 G/DL (ref 31.4–35)
MCV RBC AUTO: 87.4 FL (ref 79.6–97.8)
MM INDURATION POC: 0 MM (ref 0–5)
MONOCYTES # BLD: 0.2 K/UL (ref 0.1–1.3)
MONOCYTES NFR BLD: 2 % (ref 4–12)
NEUTS SEG # BLD: 11.7 K/UL (ref 1.7–8.2)
NEUTS SEG NFR BLD: 93 % (ref 43–78)
NRBC # BLD: 0 K/UL (ref 0–0.2)
PLATELET # BLD AUTO: 246 K/UL (ref 150–450)
PMV BLD AUTO: 10.4 FL (ref 9.4–12.3)
POTASSIUM SERPL-SCNC: 4.8 MMOL/L (ref 3.5–5.1)
POTASSIUM SERPL-SCNC: 5.2 MMOL/L (ref 3.5–5.1)
PPD POC: NEGATIVE NEGATIVE
PROT SERPL-MCNC: 5.1 G/DL (ref 6.3–8.2)
RBC # BLD AUTO: 3.34 M/UL (ref 4.23–5.6)
SODIUM SERPL-SCNC: 138 MMOL/L (ref 136–145)
WBC # BLD AUTO: 12.6 K/UL (ref 4.3–11.1)

## 2021-01-19 PROCEDURE — 36415 COLL VENOUS BLD VENIPUNCTURE: CPT

## 2021-01-19 PROCEDURE — 82962 GLUCOSE BLOOD TEST: CPT

## 2021-01-19 PROCEDURE — 65270000029 HC RM PRIVATE

## 2021-01-19 PROCEDURE — 74011250636 HC RX REV CODE- 250/636: Performed by: FAMILY MEDICINE

## 2021-01-19 PROCEDURE — 94760 N-INVAS EAR/PLS OXIMETRY 1: CPT

## 2021-01-19 PROCEDURE — 85025 COMPLETE CBC W/AUTO DIFF WBC: CPT

## 2021-01-19 PROCEDURE — 97164 PT RE-EVAL EST PLAN CARE: CPT

## 2021-01-19 PROCEDURE — 74011250637 HC RX REV CODE- 250/637: Performed by: INTERNAL MEDICINE

## 2021-01-19 PROCEDURE — 84132 ASSAY OF SERUM POTASSIUM: CPT

## 2021-01-19 PROCEDURE — 74011636637 HC RX REV CODE- 636/637: Performed by: INTERNAL MEDICINE

## 2021-01-19 PROCEDURE — 74011636637 HC RX REV CODE- 636/637: Performed by: FAMILY MEDICINE

## 2021-01-19 PROCEDURE — 97110 THERAPEUTIC EXERCISES: CPT

## 2021-01-19 PROCEDURE — 97112 NEUROMUSCULAR REEDUCATION: CPT

## 2021-01-19 PROCEDURE — 97535 SELF CARE MNGMENT TRAINING: CPT

## 2021-01-19 PROCEDURE — 97530 THERAPEUTIC ACTIVITIES: CPT

## 2021-01-19 PROCEDURE — 77010033711 HC HIGH FLOW OXYGEN

## 2021-01-19 PROCEDURE — 80053 COMPREHEN METABOLIC PANEL: CPT

## 2021-01-19 PROCEDURE — 74011250637 HC RX REV CODE- 250/637: Performed by: FAMILY MEDICINE

## 2021-01-19 RX ORDER — DEXAMETHASONE 4 MG/1
6 TABLET ORAL EVERY 12 HOURS
Status: DISCONTINUED | OUTPATIENT
Start: 2021-01-19 | End: 2021-01-19

## 2021-01-19 RX ORDER — DEXAMETHASONE 4 MG/1
6 TABLET ORAL EVERY 12 HOURS
Status: DISCONTINUED | OUTPATIENT
Start: 2021-01-19 | End: 2021-01-20

## 2021-01-19 RX ORDER — MORPHINE SULFATE 2 MG/ML
1 INJECTION, SOLUTION INTRAMUSCULAR; INTRAVENOUS
Status: DISCONTINUED | OUTPATIENT
Start: 2021-01-19 | End: 2021-02-07 | Stop reason: HOSPADM

## 2021-01-19 RX ADMIN — MORPHINE SULFATE 1 MG: 2 INJECTION, SOLUTION INTRAMUSCULAR; INTRAVENOUS at 17:25

## 2021-01-19 RX ADMIN — DEXAMETHASONE 6 MG: 4 TABLET ORAL at 09:33

## 2021-01-19 RX ADMIN — NYSTATIN: 100000 POWDER TOPICAL at 08:00

## 2021-01-19 RX ADMIN — ACETAMINOPHEN 650 MG: 325 TABLET, FILM COATED ORAL at 10:43

## 2021-01-19 RX ADMIN — Medication 10 ML: at 05:08

## 2021-01-19 RX ADMIN — ROSUVASTATIN 10 MG: 10 TABLET, FILM COATED ORAL at 20:03

## 2021-01-19 RX ADMIN — INSULIN LISPRO 2 UNITS: 100 INJECTION, SOLUTION INTRAVENOUS; SUBCUTANEOUS at 20:35

## 2021-01-19 RX ADMIN — INSULIN LISPRO 6 UNITS: 100 INJECTION, SOLUTION INTRAVENOUS; SUBCUTANEOUS at 08:00

## 2021-01-19 RX ADMIN — GUAIFENESIN 600 MG: 600 TABLET ORAL at 20:03

## 2021-01-19 RX ADMIN — INSULIN LISPRO 10 UNITS: 100 INJECTION, SOLUTION INTRAVENOUS; SUBCUTANEOUS at 17:24

## 2021-01-19 RX ADMIN — GUAIFENESIN 600 MG: 600 TABLET ORAL at 09:33

## 2021-01-19 RX ADMIN — NYSTATIN: 100000 POWDER TOPICAL at 18:00

## 2021-01-19 RX ADMIN — GLYCERIN, PETROLATUM, PHENYLEPHRINE HCL, PRAMOXINE HCL: 144; 2.5; 10; 15 CREAM TOPICAL at 08:00

## 2021-01-19 RX ADMIN — INSULIN HUMAN 20 UNITS: 100 INJECTION, SUSPENSION SUBCUTANEOUS at 12:56

## 2021-01-19 RX ADMIN — PANTOPRAZOLE SODIUM 40 MG: 40 TABLET, DELAYED RELEASE ORAL at 05:09

## 2021-01-19 RX ADMIN — INSULIN LISPRO 10 UNITS: 100 INJECTION, SOLUTION INTRAVENOUS; SUBCUTANEOUS at 12:55

## 2021-01-19 RX ADMIN — DEXAMETHASONE 6 MG: 4 TABLET ORAL at 20:03

## 2021-01-19 RX ADMIN — Medication 10 ML: at 14:30

## 2021-01-19 RX ADMIN — Medication 10 ML: at 20:04

## 2021-01-19 RX ADMIN — PANTOPRAZOLE SODIUM 40 MG: 40 TABLET, DELAYED RELEASE ORAL at 17:24

## 2021-01-19 RX ADMIN — INSULIN HUMAN 40 UNITS: 100 INJECTION, SUSPENSION SUBCUTANEOUS at 20:35

## 2021-01-19 RX ADMIN — GLYCERIN, PETROLATUM, PHENYLEPHRINE HCL, PRAMOXINE HCL: 144; 2.5; 10; 15 CREAM TOPICAL at 21:00

## 2021-01-19 NOTE — PROGRESS NOTES
Per Therapy while pt resting in bed on airvo 40% 50l at 88% exercised some arms and legs remained at 88%.  When sat on side of bed dropped to 69% placed back in bed Sats up to 90%

## 2021-01-19 NOTE — PROGRESS NOTES
Patient states he has dreamt the same dream over and over tonight and seeing things on the wall.  He is alert and oriented x 4.  % on 10 liters; decreased 02 to 7 liters.  Turned and did IS.  Hourly rounds completed. Resting in bed. Denies needs or pain at this time.  Bed in low locked position. Call light and personal items within reach.  Will continue to monitor and give report to oncoming day shift nurse.

## 2021-01-19 NOTE — PROGRESS NOTES
ACUTE OT GOALS:  (Developed with and agreed upon by patient and/or caregiver.)  1. Patient will complete lower body bathing and dressing with Mod I and adaptive equipment as needed. 2. Patient will complete toileting with Mod I and adaptive equipment as needed. 3. Patient will complete seated ADL for at least 8 minutes with good static and good dynamic seated balance. 4. Patient will complete functional transfers with Mod I and adaptive equipment as needed. 5. Patient will complete standing ADL with Mod I and good static and good dynamic standing balance.     Timeframe: 7 visits     OCCUPATIONAL THERAPY: Daily Note and AM OT Treatment Day # 3    Db Flores is a 80 y.o. male   PRIMARY DIAGNOSIS: Acute respiratory failure with hypoxia (HCC)  Acute respiratory failure with hypoxia ([facility] [address] [J96.01]       Payor: [employer] MEDICARE / Plan: [address] [place] [address] HMO / Product Type: Managed Care Medicare /   ASSESSMENT:     REHAB RECOMMENDATIONS: CURRENT LEVEL OF FUNCTION:  (Most Recently Demonstrated)   Recommendation to date pending progress:  Setting:   Short-term Rehab   Equipment:    To Be Determined Bathing:   Not tested   Dressing:   Moderate Assistance due to decreased O2 sats with mobility. Feeding/Grooming:   Not tested   Toileting:   Moderate Assistance   Functional Mobility:   CGA to Min A bed mobility. ASSESSMENT:  Mr. Sadie Nam was admitted for Acute respiratory failure with hypoxia and C19+. Pt now on Airvo 50L FiO2 40%. Pt saturating at 88% at rest and reminded to use pursed-lip breathing technique to improve O2 sats to 90% prior to participating in functional mobility. Worked on BUE exercises in bed to increase activity tolerance and increase strength with pt O2 sats fluctuating from 85% to 88% majority of the time. O2 sats observed to improve to 90% with increased rest break and pt requires CGA to complete supine to sit edge of bed.  Pt O2 sats initially in mid 80's and worked on pursed-lip breathing technqiue seated edge of bed in attempt to improve. Pt quickly desaturates to 69% and requires Min A for sit to supine. Once in supine, worked on pursed-lip breathing technique to improve O2 sats. Pt requires additional time but is able to improve O2 sats to 90%. Pt observed to have saturated brief and gown and requires Mod A for gown management and Mod A for toilet hygiene. Pt would benefit from continued skilled OT to increase activity tolerance for performance of ADLs and functional mobility. SUBJECTIVE:   Mr. Roberto Robbins states, \"I ate all of my eggs this morning. \"    SOCIAL HISTORY/LIVING ENVIRONMENT: Pt lives with spouse in two story home (all needs met on first floor) with 4 YVROSE. Pt is typically Mod I for performance of ADLs and Mod I for functional mobility with use of SPC. Pt reports one fall and no home O2 use.     OBJECTIVE:     PAIN: VITAL SIGNS: LINES/DRAINS:   Pre Treatment: Pain Screen  Pain Scale 1: Numeric (0 - 10)  Pain Intensity 1: 0  Post Treatment: Unchanged On Airvo 50L FiO2 40%. O2 sats 88-90% at rest; desaturates to 69% seated edge of bed. Able to recover O2 sats to 90% with additional time following return to supine. Airvo 50L FiO2 40%. ACTIVITIES OF DAILY LIVING: I Mod I S SBA CGA Min Mod Max Total NT Comments   BASIC ADLs:              Bathing/ Showering [] [] [] [] [] [] [] [] [] [x]    Toileting [] [] [] [] [] [] [x] [] [] []    Dressing [] [] [] [] [] [] [x] [] [] []    Feeding [] [] [] [] [] [] [] [] [] [x]    Grooming [] [] [x] [] [] [] [] [] [] [x]    Personal Device Care [] [] [] [] [] [] [] [] [] [x]    Functional Mobility [] [] [] [] [x] [x] [] [] [] [] Bed mobility. Unable to attempt standing due to decreased O2 sats seated edge of bed.    I=Independent, Mod I=Modified Independent, S=Supervision, SBA=Standby Assistance, CGA=Contact Guard Assistance,   Min=Minimal Assistance, Mod=Moderate Assistance, Max=Maximal Assistance, Total=Total Assistance, NT=Not Tested    MOBILITY: I Mod I S SBA CGA Min Mod Max Total  NT x2 Comments:   Supine to sit [] [] [] [] [x] [] [] [] [] [] []    Sit to supine [] [] [] [] [] [x] [] [] [] [] []    Sit to stand [] [] [] [] [] [] [] [] [] [x] []    Bed to chair [] [] [] [] [] [] [] [] [] [x] []    I=Independent, Mod I=Modified Independent, S=Supervision, SBA=Standby Assistance, CGA=Contact Guard Assistance,   Min=Minimal Assistance, Mod=Moderate Assistance, Max=Maximal Assistance, Total=Total Assistance, NT=Not Tested    PLAN:   FREQUENCY/DURATION: OT Plan of Care: 3 times/week for duration of hospital stay or until stated goals are met, whichever comes first.    TREATMENT:   TREATMENT:   ($$ Self Care/Home Management: 8-22 mins$$ Therapeutic Exercises: 8-22 mins   )  Therapeutic Exercise: (15 minutes):  Exercises below to improve mobility, strength and activity tolerance. Required minimal visual and verbal cues to promote proper body mechanics and promote proper body breathing techniques. Progressed repetitions and complexity of movement as indicated. Pt participated in BUE exercises to increase strength and activity tolerance. Exercises included shoulder flexion and extension, crossing midline, and forward reaching. Pt requires increased rest breaks between exercises to recover O2 sats. Self Care: (18 minutes): Procedure(s) utilized to improve and/or restore self-care/home management as related to dressing and toileting. Required minimal visual, verbal, manual and tactile cueing to facilitate activities of daily living skills and compensatory activities. Pt observed to have saturated gown and brief following return to supine. Pt requires Mod A for gown and brief management. Pt requires Min A to complete rolling in preparation for wiping.      Today's treatment session addressed Decreased Strength, Decreased ADL/Functional Activities, Decreased Activity Tolerance, Increased Shortness of Breath and Decreased Flexibility/Joint Mobility to progress towards achieving goal(s) listed above. During this session, Physical Therapy addressed  Bed Mobility and Functional Transfers to progress towards their discipline specific goal(s). Co-treatment was necessary to improve patient's ability to increase activity demands and ability to return to normal functional activity. AFTER TREATMENT POSITION/PRECAUTIONS:  Alarm Activated, Bed, Needs within reach, RN notified and O2 lead left on pt digit to allow for continuous O2 monitoring by RN.     INTERDISCIPLINARY COLLABORATION:  RN/PCT, PT/PTA and OT/PETIT    TOTAL TREATMENT DURATION:  OT Patient Time In/Time Out  Time In: 1110  Time Out: 1143    AMADOR Underwood/KRYSTINA

## 2021-01-19 NOTE — PROGRESS NOTES
Progress Note    Patient: German Sams MRN: [id number]  SSN: [id number]    YOB: 1933  Age: 80 y.o. Sex: male      Admit Date: 1/8/2021    LOS: 11 days     Subjective:   F/U rectal bleeding, COVID    81 yo hx of DM type II, GERD, admitted for COVID and rectal bleeding. GI consulted who believed from external hemorrhoids. Recommends Tuck pads and anusol CT BID. Hg stable. S/p convalescent plasma on 1/8/21. Also received remdesivir 1/8-1/12. Very weak and willing to go to rehab. Oxygen requirement worsening daily but no SOB. Now on Airvo. Stage 2 small sacral wound. Frequent turning. 60L, 85%. No SOB. No chest pain. Feeling much better. Glucose elevated.    Current Facility-Administered Medications   Medication Dose Route Frequency    insulin NPH (NOVOLIN N, HUMULIN N) injection 40 Units  40 Units SubCUTAneous Q12H    And    dexAMETHasone (DECADRON) tablet 6 mg  6 mg Oral Q12H    tuberculin injection 5 Units  5 Units IntraDERMal ONCE    guaiFENesin ER (MUCINEX) tablet 600 mg  600 mg Oral Q12H    nystatin (MYCOSTATIN) 100,000 unit/gram powder   Topical BID    khwjfkrwj-Lgqkaztu-Wdvyj-W.Pet (PREPARATION H MAXIMUM STRENGTH) 0.25-1 % cream   Rectal Q12H    phenylephrine suppository 1 Suppository  1 Suppository Rectal Q12H    pantoprazole (PROTONIX) tablet 40 mg  40 mg Oral ACB&D    witch hazel-glycerin (TUCKS) 12.5-50 % pads 1 Pad  1 Pad PeriANAL PRN    dextrose 40% (GLUTOSE) oral gel 1 Tube  15 g Oral PRN    glucagon (GLUCAGEN) injection 1 mg  1 mg IntraMUSCular PRN    dextrose (D50W) injection syrg 12.5-25 g  25-50 mL IntraVENous PRN    influenza vaccine 2020-21 (6 mos+)(PF) (FLUARIX/FLULAVAL/FLUZONE QUAD) injection 0.5 mL  0.5 mL IntraMUSCular PRIOR TO DISCHARGE    [Held by provider] aspirin delayed-release tablet 81 mg  81 mg Oral QHS    rosuvastatin (CRESTOR) tablet 10 mg  10 mg Oral QHS    sodium chloride (NS) flush 5-40 mL  5-40 mL IntraVENous Q8H    sodium chloride (NS) flush 5-40 mL  5-40 mL IntraVENous PRN    acetaminophen (TYLENOL) tablet 650 mg  650 mg Oral Q6H PRN    Or    acetaminophen (TYLENOL) suppository 650 mg  650 mg Rectal Q6H PRN    ondansetron (ZOFRAN) injection 4 mg  4 mg IntraVENous Q6H PRN    senna (SENOKOT) tablet 8.6 mg  1 Tab Oral DAILY PRN    0.9% sodium chloride infusion 250 mL  250 mL IntraVENous PRN    insulin lispro (HUMALOG) injection   SubCUTAneous AC&HS       Objective:     Vitals:    01/18/21 1909 01/18/21 2331 01/19/21 0340 01/19/21 0656   BP: (!) 145/61 136/69 (!) 145/70 139/65   Pulse: 63 (!) 56 (!) 54 64   Resp: 16 15 15 16   Temp: 97.6 °F (36.4 °C) 97.3 °F (36.3 °C) 97.3 °F (36.3 °C) 97.8 °F (36.6 °C)   SpO2: 95% 99% 100% 96%   Height:             Intake and Output:  Current Shift: No intake/output data recorded. Last three shifts: 01/17 1901 - 01/19 0700  In: 1490 [P.O.:1490]  Out: 4536 [Urine:1475]    Physical Exam:   General:  Alert, cooperative, no distress, appears stated age. Talkative with no SOB   Eyes:  Conjunctivae/corneas clear. Ears:  Normal TMs and external ear canals both ears. Nose: Nares normal. Septum midline. Mouth/Throat: Lips, mucosa, and tongue normal.    Neck:  no JVD. Back:   deferred. Lungs:   Clear to auscultation bilaterally with good air movement. Heart:  Regular rate and rhythm, S1, S2 normal   Abdomen:   Soft, non-tender. Bowel sounds normal.    Extremities: Extremities normal, atraumatic, no cyanosis or edema. Pulses: 2+ and symmetric all extremities. Skin: Stage 2 small sacral ulcer X2   Lymph nodes: Cervical, supraclavicular, and axillary nodes normal.   Neurologic: CNII-XII intact. Limited ROM in bed.  Very pleasant        Lab/Data Review:    Recent Results (from the past 24 hour(s))   GLUCOSE, POC    Collection Time: 01/18/21 11:21 AM   Result Value Ref Range    Glucose (POC) 278 (H) 65 - 100 mg/dL   GLUCOSE, POC    Collection Time: 01/18/21  3:00 PM   Result Value Ref Range Glucose (POC) 300 (H) 65 - 100 mg/dL   GLUCOSE, POC    Collection Time: 01/18/21  9:05 PM   Result Value Ref Range    Glucose (POC) 314 (H) 65 - 100 mg/dL   CBC WITH AUTOMATED DIFF    Collection Time: 01/19/21  5:38 AM   Result Value Ref Range    WBC 12.6 (H) 4.3 - 11.1 K/uL    RBC 3.34 (L) 4.23 - 5.6 M/uL    HGB 9.2 (L) 13.6 - 17.2 g/dL    HCT 29.2 (L) 41.1 - 50.3 %    MCV 87.4 79.6 - 97.8 FL    MCH 27.5 26.1 - 32.9 PG    MCHC 31.5 31.4 - 35.0 g/dL    RDW 18.8 (H) 11.9 - 14.6 %    PLATELET 479 734 - 190 K/uL    MPV 10.4 9.4 - 12.3 FL    ABSOLUTE NRBC 0.00 0.0 - 0.2 K/uL    DF AUTOMATED      NEUTROPHILS 93 (H) 43 - 78 %    LYMPHOCYTES 4 (L) 13 - 44 %    MONOCYTES 2 (L) 4.0 - 12.0 %    EOSINOPHILS 0 (L) 0.5 - 7.8 %    BASOPHILS 0 0.0 - 2.0 %    IMMATURE GRANULOCYTES 2 0.0 - 5.0 %    ABS. NEUTROPHILS 11.7 (H) 1.7 - 8.2 K/UL    ABS. LYMPHOCYTES 0.5 0.5 - 4.6 K/UL    ABS. MONOCYTES 0.2 0.1 - 1.3 K/UL    ABS. EOSINOPHILS 0.0 0.0 - 0.8 K/UL    ABS. BASOPHILS 0.0 0.0 - 0.2 K/UL    ABS. IMM. GRANS. 0.2 0.0 - 0.5 K/UL   METABOLIC PANEL, COMPREHENSIVE    Collection Time: 01/19/21  5:38 AM   Result Value Ref Range    Sodium 138 136 - 145 mmol/L    Potassium 5.2 (H) 3.5 - 5.1 mmol/L    Chloride 109 (H) 98 - 107 mmol/L    CO2 24 21 - 32 mmol/L    Anion gap 5 (L) 7 - 16 mmol/L    Glucose 228 (H) 65 - 100 mg/dL    BUN 29 (H) 8 - 23 MG/DL    Creatinine 0.95 0.8 - 1.5 MG/DL    GFR est AA >60 >60 ml/min/1.73m2    GFR est non-AA >60 >60 ml/min/1.73m2    Calcium 7.8 (L) 8.3 - 10.4 MG/DL    Bilirubin, total 0.4 0.2 - 1.1 MG/DL    ALT (SGPT) 34 12 - 65 U/L    AST (SGOT) 32 15 - 37 U/L    Alk.  phosphatase 95 50 - 136 U/L    Protein, total 5.1 (L) 6.3 - 8.2 g/dL    Albumin 2.1 (L) 3.2 - 4.6 g/dL    Globulin 3.0 2.3 - 3.5 g/dL    A-G Ratio 0.7 (L) 1.2 - 3.5     GLUCOSE, POC    Collection Time: 01/19/21  6:58 AM   Result Value Ref Range    Glucose (POC) 270 (H) 65 - 100 mg/dL       Assessment/ Plan:     Principal Problem:    Acute respiratory failure with hypoxia ([facility] [address] (1/7/2021)    Active Problems:    COVID-19 (1/7/2021)      Acute metabolic encephalopathy (8/9/5721)      Type 2 diabetes mellitus, with long-term current use of insulin ([facility] [address] (1/7/2021)      Gastrointestinal hemorrhage associated with anorectal source (1/14/2021)      Decubitus ulcer of buttock, stage 2 ([facility] [address] (1/19/2021)    Acute respiratory failure secondary to COVID - decadron BID since 1/18. S/p CP and remdesivir. Added mucinex. Now on airvo. Rectal bleeding - phenylephrine suppository. Goal treatment for two weeks. Stage 2 sacral ulcers - Frequent turning. Encourage good PO intake. Local skin care. DM type II - change to NPH 40 units every time gets decadron. SS.    K levels slightly elevated - recheck. I have spoken to patient about code status. He wants to be DNR. Does not want intubation if needed. I have updated the wife.      DVT  Prophylaxis - SCDs  Signed By: Albaro Velez DO     January 19, 2021

## 2021-01-19 NOTE — ACP (ADVANCE CARE PLANNING)
CM contacted patient via phone in room to complete ACP discussion. Patient confirmed is healthcare decision maker is Kp Kirk - Spouse [phone]. Patient did not wish to proceed with discussion after providing this information. CM remains available.

## 2021-01-19 NOTE — PROGRESS NOTES
Pt in bed turned and ruth care given cleaned and dried and creams applied. Checked pt's O2 sat 83% on 7l high flow. Increased to 10 l/ N/C.  Oxygen level remained at 83 -84 % increased oxygen level to 15l liters and only increased to 85% placed on non rebreather and resp notified pt was placed on airvo

## 2021-01-19 NOTE — PROGRESS NOTES
CM contacted patient's spouse Duran Stephens [phone], to obtain SNF choices. Spouse became tearful and voiced concerns regarding the patient's oxygen needs. Spouse expressed to CM, at this time, she feels REHAB is not appropriate to discuss. CM was receptive. JONAS will speak with DO Amna Isbell, regarding discharge needs. CM continues to monitor plan of care.

## 2021-01-19 NOTE — PROGRESS NOTES
ACUTE PHYSICAL THERAPY GOALS:  (Developed with and agreed upon by patient and/or caregiver. )  LTGs (assessed and revised as needed 21):  (1.) Felisha Escalera  will move from supine to sit and sit to supine , scoot up and down and roll side to side with MODIFIED INDEPENDENCE within 7 treatment day(s). (2.) Felisha Escalera will transfer from bed to chair and chair to bed with SBA using the least restrictive device within 7 treatment day(s). (3.) Jani Flores will ambulate with SBA for 100 feet with the least restrictive device within 7 treatment day(s) while maintaining normal vital signs. (4.) Felisha Escalera will perform standing static and dynamic balance activities x 15 minutes with SBA to improve safety and activity tolerance within 7 treatment day(s). (5.) Felisha Escalera will ascend and descend 4 stairs using one hand rail(s) with SBA to improve functional mobility and safety within 7 treatment day(s). (6.) Felisha Escalera will perform bilateral lower extremity exercises x 10 min for HEP with INDEPENDENCE to improve strength, endurance, and functional mobility within 7 treatment day(s).      PHYSICAL THERAPY ASSESSMENT: Re-evaluation and AM PT Treatment Day # 1      Felisha Escalera is a 80 y.o. male   PRIMARY DIAGNOSIS: Acute respiratory failure with hypoxia (HCC)  Acute respiratory failure with hypoxia ([facility] [address] [J96.01]       Reason for Referral:  Weakness, SOB, hx falling  ICD-10: Treatment Diagnosis: Generalized Muscle Weakness (M62.81)  Difficulty in walking, Not elsewhere classified (R26.2)  Other abnormalities of gait and mobility (R26.89)  INPATIENT: Payor: [employer] MEDICARE / Plan: [facility] [employer] MEDICARE HMO / Product Type: Managed Care Medicare /     ASSESSMENT:     REHAB RECOMMENDATIONS:   Recommendation to date pending progress:  [address] [place] [address]  Equipment:    None     PRIOR LEVEL OF FUNCTION:  (Prior to Hospitalization) INITIAL/CURRENT LEVEL OF FUNCTION:  (Most Recently Demonstrated)   Bed Mobility:   Modified Independent  Sit to Stand:   Modified Independent  Transfers:   Modified Independent  Gait/Mobility:   Modified Independent Bed Mobility:   Contact Guard Assistance  Sit to Stand:   Contact Guard Assistance  Transfers:   Contact Guard Assistance  Gait/Mobility:   Unable to perform -low O2 stats     ASSESSMENT:  Mr. Pablo Nguyen is supine on arrival, now on airvo 50L/40%, O2 stats at rest 88-90%. Pt with good supine bed mobility, able to roll to R and L for brief change. Pt with O2 stats dropping 84-88% with supine mobility, frequent rest breaks required with activity, cues for pursed lip breathing. Pt seated to EOB with CGA/MIN A, tolerated <1 min EOB, O2 stats 69%. Pt with >5 min for recovery to 88%. Pt reports does not feel SOB. Pt performed there ex B LEs in supine. Pt with low reserve, additional time required. Reassessment performed this date, goals assessed and revised as needed. PT to cont to follow for acute care needs. SUBJECTIVE:   Mr. Pablo Nguyen states, \"I am trying to get better\"    SOCIAL HISTORY/LIVING ENVIRONMENT: Lives with wife in one story home with 4 YVROSE. Uses SPC for mobility. 1 fall this past Monday. Doesn't wear O2 at baseline. OBJECTIVE:     PAIN: VITAL SIGNS: LINES/DRAINS:   Pre Treatment: Pain Screen  Pain Scale 1: Numeric (0 - 10)  Pain Intensity 1: 0  Post Treatment: 0/10 Vital Signs  O2 Sat (%): (!) 88 %(to 90%)  O2 Device: Heated; Hi flow nasal cannula  O2 Flow Rate (L/min): 50 l/min  FIO2 (%): 40 % None  O2 Device: Heated, Hi flow nasal cannula     GROSS EVALUATION:  BLE Within Functional Limits Abnormal/ Functional Abnormal/ Non-Functional (see comments) Not Tested Comments:   AROM [x] [] [] []    PROM [x] [] [] []    Strength [] [x] [] []  4/5 BLE   Balance [] [x] [] []     Posture [] [x] [] []  forward head, rounded shoulders   Sensation [x] [] [] []    Coordination [x] [] [] []    Tone [] [] [] [x]    Edema [] [] [] [x]    Activity Tolerance [] [x] [] []      [] [] [] []      COGNITION/  PERCEPTION: Intact Impaired   (see comments) Comments:   Orientation [x] []    Vision [x] []    Hearing [] [x]    Command Following [x] []    Safety Awareness [x] []     [] []      MOBILITY: I Mod I S SBA CGA Min Mod Max Total  NT x2 Comments:   Bed Mobility    Rolling [] [] [] [] [x] [] [] [] [] [] []    Supine to Sit [] [] [] [] [] [x] [] [] [] [] []    Scooting [] [] [] [] [x] [] [] [] [] [] []    Sit to Supine [] [] [] [] [x] [] [] [] [] [] []    Transfers    Sit to Stand [] [] [] [] [] [] [] [] [] [x] [] Due to low O2   Bed to Chair [] [] [] [] [] [] [] [] [] [x] []    Stand to Sit [] [] [] [] [] [] [] [] [] [x] []    I=Independent, Mod I=Modified Independent, S=Supervision, SBA=Standby Assistance, CGA=Contact Guard Assistance,   Min=Minimal Assistance, Mod=Moderate Assistance, Max=Maximal Assistance, Total=Total Assistance, NT=Not Tested  GAIT: I Mod I S SBA CGA Min Mod Max Total  NT x2 Comments:   Level of Assistance [] [] [] [] [] [] [] [] [] [x] [] Due to low O2 no gait   Distance     DME N/A    Gait Quality     I=Independent, Mod I=Modified Independent, S=Supervision, SBA=Standby Assistance, CGA=Contact Guard Assistance,   Min=Minimal Assistance, Mod=Moderate Assistance, Max=Maximal Assistance, Total=Total Assistance, NT=Not Tested    [facility] AM-PAC 6 Clicks   Basic Mobility Inpatient Short Form       How much difficulty does the patient currently have. .. Unable A Lot A Little None   1. Turning over in bed (including adjusting bedclothes, sheets and blankets)? [] 1   [] 2   [] 3   [x] 4   2. Sitting down on and standing up from a chair with arms ( e.g., wheelchair, bedside commode, etc.)   [] 1   [] 2   [x] 3   [] 4   3. Moving from lying on back to sitting on the side of the bed? [] 1   [] 2   [x] 3   [] 4   How much help from another person does the patient currently need. .. Total A Lot A Little None   4.   Moving to and from a bed to a chair (including a wheelchair)? [] 1   [] 2   [x] 3   [] 4   5. Need to walk in hospital room? [] 1   [] 2   [x] 3   [] 4   6. Climbing 3-5 steps with a railing? [] 1   [] 2   [x] 3   [] 4   © 2007, Trustees of [address] [place] [address] [address] [address], under license to [employer]. All rights reserved     Score:  Initial: 19 Most Recent: 19 (Date: -1/19/21- )    Interpretation of Tool:  Represents activities that are increasingly more difficult (i.e. Bed mobility, Transfers, Gait). PLAN:   FREQUENCY/DURATION: PT Plan of Care: 3 times/week for duration of hospital stay or until stated goals are met, whichever comes first.    PROBLEM LIST:   (Skilled intervention is medically necessary to address:)  1. Decreased Activity Tolerance  2. Decreased Balance  3. Decreased Coordination  4. Decreased Gait Ability  5. Decreased Strength  6. Decreased Transfer Abilities   INTERVENTIONS PLANNED:   (Benefits and precautions of physical therapy have been discussed with the patient.)  1. Therapeutic Activity  2. Therapeutic Exercise/HEP  3. Neuromuscular Re-education  4. Gait Training  5. Education     TREATMENT:     EVALUATION: Moderate Complexity : (Untimed Charge)    TREATMENT:   ($$ Therapeutic Activity: 23-37 mins    )  Therapeutic Activity: (    23): Therapeutic activities including Bed transfers, there ex in supine B LEs, supine to sit, sit to supine transfers, pursed lip breathing technique to improve mobility, strength, balance and coordination. Required minimal verbal cues   to promote static and dynamic balance in sitting and promote coordination of bilateral, lower extremity(s). Today's treatment session addressed Decreased ADL/Functional Activities, Decreased Transfer Abilities, Decreased Ambulation Ability/Technique, Decreased Balance and Decreased Activity Tolerance to progress towards achieving goal(s) 1, 2 and 3.  During this session, Occupational Therapy addressed ADLs to progress towards their discipline specific goal(s). Co-treatment was necessary to improve patient's ability to follow higher level commands, ability to increase activity demands and ability to return to normal functional activity.       AFTER TREATMENT POSITION/PRECAUTIONS:  Alarm Activated, Bed, Needs within reach and RN notified    INTERDISCIPLINARY COLLABORATION:  RN/PCT, PT/PTA and OT/PETIT    TOTAL TREATMENT DURATION:  PT Patient Time In/Time Out  Time In: 1110  Time Out: [facility] , PT

## 2021-01-20 LAB
ANION GAP SERPL CALC-SCNC: 5 MMOL/L (ref 7–16)
BUN SERPL-MCNC: 32 MG/DL (ref 8–23)
CALCIUM SERPL-MCNC: 8 MG/DL (ref 8.3–10.4)
CHLORIDE SERPL-SCNC: 107 MMOL/L (ref 98–107)
CO2 SERPL-SCNC: 28 MMOL/L (ref 21–32)
CREAT SERPL-MCNC: 0.99 MG/DL (ref 0.8–1.5)
GLUCOSE BLD STRIP.AUTO-MCNC: 220 MG/DL (ref 65–100)
GLUCOSE BLD STRIP.AUTO-MCNC: 249 MG/DL (ref 65–100)
GLUCOSE BLD STRIP.AUTO-MCNC: 290 MG/DL (ref 65–100)
GLUCOSE BLD STRIP.AUTO-MCNC: 70 MG/DL (ref 65–100)
GLUCOSE SERPL-MCNC: 54 MG/DL (ref 65–100)
MM INDURATION POC: 0 MM (ref 0–5)
POTASSIUM SERPL-SCNC: 4.5 MMOL/L (ref 3.5–5.1)
PPD POC: NEGATIVE NEGATIVE
SODIUM SERPL-SCNC: 140 MMOL/L (ref 136–145)

## 2021-01-20 PROCEDURE — 74011250637 HC RX REV CODE- 250/637: Performed by: INTERNAL MEDICINE

## 2021-01-20 PROCEDURE — 65270000029 HC RM PRIVATE

## 2021-01-20 PROCEDURE — 74011250637 HC RX REV CODE- 250/637: Performed by: FAMILY MEDICINE

## 2021-01-20 PROCEDURE — 80048 BASIC METABOLIC PNL TOTAL CA: CPT

## 2021-01-20 PROCEDURE — 77010033678 HC OXYGEN DAILY

## 2021-01-20 PROCEDURE — 74011636637 HC RX REV CODE- 636/637: Performed by: FAMILY MEDICINE

## 2021-01-20 PROCEDURE — 2709999900 HC NON-CHARGEABLE SUPPLY

## 2021-01-20 PROCEDURE — 36415 COLL VENOUS BLD VENIPUNCTURE: CPT

## 2021-01-20 PROCEDURE — 97530 THERAPEUTIC ACTIVITIES: CPT

## 2021-01-20 PROCEDURE — 74011636637 HC RX REV CODE- 636/637: Performed by: INTERNAL MEDICINE

## 2021-01-20 PROCEDURE — 94760 N-INVAS EAR/PLS OXIMETRY 1: CPT

## 2021-01-20 PROCEDURE — 74011250637 HC RX REV CODE- 250/637: Performed by: PHYSICIAN ASSISTANT

## 2021-01-20 PROCEDURE — 82962 GLUCOSE BLOOD TEST: CPT

## 2021-01-20 PROCEDURE — 74011250636 HC RX REV CODE- 250/636: Performed by: FAMILY MEDICINE

## 2021-01-20 RX ORDER — DEXAMETHASONE 4 MG/1
6 TABLET ORAL EVERY 12 HOURS
Status: DISCONTINUED | OUTPATIENT
Start: 2021-01-20 | End: 2021-01-24

## 2021-01-20 RX ADMIN — NYSTATIN: 100000 POWDER TOPICAL at 09:00

## 2021-01-20 RX ADMIN — GUAIFENESIN 600 MG: 600 TABLET ORAL at 21:22

## 2021-01-20 RX ADMIN — GUAIFENESIN 600 MG: 600 TABLET ORAL at 08:42

## 2021-01-20 RX ADMIN — ROSUVASTATIN 10 MG: 10 TABLET, FILM COATED ORAL at 21:22

## 2021-01-20 RX ADMIN — INSULIN LISPRO 6 UNITS: 100 INJECTION, SOLUTION INTRAVENOUS; SUBCUTANEOUS at 18:10

## 2021-01-20 RX ADMIN — PANTOPRAZOLE SODIUM 40 MG: 40 TABLET, DELAYED RELEASE ORAL at 05:48

## 2021-01-20 RX ADMIN — INSULIN HUMAN 40 UNITS: 100 INJECTION, SUSPENSION SUBCUTANEOUS at 08:49

## 2021-01-20 RX ADMIN — INSULIN LISPRO 6 UNITS: 100 INJECTION, SOLUTION INTRAVENOUS; SUBCUTANEOUS at 21:20

## 2021-01-20 RX ADMIN — Medication 10 ML: at 14:30

## 2021-01-20 RX ADMIN — DEXAMETHASONE 6 MG: 4 TABLET ORAL at 08:42

## 2021-01-20 RX ADMIN — GLYCERIN, PETROLATUM, PHENYLEPHRINE HCL, PRAMOXINE HCL: 144; 2.5; 10; 15 CREAM TOPICAL at 09:00

## 2021-01-20 RX ADMIN — PANTOPRAZOLE SODIUM 40 MG: 40 TABLET, DELAYED RELEASE ORAL at 18:10

## 2021-01-20 RX ADMIN — Medication 10 ML: at 21:36

## 2021-01-20 RX ADMIN — Medication 10 ML: at 05:47

## 2021-01-20 RX ADMIN — INSULIN LISPRO 4 UNITS: 100 INJECTION, SOLUTION INTRAVENOUS; SUBCUTANEOUS at 12:17

## 2021-01-20 RX ADMIN — NYSTATIN: 100000 POWDER TOPICAL at 18:00

## 2021-01-20 RX ADMIN — MORPHINE SULFATE 1 MG: 2 INJECTION, SOLUTION INTRAMUSCULAR; INTRAVENOUS at 08:43

## 2021-01-20 RX ADMIN — DEXAMETHASONE 6 MG: 4 TABLET ORAL at 21:22

## 2021-01-20 RX ADMIN — HUMAN INSULIN 10 UNITS: 100 INJECTION, SUSPENSION SUBCUTANEOUS at 21:21

## 2021-01-20 RX ADMIN — PHENYLEPHRINE HYDROCHLORIDE AND FAT, HARD 1 SUPPOSITORY: 5; 1.77 SUPPOSITORY RECTAL at 21:24

## 2021-01-20 NOTE — PROGRESS NOTES
CM received approval from MD Nabeel Tobin, to discuss STR at this time. CM received call from patient's niece Hallie Downingdariusz [phone], to discuss discharge planning. Hallie Egan requested update regarding SNF choices provided, via e-mail. CM informed Yoselin, SNF choices provided, are not accepting COVID patients at this time. Hallie Egan was receptive to this information and will confirm with family, in reference to  [facility] as a SNF choice. CM awaiting call back. CM continues to follow.

## 2021-01-20 NOTE — PROGRESS NOTES
END OF SHIFT NOTE:Remains on airvo 40% 50 liters high flow sat 93% Turned every 2 hours this shift. Hourly rounds completed, all needs met this shift. Will continue to monitor until night shift nurse takes over. INTAKE/OUTPUT  01/18 0701 - 01/19 0700  In: 1070 [P.O.:1070]  Out: 700 [Urine:700]  Voiding: YES  Catheter: NO  Color: clear  Drain:              DIET  Diabetic    Flatus: Patient does have flatus present. Stool:  0 occurrences. Characteristics:  Stool Assessment  Stool Color: Red  Stool Appearance: Bloody(per pt and night shift)  Stool Amount: Small  Stool Source/Status: Rectum    Ambulating  No    Emesis: 0 occurrences.     Characteristics:          VITAL SIGNS  Patient Vitals for the past 12 hrs:   Temp Pulse Resp BP SpO2   01/19/21 1510 98.1 °F (36.7 °C) 65 16 (!) 126/51 95 %   01/19/21 1300 -- 63 -- -- 93 %   01/19/21 1110 -- -- -- -- (!) 88 %   01/19/21 1057 98 °F (36.7 °C) 71 16 134/61 90 %       Pain Assessment  Pain Intensity 1: 0 (01/19/21 1800)  Pain Location 1: (\"bottom\")  Pain Intervention(s) 1: Medication (see MAR)  Patient Stated Pain Goal: 0            Pérez Clayton RN

## 2021-01-20 NOTE — PROGRESS NOTES
ACUTE PHYSICAL THERAPY GOALS:  (Developed with and agreed upon by patient and/or caregiver. )  LTGs (assessed and revised as needed 1/19/21):  (1.) Timi Bae  will move from supine to sit and sit to supine , scoot up and down and roll side to side with MODIFIED INDEPENDENCE within 7 treatment day(s). (2.) Timi Bae will transfer from bed to chair and chair to bed with SBA using the least restrictive device within 7 treatment day(s). (3.) Antoinette Flores will ambulate with SBA for 100 feet with the least restrictive device within 7 treatment day(s) while maintaining normal vital signs. (4.) Timi Bae will perform standing static and dynamic balance activities x 15 minutes with SBA to improve safety and activity tolerance within 7 treatment day(s). (5.) Timi Bae will ascend and descend 4 stairs using one hand rail(s) with SBA to improve functional mobility and safety within 7 treatment day(s). (6.) Timi Bae will perform bilateral lower extremity exercises x 10 min for HEP with INDEPENDENCE to improve strength, endurance, and functional mobility within 7 treatment day(s). PHYSICAL THERAPY: PM Treatment Day # 2    Timi Bae is a 80 y.o. male   PRIMARY DIAGNOSIS: Acute respiratory failure with hypoxia (HCC)  Acute respiratory failure with hypoxia ([facility] [address] [J96.01]         ASSESSMENT:     REHAB RECOMMENDATIONS: CURRENT LEVEL OF FUNCTION:  (Most Recently Demonstrated)   Recommendation to date pending progress:  Setting:   Short-term Rehab  Equipment:    To Be Determined Bed Mobility:   Supervision  Sit to Stand:   Not tested  Transfers:   Not tested  Gait/Mobility:   Not tested     ASSESSMENT:  Mr. Seema Harrison is making slow progress towards PT goals. Activity is limited due to O2 sats. Patient sat EOB with sats in the high 70's with additional time to recover. Will continue PT efforts. SUBJECTIVE:   Mr. Seema Harrison states, \"I feel weak. \"    SOCIAL HISTORY/ LIVING ENVIRONMENT: See initial evaluation     OBJECTIVE:     PAIN: VITAL SIGNS: LINES/DRAINS:   Pre Treatment: Pain Screen  Pain Scale 1: FLACC  Pain Intensity 1: 0  Post Treatment: 0   None  O2 Device: Hi flow nasal cannula     MOBILITY: I Mod I S SBA CGA Min Mod Max Total  NT x2 Comments:   Bed Mobility    Rolling [] [] [x] [] [] [] [] [] [] [] []    Supine to Sit [] [] [x] [] [] [] [] [] [] [] []    Scooting [] [] [x] [] [] [] [] [] [] [] []    Sit to Supine [] [] [x] [] [] [] [] [] [] [] []    Transfers    Sit to Stand [] [] [] [] [] [] [] [] [] [] []    Bed to Chair [] [] [] [] [] [] [] [] [] [] []    Stand to Sit [] [] [] [] [] [] [] [] [] [] []    I=Independent, Mod I=Modified Independent, S=Supervision, SBA=Standby Assistance, CGA=Contact Guard Assistance,   Min=Minimal Assistance, Mod=Moderate Assistance, Max=Maximal Assistance, Total=Total Assistance, NT=Not Tested    GAIT: I Mod I S SBA CGA Min Mod Max Total  NT x2 Comments:   Level of Assistance [] [] [] [] [] [] [] [] [] [x] []    Distance     DME N/A    Gait Quality     Weightbearing  Status N/A     I=Independent, Mod I=Modified Independent, S=Supervision, SBA=Standby Assistance, CGA=Contact Guard Assistance,   Min=Minimal Assistance, Mod=Moderate Assistance, Max=Maximal Assistance, Total=Total Assistance, NT=Not Tested    PLAN:   FREQUENCY/DURATION: PT Plan of Care: 3 times/week for duration of hospital stay or until stated goals are met, whichever comes first.  TREATMENT:     TREATMENT:   ($$ Therapeutic Activity: 23-37 mins    )  Therapeutic Activity (24 Minutes): Therapeutic activity included Rolling, Supine to Sit, Sit to Supine, Scooting and LE exercises to improve functional Mobility, Strength and Activity tolerance.     AFTER TREATMENT POSITION/PRECAUTIONS:  Alarm Activated, Bed, Needs within reach and RN notified    INTERDISCIPLINARY COLLABORATION:  RN/PCT    TOTAL TREATMENT DURATION:  PT Patient Time In/Time Out  Time In: 1358  Time Out: [address] Morgan Shaye, PTA

## 2021-01-20 NOTE — PROGRESS NOTES
Had a good night, slept some. Kept turned. 02 high flow 6 liter  Hourly rounds completed. Resting in bed. Denies needs or pain at this time. Bed in low locked position. Call light and personal items within reach. Will continue to monitor and give report to oncoming day shift nurse.

## 2021-01-20 NOTE — PROGRESS NOTES
Progress Note    Patient: Kam Hernandez MRN: [id number]  SSN: [id number]    YOB: 1933  Age: 80 y.o. Sex: male      Admit Date: 1/8/2021    LOS: 12 days     Subjective:   F/U rectal bleeding, COVID    81 yo hx of DM type II, GERD, admitted for COVID and rectal bleeding. GI consulted who believed from external hemorrhoids. Recommends Tuck pads and anusol MT BID. Hg stable. S/p convalescent plasma on 1/8/21. Also received remdesivir 1/8-1/12. Very weak and willing to go to rehab. Stage 2 small sacral wound. Frequent turning. 6L high flow. No SOB. No chest pain.   Glucose 54 this am., Asymptomatic from low glucose reading    Current Facility-Administered Medications   Medication Dose Route Frequency    insulin NPH (NOVOLIN N, HUMULIN N) injection 40 Units  40 Units SubCUTAneous Q12H    And    dexAMETHasone (DECADRON) tablet 6 mg  6 mg Oral Q12H    morphine injection 1 mg  1 mg IntraVENous Q3H PRN    guaiFENesin ER (MUCINEX) tablet 600 mg  600 mg Oral Q12H    nystatin (MYCOSTATIN) 100,000 unit/gram powder   Topical BID    azbdznpwg-Vlgcinhf-Egzee-W.Pet (PREPARATION H MAXIMUM STRENGTH) 0.25-1 % cream   Rectal Q12H    phenylephrine suppository 1 Suppository  1 Suppository Rectal Q12H    pantoprazole (PROTONIX) tablet 40 mg  40 mg Oral ACB&D    witch hazel-glycerin (TUCKS) 12.5-50 % pads 1 Pad  1 Pad PeriANAL PRN    dextrose 40% (GLUTOSE) oral gel 1 Tube  15 g Oral PRN    glucagon (GLUCAGEN) injection 1 mg  1 mg IntraMUSCular PRN    dextrose (D50W) injection syrg 12.5-25 g  25-50 mL IntraVENous PRN    influenza vaccine 2020-21 (6 mos+)(PF) (FLUARIX/FLULAVAL/FLUZONE QUAD) injection 0.5 mL  0.5 mL IntraMUSCular PRIOR TO DISCHARGE    [Held by provider] aspirin delayed-release tablet 81 mg  81 mg Oral QHS    rosuvastatin (CRESTOR) tablet 10 mg  10 mg Oral QHS    sodium chloride (NS) flush 5-40 mL  5-40 mL IntraVENous Q8H    sodium chloride (NS) flush 5-40 mL  5-40 mL IntraVENous PRN    acetaminophen (TYLENOL) tablet 650 mg  650 mg Oral Q6H PRN    Or    acetaminophen (TYLENOL) suppository 650 mg  650 mg Rectal Q6H PRN    ondansetron (ZOFRAN) injection 4 mg  4 mg IntraVENous Q6H PRN    senna (SENOKOT) tablet 8.6 mg  1 Tab Oral DAILY PRN    0.9% sodium chloride infusion 250 mL  250 mL IntraVENous PRN    insulin lispro (HUMALOG) injection   SubCUTAneous AC&HS       Objective:     Vitals:    01/19/21 2053 01/19/21 2348 01/20/21 0309 01/20/21 0737   BP:  (!) 150/73 (!) 123/55 (!) 109/54   Pulse:  (!) 53 (!) 49 (!) 50   Resp:  16 17 18   Temp:  98.2 °F (36.8 °C) 98.8 °F (37.1 °C) 97.9 °F (36.6 °C)   SpO2: 98% 100% 98% 99%   Height:             Intake and Output:  Current Shift: No intake/output data recorded. Last three shifts: 01/18 1901 - 01/20 0700  In: 26 [P.O.:470]  Out: 950 [Urine:950]    Physical Exam:   General:  Alert, cooperative, no distress, appears stated age. Talkative with no SOB   Eyes:  Conjunctivae/corneas clear. Ears:  Normal TMs and external ear canals both ears. Nose: Nares normal. Septum midline. Mouth/Throat: Lips, mucosa, and tongue normal.    Neck:  no JVD. Back:   deferred. Lungs:   Clear to auscultation bilaterally with good air movement. Heart:  Regular rate and rhythm, S1, S2 normal   Abdomen:   Soft, non-tender. Bowel sounds normal.    Extremities: Extremities normal, atraumatic, no cyanosis or edema. Pulses: 2+ and symmetric all extremities. Skin: Sacral wound not assessed today    Lymph nodes: Cervical, supraclavicular, and axillary nodes normal.   Neurologic: CNII-XII intact. Limited ROM in bed.  Very pleasant        Lab/Data Review:    Recent Results (from the past 24 hour(s))   GLUCOSE, POC    Collection Time: 01/19/21 10:59 AM   Result Value Ref Range    Glucose (POC) 357 (H) 65 - 100 mg/dL   POTASSIUM    Collection Time: 01/19/21 12:18 PM   Result Value Ref Range    Potassium 4.8 3.5 - 5.1 mmol/L   GLUCOSE, POC    Collection Time: 01/19/21  3:13 PM   Result Value Ref Range    Glucose (POC) 363 (H) 65 - 100 mg/dL   GLUCOSE, POC    Collection Time: 01/19/21  8:23 PM   Result Value Ref Range    Glucose (POC) 168 (H) 65 - 953 mg/dL   METABOLIC PANEL, BASIC    Collection Time: 01/20/21  6:24 AM   Result Value Ref Range    Sodium 140 136 - 145 mmol/L    Potassium 4.5 3.5 - 5.1 mmol/L    Chloride 107 98 - 107 mmol/L    CO2 28 21 - 32 mmol/L    Anion gap 5 (L) 7 - 16 mmol/L    Glucose 54 (L) 65 - 100 mg/dL    BUN 32 (H) 8 - 23 MG/DL    Creatinine 0.99 0.8 - 1.5 MG/DL    GFR est AA >60 >60 ml/min/1.73m2    GFR est non-AA >60 >60 ml/min/1.73m2    Calcium 8.0 (L) 8.3 - 10.4 MG/DL   GLUCOSE, POC    Collection Time: 01/20/21  7:30 AM   Result Value Ref Range    Glucose (POC) 70 65 - 100 mg/dL       Assessment/ Plan:     Principal Problem:    Acute respiratory failure with hypoxia (HCC) (1/7/2021)    Active Problems:    COVID-19 (1/7/2021)      Acute metabolic encephalopathy (8/1/1505)      Type 2 diabetes mellitus, with long-term current use of insulin ([facility] [address] (1/7/2021)      Gastrointestinal hemorrhage associated with anorectal source (1/14/2021)      Decubitus ulcer of buttock, stage 2 ([address] (1/19/2021)    Acute respiratory failure secondary to COVID - decadron BID since 1/18. S/p CP and remdesivir. Added mucinex. Now on airvo. Rectal bleeding - phenylephrine suppository. Goal treatment for two weeks. Stage 2 sacral ulcers - Frequent turning. Encourage good PO intake. Local skin care. DM type II - change to NPH 40 units to 10 units every time gets decadron. SS.     I have spoken to patient about code status. He wants to be DNR. Does not want intubation if needed. I have attempted to call the wife with no answer. Hopeful dc soon to rehab if we can continue to wean down oxygen supplement.      DVT  Prophylaxis - SCDs  Signed By: Ammy Alcantara DO     January 20, 2021

## 2021-01-21 LAB
GLUCOSE BLD STRIP.AUTO-MCNC: 105 MG/DL (ref 65–100)
GLUCOSE BLD STRIP.AUTO-MCNC: 223 MG/DL (ref 65–100)
GLUCOSE BLD STRIP.AUTO-MCNC: 288 MG/DL (ref 65–100)
GLUCOSE BLD STRIP.AUTO-MCNC: 392 MG/DL (ref 65–100)
MM INDURATION POC: 0 MM (ref 0–5)
PPD POC: NEGATIVE NEGATIVE

## 2021-01-21 PROCEDURE — 97530 THERAPEUTIC ACTIVITIES: CPT

## 2021-01-21 PROCEDURE — 74011636637 HC RX REV CODE- 636/637: Performed by: FAMILY MEDICINE

## 2021-01-21 PROCEDURE — 74011250637 HC RX REV CODE- 250/637: Performed by: INTERNAL MEDICINE

## 2021-01-21 PROCEDURE — 77010033678 HC OXYGEN DAILY

## 2021-01-21 PROCEDURE — 74011250637 HC RX REV CODE- 250/637: Performed by: FAMILY MEDICINE

## 2021-01-21 PROCEDURE — 74011250636 HC RX REV CODE- 250/636: Performed by: FAMILY MEDICINE

## 2021-01-21 PROCEDURE — 82962 GLUCOSE BLOOD TEST: CPT

## 2021-01-21 PROCEDURE — 2709999900 HC NON-CHARGEABLE SUPPLY

## 2021-01-21 PROCEDURE — 65270000029 HC RM PRIVATE

## 2021-01-21 PROCEDURE — 74011250637 HC RX REV CODE- 250/637: Performed by: PHYSICIAN ASSISTANT

## 2021-01-21 PROCEDURE — 74011636637 HC RX REV CODE- 636/637: Performed by: INTERNAL MEDICINE

## 2021-01-21 PROCEDURE — 94760 N-INVAS EAR/PLS OXIMETRY 1: CPT

## 2021-01-21 RX ADMIN — HUMAN INSULIN 10 UNITS: 100 INJECTION, SUSPENSION SUBCUTANEOUS at 08:33

## 2021-01-21 RX ADMIN — GLYCERIN, PETROLATUM, PHENYLEPHRINE HCL, PRAMOXINE HCL: 144; 2.5; 10; 15 CREAM TOPICAL at 21:21

## 2021-01-21 RX ADMIN — PHENYLEPHRINE HYDROCHLORIDE AND FAT, HARD 1 SUPPOSITORY: 5; 1.77 SUPPOSITORY RECTAL at 21:25

## 2021-01-21 RX ADMIN — HUMAN INSULIN 10 UNITS: 100 INJECTION, SUSPENSION SUBCUTANEOUS at 21:01

## 2021-01-21 RX ADMIN — DEXAMETHASONE 6 MG: 4 TABLET ORAL at 08:29

## 2021-01-21 RX ADMIN — INSULIN LISPRO 4 UNITS: 100 INJECTION, SOLUTION INTRAVENOUS; SUBCUTANEOUS at 11:33

## 2021-01-21 RX ADMIN — NYSTATIN: 100000 POWDER TOPICAL at 09:00

## 2021-01-21 RX ADMIN — GUAIFENESIN 600 MG: 600 TABLET ORAL at 08:29

## 2021-01-21 RX ADMIN — Medication 10 ML: at 21:04

## 2021-01-21 RX ADMIN — NYSTATIN: 100000 POWDER TOPICAL at 18:00

## 2021-01-21 RX ADMIN — MORPHINE SULFATE 1 MG: 2 INJECTION, SOLUTION INTRAMUSCULAR; INTRAVENOUS at 08:29

## 2021-01-21 RX ADMIN — GUAIFENESIN 600 MG: 600 TABLET ORAL at 21:00

## 2021-01-21 RX ADMIN — Medication 10 ML: at 05:04

## 2021-01-21 RX ADMIN — Medication 10 ML: at 08:29

## 2021-01-21 RX ADMIN — PANTOPRAZOLE SODIUM 40 MG: 40 TABLET, DELAYED RELEASE ORAL at 16:43

## 2021-01-21 RX ADMIN — MORPHINE SULFATE 1 MG: 2 INJECTION, SOLUTION INTRAMUSCULAR; INTRAVENOUS at 04:57

## 2021-01-21 RX ADMIN — INSULIN LISPRO 6 UNITS: 100 INJECTION, SOLUTION INTRAVENOUS; SUBCUTANEOUS at 21:01

## 2021-01-21 RX ADMIN — PANTOPRAZOLE SODIUM 40 MG: 40 TABLET, DELAYED RELEASE ORAL at 05:03

## 2021-01-21 RX ADMIN — ROSUVASTATIN 10 MG: 10 TABLET, FILM COATED ORAL at 21:00

## 2021-01-21 RX ADMIN — ACETAMINOPHEN 650 MG: 325 TABLET, FILM COATED ORAL at 18:08

## 2021-01-21 RX ADMIN — DEXAMETHASONE 6 MG: 4 TABLET ORAL at 21:00

## 2021-01-21 RX ADMIN — INSULIN LISPRO 10 UNITS: 100 INJECTION, SOLUTION INTRAVENOUS; SUBCUTANEOUS at 16:43

## 2021-01-21 RX ADMIN — PHENYLEPHRINE HYDROCHLORIDE AND FAT, HARD 1 SUPPOSITORY: 5; 1.77 SUPPOSITORY RECTAL at 08:33

## 2021-01-21 RX ADMIN — GLYCERIN, PETROLATUM, PHENYLEPHRINE HCL, PRAMOXINE HCL: 144; 2.5; 10; 15 CREAM TOPICAL at 09:00

## 2021-01-21 RX ADMIN — Medication 10 ML: at 15:35

## 2021-01-21 RX ADMIN — ACETAMINOPHEN 650 MG: 325 TABLET, FILM COATED ORAL at 21:02

## 2021-01-21 NOTE — PROGRESS NOTES
Progress Note    Patient: Damian Flores MRN: [id number]  SSN: [id number]    YOB: 1933  Age: 87 y.o.  Sex: male      Admit Date: 1/8/2021    LOS: 13 days     Subjective:     Mr. Flores is a 88 yo  male hx of DM type II, GERD, admitted for COVID and rectal bleeding. GI consulted and diagnosed with external hemorrhoids. Recommends Tuck pads and anusol [place] BID. Hg stable trends.     S/p convalescent plasma on 1/8/21. Also received remdesivir 1/8-1/12. He is slow O2 wean.     He has a Stage 2 small sacral wound, needs Frequent turning.     Discharge plans pending to rehab.      1-21-21 spoke with wife while in room by phone, has rectal pain, no rectal bleeding, no anorexia or BM changes, less short of breath and cough, no edema , working with PT        Current Facility-Administered Medications   Medication Dose Route Frequency   • insulin NPH (NOVOLIN N, HUMULIN N) injection 10 Units  10 Units SubCUTAneous Q12H    And   • dexAMETHasone (DECADRON) tablet 6 mg  6 mg Oral Q12H   • morphine injection 1 mg  1 mg IntraVENous Q3H PRN   • guaiFENesin ER (MUCINEX) tablet 600 mg  600 mg Oral Q12H   • nystatin (MYCOSTATIN) 100,000 unit/gram powder   Topical BID   • unzoaucrn-Xpownyvx-Pnilk-W.Pet (PREPARATION H MAXIMUM STRENGTH) 0.25-1 % cream   Rectal Q12H   • phenylephrine suppository 1 Suppository  1 Suppository Rectal Q12H   • pantoprazole (PROTONIX) tablet 40 mg  40 mg Oral ACB&D   • witch hazel-glycerin (TUCKS) 12.5-50 % pads 1 Pad  1 Pad PeriANAL PRN   • dextrose 40% (GLUTOSE) oral gel 1 Tube  15 g Oral PRN   • glucagon (GLUCAGEN) injection 1 mg  1 mg IntraMUSCular PRN   • dextrose (D50W) injection syrg 12.5-25 g  25-50 mL IntraVENous PRN   • influenza vaccine 2020-21 (6 mos+)(PF) (FLUARIX/FLULAVAL/FLUZONE QUAD) injection 0.5 mL  0.5 mL IntraMUSCular PRIOR TO DISCHARGE   • [Held by provider] aspirin delayed-release tablet 81 mg  81 mg Oral QHS   • rosuvastatin (CRESTOR) tablet 10 mg  10 mg Oral QHS   •  sodium chloride (NS) flush 5-40 mL  5-40 mL IntraVENous Q8H    sodium chloride (NS) flush 5-40 mL  5-40 mL IntraVENous PRN    acetaminophen (TYLENOL) tablet 650 mg  650 mg Oral Q6H PRN    Or    acetaminophen (TYLENOL) suppository 650 mg  650 mg Rectal Q6H PRN    ondansetron (ZOFRAN) injection 4 mg  4 mg IntraVENous Q6H PRN    senna (SENOKOT) tablet 8.6 mg  1 Tab Oral DAILY PRN    0.9% sodium chloride infusion 250 mL  250 mL IntraVENous PRN    insulin lispro (HUMALOG) injection   SubCUTAneous AC&HS       Objective:     Vitals:    01/20/21 1940 01/21/21 0000 01/21/21 0436 01/21/21 0701   BP: (!) 144/69 132/71 126/71 110/68   Pulse: 60 68 72 61   Resp: 18 18 18 18   Temp: 97.9 °F (36.6 °C) 98.2 °F (36.8 °C) 98.9 °F (37.2 °C) 98.7 °F (37.1 °C)   SpO2: 98% 98% 98% 93%   Height:             Intake and Output:  Current Shift: 01/21 0701 - 01/21 1900  In: 240 [P.O.:240]  Out: -   Last three shifts: 01/19 1901 - 01/21 0700  In: 120 [P.O.:120]  Out: 1450 [Urine:1450]    Physical Exam:   General:  Alert, cooperative, no distress, appears stated age. pleasant                           Lungs:   Clear to auscultation bilaterally with good air movement. Heart:  Regular rate and rhythm, S1, S2 normal, no edema    Abdomen:   Soft, non-tender.  Bowel sounds normal.                    Neurologic: Grossly intact        Lab/Data Review:    Recent Results (from the past 24 hour(s))   GLUCOSE, POC    Collection Time: 01/20/21 11:40 AM   Result Value Ref Range    Glucose (POC) 220 (H) 65 - 100 mg/dL   GLUCOSE, POC    Collection Time: 01/20/21  4:09 PM   Result Value Ref Range    Glucose (POC) 290 (H) 65 - 100 mg/dL   GLUCOSE, POC    Collection Time: 01/20/21  9:15 PM   Result Value Ref Range    Glucose (POC) 249 (H) 65 - 100 mg/dL   GLUCOSE, POC    Collection Time: 01/21/21  7:07 AM   Result Value Ref Range    Glucose (POC) 105 (H) 65 - 100 mg/dL       Assessment/ Plan:     Principal Problem:    Acute respiratory failure with hypoxia ([facility] (1/7/2021)    Active Problems:    COVID-19 (1/7/2021)      Acute metabolic encephalopathy (2/2/5740)      Type 2 diabetes mellitus, with long-term current use of insulin ([facility] (1/7/2021)      Gastrointestinal hemorrhage associated with anorectal source (1/14/2021)      Decubitus ulcer of buttock, stage 2 ([address] (1/19/2021)    Acute hypoxic respiratory failure secondary to COVID   ·  decadron resumed 1-18-21  · S/p plasma and remdesivir  · O2 taper as tolerant, on 6 L NC    Rectal bleeding due to hemorrhoids  ·  phenylephrine suppository  · Stable CBC trends   · ASA held    Stage 2 sacral ulcers   ·  Frequent turning    DM type II   ·  continued NPH, SSI     Discharge plans pending O2 taper and needs less than 6 L ambulating     DVT  Prophylaxis - SCDs  Signed By: Jaja Mejia MD     January 21, 2021

## 2021-01-21 NOTE — PROGRESS NOTES
Problem: Pressure Injury - Risk of  Goal: *Prevention of pressure injury  Description: Document Franklin Scale and appropriate interventions in the flowsheet.   Outcome: Progressing Towards Goal  Note: Pressure Injury Interventions:  Sensory Interventions: Assess need for specialty bed, Avoid rigorous massage over bony prominences    Moisture Interventions: Apply protective barrier, creams and emollients, Assess need for specialty bed    Activity Interventions: Pressure redistribution bed/mattress(bed type), PT/OT evaluation    Mobility Interventions: Pressure redistribution bed/mattress (bed type), PT/OT evaluation    Nutrition Interventions: Document food/fluid/supplement intake, Discuss nutritional consult with provider    Friction and Shear Interventions: Apply protective barrier, creams and emollients, Lift team/patient mobility team, Minimize layers

## 2021-01-21 NOTE — PROGRESS NOTES
CM contacted Espinoza Myers with [facility], to obtain status update regarding referrals sent. Per Espinoza Myers, there are no beds available at [facility]. Espinoza Myers also stated oxygen needs will need to be lower, in order for SNF to accept. However, Espinoza Myers informed CM, bed status should attentively change within 1-2 days. CM was receptive to this update. CM continues to monitor plan of care.

## 2021-01-21 NOTE — PROGRESS NOTES
END OF SHIFT NOTE:O2 6 l NC sats 92% Hourly rounds completed, all needs met this shift. Will continue to monitor until night shift nurse takes over. INTAKE/OUTPUT  01/19 0701 - 01/20 0700  In: 120 [P.O.:120]  Out: 750 [Urine:750]  Voiding: YES  Catheter: NO  Color: clear  Drain:              DIET  Diabetic    Flatus: Patient does have flatus present. Stool:  0 occurrences. Characteristics:  Stool Assessment  Stool Color: Red  Stool Appearance: Bloody(per pt and night shift)  Stool Amount: Small  Stool Source/Status: Rectum    Ambulating  No    Emesis: 0 occurrences.     Characteristics:          VITAL SIGNS  Patient Vitals for the past 12 hrs:   Temp Pulse Resp BP SpO2   01/20/21 1940 97.9 °F (36.6 °C) 60 18 (!) 144/69 98 %   01/20/21 1603 97.4 °F (36.3 °C) (!) 58 18 (!) 123/46 95 %   01/20/21 1137 97.9 °F (36.6 °C) 61 17 (!) 121/53 95 %       Pain Assessment  Pain Intensity 1: 0 (01/20/21 1358)  Pain Location 1: Coccyx  Pain Intervention(s) 1: Medication (see MAR)  Patient Stated Pain Goal: 0            Freida Hodgson RN

## 2021-01-21 NOTE — PROGRESS NOTES
JONAS received SNF choices from the patient's niece [place] via e-mail. [facility] requested CM send referral to [address] [place] [address]. Referrals Sent. JONAS will follow up with [address] [place] [address] this day. JONAS continues to monitor.

## 2021-01-21 NOTE — PROGRESS NOTES
ACUTE PHYSICAL THERAPY GOALS:  (Developed with and agreed upon by patient and/or caregiver. )  LTGs (assessed and revised as needed 1/19/21):  (1.) Yissel Rey  will move from supine to sit and sit to supine , scoot up and down and roll side to side with MODIFIED INDEPENDENCE within 7 treatment day(s). (2.) Yissel Rey will transfer from bed to chair and chair to bed with SBA using the least restrictive device within 7 treatment day(s). (3.) [facility] Flores will ambulate with SBA for 100 feet with the least restrictive device within 7 treatment day(s) while maintaining normal vital signs. (4.) Yissel Rey will perform standing static and dynamic balance activities x 15 minutes with SBA to improve safety and activity tolerance within 7 treatment day(s). (5.) Yissel Rey will ascend and descend 4 stairs using one hand rail(s) with SBA to improve functional mobility and safety within 7 treatment day(s). (6.) Yissel Rey will perform bilateral lower extremity exercises x 10 min for HEP with INDEPENDENCE to improve strength, endurance, and functional mobility within 7 treatment day(s). PHYSICAL THERAPY: Daily Note and AM Treatment Day # 3    Yissel Le is a 80 y.o. male   PRIMARY DIAGNOSIS: Acute respiratory failure with hypoxia (HCC)  Acute respiratory failure with hypoxia ([facility] [J96.01]         ASSESSMENT:     REHAB RECOMMENDATIONS: CURRENT LEVEL OF FUNCTION:  (Most Recently Demonstrated)   Recommendation to date pending progress:  Setting:   Short-term Rehab  Equipment:    To Be Determined Bed Mobility:   Supervision  Sit to Stand:   Not tested  Transfers:   Not tested  Gait/Mobility:   Not tested     ASSESSMENT:  Mr. Giovany Garcia is making slow progress towards PT goals. Activity is limited due to O2 sats dropping with any kind of exertion. Sats 87% on 6L increased to 8L which improved sats to 92. Attempted EOB treatment but patient dropped to 79%.  Returned to supine where sats took additional time to recover. Attempted LE exercises but again sats dropped. RN and respiratory notified. Will continue PT efforts. SUBJECTIVE:   Mr. Pablo Nguyen states, \"I feel weak. \"    SOCIAL HISTORY/ LIVING ENVIRONMENT: See initial evaluation     OBJECTIVE:     PAIN: VITAL SIGNS: LINES/DRAINS:   Pre Treatment: Pain Screen  Pain Scale 1: FLACC  Pain Intensity 1: 0  Post Treatment: 0   None  O2 Device: Hi flow nasal cannula     MOBILITY: I Mod I S SBA CGA Min Mod Max Total  NT x2 Comments:   Bed Mobility    Rolling [] [] [x] [] [] [] [] [] [] [] []    Supine to Sit [] [] [x] [] [] [] [] [] [] [] []    Scooting [] [] [x] [] [] [] [] [] [] [] []    Sit to Supine [] [] [x] [] [] [] [] [] [] [] []    Transfers    Sit to Stand [] [] [] [] [] [] [] [] [] [] []    Bed to Chair [] [] [] [] [] [] [] [] [] [] []    Stand to Sit [] [] [] [] [] [] [] [] [] [] []    I=Independent, Mod I=Modified Independent, S=Supervision, SBA=Standby Assistance, CGA=Contact Guard Assistance,   Min=Minimal Assistance, Mod=Moderate Assistance, Max=Maximal Assistance, Total=Total Assistance, NT=Not Tested    GAIT: I Mod I S SBA CGA Min Mod Max Total  NT x2 Comments:   Level of Assistance [] [] [] [] [] [] [] [] [] [x] []    Distance     DME N/A    Gait Quality     Weightbearing  Status N/A     I=Independent, Mod I=Modified Independent, S=Supervision, SBA=Standby Assistance, CGA=Contact Guard Assistance,   Min=Minimal Assistance, Mod=Moderate Assistance, Max=Maximal Assistance, Total=Total Assistance, NT=Not Tested    PLAN:   FREQUENCY/DURATION: PT Plan of Care: 3 times/week for duration of hospital stay or until stated goals are met, whichever comes first.  TREATMENT:     TREATMENT:   ($$ Therapeutic Activity: 23-37 mins    )  Therapeutic Activity (26 Minutes): Therapeutic activity included Rolling, Supine to Sit, Sit to Supine, Scooting and LE exercises to improve functional Mobility, Strength and Activity tolerance.     AFTER TREATMENT POSITION/PRECAUTIONS:  Alarm Activated, Bed, Needs within reach and RN notified    INTERDISCIPLINARY COLLABORATION:  RN/PCT    TOTAL TREATMENT DURATION:  PT Patient Time In/Time Out  Time In: 1049  Time Out: [address] [facility]

## 2021-01-22 LAB
ALBUMIN SERPL-MCNC: 2 G/DL (ref 3.2–4.6)
ALBUMIN/GLOB SERPL: 0.6 {RATIO} (ref 1.2–3.5)
ALP SERPL-CCNC: 81 U/L (ref 50–136)
ALT SERPL-CCNC: 26 U/L (ref 12–65)
ANION GAP SERPL CALC-SCNC: 2 MMOL/L (ref 7–16)
AST SERPL-CCNC: 18 U/L (ref 15–37)
BASOPHILS # BLD: 0 K/UL (ref 0–0.2)
BASOPHILS NFR BLD: 0 % (ref 0–2)
BILIRUB SERPL-MCNC: 0.4 MG/DL (ref 0.2–1.1)
BUN SERPL-MCNC: 30 MG/DL (ref 8–23)
CALCIUM SERPL-MCNC: 7.9 MG/DL (ref 8.3–10.4)
CHLORIDE SERPL-SCNC: 103 MMOL/L (ref 98–107)
CO2 SERPL-SCNC: 31 MMOL/L (ref 21–32)
CREAT SERPL-MCNC: 0.91 MG/DL (ref 0.8–1.5)
DIFFERENTIAL METHOD BLD: ABNORMAL
EOSINOPHIL # BLD: 0 K/UL (ref 0–0.8)
EOSINOPHIL NFR BLD: 0 % (ref 0.5–7.8)
ERYTHROCYTE [DISTWIDTH] IN BLOOD BY AUTOMATED COUNT: 19.2 % (ref 11.9–14.6)
GLOBULIN SER CALC-MCNC: 3.2 G/DL (ref 2.3–3.5)
GLUCOSE BLD STRIP.AUTO-MCNC: 100 MG/DL (ref 65–100)
GLUCOSE BLD STRIP.AUTO-MCNC: 171 MG/DL (ref 65–100)
GLUCOSE BLD STRIP.AUTO-MCNC: 343 MG/DL (ref 65–100)
GLUCOSE BLD STRIP.AUTO-MCNC: 365 MG/DL (ref 65–100)
GLUCOSE SERPL-MCNC: 98 MG/DL (ref 65–100)
HCT VFR BLD AUTO: 30.6 % (ref 41.1–50.3)
HGB BLD-MCNC: 9.9 G/DL (ref 13.6–17.2)
IMM GRANULOCYTES # BLD AUTO: 0.3 K/UL (ref 0–0.5)
IMM GRANULOCYTES NFR BLD AUTO: 2 % (ref 0–5)
LYMPHOCYTES # BLD: 0.5 K/UL (ref 0.5–4.6)
LYMPHOCYTES NFR BLD: 4 % (ref 13–44)
MCH RBC QN AUTO: 27.7 PG (ref 26.1–32.9)
MCHC RBC AUTO-ENTMCNC: 32.4 G/DL (ref 31.4–35)
MCV RBC AUTO: 85.7 FL (ref 79.6–97.8)
MONOCYTES # BLD: 0.7 K/UL (ref 0.1–1.3)
MONOCYTES NFR BLD: 5 % (ref 4–12)
NEUTS SEG # BLD: 12.8 K/UL (ref 1.7–8.2)
NEUTS SEG NFR BLD: 90 % (ref 43–78)
NRBC # BLD: 0 K/UL (ref 0–0.2)
PLATELET # BLD AUTO: 282 K/UL (ref 150–450)
PMV BLD AUTO: 10.6 FL (ref 9.4–12.3)
POTASSIUM SERPL-SCNC: 5 MMOL/L (ref 3.5–5.1)
PROT SERPL-MCNC: 5.2 G/DL (ref 6.3–8.2)
RBC # BLD AUTO: 3.57 M/UL (ref 4.23–5.6)
SODIUM SERPL-SCNC: 136 MMOL/L (ref 138–145)
WBC # BLD AUTO: 14.3 K/UL (ref 4.3–11.1)

## 2021-01-22 PROCEDURE — 74011250637 HC RX REV CODE- 250/637: Performed by: INTERNAL MEDICINE

## 2021-01-22 PROCEDURE — 74011636637 HC RX REV CODE- 636/637: Performed by: FAMILY MEDICINE

## 2021-01-22 PROCEDURE — 97110 THERAPEUTIC EXERCISES: CPT

## 2021-01-22 PROCEDURE — 82962 GLUCOSE BLOOD TEST: CPT

## 2021-01-22 PROCEDURE — 85025 COMPLETE CBC W/AUTO DIFF WBC: CPT

## 2021-01-22 PROCEDURE — 97530 THERAPEUTIC ACTIVITIES: CPT

## 2021-01-22 PROCEDURE — 74011250636 HC RX REV CODE- 250/636: Performed by: FAMILY MEDICINE

## 2021-01-22 PROCEDURE — 65270000029 HC RM PRIVATE

## 2021-01-22 PROCEDURE — 74011636637 HC RX REV CODE- 636/637: Performed by: INTERNAL MEDICINE

## 2021-01-22 PROCEDURE — 77010033678 HC OXYGEN DAILY

## 2021-01-22 PROCEDURE — 74011250637 HC RX REV CODE- 250/637: Performed by: FAMILY MEDICINE

## 2021-01-22 PROCEDURE — 94760 N-INVAS EAR/PLS OXIMETRY 1: CPT

## 2021-01-22 PROCEDURE — 74011250637 HC RX REV CODE- 250/637: Performed by: PHYSICIAN ASSISTANT

## 2021-01-22 PROCEDURE — 80053 COMPREHEN METABOLIC PANEL: CPT

## 2021-01-22 RX ADMIN — ROSUVASTATIN 10 MG: 10 TABLET, FILM COATED ORAL at 21:54

## 2021-01-22 RX ADMIN — INSULIN LISPRO 10 UNITS: 100 INJECTION, SOLUTION INTRAVENOUS; SUBCUTANEOUS at 22:00

## 2021-01-22 RX ADMIN — HUMAN INSULIN 10 UNITS: 100 INJECTION, SUSPENSION SUBCUTANEOUS at 21:55

## 2021-01-22 RX ADMIN — Medication 10 ML: at 13:30

## 2021-01-22 RX ADMIN — MORPHINE SULFATE 1 MG: 2 INJECTION, SOLUTION INTRAMUSCULAR; INTRAVENOUS at 09:53

## 2021-01-22 RX ADMIN — NYSTATIN: 100000 POWDER TOPICAL at 08:32

## 2021-01-22 RX ADMIN — DEXAMETHASONE 6 MG: 4 TABLET ORAL at 21:54

## 2021-01-22 RX ADMIN — PANTOPRAZOLE SODIUM 40 MG: 40 TABLET, DELAYED RELEASE ORAL at 16:10

## 2021-01-22 RX ADMIN — DEXAMETHASONE 6 MG: 4 TABLET ORAL at 08:32

## 2021-01-22 RX ADMIN — Medication 10 ML: at 22:00

## 2021-01-22 RX ADMIN — GLYCERIN, PETROLATUM, PHENYLEPHRINE HCL, PRAMOXINE HCL: 144; 2.5; 10; 15 CREAM TOPICAL at 08:33

## 2021-01-22 RX ADMIN — GUAIFENESIN 600 MG: 600 TABLET ORAL at 08:32

## 2021-01-22 RX ADMIN — HUMAN INSULIN 10 UNITS: 100 INJECTION, SUSPENSION SUBCUTANEOUS at 08:32

## 2021-01-22 RX ADMIN — NYSTATIN: 100000 POWDER TOPICAL at 17:06

## 2021-01-22 RX ADMIN — PHENYLEPHRINE HYDROCHLORIDE AND FAT, HARD 1 SUPPOSITORY: 5; 1.77 SUPPOSITORY RECTAL at 21:56

## 2021-01-22 RX ADMIN — PHENYLEPHRINE HYDROCHLORIDE AND FAT, HARD 1 SUPPOSITORY: 5; 1.77 SUPPOSITORY RECTAL at 08:32

## 2021-01-22 RX ADMIN — GUAIFENESIN 600 MG: 600 TABLET ORAL at 21:54

## 2021-01-22 RX ADMIN — Medication 10 ML: at 05:50

## 2021-01-22 RX ADMIN — INSULIN LISPRO 2 UNITS: 100 INJECTION, SOLUTION INTRAVENOUS; SUBCUTANEOUS at 11:11

## 2021-01-22 RX ADMIN — PANTOPRAZOLE SODIUM 40 MG: 40 TABLET, DELAYED RELEASE ORAL at 08:32

## 2021-01-22 RX ADMIN — INSULIN LISPRO 8 UNITS: 100 INJECTION, SOLUTION INTRAVENOUS; SUBCUTANEOUS at 16:10

## 2021-01-22 NOTE — PROGRESS NOTES
Comprehensive Nutrition Assessment    Type and Reason for Visit: Reassess, RD nutrition re-screen/LOS    Nutrition Recommendations/Plan:   Meals and Snacks:  Continue current diet. Nutrition Supplement Therapy:   Medical food supplement therapy:  Continue Glucerna Shake three times per day (this provides 220 kcal and 10 grams protein per bottle)      Malnutrition Assessment:  Malnutrition Status: Insufficient data    Nutrition Assessment:   Nutrition History: Unable to obtain hx. Nutrition Background: PMH remarkable for coronary artery disease, diabetes mellitus presented to emergency room with altered mental status. Admitted with acute respiratory failure with hypoxia, Covid PNA, ALFRED resolved, DM, GI. Daily Update:  Pt answers phone in room and then phone is disconnected, phone busy at additional attempts. RN reports pt eats well if he is sat up for meals. His intake has improved from <25% at previous RD contact. Nutrition Related Findings:   NFPE deferred due to isolation Wound Type: Stage II(sacral)    Current Nutrition Therapies:  DIET DIABETIC WITH OPTIONS Consistent Carb 1500-1600kcal; Mechanical Soft  DIET NUTRITIONAL SUPPLEMENTS All Meals; Glucerna Shake ( )    Current Intake:   Average Meal Intake: 51-75% Average Supplement Intake: (100% recorded times 3)      Anthropometric Measures:  Height: 5' 6\" (167.6 cm)  Current Body Wt: 77.1 kg (169 lb 15.6 oz)(1/7), Weight source: Stated  BMI: 27.4, Overweight (BMI 25.0-29. 9)  Admission Body Weight: 169 lb 15.6 oz(stated)  Ideal Body Wt: 142 lbs (65 kg), 119.7 %  Usual Body Wt: (no hx available)            Estimated Daily Nutrient Needs:  Energy (kcal/day): 7320-5106 ((20-25), Weight Used: Admission(77.1 kg))  Protein (g/day): 77-93 ( 1-1.2g/kg) Weight Used: (Admission)  Fluid (ml/day):   (1 ml/kcal)    Nutrition Diagnosis:   · Inadequate oral intake related to altered GI function(fatigue, poor appetite) as evidenced by (po intake slowly improving meeting 50-65% needs)    Nutrition Interventions:   Food and/or Nutrient Delivery: Continue current diet, Continue oral nutrition supplement     Coordination of Nutrition Care: Continue to monitor while inpatient  Plan of Care discussed with Victor M King RN. Goals:   Previous Goal Met: Progressing toward goal(s)  Active Goal: Meet >75% estimated needs by nutrition follow-up    Nutrition Monitoring and Evaluation:      Food/Nutrient Intake Outcomes: Food and nutrient intake, Supplement intake  Physical Signs/Symptoms Outcomes: Biochemical data, GI status    Discharge Planning:     Too soon to determine    [place], [address], LDN on 1/22/2021 at 1:47 PM  Contact: [phone]     Disaster Mode active

## 2021-01-22 NOTE — PROGRESS NOTES
Shift Summary  Hourly rounds performed on pt, pt resting in the bed quietly with head the bed elevated and eyes cosed. Patient remained alert and oriented x 2 - 3 on 8 L humidified high flow via NC at the beginning of this shift O2 sats at 100, oxygen decreased to 6 and sats reads 100, then oxygen decreased to 5 L and saturation reads 97 - 100 %. Left  FA IV site remained patent. Patient c/o pain and was medicated with PRN Tylenol with adequate relief noted. No other concern voiced or noted this shift. Patient remained afebrile, VSS, see chart, adequate output and 0 BM noted and charted. See chart.  No acute distress noted.

## 2021-01-22 NOTE — PROGRESS NOTES
ACUTE OT GOALS:  (Developed with and agreed upon by patient and/or caregiver.)  1. Patient will complete lower body bathing and dressing with Mod I and adaptive equipment as needed. 2. Patient will complete toileting with Mod I and adaptive equipment as needed. 3. Patient will complete seated ADL for at least 8 minutes with good static and good dynamic seated balance. 4. Patient will complete functional transfers with Mod I and adaptive equipment as needed. 5. Patient will complete standing ADL with Mod I and good static and good dynamic standing balance.     Timeframe: 7 visits     OCCUPATIONAL THERAPY: Daily Note and PM OT Treatment Day # 4    Cody Jeffers is a 80 y.o. male   PRIMARY DIAGNOSIS: Acute respiratory failure with hypoxia (HCC)  Acute respiratory failure with hypoxia ([facility] [address] [J96.01]       Payor: [employer] MEDICARE / Plan: [address] [place] [address] HMO / Product Type: Managed Care Medicare /   ASSESSMENT:     REHAB RECOMMENDATIONS: CURRENT LEVEL OF FUNCTION:  (Most Recently Demonstrated)   Recommendation to date pending progress:  Setting:   Short-term Rehab   Equipment:    To Be Determined Bathing:   Not tested   Dressing:   Not tested  Feeding/Grooming:   Not tested   Toileting:   Not tested   Functional Mobility:   Minimal Assistance x 2 sit to stand/sidesteps. ASSESSMENT:  Mr. Harsh Crowley was admitted for Acute respiratory failure with hypoxia and C19+. Pt now on 4L O2 at rest with O2 sats at 99%. Pt requires Supervision to complete supine to sit and is observed to begin desaturating. RN notified. Worked on pursed-lip breathing technique to improve O2 sats and increase activity tolerance. Pt unable to improve and requires increase to 15L O2 but still presents with difficulty improving O2 sats and is observed to desaturate as low as 68% while seated edge of bed. Pt requires Min A x 2 to complete sit to stand and take side steps to L side of bed in preparation for return to bed.  Pt requires 15L O2 and additional recovery time to improve O2 sats to 91%. PTA agreed to check back in a few minutes to see if pt could be weaned closer to 4L. Pt would benefit from continued skilled OT to increase activity tolerance for performance of ADLs and functional mobility. SUBJECTIVE:   Mr. Joseph Vazquez states, \"I feel fine. \"    SOCIAL HISTORY/LIVING ENVIRONMENT: Pt lives with spouse in two story home (all needs met on first floor) with 4 YVROSE. Pt is typically Mod I for performance of ADLs and Mod I for functional mobility with use of SPC. Pt reports one fall and no home O2 use.     OBJECTIVE:     PAIN: VITAL SIGNS: LINES/DRAINS:   Pre Treatment: Pain Screen  Pain Scale 1: Numeric (0 - 10)  Pain Intensity 1: 0  Post Treatment: Unchanged Vital Signs  O2 Sat (%): 91 %  O2 Device: Nasal cannula  O2 Flow Rate (L/min): 15 l/min   Pt observed to desaturate to 68%. O2 Device: Nasal Cannula     ACTIVITIES OF DAILY LIVING: I Mod I S SBA CGA Min Mod Max Total NT Comments   BASIC ADLs:              Bathing/ Showering [] [] [] [] [] [] [] [] [] [x]    Toileting [] [] [] [] [] [] [] [] [] [x]    Dressing [] [] [] [] [] [] [] [] [] [x]    Feeding [] [] [] [] [] [] [] [] [] [x]    Grooming [] [] [] [] [] [] [] [] [] [x]    Personal Device Care [] [] [] [] [] [] [] [] [] [x]    Functional Mobility [] [] [] [] [] [x] [] [] [] [] Assist x 2 transfers and sidesteps. I=Independent, Mod I=Modified Independent, S=Supervision, SBA=Standby Assistance, CGA=Contact Guard Assistance,   Min=Minimal Assistance, Mod=Moderate Assistance, Max=Maximal Assistance, Total=Total Assistance, NT=Not Tested    MOBILITY: I Mod I S SBA CGA Min Mod Max Total  NT x2 Comments:   Supine to sit [] [] [x] [] [] [] [] [] [] [] []    Sit to supine [] [] [] [x] [] [] [] [] [] [] []    Sit to stand [] [] [] [] [] [x] [] [] [] [] [x] Without use of DME.    Bed to chair [] [] [] [] [] [] [] [] [] [x] []    I=Independent, Mod I=Modified Independent, S=Supervision, SBA=Standby Assistance, CGA=Contact [employer],   Min=Minimal Assistance, Mod=Moderate Assistance, Max=Maximal Assistance, Total=Total Assistance, NT=Not Tested    PLAN:   FREQUENCY/DURATION: OT Plan of Care: 3 times/week for duration of hospital stay or until stated goals are met, whichever comes first.    TREATMENT:   TREATMENT:   ( $$ Therapeutic Exercises: 23-37 mins   )  Therapeutic Exercise: (25 minutes):  Exercises below to improve mobility, strength and activity tolerance. Required moderate visual and verbal cues to promote proper body mechanics and promote proper body breathing techniques. Progressed repetitions and complexity of movement as indicated. Pt reminded to use pursed-lip breathing technique to improve O2 sats while seated edge of bed. Pt unable to to improve and requires return to supine. Once in supine, continued to work on pursed-lip breathing technique to improve O2 sats. Pt requires additional time and increase to 15L O2 to increase O2 sats to 91% at rest..     Today's treatment session addressed Decreased Strength, Decreased ADL/Functional Activities, Decreased Activity Tolerance, Increased Shortness of Breath and Decreased Flexibility/Joint Mobility to progress towards achieving goal(s) listed above. During this session, Physical Therapy addressed  Bed Mobility and Functional Transfers to progress towards their discipline specific goal(s). Co-treatment was necessary to improve patient's ability to increase activity demands and ability to return to normal functional activity. AFTER TREATMENT POSITION/PRECAUTIONS:  Alarm Activated, Bed, Needs within reach, RN notified and head of bed elevated.     INTERDISCIPLINARY COLLABORATION:  RN/PCT, PT/PTA and OT/PETIT    TOTAL TREATMENT DURATION:  OT Patient Time In/Time Out  Time In: 1315  Time Out: 1340    AMADOR Underwood/KRYSTINA

## 2021-01-22 NOTE — PROGRESS NOTES
Progress Note    Patient: Anu James MRN: [id number]  SSN: [id number]    YOB: 1933  Age: 80 y.o. Sex: male      Admit Date: 1/8/2021    LOS: 14 days     Subjective:     Mr. Pablo Nguyen is a 81 yo  male hx of DM type II, GERD, admitted for COVID and rectal bleeding. GI consulted and diagnosed with external hemorrhoids. Recommends Tuck pads and anusol ME BID. Hg stable trends. S/p convalescent plasma on 1/8/21. Also received remdesivir 1/8-1/12. He is slow O2 wean. He has a Stage 2 small sacral wound, needs Frequent turning.      Discharge plans pending to rehab.      1-22-21 ate ok, still has rectal pain, had BM, no dyspnea or cough, no chest pain or edema, on 5/5 L NC        Current Facility-Administered Medications   Medication Dose Route Frequency    insulin NPH (NOVOLIN N, HUMULIN N) injection 10 Units  10 Units SubCUTAneous Q12H    And    dexAMETHasone (DECADRON) tablet 6 mg  6 mg Oral Q12H    morphine injection 1 mg  1 mg IntraVENous Q3H PRN    guaiFENesin ER (MUCINEX) tablet 600 mg  600 mg Oral Q12H    nystatin (MYCOSTATIN) 100,000 unit/gram powder   Topical BID    wzxkmscqb-Efhxygsl-Srpjr-W.Pet (PREPARATION H MAXIMUM STRENGTH) 0.25-1 % cream   Rectal Q12H    phenylephrine suppository 1 Suppository  1 Suppository Rectal Q12H    pantoprazole (PROTONIX) tablet 40 mg  40 mg Oral ACB&D    witch hazel-glycerin (TUCKS) 12.5-50 % pads 1 Pad  1 Pad PeriANAL PRN    dextrose 40% (GLUTOSE) oral gel 1 Tube  15 g Oral PRN    glucagon (GLUCAGEN) injection 1 mg  1 mg IntraMUSCular PRN    dextrose (D50W) injection syrg 12.5-25 g  25-50 mL IntraVENous PRN    influenza vaccine 2020-21 (6 mos+)(PF) (FLUARIX/FLULAVAL/FLUZONE QUAD) injection 0.5 mL  0.5 mL IntraMUSCular PRIOR TO DISCHARGE    [Held by provider] aspirin delayed-release tablet 81 mg  81 mg Oral QHS    rosuvastatin (CRESTOR) tablet 10 mg  10 mg Oral QHS    sodium chloride (NS) flush 5-40 mL  5-40 mL IntraVENous Q8H    sodium chloride (NS) flush 5-40 mL  5-40 mL IntraVENous PRN    acetaminophen (TYLENOL) tablet 650 mg  650 mg Oral Q6H PRN    Or    acetaminophen (TYLENOL) suppository 650 mg  650 mg Rectal Q6H PRN    ondansetron (ZOFRAN) injection 4 mg  4 mg IntraVENous Q6H PRN    senna (SENOKOT) tablet 8.6 mg  1 Tab Oral DAILY PRN    0.9% sodium chloride infusion 250 mL  250 mL IntraVENous PRN    insulin lispro (HUMALOG) injection   SubCUTAneous AC&HS       Objective:     Vitals:    01/21/21 2329 01/22/21 0338 01/22/21 0824 01/22/21 0830   BP: 125/62 (!) 123/52 (!) 114/52    Pulse: (!) 52 (!) 53 65    Resp: 17 17 26    Temp: 97.6 °F (36.4 °C) 97.8 °F (36.6 °C) 98.7 °F (37.1 °C)    SpO2: 97% 92% 92% 93%   Height:             Intake and Output:  Current Shift: 01/22 0701 - 01/22 1900  In: 487 [P.O.:487]  Out: -   Last three shifts: 01/20 1901 - 01/22 0700  In: 730 [P.O.:730]  Out: 2050 [Urine:2050]    Physical Exam:   General:  Alert, cooperative, no distress, elderly, pleasant   Lungs:   Clear to auscultation bilaterally with good air movement. Heart:  Regular rate and rhythm, S1, S2 normal, no edema    Abdomen:   Soft, non-tender.  Bowel sounds normal.    Neurologic: Grossly intact , pleasant        Lab/Data Review:    Recent Results (from the past 24 hour(s))   GLUCOSE, POC    Collection Time: 01/21/21 10:58 AM   Result Value Ref Range    Glucose (POC) 223 (H) 65 - 100 mg/dL   GLUCOSE, POC    Collection Time: 01/21/21  2:59 PM   Result Value Ref Range    Glucose (POC) 392 (H) 65 - 100 mg/dL   GLUCOSE, POC    Collection Time: 01/21/21  8:21 PM   Result Value Ref Range    Glucose (POC) 288 (H) 65 - 100 mg/dL   CBC WITH AUTOMATED DIFF    Collection Time: 01/22/21  4:41 AM   Result Value Ref Range    WBC 14.3 (H) 4.3 - 11.1 K/uL    RBC 3.57 (L) 4.23 - 5.6 M/uL    HGB 9.9 (L) 13.6 - 17.2 g/dL    HCT 30.6 (L) 41.1 - 50.3 %    MCV 85.7 79.6 - 97.8 FL    MCH 27.7 26.1 - 32.9 PG    MCHC 32.4 31.4 - 35.0 g/dL    RDW 19.2 (H) 11.9 - 14.6 % PLATELET 499 960 - 910 K/uL    MPV 10.6 9.4 - 12.3 FL    ABSOLUTE NRBC 0.00 0.0 - 0.2 K/uL    DF AUTOMATED      NEUTROPHILS 90 (H) 43 - 78 %    LYMPHOCYTES 4 (L) 13 - 44 %    MONOCYTES 5 4.0 - 12.0 %    EOSINOPHILS 0 (L) 0.5 - 7.8 %    BASOPHILS 0 0.0 - 2.0 %    IMMATURE GRANULOCYTES 2 0.0 - 5.0 %    ABS. NEUTROPHILS 12.8 (H) 1.7 - 8.2 K/UL    ABS. LYMPHOCYTES 0.5 0.5 - 4.6 K/UL    ABS. MONOCYTES 0.7 0.1 - 1.3 K/UL    ABS. EOSINOPHILS 0.0 0.0 - 0.8 K/UL    ABS. BASOPHILS 0.0 0.0 - 0.2 K/UL    ABS. IMM. GRANS. 0.3 0.0 - 0.5 K/UL   METABOLIC PANEL, COMPREHENSIVE    Collection Time: 01/22/21  4:41 AM   Result Value Ref Range    Sodium 136 (L) 138 - 145 mmol/L    Potassium 5.0 3.5 - 5.1 mmol/L    Chloride 103 98 - 107 mmol/L    CO2 31 21 - 32 mmol/L    Anion gap 2 (L) 7 - 16 mmol/L    Glucose 98 65 - 100 mg/dL    BUN 30 (H) 8 - 23 MG/DL    Creatinine 0.91 0.8 - 1.5 MG/DL    GFR est AA >60 >60 ml/min/1.73m2    GFR est non-AA >60 >60 ml/min/1.73m2    Calcium 7.9 (L) 8.3 - 10.4 MG/DL    Bilirubin, total 0.4 0.2 - 1.1 MG/DL    ALT (SGPT) 26 12 - 65 U/L    AST (SGOT) 18 15 - 37 U/L    Alk.  phosphatase 81 50 - 136 U/L    Protein, total 5.2 (L) 6.3 - 8.2 g/dL    Albumin 2.0 (L) 3.2 - 4.6 g/dL    Globulin 3.2 2.3 - 3.5 g/dL    A-G Ratio 0.6 (L) 1.2 - 3.5     GLUCOSE, POC    Collection Time: 01/22/21  7:20 AM   Result Value Ref Range    Glucose (POC) 100 65 - 100 mg/dL       Assessment/ Plan:     Principal Problem:    Acute respiratory failure with hypoxia ([address] (1/7/2021)    Active Problems:    COVID-19 (1/7/2021)      Acute metabolic encephalopathy (1/4/4106)      Type 2 diabetes mellitus, with long-term current use of insulin ([address] (1/7/2021)      Gastrointestinal hemorrhage associated with anorectal source (1/14/2021)      Decubitus ulcer of buttock, stage 2 ([address] (1/19/2021)    Acute hypoxic respiratory failure secondary to COVID19 pneumonia:   ·  decadron resumed BID 1-18-21, D5  · S/p plasma and remdesivir  · O2 taper as tolerant, on 5.5 L NC    Rectal bleeding due to hemorrhoids  ·  phenylephrine suppository  · Stable CBC trends   · ASA held    Stage 2 sacral ulcers   ·  Frequent turning    DM type II   ·  continued NPH, SSI     Discharge plans pending O2 taper and needs less than 5 L ambulating     DVT  Prophylaxis - SCDs  Signed By: Og Arthur MD     January 22, 2021

## 2021-01-22 NOTE — PROGRESS NOTES
ACUTE PHYSICAL THERAPY GOALS:  (Developed with and agreed upon by patient and/or caregiver. )  LTGs (assessed and revised as needed 1/19/21):  (1.) Cierra Dominguez  will move from supine to sit and sit to supine , scoot up and down and roll side to side with MODIFIED INDEPENDENCE within 7 treatment day(s). (2.) Cierra Dominguez will transfer from bed to chair and chair to bed with SBA using the least restrictive device within 7 treatment day(s). (3.) [facility] will ambulate with SBA for 100 feet with the least restrictive device within 7 treatment day(s) while maintaining normal vital signs. (4.) Cierra Dominguez will perform standing static and dynamic balance activities x 15 minutes with SBA to improve safety and activity tolerance within 7 treatment day(s). (5.) Cierra Dominguez will ascend and descend 4 stairs using one hand rail(s) with SBA to improve functional mobility and safety within 7 treatment day(s). (6.) Cierra Dominguez will perform bilateral lower extremity exercises x 10 min for HEP with INDEPENDENCE to improve strength, endurance, and functional mobility within 7 treatment day(s). PHYSICAL THERAPY: Daily Note and PM Treatment Day # 4    Cierra Dominguez is a 80 y.o. male   PRIMARY DIAGNOSIS: Acute respiratory failure with hypoxia (HCC)  Acute respiratory failure with hypoxia ([facility] [J96.01]         ASSESSMENT:     REHAB RECOMMENDATIONS: CURRENT LEVEL OF FUNCTION:  (Most Recently Demonstrated)   Recommendation to date pending progress:  Setting:   Short-term Rehab  Equipment:    To Be Determined Bed Mobility:   Supervision  Sit to Stand:   Minimal Assistance x 2  Transfers:   Not tested  Gait/Mobility:   Minimal Assistance x 2     ASSESSMENT:  Mr. Maria Garcia is making slow progress towards PT goals. Activity continues to be severely limited due to a significant drop in O2 sats with exertion. Patient initially on 4L O2 with sats 99% supine. Sats drop to 68% with supine to sit.  O2 turned up to 15L and sats only improved to 76%. RN and respiratory notified. Will continue PT efforts. SUBJECTIVE:   Mr. Marquis People states, Rory Six you call my wife? \"    SOCIAL HISTORY/ LIVING ENVIRONMENT: See initial evaluation     OBJECTIVE:     PAIN: VITAL SIGNS: LINES/DRAINS:   Pre Treatment: Pain Screen  Pain Scale 1: FLACC  Pain Intensity 1: 0  Post Treatment: 0   None  O2 Device: Nasal cannula, Humidifier     MOBILITY: I Mod I S SBA CGA Min Mod Max Total  NT x2 Comments:   Bed Mobility    Rolling [] [] [x] [] [] [] [] [] [] [] []    Supine to Sit [] [] [x] [] [] [] [] [] [] [] []    Scooting [] [] [x] [] [] [] [] [] [] [] []    Sit to Supine [] [] [x] [] [] [] [] [] [] [] []    Transfers    Sit to Stand [] [] [] [] [] [x] [] [] [] [] [x]    Bed to Chair [] [] [] [] [] [] [] [] [] [] []    Stand to Sit [] [] [] [] [] [x] [] [] [] [] [x]    I=Independent, Mod I=Modified Independent, S=Supervision, SBA=Standby Assistance, CGA=Contact Guard Assistance,   Min=Minimal Assistance, Mod=Moderate Assistance, Max=Maximal Assistance, Total=Total Assistance, NT=Not Tested    GAIT: I Mod I S SBA CGA Min Mod Max Total  NT x2 Comments:   Level of Assistance [] [] [] [] [] [x] [] [] [] [] [x]    Distance Side steps towards HOB    DME HHA x 2    Gait Quality     Weightbearing  Status N/A     I=Independent, Mod I=Modified Independent, S=Supervision, SBA=Standby Assistance, CGA=Contact Guard Assistance,   Min=Minimal Assistance, Mod=Moderate Assistance, Max=Maximal Assistance, Total=Total Assistance, NT=Not Tested    PLAN:   FREQUENCY/DURATION: PT Plan of Care: 3 times/week for duration of hospital stay or until stated goals are met, whichever comes first.  TREATMENT:     TREATMENT:   ($$ Therapeutic Activity: 23-37 mins    )  Therapeutic Activity (25 Minutes):  Therapeutic activity included Rolling, Supine to Sit, Sit to Supine, Scooting, Transfer Training, Ambulation on level ground, Standing balance and pursed lipped breathing and incentive spirometer training to improve functional Mobility, Strength, Activity tolerance and Lung function.     AFTER TREATMENT POSITION/PRECAUTIONS:  Alarm Activated, Bed, Needs within reach and RN notified    INTERDISCIPLINARY COLLABORATION:  RN/PCT    TOTAL TREATMENT DURATION:  PT Patient Time In/Time Out  Time In: 7935  Time Out: 1749 [address] [facility] [place]

## 2021-01-23 LAB
GLUCOSE BLD STRIP.AUTO-MCNC: 170 MG/DL (ref 65–100)
GLUCOSE BLD STRIP.AUTO-MCNC: 181 MG/DL (ref 65–100)
GLUCOSE BLD STRIP.AUTO-MCNC: 250 MG/DL (ref 65–100)
GLUCOSE BLD STRIP.AUTO-MCNC: 342 MG/DL (ref 65–100)

## 2021-01-23 PROCEDURE — 74011250637 HC RX REV CODE- 250/637: Performed by: FAMILY MEDICINE

## 2021-01-23 PROCEDURE — 65270000029 HC RM PRIVATE

## 2021-01-23 PROCEDURE — 74011250637 HC RX REV CODE- 250/637: Performed by: INTERNAL MEDICINE

## 2021-01-23 PROCEDURE — 74011636637 HC RX REV CODE- 636/637: Performed by: FAMILY MEDICINE

## 2021-01-23 PROCEDURE — 82962 GLUCOSE BLOOD TEST: CPT

## 2021-01-23 PROCEDURE — 74011250636 HC RX REV CODE- 250/636: Performed by: FAMILY MEDICINE

## 2021-01-23 PROCEDURE — 2709999900 HC NON-CHARGEABLE SUPPLY

## 2021-01-23 PROCEDURE — 74011636637 HC RX REV CODE- 636/637: Performed by: INTERNAL MEDICINE

## 2021-01-23 PROCEDURE — 77030040829 HC CATH EXT URINE MDII -B

## 2021-01-23 PROCEDURE — 74011250637 HC RX REV CODE- 250/637: Performed by: PHYSICIAN ASSISTANT

## 2021-01-23 PROCEDURE — 77030040831 HC BAG URINE DRNG MDII -A

## 2021-01-23 RX ADMIN — PANTOPRAZOLE SODIUM 40 MG: 40 TABLET, DELAYED RELEASE ORAL at 16:59

## 2021-01-23 RX ADMIN — INSULIN LISPRO 2 UNITS: 100 INJECTION, SOLUTION INTRAVENOUS; SUBCUTANEOUS at 16:59

## 2021-01-23 RX ADMIN — GUAIFENESIN 600 MG: 600 TABLET ORAL at 08:30

## 2021-01-23 RX ADMIN — Medication 10 ML: at 05:28

## 2021-01-23 RX ADMIN — PHENYLEPHRINE HYDROCHLORIDE AND FAT, HARD 1 SUPPOSITORY: 5; 1.77 SUPPOSITORY RECTAL at 21:32

## 2021-01-23 RX ADMIN — PANTOPRAZOLE SODIUM 40 MG: 40 TABLET, DELAYED RELEASE ORAL at 05:28

## 2021-01-23 RX ADMIN — MORPHINE SULFATE 1 MG: 2 INJECTION, SOLUTION INTRAMUSCULAR; INTRAVENOUS at 02:49

## 2021-01-23 RX ADMIN — DEXAMETHASONE 6 MG: 4 TABLET ORAL at 08:30

## 2021-01-23 RX ADMIN — HUMAN INSULIN 10 UNITS: 100 INJECTION, SUSPENSION SUBCUTANEOUS at 08:35

## 2021-01-23 RX ADMIN — NYSTATIN: 100000 POWDER TOPICAL at 16:59

## 2021-01-23 RX ADMIN — Medication 10 ML: at 13:28

## 2021-01-23 RX ADMIN — INSULIN LISPRO 6 UNITS: 100 INJECTION, SOLUTION INTRAVENOUS; SUBCUTANEOUS at 11:49

## 2021-01-23 RX ADMIN — NYSTATIN: 100000 POWDER TOPICAL at 08:30

## 2021-01-23 RX ADMIN — INSULIN LISPRO 8 UNITS: 100 INJECTION, SOLUTION INTRAVENOUS; SUBCUTANEOUS at 21:26

## 2021-01-23 RX ADMIN — DEXAMETHASONE 6 MG: 4 TABLET ORAL at 21:24

## 2021-01-23 RX ADMIN — ROSUVASTATIN 10 MG: 10 TABLET, FILM COATED ORAL at 21:33

## 2021-01-23 RX ADMIN — GUAIFENESIN 600 MG: 600 TABLET ORAL at 21:25

## 2021-01-23 RX ADMIN — INSULIN LISPRO 2 UNITS: 100 INJECTION, SOLUTION INTRAVENOUS; SUBCUTANEOUS at 08:00

## 2021-01-23 RX ADMIN — HUMAN INSULIN 10 UNITS: 100 INJECTION, SUSPENSION SUBCUTANEOUS at 21:26

## 2021-01-23 RX ADMIN — PHENYLEPHRINE HYDROCHLORIDE AND FAT, HARD 1 SUPPOSITORY: 5; 1.77 SUPPOSITORY RECTAL at 08:35

## 2021-01-23 NOTE — PROGRESS NOTES
Hourly rounding completed on this shift. No new complaints at this time. All needs met. Pt maintaining on 4L oxygen. Pt is currently resting in bed. Will continue to monitor and give report to oncoming nurse.

## 2021-01-23 NOTE — PROGRESS NOTES
Pt is alert and oriented x3 with intermittent confusion noted. Pt in bed, resting. Bed in low position, wheels locked, call light within reach, instructed to call with any needs. Hourly rounds completed this shift. NAD noted. VSS. Pt has c/o pain, treated per MAR. IV is SL, and dressing is clean, dry, and intact. Pt weaned to [address] NC at rest, however pt continues to require significant increase in O2 with any movement. Report  given to night shift nurse.

## 2021-01-23 NOTE — PROGRESS NOTES
Progress Note    Patient: Yissel Le MRN: [id number]  SSN: [id number]    YOB: 1933  Age: 80 y.o. Sex: male      Admit Date: 1/8/2021    LOS: 15 days     Subjective:     Mr. Giovany Garcia is a 81 yo  male hx of DM type II, GERD, admitted for COVID and rectal bleeding. GI consulted and diagnosed with external hemorrhoids. Recommends Tuck pads and anusol HI BID. Hg stable trends. S/p convalescent plasma on 1/8/21. Also received remdesivir 1/8-1/12. He is slow O2 wean. He has a Stage 2 small sacral wound, needs Frequent turning.      Discharge plans pending to rehab.      1-23-21 eating breakfast, no rectal pain, less short of breath or cough, no edema or chest pain      Current Facility-Administered Medications   Medication Dose Route Frequency    insulin NPH (NOVOLIN N, HUMULIN N) injection 10 Units  10 Units SubCUTAneous Q12H    And    dexAMETHasone (DECADRON) tablet 6 mg  6 mg Oral Q12H    morphine injection 1 mg  1 mg IntraVENous Q3H PRN    guaiFENesin ER (MUCINEX) tablet 600 mg  600 mg Oral Q12H    nystatin (MYCOSTATIN) 100,000 unit/gram powder   Topical BID    teqybrmaq-Hpcdpeji-Gwuum-W.Pet (PREPARATION H MAXIMUM STRENGTH) 0.25-1 % cream   Rectal Q12H    phenylephrine suppository 1 Suppository  1 Suppository Rectal Q12H    pantoprazole (PROTONIX) tablet 40 mg  40 mg Oral ACB&D    witch hazel-glycerin (TUCKS) 12.5-50 % pads 1 Pad  1 Pad PeriANAL PRN    dextrose 40% (GLUTOSE) oral gel 1 Tube  15 g Oral PRN    glucagon (GLUCAGEN) injection 1 mg  1 mg IntraMUSCular PRN    dextrose (D50W) injection syrg 12.5-25 g  25-50 mL IntraVENous PRN    influenza vaccine 2020-21 (6 mos+)(PF) (FLUARIX/FLULAVAL/FLUZONE QUAD) injection 0.5 mL  0.5 mL IntraMUSCular PRIOR TO DISCHARGE    [Held by provider] aspirin delayed-release tablet 81 mg  81 mg Oral QHS    rosuvastatin (CRESTOR) tablet 10 mg  10 mg Oral QHS    sodium chloride (NS) flush 5-40 mL  5-40 mL IntraVENous Q8H    sodium chloride (NS) flush 5-40 mL  5-40 mL IntraVENous PRN    acetaminophen (TYLENOL) tablet 650 mg  650 mg Oral Q6H PRN    Or    acetaminophen (TYLENOL) suppository 650 mg  650 mg Rectal Q6H PRN    ondansetron (ZOFRAN) injection 4 mg  4 mg IntraVENous Q6H PRN    senna (SENOKOT) tablet 8.6 mg  1 Tab Oral DAILY PRN    0.9% sodium chloride infusion 250 mL  250 mL IntraVENous PRN    insulin lispro (HUMALOG) injection   SubCUTAneous AC&HS       Objective:     Vitals:    01/22/21 2026 01/22/21 2326 01/23/21 0334 01/23/21 0753   BP: 121/70 (!) 127/59 (!) 116/57 119/70   Pulse: (!) 59 (!) 53  96   Resp: 19 19 19 18   Temp: 97.9 °F (36.6 °C) 97.7 °F (36.5 °C) 97 °F (36.1 °C) 97.8 °F (36.6 °C)   SpO2: 95% 97% 96% 95%   Height:             Intake and Output:  Current Shift: No intake/output data recorded. Last three shifts: 01/21 1901 - 01/23 0700  In: 957 [P.O.:957]  Out: 1175 [Urine:1175]    Physical Exam:   General:  Alert, cooperative, no distress, elderly   Lungs:   Clear to auscultation bilaterally    Heart:  Regular rate and rhythm, S1, S2 normal, no edema    Abdomen:   Soft, non-tender.  Bowel sounds normal.    Neurologic: Grossly intact , pleasant        Lab/Data Review:    Recent Results (from the past 24 hour(s))   GLUCOSE, POC    Collection Time: 01/22/21 10:54 AM   Result Value Ref Range    Glucose (POC) 171 (H) 65 - 100 mg/dL   GLUCOSE, POC    Collection Time: 01/22/21  4:02 PM   Result Value Ref Range    Glucose (POC) 343 (H) 65 - 100 mg/dL   GLUCOSE, POC    Collection Time: 01/22/21  8:28 PM   Result Value Ref Range    Glucose (POC) 365 (H) 65 - 100 mg/dL   GLUCOSE, POC    Collection Time: 01/23/21  7:03 AM   Result Value Ref Range    Glucose (POC) 170 (H) 65 - 100 mg/dL       Assessment/ Plan:     Principal Problem:    Acute respiratory failure with hypoxia ([facility] [address] (1/7/2021)    Active Problems:    COVID-19 (1/7/2021)      Acute metabolic encephalopathy (8/1/0524)      Type 2 diabetes mellitus, with long-term current use of insulin ([facility] (1/7/2021)      Gastrointestinal hemorrhage associated with anorectal source (1/14/2021)      Decubitus ulcer of buttock, stage 2 ([facility] (1/19/2021)    Acute hypoxic respiratory failure secondary to COVID19 pneumonia:   ·  decadron resumed BID 1-18-21, D6  · S/p plasma and remdesivir  · O2 taper as tolerant, on 4 L NC    Rectal bleeding due to hemorrhoids  ·  phenylephrine suppository  · Stable CBC trends   · ASA held    Stage 2 sacral ulcers   ·  Frequent turning    DM type II   ·  continued NPH, SSI     Discharge plans pending O2 taper and bed placement     DVT  Prophylaxis - SCDs  Signed By: Akbar Clark MD     January 23, 2021

## 2021-01-24 LAB
ANION GAP SERPL CALC-SCNC: 2 MMOL/L (ref 7–16)
ANION GAP SERPL CALC-SCNC: 5 MMOL/L (ref 7–16)
BASOPHILS # BLD: 0 K/UL (ref 0–0.2)
BASOPHILS NFR BLD: 0 % (ref 0–2)
BUN SERPL-MCNC: 34 MG/DL (ref 8–23)
BUN SERPL-MCNC: 39 MG/DL (ref 8–23)
CALCIUM SERPL-MCNC: 8.2 MG/DL (ref 8.3–10.4)
CALCIUM SERPL-MCNC: 8.4 MG/DL (ref 8.3–10.4)
CHLORIDE SERPL-SCNC: 100 MMOL/L (ref 98–107)
CHLORIDE SERPL-SCNC: 99 MMOL/L (ref 98–107)
CO2 SERPL-SCNC: 29 MMOL/L (ref 21–32)
CO2 SERPL-SCNC: 32 MMOL/L (ref 21–32)
CREAT SERPL-MCNC: 1.18 MG/DL (ref 0.8–1.5)
CREAT SERPL-MCNC: 1.33 MG/DL (ref 0.8–1.5)
DIFFERENTIAL METHOD BLD: ABNORMAL
EOSINOPHIL # BLD: 0 K/UL (ref 0–0.8)
EOSINOPHIL NFR BLD: 0 % (ref 0.5–7.8)
ERYTHROCYTE [DISTWIDTH] IN BLOOD BY AUTOMATED COUNT: 19.7 % (ref 11.9–14.6)
GLUCOSE BLD STRIP.AUTO-MCNC: 178 MG/DL (ref 65–100)
GLUCOSE BLD STRIP.AUTO-MCNC: 194 MG/DL (ref 65–100)
GLUCOSE BLD STRIP.AUTO-MCNC: 196 MG/DL (ref 65–100)
GLUCOSE BLD STRIP.AUTO-MCNC: 311 MG/DL (ref 65–100)
GLUCOSE SERPL-MCNC: 174 MG/DL (ref 65–100)
GLUCOSE SERPL-MCNC: 219 MG/DL (ref 65–100)
HCT VFR BLD AUTO: 35.2 % (ref 41.1–50.3)
HGB BLD-MCNC: 11.2 G/DL (ref 13.6–17.2)
IMM GRANULOCYTES # BLD AUTO: 0.2 K/UL (ref 0–0.5)
IMM GRANULOCYTES NFR BLD AUTO: 1 % (ref 0–5)
LYMPHOCYTES # BLD: 0.4 K/UL (ref 0.5–4.6)
LYMPHOCYTES NFR BLD: 2 % (ref 13–44)
MCH RBC QN AUTO: 28.2 PG (ref 26.1–32.9)
MCHC RBC AUTO-ENTMCNC: 31.8 G/DL (ref 31.4–35)
MCV RBC AUTO: 88.7 FL (ref 79.6–97.8)
MONOCYTES # BLD: 0.5 K/UL (ref 0.1–1.3)
MONOCYTES NFR BLD: 3 % (ref 4–12)
NEUTS SEG # BLD: 15.3 K/UL (ref 1.7–8.2)
NEUTS SEG NFR BLD: 93 % (ref 43–78)
NRBC # BLD: 0 K/UL (ref 0–0.2)
PLATELET # BLD AUTO: 264 K/UL (ref 150–450)
PMV BLD AUTO: 10.9 FL (ref 9.4–12.3)
POTASSIUM SERPL-SCNC: 5.3 MMOL/L (ref 3.5–5.1)
POTASSIUM SERPL-SCNC: 5.6 MMOL/L (ref 3.5–5.1)
RBC # BLD AUTO: 3.97 M/UL (ref 4.23–5.6)
SODIUM SERPL-SCNC: 133 MMOL/L (ref 138–145)
SODIUM SERPL-SCNC: 134 MMOL/L (ref 138–145)
WBC # BLD AUTO: 16.5 K/UL (ref 4.3–11.1)

## 2021-01-24 PROCEDURE — 65270000029 HC RM PRIVATE

## 2021-01-24 PROCEDURE — 74011250636 HC RX REV CODE- 250/636: Performed by: INTERNAL MEDICINE

## 2021-01-24 PROCEDURE — 74011636637 HC RX REV CODE- 636/637: Performed by: INTERNAL MEDICINE

## 2021-01-24 PROCEDURE — 74011250637 HC RX REV CODE- 250/637: Performed by: INTERNAL MEDICINE

## 2021-01-24 PROCEDURE — 82962 GLUCOSE BLOOD TEST: CPT

## 2021-01-24 PROCEDURE — 74011250636 HC RX REV CODE- 250/636: Performed by: FAMILY MEDICINE

## 2021-01-24 PROCEDURE — 85025 COMPLETE CBC W/AUTO DIFF WBC: CPT

## 2021-01-24 PROCEDURE — 80048 BASIC METABOLIC PNL TOTAL CA: CPT

## 2021-01-24 PROCEDURE — 74011636637 HC RX REV CODE- 636/637: Performed by: FAMILY MEDICINE

## 2021-01-24 PROCEDURE — 74011250637 HC RX REV CODE- 250/637: Performed by: PHYSICIAN ASSISTANT

## 2021-01-24 PROCEDURE — 74011250637 HC RX REV CODE- 250/637: Performed by: FAMILY MEDICINE

## 2021-01-24 RX ORDER — DEXAMETHASONE 4 MG/1
6 TABLET ORAL EVERY 12 HOURS
Status: DISCONTINUED | OUTPATIENT
Start: 2021-01-24 | End: 2021-01-28

## 2021-01-24 RX ADMIN — DEXAMETHASONE 6 MG: 4 TABLET ORAL at 09:22

## 2021-01-24 RX ADMIN — Medication 10 ML: at 14:30

## 2021-01-24 RX ADMIN — GLYCERIN, PETROLATUM, PHENYLEPHRINE HCL, PRAMOXINE HCL: 144; 2.5; 10; 15 CREAM TOPICAL at 09:00

## 2021-01-24 RX ADMIN — DEXAMETHASONE 6 MG: 4 TABLET ORAL at 21:32

## 2021-01-24 RX ADMIN — GUAIFENESIN 600 MG: 600 TABLET ORAL at 09:22

## 2021-01-24 RX ADMIN — HUMAN INSULIN 10 UNITS: 100 INJECTION, SUSPENSION SUBCUTANEOUS at 09:26

## 2021-01-24 RX ADMIN — INSULIN LISPRO 8 UNITS: 100 INJECTION, SOLUTION INTRAVENOUS; SUBCUTANEOUS at 12:07

## 2021-01-24 RX ADMIN — NYSTATIN: 100000 POWDER TOPICAL at 09:00

## 2021-01-24 RX ADMIN — ROSUVASTATIN 10 MG: 10 TABLET, FILM COATED ORAL at 21:32

## 2021-01-24 RX ADMIN — PANTOPRAZOLE SODIUM 40 MG: 40 TABLET, DELAYED RELEASE ORAL at 05:39

## 2021-01-24 RX ADMIN — PHENYLEPHRINE HYDROCHLORIDE AND FAT, HARD 1 SUPPOSITORY: 5; 1.77 SUPPOSITORY RECTAL at 21:00

## 2021-01-24 RX ADMIN — INSULIN LISPRO 2 UNITS: 100 INJECTION, SOLUTION INTRAVENOUS; SUBCUTANEOUS at 17:49

## 2021-01-24 RX ADMIN — NYSTATIN: 100000 POWDER TOPICAL at 18:00

## 2021-01-24 RX ADMIN — INSULIN LISPRO 2 UNITS: 100 INJECTION, SOLUTION INTRAVENOUS; SUBCUTANEOUS at 09:23

## 2021-01-24 RX ADMIN — GUAIFENESIN 600 MG: 600 TABLET ORAL at 21:32

## 2021-01-24 RX ADMIN — INSULIN HUMAN 12 UNITS: 100 INJECTION, SUSPENSION SUBCUTANEOUS at 21:32

## 2021-01-24 RX ADMIN — PANTOPRAZOLE SODIUM 40 MG: 40 TABLET, DELAYED RELEASE ORAL at 17:49

## 2021-01-24 RX ADMIN — ACETAMINOPHEN 650 MG: 325 TABLET, FILM COATED ORAL at 19:41

## 2021-01-24 RX ADMIN — Medication 10 ML: at 21:32

## 2021-01-24 NOTE — PROGRESS NOTES
01/24/21 0352   Oxygen Therapy   Pulse via Oximetry 53 beats per minute   O2 Device Hi flow nasal cannula   O2 Flow Rate (L/min) 4 l/min     Pt rounded on hourly and PRN. No complaints of pain, nausea,vomiting. Pt turned to relieve pressure on sacrum. Bed is low, locked, call bell within reach. All needs met this shift. Will continue to monitor until next RN comes on.

## 2021-01-24 NOTE — PROGRESS NOTES
END OF SHIFT NOTE:Remains on 15 l high flow N/C sat 92 % States\" I feel some better than this morning. I have been able to get some rest today\" Voices no c/o pain. Hourly rounds completed, all needs met this shift. Will continue to monitor until night shift nurse takes over. INTAKE/OUTPUT  01/23 0701 - 01/24 0700  In: -   Out: 600 [Urine:600]  Voiding: YES  Catheter: YES condom cath  Color: clear  Drain:              DIET  Diabetic    Flatus: Patient does have flatus present. Stool:  0 occurrences. Characteristics:  Stool Assessment  Stool Color: Red  Stool Appearance: Bloody(per pt and night shift)  Stool Amount: Small  Stool Source/Status: Rectum    Ambulating  No    Emesis: 0 occurrences.     Characteristics:          VITAL SIGNS  Patient Vitals for the past 12 hrs:   Temp Pulse Resp BP SpO2   01/24/21 1756 -- -- -- -- 92 %   01/24/21 1543 97.3 °F (36.3 °C) (!) 58 18 119/72 99 %   01/24/21 1443 -- -- -- -- 95 %   01/24/21 1208 -- -- -- -- 93 %   01/24/21 1047 97.6 °F (36.4 °C) 63 18 126/74 99 %   01/24/21 1000 -- -- -- -- 92 %   01/24/21 0813 -- -- -- -- 92 %   01/24/21 0654 97.7 °F (36.5 °C) (!) 54 17 134/70 92 %       Pain Assessment  Pain Intensity 1: 0 (01/24/21 1208)  Pain Location 1: Back, Rectal  Pain Intervention(s) 1: Medication (see MAR)  Patient Stated Pain Goal: 0            Ovidio Rucker, RN

## 2021-01-24 NOTE — PROGRESS NOTES
Disaster charting initiated    Pt resting and stable    Current O2 needs:15l high flow NC 92 %    Pt found with oxygen off and sats 79% replaced oxygen at 4l N/C and unable to get sat above 84% increased to 10 lN/C high flow with sats  Coming up to 85%. Increased to 15 l N/C high flow sats to 92 % . Resp notified and in to check on pt.

## 2021-01-24 NOTE — PROGRESS NOTES
Progress Note    Patient: Garrett Burn MRN: [id number]  SSN: [id number]    YOB: 1933  Age: 80 y.o. Sex: male      Admit Date: 1/8/2021    LOS: 16 days     Subjective:     Mr. Sadie Nam is a 81 yo  male hx of DM type II, GERD, admitted for COVID19, acute hypoxic respiratory failure, and rectal bleeding. GI consulted and diagnosed with external hemorrhoids. Recommends Tuck pads and anusol WA BID. Hg stable trends. S/p convalescent plasma on 1/8/21. Also received remdesivir 1/8-1/12. He is slow O2 wean. He has a Stage 2 small sacral wound, needs Frequent turning.      Discharge plans pending to rehab.      1-24-21 got up without O2 and desatted, took time to improve and now on 15 L NC, feels less short of breath, less cough, ate ok, bottom feels better, no edema or chest pain      Current Facility-Administered Medications   Medication Dose Route Frequency    insulin NPH (NOVOLIN N, HUMULIN N) injection 10 Units  10 Units SubCUTAneous Q12H    And    dexAMETHasone (DECADRON) tablet 6 mg  6 mg Oral Q12H    morphine injection 1 mg  1 mg IntraVENous Q3H PRN    guaiFENesin ER (MUCINEX) tablet 600 mg  600 mg Oral Q12H    nystatin (MYCOSTATIN) 100,000 unit/gram powder   Topical BID    qblxgfnqw-Mfkyfzks-Guuvj-W.Pet (PREPARATION H MAXIMUM STRENGTH) 0.25-1 % cream   Rectal Q12H    phenylephrine suppository 1 Suppository  1 Suppository Rectal Q12H    pantoprazole (PROTONIX) tablet 40 mg  40 mg Oral ACB&D    witch hazel-glycerin (TUCKS) 12.5-50 % pads 1 Pad  1 Pad PeriANAL PRN    dextrose 40% (GLUTOSE) oral gel 1 Tube  15 g Oral PRN    glucagon (GLUCAGEN) injection 1 mg  1 mg IntraMUSCular PRN    dextrose (D50W) injection syrg 12.5-25 g  25-50 mL IntraVENous PRN    influenza vaccine 2020-21 (6 mos+)(PF) (FLUARIX/FLULAVAL/FLUZONE QUAD) injection 0.5 mL  0.5 mL IntraMUSCular PRIOR TO DISCHARGE    [Held by provider] aspirin delayed-release tablet 81 mg  81 mg Oral QHS    rosuvastatin (CRESTOR) tablet 10 mg  10 mg Oral QHS    sodium chloride (NS) flush 5-40 mL  5-40 mL IntraVENous Q8H    sodium chloride (NS) flush 5-40 mL  5-40 mL IntraVENous PRN    acetaminophen (TYLENOL) tablet 650 mg  650 mg Oral Q6H PRN    Or    acetaminophen (TYLENOL) suppository 650 mg  650 mg Rectal Q6H PRN    ondansetron (ZOFRAN) injection 4 mg  4 mg IntraVENous Q6H PRN    senna (SENOKOT) tablet 8.6 mg  1 Tab Oral DAILY PRN    0.9% sodium chloride infusion 250 mL  250 mL IntraVENous PRN    insulin lispro (HUMALOG) injection   SubCUTAneous AC&HS       Objective:     Vitals:    01/24/21 0813 01/24/21 1000 01/24/21 1047 01/24/21 1208   BP:   126/74    Pulse:   63    Resp:   18    Temp:   97.6 °F (36.4 °C)    SpO2: 92% 92% 99% 93%   Height:             Intake and Output:  Current Shift: 01/24 0701 - 01/24 1900  In: 240 [P.O.:240]  Out: -   Last three shifts: 01/22 1901 - 01/24 0700  In: -   Out: 925 [Urine:925]    Physical Exam:   General:  Alert, cooperative, no distress, elderly   Lungs:   Clear to auscultation bilaterally    Heart:  Regular rate and rhythm, S1, S2 normal, no edema    Abdomen:   Soft, non-tender.  Bowel sounds normal.    Neurologic: Grossly intact , pleasant        Lab/Data Review:    Recent Results (from the past 24 hour(s))   GLUCOSE, POC    Collection Time: 01/23/21  4:26 PM   Result Value Ref Range    Glucose (POC) 181 (H) 65 - 100 mg/dL   GLUCOSE, POC    Collection Time: 01/23/21  8:54 PM   Result Value Ref Range    Glucose (POC) 342 (H) 65 - 100 mg/dL   CBC WITH AUTOMATED DIFF    Collection Time: 01/24/21  5:49 AM   Result Value Ref Range    WBC 16.5 (H) 4.3 - 11.1 K/uL    RBC 3.97 (L) 4.23 - 5.6 M/uL    HGB 11.2 (L) 13.6 - 17.2 g/dL    HCT 35.2 (L) 41.1 - 50.3 %    MCV 88.7 79.6 - 97.8 FL    MCH 28.2 26.1 - 32.9 PG    MCHC 31.8 31.4 - 35.0 g/dL    RDW 19.7 (H) 11.9 - 14.6 %    PLATELET 045 178 - 374 K/uL    MPV 10.9 9.4 - 12.3 FL    ABSOLUTE NRBC 0.00 0.0 - 0.2 K/uL    DF AUTOMATED NEUTROPHILS 93 (H) 43 - 78 %    LYMPHOCYTES 2 (L) 13 - 44 %    MONOCYTES 3 (L) 4.0 - 12.0 %    EOSINOPHILS 0 (L) 0.5 - 7.8 %    BASOPHILS 0 0.0 - 2.0 %    IMMATURE GRANULOCYTES 1 0.0 - 5.0 %    ABS. NEUTROPHILS 15.3 (H) 1.7 - 8.2 K/UL    ABS. LYMPHOCYTES 0.4 (L) 0.5 - 4.6 K/UL    ABS. MONOCYTES 0.5 0.1 - 1.3 K/UL    ABS. EOSINOPHILS 0.0 0.0 - 0.8 K/UL    ABS. BASOPHILS 0.0 0.0 - 0.2 K/UL    ABS. IMM.  GRANS. 0.2 0.0 - 0.5 K/UL   METABOLIC PANEL, BASIC    Collection Time: 01/24/21  5:49 AM   Result Value Ref Range    Sodium 134 (L) 138 - 145 mmol/L    Potassium 5.6 (H) 3.5 - 5.1 mmol/L    Chloride 100 98 - 107 mmol/L    CO2 32 21 - 32 mmol/L    Anion gap 2 (L) 7 - 16 mmol/L    Glucose 174 (H) 65 - 100 mg/dL    BUN 34 (H) 8 - 23 MG/DL    Creatinine 1.18 0.8 - 1.5 MG/DL    GFR est AA >60 >60 ml/min/1.73m2    GFR est non-AA >60 >60 ml/min/1.73m2    Calcium 8.2 (L) 8.3 - 10.4 MG/DL   GLUCOSE, POC    Collection Time: 01/24/21  7:08 AM   Result Value Ref Range    Glucose (POC) 196 (H) 65 - 100 mg/dL   GLUCOSE, POC    Collection Time: 01/24/21 10:49 AM   Result Value Ref Range    Glucose (POC) 311 (H) 65 - 100 mg/dL       Assessment/ Plan:     Principal Problem:    Acute respiratory failure with hypoxia (HCC) (1/7/2021)    Active Problems:    COVID-19 (1/7/2021)      Acute metabolic encephalopathy (4/9/9080)      Type 2 diabetes mellitus, with long-term current use of insulin ([address] (1/7/2021)      Gastrointestinal hemorrhage associated with anorectal source (1/14/2021)      Decubitus ulcer of buttock, stage 2 ([address] (1/19/2021)    Acute hypoxic respiratory failure secondary to COVID19 pneumonia:   ·  decadron resumed BID 1-18-21, D7/10  · S/p plasma and remdesivir  · O2 taper as tolerant, on 15 L NC    Rectal bleeding due to hemorrhoids  ·  phenylephrine suppository  · Stable CBC trends   · ASA held    Stage 2 sacral ulcers   ·  Frequent turning    DM type II   · Increase NPH  · continued SSI       Hyperkalemia:  ·  repeat lab    Discharge plans pending O2 taper and bed placement     DVT  Prophylaxis - SCDs  Signed By: Trini Quinones MD     January 24, 2021

## 2021-01-25 ENCOUNTER — APPOINTMENT (OUTPATIENT)
Dept: GENERAL RADIOLOGY | Age: 86
DRG: 177 | End: 2021-01-25
Attending: INTERNAL MEDICINE
Payer: MEDICARE

## 2021-01-25 LAB
ANION GAP SERPL CALC-SCNC: 1 MMOL/L (ref 7–16)
ANION GAP SERPL CALC-SCNC: 3 MMOL/L (ref 7–16)
ATRIAL RATE: 57 BPM
ATRIAL RATE: 67 BPM
BASOPHILS # BLD: 0 K/UL (ref 0–0.2)
BASOPHILS NFR BLD: 0 % (ref 0–2)
BUN SERPL-MCNC: 39 MG/DL (ref 8–23)
BUN SERPL-MCNC: 43 MG/DL (ref 8–23)
CALCIUM SERPL-MCNC: 7.7 MG/DL (ref 8.3–10.4)
CALCIUM SERPL-MCNC: 8 MG/DL (ref 8.3–10.4)
CALCULATED P AXIS, ECG09: 40 DEGREES
CALCULATED P AXIS, ECG09: 43 DEGREES
CALCULATED R AXIS, ECG10: -17 DEGREES
CALCULATED R AXIS, ECG10: -48 DEGREES
CALCULATED T AXIS, ECG11: 41 DEGREES
CALCULATED T AXIS, ECG11: 97 DEGREES
CHLORIDE SERPL-SCNC: 100 MMOL/L (ref 98–107)
CHLORIDE SERPL-SCNC: 99 MMOL/L (ref 98–107)
CO2 SERPL-SCNC: 30 MMOL/L (ref 21–32)
CO2 SERPL-SCNC: 33 MMOL/L (ref 21–32)
CREAT SERPL-MCNC: 1.12 MG/DL (ref 0.8–1.5)
CREAT SERPL-MCNC: 1.28 MG/DL (ref 0.8–1.5)
DIAGNOSIS, 93000: NORMAL
DIAGNOSIS, 93000: NORMAL
DIFFERENTIAL METHOD BLD: ABNORMAL
EOSINOPHIL # BLD: 0 K/UL (ref 0–0.8)
EOSINOPHIL NFR BLD: 0 % (ref 0.5–7.8)
ERYTHROCYTE [DISTWIDTH] IN BLOOD BY AUTOMATED COUNT: 20 % (ref 11.9–14.6)
GLUCOSE BLD STRIP.AUTO-MCNC: 125 MG/DL (ref 65–100)
GLUCOSE BLD STRIP.AUTO-MCNC: 173 MG/DL (ref 65–100)
GLUCOSE BLD STRIP.AUTO-MCNC: 321 MG/DL (ref 65–100)
GLUCOSE BLD STRIP.AUTO-MCNC: 87 MG/DL (ref 65–100)
GLUCOSE SERPL-MCNC: 127 MG/DL (ref 65–100)
GLUCOSE SERPL-MCNC: 211 MG/DL (ref 65–100)
HCT VFR BLD AUTO: 34.8 % (ref 41.1–50.3)
HGB BLD-MCNC: 11 G/DL (ref 13.6–17.2)
IMM GRANULOCYTES # BLD AUTO: 0.2 K/UL (ref 0–0.5)
IMM GRANULOCYTES NFR BLD AUTO: 1 % (ref 0–5)
LYMPHOCYTES # BLD: 0.7 K/UL (ref 0.5–4.6)
LYMPHOCYTES NFR BLD: 5 % (ref 13–44)
MCH RBC QN AUTO: 28.3 PG (ref 26.1–32.9)
MCHC RBC AUTO-ENTMCNC: 31.6 G/DL (ref 31.4–35)
MCV RBC AUTO: 89.5 FL (ref 79.6–97.8)
MONOCYTES # BLD: 0.9 K/UL (ref 0.1–1.3)
MONOCYTES NFR BLD: 6 % (ref 4–12)
NEUTS SEG # BLD: 12.7 K/UL (ref 1.7–8.2)
NEUTS SEG NFR BLD: 88 % (ref 43–78)
NRBC # BLD: 0 K/UL (ref 0–0.2)
P-R INTERVAL, ECG05: 206 MS
P-R INTERVAL, ECG05: 208 MS
PLATELET # BLD AUTO: 270 K/UL (ref 150–450)
PMV BLD AUTO: 10.8 FL (ref 9.4–12.3)
POTASSIUM SERPL-SCNC: 5.4 MMOL/L (ref 3.5–5.1)
POTASSIUM SERPL-SCNC: 5.7 MMOL/L (ref 3.5–5.1)
Q-T INTERVAL, ECG07: 416 MS
Q-T INTERVAL, ECG07: 448 MS
QRS DURATION, ECG06: 104 MS
QRS DURATION, ECG06: 94 MS
QTC CALCULATION (BEZET), ECG08: 436 MS
QTC CALCULATION (BEZET), ECG08: 439 MS
RBC # BLD AUTO: 3.89 M/UL (ref 4.23–5.6)
SODIUM SERPL-SCNC: 132 MMOL/L (ref 136–145)
SODIUM SERPL-SCNC: 134 MMOL/L (ref 136–145)
TROPONIN-HIGH SENSITIVITY: 18.7 PG/ML (ref 0–14)
TROPONIN-HIGH SENSITIVITY: 19.4 PG/ML (ref 0–14)
VENTRICULAR RATE, ECG03: 57 BPM
VENTRICULAR RATE, ECG03: 67 BPM
WBC # BLD AUTO: 14.5 K/UL (ref 4.3–11.1)

## 2021-01-25 PROCEDURE — 74011250636 HC RX REV CODE- 250/636: Performed by: INTERNAL MEDICINE

## 2021-01-25 PROCEDURE — 74011636637 HC RX REV CODE- 636/637: Performed by: INTERNAL MEDICINE

## 2021-01-25 PROCEDURE — 74011250637 HC RX REV CODE- 250/637: Performed by: INTERNAL MEDICINE

## 2021-01-25 PROCEDURE — 77030040829 HC CATH EXT URINE MDII -B

## 2021-01-25 PROCEDURE — 80048 BASIC METABOLIC PNL TOTAL CA: CPT

## 2021-01-25 PROCEDURE — 74011250636 HC RX REV CODE- 250/636: Performed by: FAMILY MEDICINE

## 2021-01-25 PROCEDURE — 74011250637 HC RX REV CODE- 250/637: Performed by: FAMILY MEDICINE

## 2021-01-25 PROCEDURE — 82962 GLUCOSE BLOOD TEST: CPT

## 2021-01-25 PROCEDURE — 71045 X-RAY EXAM CHEST 1 VIEW: CPT

## 2021-01-25 PROCEDURE — 36415 COLL VENOUS BLD VENIPUNCTURE: CPT

## 2021-01-25 PROCEDURE — 74011250637 HC RX REV CODE- 250/637: Performed by: PHYSICIAN ASSISTANT

## 2021-01-25 PROCEDURE — 65660000000 HC RM CCU STEPDOWN

## 2021-01-25 PROCEDURE — 2709999900 HC NON-CHARGEABLE SUPPLY

## 2021-01-25 PROCEDURE — 85025 COMPLETE CBC W/AUTO DIFF WBC: CPT

## 2021-01-25 PROCEDURE — 77030040831 HC BAG URINE DRNG MDII -A

## 2021-01-25 PROCEDURE — 97530 THERAPEUTIC ACTIVITIES: CPT

## 2021-01-25 PROCEDURE — 84484 ASSAY OF TROPONIN QUANT: CPT

## 2021-01-25 PROCEDURE — 93005 ELECTROCARDIOGRAM TRACING: CPT | Performed by: INTERNAL MEDICINE

## 2021-01-25 PROCEDURE — 77030040393 HC DRSG OPTIFOAM GENT MDII -B

## 2021-01-25 RX ADMIN — MORPHINE SULFATE 1 MG: 2 INJECTION, SOLUTION INTRAMUSCULAR; INTRAVENOUS at 08:08

## 2021-01-25 RX ADMIN — Medication 10 ML: at 05:13

## 2021-01-25 RX ADMIN — INSULIN HUMAN 12 UNITS: 100 INJECTION, SUSPENSION SUBCUTANEOUS at 08:08

## 2021-01-25 RX ADMIN — GUAIFENESIN 600 MG: 600 TABLET ORAL at 08:07

## 2021-01-25 RX ADMIN — MORPHINE SULFATE 1 MG: 2 INJECTION, SOLUTION INTRAMUSCULAR; INTRAVENOUS at 01:24

## 2021-01-25 RX ADMIN — SODIUM ZIRCONIUM CYCLOSILICATE 10 G: 10 POWDER, FOR SUSPENSION ORAL at 11:36

## 2021-01-25 RX ADMIN — GLYCERIN, PETROLATUM, PHENYLEPHRINE HCL, PRAMOXINE HCL: 144; 2.5; 10; 15 CREAM TOPICAL at 22:38

## 2021-01-25 RX ADMIN — GUAIFENESIN 600 MG: 600 TABLET ORAL at 22:22

## 2021-01-25 RX ADMIN — INSULIN LISPRO 2 UNITS: 100 INJECTION, SOLUTION INTRAVENOUS; SUBCUTANEOUS at 11:25

## 2021-01-25 RX ADMIN — PHENYLEPHRINE HYDROCHLORIDE AND FAT, HARD 1 SUPPOSITORY: 5; 1.77 SUPPOSITORY RECTAL at 08:08

## 2021-01-25 RX ADMIN — ROSUVASTATIN 10 MG: 10 TABLET, FILM COATED ORAL at 22:22

## 2021-01-25 RX ADMIN — DEXAMETHASONE 6 MG: 4 TABLET ORAL at 22:22

## 2021-01-25 RX ADMIN — PANTOPRAZOLE SODIUM 40 MG: 40 TABLET, DELAYED RELEASE ORAL at 15:51

## 2021-01-25 RX ADMIN — Medication 10 ML: at 13:06

## 2021-01-25 RX ADMIN — PANTOPRAZOLE SODIUM 40 MG: 40 TABLET, DELAYED RELEASE ORAL at 06:00

## 2021-01-25 RX ADMIN — DEXAMETHASONE 6 MG: 4 TABLET ORAL at 08:07

## 2021-01-25 RX ADMIN — INSULIN LISPRO 8 UNITS: 100 INJECTION, SOLUTION INTRAVENOUS; SUBCUTANEOUS at 22:26

## 2021-01-25 RX ADMIN — NYSTATIN: 100000 POWDER TOPICAL at 08:09

## 2021-01-25 RX ADMIN — Medication 10 ML: at 22:23

## 2021-01-25 RX ADMIN — INSULIN HUMAN 12 UNITS: 100 INJECTION, SUSPENSION SUBCUTANEOUS at 22:36

## 2021-01-25 RX ADMIN — PHENYLEPHRINE HYDROCHLORIDE AND FAT, HARD 1 SUPPOSITORY: 5; 1.77 SUPPOSITORY RECTAL at 22:46

## 2021-01-25 RX ADMIN — MORPHINE SULFATE 1 MG: 2 INJECTION, SOLUTION INTRAMUSCULAR; INTRAVENOUS at 11:26

## 2021-01-25 RX ADMIN — NYSTATIN: 100000 POWDER TOPICAL at 17:10

## 2021-01-25 NOTE — PROGRESS NOTES
ACUTE PHYSICAL THERAPY GOALS:  (Developed with and agreed upon by patient and/or caregiver. )  LTGs (assessed and revised as needed 1/19/21):  (1.) Kam Hernandez  will move from supine to sit and sit to supine , scoot up and down and roll side to side with MODIFIED INDEPENDENCE within 7 treatment day(s). (2.) Kam Hernandez will transfer from bed to chair and chair to bed with SBA using the least restrictive device within 7 treatment day(s). (3.) Arminda Flores will ambulate with SBA for 100 feet with the least restrictive device within 7 treatment day(s) while maintaining normal vital signs. (4.) Kam Hernandez will perform standing static and dynamic balance activities x 15 minutes with SBA to improve safety and activity tolerance within 7 treatment day(s). (5.) Kam Hernandez will ascend and descend 4 stairs using one hand rail(s) with SBA to improve functional mobility and safety within 7 treatment day(s). (6.) Kam Hernandez will perform bilateral lower extremity exercises x 10 min for HEP with INDEPENDENCE to improve strength, endurance, and functional mobility within 7 treatment day(s). PHYSICAL THERAPY: Daily Note and AM Treatment Day # 5    Kam Hernandez is a 80 y.o. male   PRIMARY DIAGNOSIS: Acute respiratory failure with hypoxia (HCC)  Acute respiratory failure with hypoxia ([address] [J96.01]         ASSESSMENT:     REHAB RECOMMENDATIONS: CURRENT LEVEL OF FUNCTION:  (Most Recently Demonstrated)   Recommendation to date pending progress:  Setting:   Short-term Rehab  Equipment:    To Be Determined Bed Mobility:   Supervision  Sit to Stand:   Not tested  Transfers:   Not tested  Gait/Mobility:   Not tested     ASSESSMENT:  Mr. Joseph Vazquez is making slow progress towards PT goals. Patient continues to have difficulty tolerating any treatment without dizziness and drop in O2 sats. /66 on EOB. Sats drop to mid 70's EOB on 12L and take additional time to improve. Will continue PT efforts. SUBJECTIVE:   Mr. Evin Greenberg states, \"I can't do this. I need to lay down? \"    SOCIAL HISTORY/ LIVING ENVIRONMENT: See initial evaluation     OBJECTIVE:     PAIN: VITAL SIGNS: LINES/DRAINS:   Pre Treatment: Pain Screen  Pain Scale 1: FLACC  Pain Intensity 1: 0  Post Treatment: 0   None  O2 Device: Hi flow nasal cannula     MOBILITY: I Mod I S SBA CGA Min Mod Max Total  NT x2 Comments:   Bed Mobility    Rolling [] [] [x] [] [] [] [] [] [] [] []    Supine to Sit [] [] [x] [] [] [] [] [] [] [] []    Scooting [] [] [x] [] [] [] [] [] [] [] []    Sit to Supine [] [] [x] [] [] [] [] [] [] [] []    Transfers    Sit to Stand [] [] [] [] [] [] [] [] [] [x] []    Bed to Chair [] [] [] [] [] [] [] [] [] [x] []    Stand to Sit [] [] [] [] [] [] [] [] [] [x] []    I=Independent, Mod I=Modified Independent, S=Supervision, SBA=Standby Assistance, CGA=Contact Guard Assistance,   Min=Minimal Assistance, Mod=Moderate Assistance, Max=Maximal Assistance, Total=Total Assistance, NT=Not Tested    GAIT: I Mod I S SBA CGA Min Mod Max Total  NT x2 Comments:   Level of Assistance [] [] [] [] [] [] [] [] [] [] []    Distance     DME None    Gait Quality     Weightbearing  Status N/A     I=Independent, Mod I=Modified Independent, S=Supervision, SBA=Standby Assistance, CGA=Contact Guard Assistance,   Min=Minimal Assistance, Mod=Moderate Assistance, Max=Maximal Assistance, Total=Total Assistance, NT=Not Tested    PLAN:   FREQUENCY/DURATION: PT Plan of Care: 3 times/week for duration of hospital stay or until stated goals are met, whichever comes first.  TREATMENT:     TREATMENT:   ($$ Therapeutic Activity: 23-37 mins    )  Therapeutic Activity (24 Minutes): Therapeutic activity included Rolling, Supine to Sit, Sit to Supine, Scooting and pursed lipped breathing and incentive spirometer training as well as exercises to improve functional Mobility, Strength, Activity tolerance and Lung function.     AFTER TREATMENT POSITION/PRECAUTIONS:  Alarm Activated, Bed, Needs within reach and RN notified    INTERDISCIPLINARY COLLABORATION:  RN/PCT    TOTAL TREATMENT DURATION:  PT Patient Time In/Time Out  Time In: 1002  Time Out: [address], PTA

## 2021-01-25 NOTE — PROGRESS NOTES
Pt is alert and oriented to person/place only. Pt in bed, resting. Bed in low position, wheels locked, call light within reach, instructed to call with any needs. Hourly rounds completed this shift. NAD noted. VSS. Pt has c/o pain, treated per MAR. IV is SL, and dressing is clean, dry, and intact. Pt weaned from 15L to 12L HFNC, continues to desat significantly with activity/movement. Pt turned Q2 hours throughout shift. Report to be given to night shift nurse.

## 2021-01-25 NOTE — PROGRESS NOTES
Chart reviewed by CM for discharge planning. Patient has been recommended for STR. Per chart review, patient on 15L hi-flow. CM followed with Marin Lewis with [facility] this day, regarding SNF referrals. Per Marin Lewis, there are beds available at [facility], as [facility] are standard units. Marin Lewis also stated, they can accept the patient at 5 liters or lower. CM was receptive and will inform attending MD. [facility] contacted patient's spouse Kane Rosales [phone], to provide updates. Per Kane Rosales, she would like CM to identify bed availability at [facility] in [facility] [place], before considering bed offer. CM contacted [facility] to identify this information. Per Kate with Admissions, they can assess the patient for admission, 21 days after COVID dx date. Kate confirmed SNF accepts COVID patients after the patient has recovered. However, admissions can vary, depending on oxygen needs and symptoms. CM was receptive and will place referral at the appropriate time. CM continues to monitor plan of care.

## 2021-01-25 NOTE — PROGRESS NOTES
Hourly rounds done. Pt c/o pain, medicated per MAR. Remains on 15 L hi-zuleima. Denies nausea, vomiting. Disaster charting implemented. All needs met at this time.

## 2021-01-25 NOTE — PROGRESS NOTES
Progress Note    Patient: Anu James MRN: [id number]  SSN: [id number]    YOB: 1933  Age: 80 y.o. Sex: male      Admit Date: 1/8/2021    LOS: 17 days     Subjective:     Mr. Pablo Nguyen is a 79 yo  male hx of DM type II, GERD, admitted for COVID19, acute hypoxic respiratory failure, and rectal bleeding. GI consulted and diagnosed with external hemorrhoids. Recommends Tuck pads and anusol DE BID. Hg stable trends. S/p convalescent plasma on 1/8/21. Also received remdesivir 1/8-1/12. He is slow O2 wean. He has a Stage 2 small sacral wound, needs Frequent turning. On 1-24-21, he got up without O2 and desatted, took time to improve and required up to 15 L NC.   Discharge plans pending to rehab.    1-25-21 on 12 L, frustrated today, didn't eat much today, on phone with his wife, ready for rehab, has been more confused per RN after received morphine     I updated his wife on phone wile in room     Current Facility-Administered Medications   Medication Dose Route Frequency    insulin NPH (NOVOLIN N, HUMULIN N) injection 12 Units  12 Units SubCUTAneous Q12H    And    dexAMETHasone (DECADRON) tablet 6 mg  6 mg Oral Q12H    morphine injection 1 mg  1 mg IntraVENous Q3H PRN    guaiFENesin ER (MUCINEX) tablet 600 mg  600 mg Oral Q12H    nystatin (MYCOSTATIN) 100,000 unit/gram powder   Topical BID    vxcbdqodc-Sluhkwmy-Iynbm-W.Pet (PREPARATION H MAXIMUM STRENGTH) 0.25-1 % cream   Rectal Q12H    phenylephrine suppository 1 Suppository  1 Suppository Rectal Q12H    pantoprazole (PROTONIX) tablet 40 mg  40 mg Oral ACB&D    witch hazel-glycerin (TUCKS) 12.5-50 % pads 1 Pad  1 Pad PeriANAL PRN    dextrose 40% (GLUTOSE) oral gel 1 Tube  15 g Oral PRN    glucagon (GLUCAGEN) injection 1 mg  1 mg IntraMUSCular PRN    dextrose (D50W) injection syrg 12.5-25 g  25-50 mL IntraVENous PRN    influenza vaccine 2020-21 (6 mos+)(PF) (FLUARIX/FLULAVAL/FLUZONE QUAD) injection 0.5 mL  0.5 mL IntraMUSCular PRIOR TO DISCHARGE    [Held by provider] aspirin delayed-release tablet 81 mg  81 mg Oral QHS    rosuvastatin (CRESTOR) tablet 10 mg  10 mg Oral QHS    sodium chloride (NS) flush 5-40 mL  5-40 mL IntraVENous Q8H    sodium chloride (NS) flush 5-40 mL  5-40 mL IntraVENous PRN    acetaminophen (TYLENOL) tablet 650 mg  650 mg Oral Q6H PRN    Or    acetaminophen (TYLENOL) suppository 650 mg  650 mg Rectal Q6H PRN    ondansetron (ZOFRAN) injection 4 mg  4 mg IntraVENous Q6H PRN    senna (SENOKOT) tablet 8.6 mg  1 Tab Oral DAILY PRN    0.9% sodium chloride infusion 250 mL  250 mL IntraVENous PRN    insulin lispro (HUMALOG) injection   SubCUTAneous AC&HS       Objective:     Vitals:    01/25/21 0820 01/25/21 0947 01/25/21 1139 01/25/21 1153   BP:   (!) 118/53    Pulse:   65 65   Resp:   26    Temp:   97.5 °F (36.4 °C)    SpO2: 90% 95% 95%    Height:             Intake and Output:  Current Shift: 01/25 0701 - 01/25 1900  In: 237 [P.O.:237]  Out: -   Last three shifts: 01/23 1901 - 01/25 0700  In: 240 [P.O.:240]  Out: 1175 [Urine:1175]    Physical Exam:   General:  Alert, agitated, no distress, elderly   Lungs:   Clear to auscultation bilaterally anterior    Heart:  Regular rate and rhythm, S1, S2 normal, no edema    Abdomen:   Soft, non-tender.  Bowel sounds normal.    Neurologic: Grossly intact , agitated        Lab/Data Review:    Recent Results (from the past 24 hour(s))   METABOLIC PANEL, BASIC    Collection Time: 01/24/21  2:38 PM   Result Value Ref Range    Sodium 133 (L) 138 - 145 mmol/L    Potassium 5.3 (H) 3.5 - 5.1 mmol/L    Chloride 99 98 - 107 mmol/L    CO2 29 21 - 32 mmol/L    Anion gap 5 (L) 7 - 16 mmol/L    Glucose 219 (H) 65 - 100 mg/dL    BUN 39 (H) 8 - 23 MG/DL    Creatinine 1.33 0.8 - 1.5 MG/DL    GFR est AA >60 >60 ml/min/1.73m2    GFR est non-AA 54 (L) >60 ml/min/1.73m2    Calcium 8.4 8.3 - 10.4 MG/DL   GLUCOSE, POC    Collection Time: 01/24/21  3:45 PM   Result Value Ref Range    Glucose (POC) 194 (H) 65 - 100 mg/dL   GLUCOSE, POC    Collection Time: 01/24/21  8:45 PM   Result Value Ref Range    Glucose (POC) 178 (H) 65 - 100 mg/dL   CBC WITH AUTOMATED DIFF    Collection Time: 01/25/21  7:34 AM   Result Value Ref Range    WBC 14.5 (H) 4.3 - 11.1 K/uL    RBC 3.89 (L) 4.23 - 5.6 M/uL    HGB 11.0 (L) 13.6 - 17.2 g/dL    HCT 34.8 (L) 41.1 - 50.3 %    MCV 89.5 79.6 - 97.8 FL    MCH 28.3 26.1 - 32.9 PG    MCHC 31.6 31.4 - 35.0 g/dL    RDW 20.0 (H) 11.9 - 14.6 %    PLATELET 923 137 - 650 K/uL    MPV 10.8 9.4 - 12.3 FL    ABSOLUTE NRBC 0.00 0.0 - 0.2 K/uL    DF AUTOMATED      NEUTROPHILS 88 (H) 43 - 78 %    LYMPHOCYTES 5 (L) 13 - 44 %    MONOCYTES 6 4.0 - 12.0 %    EOSINOPHILS 0 (L) 0.5 - 7.8 %    BASOPHILS 0 0.0 - 2.0 %    IMMATURE GRANULOCYTES 1 0.0 - 5.0 %    ABS. NEUTROPHILS 12.7 (H) 1.7 - 8.2 K/UL    ABS. LYMPHOCYTES 0.7 0.5 - 4.6 K/UL    ABS. MONOCYTES 0.9 0.1 - 1.3 K/UL    ABS. EOSINOPHILS 0.0 0.0 - 0.8 K/UL    ABS. BASOPHILS 0.0 0.0 - 0.2 K/UL    ABS. IMM.  GRANS. 0.2 0.0 - 0.5 K/UL   METABOLIC PANEL, BASIC    Collection Time: 01/25/21  7:34 AM   Result Value Ref Range    Sodium 134 (L) 136 - 145 mmol/L    Potassium 5.7 (H) 3.5 - 5.1 mmol/L    Chloride 100 98 - 107 mmol/L    CO2 33 (H) 21 - 32 mmol/L    Anion gap 1 (L) 7 - 16 mmol/L    Glucose 127 (H) 65 - 100 mg/dL    BUN 39 (H) 8 - 23 MG/DL    Creatinine 1.12 0.8 - 1.5 MG/DL    GFR est AA >60 >60 ml/min/1.73m2    GFR est non-AA >60 >60 ml/min/1.73m2    Calcium 8.0 (L) 8.3 - 10.4 MG/DL   GLUCOSE, POC    Collection Time: 01/25/21  7:57 AM   Result Value Ref Range    Glucose (POC) 125 (H) 65 - 100 mg/dL   EKG, 12 LEAD, INITIAL    Collection Time: 01/25/21  9:21 AM   Result Value Ref Range    Ventricular Rate 67 BPM    Atrial Rate 67 BPM    P-R Interval 206 ms    QRS Duration 104 ms    Q-T Interval 416 ms    QTC Calculation (Bezet) 439 ms    Calculated P Axis 40 degrees    Calculated R Axis -48 degrees    Calculated T Axis 97 degrees    Diagnosis Normal sinus rhythm  Left axis deviation  Left ventricular hypertrophy  ST & T wave abnormality, consider lateral ischemia  Abnormal ECG  When compared with ECG of 07-JAN-2021 15:45,  Criteria for Septal infarct are no longer Present  ST elevation now present in Inferior leads  Nonspecific T wave abnormality no longer evident in Inferior leads  Confirmed by ARELY THOMPSON (), Nadia Diazlewis ([id number]) on 1/25/2021 9:48:49 AM     GLUCOSE, POC    Collection Time: 01/25/21 10:52 AM   Result Value Ref Range    Glucose (POC) 173 (H) 65 - 100 mg/dL       Assessment/ Plan:     Principal Problem:    Acute respiratory failure with hypoxia ([address] (1/7/2021)    Active Problems:    COVID-19 (1/7/2021)      Acute metabolic encephalopathy (4/6/0687)      Type 2 diabetes mellitus, with long-term current use of insulin ([address] (1/7/2021)      Gastrointestinal hemorrhage associated with anorectal source (1/14/2021)      Decubitus ulcer of buttock, stage 2 ([address] (1/19/2021)    Acute hypoxic respiratory failure secondary to COVID19 pneumonia:   ·  decadron resumed BID 1-18-21, D8/10  · S/p plasma and remdesivir  · O2 taper as tolerant, on 12 L NC  · CXR today    Rectal bleeding due to hemorrhoids  ·  phenylephrine suppository  · Stable CBC trends   · ASA held    Stage 2 sacral ulcers   ·  Frequent turning    DM type II   · Continued  NPH, wean after stopping steroids  · continued SSI       Hyperkalemia:  ·  repeat lab at 6 pm  · lokelma once   · EKG -reviewed and abnormal , repeat STAT with troponin, followup on and remote tele     Discharge plans pending O2 taper and bed placement     DVT  Prophylaxis - SCDs  Signed By: Carol Kaufman MD     January 25, 2021

## 2021-01-26 LAB
ANION GAP SERPL CALC-SCNC: 2 MMOL/L (ref 7–16)
ANION GAP SERPL CALC-SCNC: 3 MMOL/L (ref 7–16)
BASOPHILS # BLD: 0 K/UL (ref 0–0.2)
BASOPHILS NFR BLD: 0 % (ref 0–2)
BUN SERPL-MCNC: 36 MG/DL (ref 8–23)
BUN SERPL-MCNC: 37 MG/DL (ref 8–23)
CALCIUM SERPL-MCNC: 7.7 MG/DL (ref 8.3–10.4)
CALCIUM SERPL-MCNC: 8.3 MG/DL (ref 8.3–10.4)
CHLORIDE SERPL-SCNC: 101 MMOL/L (ref 98–107)
CHLORIDE SERPL-SCNC: 99 MMOL/L (ref 98–107)
CO2 SERPL-SCNC: 30 MMOL/L (ref 21–32)
CO2 SERPL-SCNC: 32 MMOL/L (ref 21–32)
CREAT SERPL-MCNC: 1.03 MG/DL (ref 0.8–1.5)
CREAT SERPL-MCNC: 1.08 MG/DL (ref 0.8–1.5)
DIFFERENTIAL METHOD BLD: ABNORMAL
EOSINOPHIL # BLD: 0 K/UL (ref 0–0.8)
EOSINOPHIL NFR BLD: 0 % (ref 0.5–7.8)
ERYTHROCYTE [DISTWIDTH] IN BLOOD BY AUTOMATED COUNT: 19.9 % (ref 11.9–14.6)
GLUCOSE BLD STRIP.AUTO-MCNC: 129 MG/DL (ref 65–100)
GLUCOSE BLD STRIP.AUTO-MCNC: 149 MG/DL (ref 65–100)
GLUCOSE BLD STRIP.AUTO-MCNC: 247 MG/DL (ref 65–100)
GLUCOSE BLD STRIP.AUTO-MCNC: 316 MG/DL (ref 65–100)
GLUCOSE SERPL-MCNC: 130 MG/DL (ref 65–100)
GLUCOSE SERPL-MCNC: 255 MG/DL (ref 65–100)
HCT VFR BLD AUTO: 35.3 % (ref 41.1–50.3)
HGB BLD-MCNC: 11.1 G/DL (ref 13.6–17.2)
IMM GRANULOCYTES # BLD AUTO: 0.2 K/UL (ref 0–0.5)
IMM GRANULOCYTES NFR BLD AUTO: 2 % (ref 0–5)
LYMPHOCYTES # BLD: 0.5 K/UL (ref 0.5–4.6)
LYMPHOCYTES NFR BLD: 4 % (ref 13–44)
MCH RBC QN AUTO: 28.2 PG (ref 26.1–32.9)
MCHC RBC AUTO-ENTMCNC: 31.4 G/DL (ref 31.4–35)
MCV RBC AUTO: 89.8 FL (ref 79.6–97.8)
MONOCYTES # BLD: 0.4 K/UL (ref 0.1–1.3)
MONOCYTES NFR BLD: 3 % (ref 4–12)
NEUTS SEG # BLD: 11.6 K/UL (ref 1.7–8.2)
NEUTS SEG NFR BLD: 92 % (ref 43–78)
NRBC # BLD: 0 K/UL (ref 0–0.2)
PLATELET # BLD AUTO: 267 K/UL (ref 150–450)
PMV BLD AUTO: 11.1 FL (ref 9.4–12.3)
POTASSIUM SERPL-SCNC: 4.5 MMOL/L (ref 3.5–5.1)
POTASSIUM SERPL-SCNC: 5.6 MMOL/L (ref 3.5–5.1)
RBC # BLD AUTO: 3.93 M/UL (ref 4.23–5.6)
SODIUM SERPL-SCNC: 133 MMOL/L (ref 136–145)
SODIUM SERPL-SCNC: 134 MMOL/L (ref 138–145)
TROPONIN-HIGH SENSITIVITY: 16.3 PG/ML (ref 0–14)
WBC # BLD AUTO: 12.6 K/UL (ref 4.3–11.1)

## 2021-01-26 PROCEDURE — 97530 THERAPEUTIC ACTIVITIES: CPT

## 2021-01-26 PROCEDURE — 74011250637 HC RX REV CODE- 250/637: Performed by: FAMILY MEDICINE

## 2021-01-26 PROCEDURE — 97535 SELF CARE MNGMENT TRAINING: CPT

## 2021-01-26 PROCEDURE — 80048 BASIC METABOLIC PNL TOTAL CA: CPT

## 2021-01-26 PROCEDURE — 74011250637 HC RX REV CODE- 250/637: Performed by: INTERNAL MEDICINE

## 2021-01-26 PROCEDURE — 82962 GLUCOSE BLOOD TEST: CPT

## 2021-01-26 PROCEDURE — 77030040831 HC BAG URINE DRNG MDII -A

## 2021-01-26 PROCEDURE — 36415 COLL VENOUS BLD VENIPUNCTURE: CPT

## 2021-01-26 PROCEDURE — 74011636637 HC RX REV CODE- 636/637: Performed by: INTERNAL MEDICINE

## 2021-01-26 PROCEDURE — 74011250636 HC RX REV CODE- 250/636: Performed by: FAMILY MEDICINE

## 2021-01-26 PROCEDURE — 94760 N-INVAS EAR/PLS OXIMETRY 1: CPT

## 2021-01-26 PROCEDURE — 97112 NEUROMUSCULAR REEDUCATION: CPT

## 2021-01-26 PROCEDURE — 74011250637 HC RX REV CODE- 250/637: Performed by: PHYSICIAN ASSISTANT

## 2021-01-26 PROCEDURE — 2709999900 HC NON-CHARGEABLE SUPPLY

## 2021-01-26 PROCEDURE — 74011250636 HC RX REV CODE- 250/636: Performed by: INTERNAL MEDICINE

## 2021-01-26 PROCEDURE — 85025 COMPLETE CBC W/AUTO DIFF WBC: CPT

## 2021-01-26 PROCEDURE — 84484 ASSAY OF TROPONIN QUANT: CPT

## 2021-01-26 PROCEDURE — 65660000000 HC RM CCU STEPDOWN

## 2021-01-26 RX ORDER — SODIUM CHLORIDE 9 MG/ML
75 INJECTION, SOLUTION INTRAVENOUS CONTINUOUS
Status: DISCONTINUED | OUTPATIENT
Start: 2021-01-26 | End: 2021-01-27

## 2021-01-26 RX ADMIN — Medication 10 ML: at 13:04

## 2021-01-26 RX ADMIN — INSULIN LISPRO 8 UNITS: 100 INJECTION, SOLUTION INTRAVENOUS; SUBCUTANEOUS at 16:04

## 2021-01-26 RX ADMIN — Medication 10 ML: at 21:47

## 2021-01-26 RX ADMIN — MORPHINE SULFATE 1 MG: 2 INJECTION, SOLUTION INTRAMUSCULAR; INTRAVENOUS at 22:57

## 2021-01-26 RX ADMIN — NYSTATIN: 100000 POWDER TOPICAL at 08:03

## 2021-01-26 RX ADMIN — DEXAMETHASONE 6 MG: 4 TABLET ORAL at 21:41

## 2021-01-26 RX ADMIN — DEXAMETHASONE 6 MG: 4 TABLET ORAL at 08:01

## 2021-01-26 RX ADMIN — Medication 10 ML: at 06:21

## 2021-01-26 RX ADMIN — PANTOPRAZOLE SODIUM 40 MG: 40 TABLET, DELAYED RELEASE ORAL at 07:14

## 2021-01-26 RX ADMIN — SODIUM ZIRCONIUM CYCLOSILICATE 10 G: 10 POWDER, FOR SUSPENSION ORAL at 10:10

## 2021-01-26 RX ADMIN — PHENYLEPHRINE HYDROCHLORIDE AND FAT, HARD 1 SUPPOSITORY: 5; 1.77 SUPPOSITORY RECTAL at 21:42

## 2021-01-26 RX ADMIN — ROSUVASTATIN 10 MG: 10 TABLET, FILM COATED ORAL at 21:41

## 2021-01-26 RX ADMIN — PHENYLEPHRINE HYDROCHLORIDE AND FAT, HARD 1 SUPPOSITORY: 5; 1.77 SUPPOSITORY RECTAL at 08:04

## 2021-01-26 RX ADMIN — INSULIN LISPRO 4 UNITS: 100 INJECTION, SOLUTION INTRAVENOUS; SUBCUTANEOUS at 11:15

## 2021-01-26 RX ADMIN — INSULIN HUMAN 12 UNITS: 100 INJECTION, SUSPENSION SUBCUTANEOUS at 08:02

## 2021-01-26 RX ADMIN — GUAIFENESIN 600 MG: 600 TABLET ORAL at 08:00

## 2021-01-26 RX ADMIN — NYSTATIN: 100000 POWDER TOPICAL at 17:07

## 2021-01-26 RX ADMIN — PANTOPRAZOLE SODIUM 40 MG: 40 TABLET, DELAYED RELEASE ORAL at 16:04

## 2021-01-26 RX ADMIN — INSULIN HUMAN 12 UNITS: 100 INJECTION, SUSPENSION SUBCUTANEOUS at 21:41

## 2021-01-26 RX ADMIN — SODIUM CHLORIDE 75 ML/HR: 900 INJECTION, SOLUTION INTRAVENOUS at 10:10

## 2021-01-26 RX ADMIN — GUAIFENESIN 600 MG: 600 TABLET ORAL at 21:41

## 2021-01-26 NOTE — PROGRESS NOTES
Pt is alert but confused, oriented to person/place only. Pt in bed, resting. Bed in low position, wheels locked, call light within reach, instructed to call with any needs. Hourly rounds completed this shift. NAD noted. VSS. Pt has c/o pain, treated per MAR. IV is infusing, and dressing is clean, dry, and intact. Pt weaned from 12L to 8L NC with O2 sats in mid 90's. Pt turned Q2 hours. Pt's wife and niece updated this shift. Report to be given to night shift nurse.

## 2021-01-26 NOTE — PROGRESS NOTES
ACUTE PHYSICAL THERAPY GOALS:  (Developed with and agreed upon by patient and/or caregiver. )  LTGs (assessed and revised as needed 1/19/21):  (1.) Tonya Paula  will move from supine to sit and sit to supine , scoot up and down and roll side to side with MODIFIED INDEPENDENCE within 7 treatment day(s). (2.) Tonya Paula will transfer from bed to chair and chair to bed with SBA using the least restrictive device within 7 treatment day(s). (3.) [facility] will ambulate with SBA for 100 feet with the least restrictive device within 7 treatment day(s) while maintaining normal vital signs. (4.) Tonya Paula will perform standing static and dynamic balance activities x 15 minutes with SBA to improve safety and activity tolerance within 7 treatment day(s). (5.) Tonya Paula will ascend and descend 4 stairs using one hand rail(s) with SBA to improve functional mobility and safety within 7 treatment day(s). (6.) Tonya Paula will perform bilateral lower extremity exercises x 10 min for HEP with INDEPENDENCE to improve strength, endurance, and functional mobility within 7 treatment day(s). PHYSICAL THERAPY: Daily Note and AM Treatment Day # 6    Tonya Paula is a 80 y.o. male   PRIMARY DIAGNOSIS: Acute respiratory failure with hypoxia (HCC)  Acute respiratory failure with hypoxia ([facility] [address] [J96.01]         ASSESSMENT:     REHAB RECOMMENDATIONS: CURRENT LEVEL OF FUNCTION:  (Most Recently Demonstrated)   Recommendation to date pending progress:  Setting:   Short-term Rehab  Equipment:    To Be Determined Bed Mobility:   Minimal Assistance  Sit to Stand:   Minimal Assistance x 2  Transfers:   Not tested  Gait/Mobility:   Not tested     ASSESSMENT:  Mr. Ellen Choi is making slow progress towards PT goals. Patient continues to have difficuty tolerating any activity without dizziness (BP WNL) and drop in O2 sats.  Patient also needed increased assistance today for supine to sit (min assist compared to supervision in previous treatments). O2 sats drop to 68% EOB but seem to rebound quicker compared to previous treatments. Will continue PT efforts.      SUBJECTIVE:   Mr. Kiana De La Torre states, \"I need to rest\"    SOCIAL HISTORY/ LIVING ENVIRONMENT: See initial evaluation     OBJECTIVE:     PAIN: VITAL SIGNS: LINES/DRAINS:   Pre Treatment: Pain Screen  Pain Scale 1: Numeric (0 - 10)  Pain Intensity 1: 0  Post Treatment: 0   None  O2 Device: Hi flow nasal cannula, Humidifier     MOBILITY: I Mod I S SBA CGA Min Mod Max Total  NT x2 Comments:   Bed Mobility    Rolling [] [] [] [x] [] [] [] [] [] [] []    Supine to Sit [] [] [] [] [] [x] [] [] [] [] []    Scooting [] [] [] [] [] [] [] [] [] [] []    Sit to Supine [] [] [] [x] [] [] [] [] [] [] []    Transfers    Sit to Stand [] [] [] [] [] [x] [] [] [] [] [x]    Bed to Chair [] [] [] [] [] [] [] [] [] [] []    Stand to Sit [] [] [] [] [] [x] [] [] [] [] [x]    I=Independent, Mod I=Modified Independent, S=Supervision, SBA=Standby Assistance, CGA=Contact Guard Assistance,   Min=Minimal Assistance, Mod=Moderate Assistance, Max=Maximal Assistance, Total=Total Assistance, NT=Not Tested    GAIT: I Mod I S SBA CGA Min Mod Max Total  NT x2 Comments:   Level of Assistance [] [] [] [] [] [] [] [] [] [] []    Distance     DME None    Gait Quality     Weightbearing  Status N/A     I=Independent, Mod I=Modified Independent, S=Supervision, SBA=Standby Assistance, CGA=Contact Guard Assistance,   Min=Minimal Assistance, Mod=Moderate Assistance, Max=Maximal Assistance, Total=Total Assistance, NT=Not Tested    PLAN:   FREQUENCY/DURATION: PT Plan of Care: 3 times/week for duration of hospital stay or until stated goals are met, whichever comes first.  TREATMENT:     TREATMENT:   ($$ Therapeutic Activity: 38-52 mins    )  Co-Treatment PT/OT necessary due to patient's decreased overall endurance/tolerance levels, as well as need for high level skilled assistance to complete functional transfers/mobility and functional tasks  Therapeutic Activity (38 Minutes): Therapeutic activity included Rolling, Supine to Sit, Sit to Supine, Scooting, Transfer Training, Sitting balance , Standing balance and pursed lipped breathing and incentive spirometer training as well as exercises to improve functional Mobility, Strength, Activity tolerance and Lung function.     AFTER TREATMENT POSITION/PRECAUTIONS:  Alarm Activated, Bed, Needs within reach and RN notified    INTERDISCIPLINARY COLLABORATION:  RN/PCT    TOTAL TREATMENT DURATION:  PT Patient Time In/Time Out  Time In: 1114  Time Out: 500 [address]

## 2021-01-26 NOTE — PROGRESS NOTES
Progress Note    Patient: Samara April MRN: [id number]  SSN: [id number]    YOB: 1933  Age: 80 y.o. Sex: male      Admit Date: 1/8/2021    LOS: 18 days     Subjective:     Mr. Evin Greenberg is a 79 yo  male hx of DM type II, GERD, admitted for COVID19, acute hypoxic respiratory failure, and rectal bleeding. GI consulted and diagnosed with external hemorrhoids. Recommends Tuck pads and anusol AL BID. Hg stable trends. S/p convalescent plasma on 1/8/21. Also received remdesivir 1/8-1/12. He is slow O2 wean. He has a Stage 2 small sacral wound, needs Frequent turning. On 1-24-21, he got up without O2 and desatted, took time to improve and required up to 15 L NC. He has had mild hyperkalemia, receiving lokelma.      Discharge plans pending to rehab.    1-26-21 on 12 L, sleeping, no dyspnea, no cough, ate ok, no BM change, no chest pain or edema     Current Facility-Administered Medications   Medication Dose Route Frequency    0.9% sodium chloride infusion  75 mL/hr IntraVENous CONTINUOUS    insulin NPH (NOVOLIN N, HUMULIN N) injection 12 Units  12 Units SubCUTAneous Q12H    And    dexAMETHasone (DECADRON) tablet 6 mg  6 mg Oral Q12H    morphine injection 1 mg  1 mg IntraVENous Q3H PRN    guaiFENesin ER (MUCINEX) tablet 600 mg  600 mg Oral Q12H    nystatin (MYCOSTATIN) 100,000 unit/gram powder   Topical BID    ovikvqhnk-Txshnmce-Tsofx-W.Pet (PREPARATION H MAXIMUM STRENGTH) 0.25-1 % cream   Rectal Q12H    phenylephrine suppository 1 Suppository  1 Suppository Rectal Q12H    pantoprazole (PROTONIX) tablet 40 mg  40 mg Oral ACB&D    witch hazel-glycerin (TUCKS) 12.5-50 % pads 1 Pad  1 Pad PeriANAL PRN    dextrose 40% (GLUTOSE) oral gel 1 Tube  15 g Oral PRN    glucagon (GLUCAGEN) injection 1 mg  1 mg IntraMUSCular PRN    dextrose (D50W) injection syrg 12.5-25 g  25-50 mL IntraVENous PRN    influenza vaccine 2020-21 (6 mos+)(PF) (FLUARIX/FLULAVAL/FLUZONE QUAD) injection 0.5 mL  0.5 mL IntraMUSCular PRIOR TO DISCHARGE    [Held by provider] aspirin delayed-release tablet 81 mg  81 mg Oral QHS    rosuvastatin (CRESTOR) tablet 10 mg  10 mg Oral QHS    sodium chloride (NS) flush 5-40 mL  5-40 mL IntraVENous Q8H    sodium chloride (NS) flush 5-40 mL  5-40 mL IntraVENous PRN    acetaminophen (TYLENOL) tablet 650 mg  650 mg Oral Q6H PRN    Or    acetaminophen (TYLENOL) suppository 650 mg  650 mg Rectal Q6H PRN    ondansetron (ZOFRAN) injection 4 mg  4 mg IntraVENous Q6H PRN    senna (SENOKOT) tablet 8.6 mg  1 Tab Oral DAILY PRN    0.9% sodium chloride infusion 250 mL  250 mL IntraVENous PRN    insulin lispro (HUMALOG) injection   SubCUTAneous AC&HS       Objective:     Vitals:    01/26/21 1103 01/26/21 1114 01/26/21 1200 01/26/21 1345   BP: 120/61      Pulse: (!) 59  (!) 59 60   Resp: 20      Temp: 98.1 °F (36.7 °C)      SpO2: 99% 92%  97%   Height:             Intake and Output:  Current Shift: 01/26 0701 - 01/26 1900  In: 360 [P.O.:360]  Out: -   Last three shifts: 01/24 1901 - 01/26 0700  In: 474 [P.O.:474]  Out: 560 [Urine:560]    Physical Exam:   General:  Alert, agitated, no distress, elderly   Lungs:   Clear to auscultation bilaterally anterior    Heart:  Regular rate and rhythm, S1, S2 normal, no edema    Abdomen:   Soft, non-tender.  Bowel sounds normal.    Neurologic: Grossly intact , pleasant        Lab/Data Review:    Recent Results (from the past 24 hour(s))   GLUCOSE, POC    Collection Time: 01/25/21  3:50 PM   Result Value Ref Range    Glucose (POC) 87 65 - 450 mg/dL   METABOLIC PANEL, BASIC    Collection Time: 01/25/21  6:20 PM   Result Value Ref Range    Sodium 132 (L) 136 - 145 mmol/L    Potassium 5.4 (H) 3.5 - 5.1 mmol/L    Chloride 99 98 - 107 mmol/L    CO2 30 21 - 32 mmol/L    Anion gap 3 (L) 7 - 16 mmol/L    Glucose 211 (H) 65 - 100 mg/dL    BUN 43 (H) 8 - 23 MG/DL    Creatinine 1.28 0.8 - 1.5 MG/DL    GFR est AA >60 >60 ml/min/1.73m2    GFR est non-AA 57 (L) >60 ml/min/1.73m2    Calcium 7.7 (L) 8.3 - 10.4 MG/DL   GLUCOSE, POC    Collection Time: 01/25/21  8:54 PM   Result Value Ref Range    Glucose (POC) 321 (H) 65 - 100 mg/dL   TROPONIN-HIGH SENSITIVITY    Collection Time: 01/25/21 10:05 PM   Result Value Ref Range    Troponin-High Sensitivity 19.4 (H) 0 - 14 pg/mL   GLUCOSE, POC    Collection Time: 01/26/21  6:55 AM   Result Value Ref Range    Glucose (POC) 129 (H) 65 - 100 mg/dL   CBC WITH AUTOMATED DIFF    Collection Time: 01/26/21  6:58 AM   Result Value Ref Range    WBC 12.6 (H) 4.3 - 11.1 K/uL    RBC 3.93 (L) 4.23 - 5.6 M/uL    HGB 11.1 (L) 13.6 - 17.2 g/dL    HCT 35.3 (L) 41.1 - 50.3 %    MCV 89.8 79.6 - 97.8 FL    MCH 28.2 26.1 - 32.9 PG    MCHC 31.4 31.4 - 35.0 g/dL    RDW 19.9 (H) 11.9 - 14.6 %    PLATELET 415 232 - 079 K/uL    MPV 11.1 9.4 - 12.3 FL    ABSOLUTE NRBC 0.00 0.0 - 0.2 K/uL    DF AUTOMATED      NEUTROPHILS 92 (H) 43 - 78 %    LYMPHOCYTES 4 (L) 13 - 44 %    MONOCYTES 3 (L) 4.0 - 12.0 %    EOSINOPHILS 0 (L) 0.5 - 7.8 %    BASOPHILS 0 0.0 - 2.0 %    IMMATURE GRANULOCYTES 2 0.0 - 5.0 %    ABS. NEUTROPHILS 11.6 (H) 1.7 - 8.2 K/UL    ABS. LYMPHOCYTES 0.5 0.5 - 4.6 K/UL    ABS. MONOCYTES 0.4 0.1 - 1.3 K/UL    ABS. EOSINOPHILS 0.0 0.0 - 0.8 K/UL    ABS. BASOPHILS 0.0 0.0 - 0.2 K/UL    ABS. IMM.  GRANS. 0.2 0.0 - 0.5 K/UL   METABOLIC PANEL, BASIC    Collection Time: 01/26/21  6:58 AM   Result Value Ref Range    Sodium 134 (L) 138 - 145 mmol/L    Potassium 5.6 (H) 3.5 - 5.1 mmol/L    Chloride 99 98 - 107 mmol/L    CO2 32 21 - 32 mmol/L    Anion gap 3 (L) 7 - 16 mmol/L    Glucose 130 (H) 65 - 100 mg/dL    BUN 37 (H) 8 - 23 MG/DL    Creatinine 1.03 0.8 - 1.5 MG/DL    GFR est AA >60 >60 ml/min/1.73m2    GFR est non-AA >60 >60 ml/min/1.73m2    Calcium 8.3 8.3 - 10.4 MG/DL   TROPONIN-HIGH SENSITIVITY    Collection Time: 01/26/21  6:58 AM   Result Value Ref Range    Troponin-High Sensitivity 16.3 (H) 0 - 14 pg/mL   GLUCOSE, POC    Collection Time: 01/26/21 11:06 AM Result Value Ref Range    Glucose (POC) 247 (H) 65 - 100 mg/dL       Assessment/ Plan:     Principal Problem:    Acute respiratory failure with hypoxia ([address] (1/7/2021)    Active Problems:    COVID-19 (1/7/2021)      Acute metabolic encephalopathy (9/1/9820)      Type 2 diabetes mellitus, with long-term current use of insulin ([address] (1/7/2021)      Gastrointestinal hemorrhage associated with anorectal source (1/14/2021)      Decubitus ulcer of buttock, stage 2 ([address] (1/19/2021)    Acute hypoxic respiratory failure secondary to COVID19 pneumonia:   ·  decadron resumed BID 1-18-21, D9/10  · S/p plasma and remdesivir  · O2 taper as tolerant, on 12 L NC  · Consider CTA if fails to wean    Rectal bleeding due to hemorrhoids  ·  phenylephrine suppository  · Stable CBC trends   · ASA held    Stage 2 sacral ulcers   ·  Frequent turning    DM type II   · Continued  NPH, wean after stopping steroids  · continued SSI       Hyperkalemia:  ·  repeat lab   · lokelma once today  · Gentle IVF  · EKG 1-25 -reviewed and abnormal , repeat STAT with troponin negative, remote tele       Bradycardia:  · followup tele  · No complaints    Discharge plans pending O2 taper and bed placement     DVT  Prophylaxis - SCDs  Signed By: Brock Ramos MD     January 26, 2021

## 2021-01-26 NOTE — PROGRESS NOTES
JONAS contacted Harrison Parks with [facility], to discuss bed availability. Per Harrison Parks, there are beds available at [facility]. However, Harrison Parks informed CM, [facility] will attentively accept the patient for [facility]. However CM has been advised to speak with SNF liaison Harrison Parks, when the patient is medically stable for discharge,  to determine facility. JONAS was receptive. JONAS notified patient's niece Milad Johnson of update.

## 2021-01-26 NOTE — PROGRESS NOTES
Pt. AAO X2. Denies pain. No signs of distress or discomfort. Bed in the lowest position with alarm in place. Phone and call light within reach. Pt. Advise to use call light for assistance. Isolation and safety precaution in place. Pt. Wife was call for pt. To talk to .

## 2021-01-26 NOTE — PROGRESS NOTES
ACUTE OT GOALS:  (Developed with and agreed upon by patient and/or caregiver.)  1. Patient will complete lower body bathing and dressing with Mod I and adaptive equipment as needed. 2. Patient will complete toileting with Mod I and adaptive equipment as needed. 3. Patient will complete seated ADL for at least 8 minutes with good static and good dynamic seated balance. 4. Patient will complete functional transfers with Mod I and adaptive equipment as needed. 5. Patient will complete standing ADL with Mod I and good static and good dynamic standing balance.     Timeframe: 7 visits     OCCUPATIONAL THERAPY: Daily Note and PM OT Treatment Day # 5    Iraida Wren is a 80 y.o. male   PRIMARY DIAGNOSIS: Acute respiratory failure with hypoxia (HCC)  Acute respiratory failure with hypoxia ([facility] [address] [J96.01]       Payor: [employer] MEDICARE / Plan: [address] [place] [address] HMO / Product Type: Managed Care Medicare /   ASSESSMENT:     REHAB RECOMMENDATIONS: CURRENT LEVEL OF FUNCTION:  (Most Recently Demonstrated)   Recommendation to date pending progress:  Setting:   Short-term Rehab   Equipment:    To Be Determined Bathing:   Set Up in supine for upper body bathing. Dressing:   Set Up in supine for gown management. Feeding/Grooming:   Set Up in supine to wash face. Toileting:   Moderate Assistance for brief management. Functional Mobility:   Minimal Assistance x 2 sit to stand. ASSESSMENT:  Mr. Niki Chisholm was admitted for Acute respiratory failure with hypoxia and C19+. Pt now on 12L O2 at rest with O2 sats at 92%. Pt requires Supervision to complete supine to sit and is observed to begin desaturating. Worked on pursed-lip breathing technique to improve O2 sats and increase activity tolerance. Pt unable to improve and requires increase to 15L O2 but still presents with difficulty improving O2 sats and is observed to desaturate as low as 68% while seated edge of bed.  Pt requires Min A x 2 to complete sit to  preparation for linen change. Pt reports feeling slightly lightheaded. Bp assessed in seated and 119/52. Pt requires CGA to complete sit to supine. Once in supine, head of bed elevated and Bp taken and 131/61. O2 sats 92% at rest. Pt would benefit from continued skilled OT to increase activity tolerance for performance of ADLs and functional mobility. SUBJECTIVE:   Mr. Larissa Zarate states, \"I feel better. \"    SOCIAL HISTORY/LIVING ENVIRONMENT: Pt lives with spouse in two story home (all needs met on first floor) with 4 YVROSE. Pt is typically Mod I for performance of ADLs and Mod I for functional mobility with use of SPC. Pt reports one fall and no home O2 use.     OBJECTIVE:     PAIN: VITAL SIGNS: LINES/DRAINS:   Pre Treatment: Pain Screen  Pain Scale 1: Numeric (0 - 10)  Pain Intensity 1: 0  Post Treatment: Unchanged Vital Signs  O2 Sat (%): 92 %  O2 Device: Nasal cannula;Humidifier  O2 Flow Rate (L/min): 12 l/min   Pt observed to desaturate to 68%. Pt O2 increased to 15L O2 secondary to desaturation. O2 decreased to 12L O2 and pt able to maintain O2 sats at 92% following return to supine. O2 Device: Nasal Cannula     ACTIVITIES OF DAILY LIVING: I Mod I S SBA CGA Min Mod Max Total NT Comments   BASIC ADLs:              Bathing/ Showering [] [] [] [] [] [] [] [] [] [x]    Toileting [] [] [] [] [] [] [] [] [] [x]    Dressing [] [] [] [] [] [] [] [] [] [x]    Feeding [] [] [] [] [] [] [] [] [] [x]    Grooming [] [] [] [] [] [] [] [] [] [x]    Personal Device Care [] [] [] [] [] [] [] [] [] [x]    Functional Mobility [] [] [] [] [] [x] [] [] [] [] Assist x 2 sit to stand transfer.    I=Independent, Mod I=Modified Independent, S=Supervision, SBA=Standby Assistance, CGA=Contact Guard Assistance,   Min=Minimal Assistance, Mod=Moderate Assistance, Max=Maximal Assistance, Total=Total Assistance, NT=Not Tested    MOBILITY: I Mod I S SBA CGA Min Mod Max Total  NT x2 Comments:   Supine to sit [] [] [x] [] [] [] [] [] [] [] []    Sit to supine [] [] [] [] [x] [] [] [] [] [] []    Sit to stand [] [] [] [] [] [x] [] [] [] [] [x] Without use of DME. Bed to chair [] [] [] [] [] [] [] [] [] [x] []    I=Independent, Mod I=Modified Independent, S=Supervision, SBA=Standby Assistance, CGA=Contact Guard Assistance,   Min=Minimal Assistance, Mod=Moderate Assistance, Max=Maximal Assistance, Total=Total Assistance, NT=Not Tested    PLAN:   FREQUENCY/DURATION: OT Plan of Care: 3 times/week for duration of hospital stay or until stated goals are met, whichever comes first.    TREATMENT:   TREATMENT:   Self Care: (23 minutes): Procedure(s) utilized to improve and/or restore self-care/home management as related to dressing, bathing, toileting and grooming. Required minimal visual, verbal, manual and tactile cueing to facilitate activities of daily living skills and compensatory activities. Once in supine, pt noted to have saturated diaper brief. Pt requires Mod A for brief management. Once brief doffed, pt reports increased need to urinate and requires Set Up with use of urinal to urinate. Pt requires Set Up to complete genital wiping following completion of urination. Pt gown also noted to be saturated. Pt requires Set Up for gown management and to complete upper body bathing. Pt requires Set Up to wash face in supine. Neuromuscular Re-education: (15 minutes):  Exercise/activities below to improve balance, coordination and posture. Required minimal visual, verbal, manual and tactile cues to promote static and dynamic balance in sitting and standing. Pt with fair static and dynamic seated balance. Provided with minimal cueing to improve. Upon standing, pt with fair (-) standing balance. Provided with minimal cueing to stand upright and bring pelvis forward to improve.     Today's treatment session addressed Decreased Strength, Decreased ADL/Functional Activities, Decreased Balance, Decreased Activity Tolerance, Increased Shortness of Breath and Decreased Flexibility/Joint Mobility to progress towards achieving goal(s) listed above. During this session, Physical Therapy addressed  Bed Mobility and Functional Transfers to progress towards their discipline specific goal(s). Co-treatment was necessary to improve patient's ability to increase activity demands and ability to return to normal functional activity. AFTER TREATMENT POSITION/PRECAUTIONS:  Alarm Activated, Bed, Needs within reach, RN notified and head of bed elevated.     INTERDISCIPLINARY COLLABORATION:  RN/PCT, PT/PTA and OT/PETIT    TOTAL TREATMENT DURATION:  OT Patient Time In/Time Out  Time In: 1114  Time Out: 1152    AMADOR Underwood/KRYSTINA

## 2021-01-27 LAB
ANION GAP SERPL CALC-SCNC: 3 MMOL/L (ref 7–16)
BASOPHILS # BLD: 0 K/UL (ref 0–0.2)
BASOPHILS NFR BLD: 0 % (ref 0–2)
BUN SERPL-MCNC: 29 MG/DL (ref 8–23)
CALCIUM SERPL-MCNC: 7.8 MG/DL (ref 8.3–10.4)
CHLORIDE SERPL-SCNC: 103 MMOL/L (ref 98–107)
CO2 SERPL-SCNC: 30 MMOL/L (ref 21–32)
CREAT SERPL-MCNC: 0.94 MG/DL (ref 0.8–1.5)
DIFFERENTIAL METHOD BLD: ABNORMAL
EOSINOPHIL # BLD: 0 K/UL (ref 0–0.8)
EOSINOPHIL NFR BLD: 0 % (ref 0.5–7.8)
ERYTHROCYTE [DISTWIDTH] IN BLOOD BY AUTOMATED COUNT: 19.9 % (ref 11.9–14.6)
GLUCOSE BLD STRIP.AUTO-MCNC: 163 MG/DL (ref 65–100)
GLUCOSE BLD STRIP.AUTO-MCNC: 245 MG/DL (ref 65–100)
GLUCOSE BLD STRIP.AUTO-MCNC: 363 MG/DL (ref 65–100)
GLUCOSE BLD STRIP.AUTO-MCNC: 98 MG/DL (ref 65–100)
GLUCOSE SERPL-MCNC: 164 MG/DL (ref 65–100)
HCT VFR BLD AUTO: 35.6 % (ref 41.1–50.3)
HGB BLD-MCNC: 11 G/DL (ref 13.6–17.2)
IMM GRANULOCYTES # BLD AUTO: 0.2 K/UL (ref 0–0.5)
IMM GRANULOCYTES NFR BLD AUTO: 1 % (ref 0–5)
LYMPHOCYTES # BLD: 0.3 K/UL (ref 0.5–4.6)
LYMPHOCYTES NFR BLD: 2 % (ref 13–44)
MCH RBC QN AUTO: 27.8 PG (ref 26.1–32.9)
MCHC RBC AUTO-ENTMCNC: 30.9 G/DL (ref 31.4–35)
MCV RBC AUTO: 90.1 FL (ref 79.6–97.8)
MONOCYTES # BLD: 0.2 K/UL (ref 0.1–1.3)
MONOCYTES NFR BLD: 2 % (ref 4–12)
NEUTS SEG # BLD: 12.4 K/UL (ref 1.7–8.2)
NEUTS SEG NFR BLD: 95 % (ref 43–78)
NRBC # BLD: 0 K/UL (ref 0–0.2)
PLATELET # BLD AUTO: 248 K/UL (ref 150–450)
PMV BLD AUTO: 11.6 FL (ref 9.4–12.3)
POTASSIUM SERPL-SCNC: 5.2 MMOL/L (ref 3.5–5.1)
RBC # BLD AUTO: 3.95 M/UL (ref 4.23–5.6)
SODIUM SERPL-SCNC: 136 MMOL/L (ref 136–145)
WBC # BLD AUTO: 13.1 K/UL (ref 4.3–11.1)

## 2021-01-27 PROCEDURE — 94760 N-INVAS EAR/PLS OXIMETRY 1: CPT

## 2021-01-27 PROCEDURE — 74011250637 HC RX REV CODE- 250/637: Performed by: FAMILY MEDICINE

## 2021-01-27 PROCEDURE — 82962 GLUCOSE BLOOD TEST: CPT

## 2021-01-27 PROCEDURE — 65660000000 HC RM CCU STEPDOWN

## 2021-01-27 PROCEDURE — 77010033678 HC OXYGEN DAILY

## 2021-01-27 PROCEDURE — 74011636637 HC RX REV CODE- 636/637: Performed by: INTERNAL MEDICINE

## 2021-01-27 PROCEDURE — 2709999900 HC NON-CHARGEABLE SUPPLY

## 2021-01-27 PROCEDURE — 74011250637 HC RX REV CODE- 250/637: Performed by: INTERNAL MEDICINE

## 2021-01-27 PROCEDURE — 85025 COMPLETE CBC W/AUTO DIFF WBC: CPT

## 2021-01-27 PROCEDURE — 97530 THERAPEUTIC ACTIVITIES: CPT

## 2021-01-27 PROCEDURE — 80048 BASIC METABOLIC PNL TOTAL CA: CPT

## 2021-01-27 PROCEDURE — 74011250636 HC RX REV CODE- 250/636: Performed by: INTERNAL MEDICINE

## 2021-01-27 PROCEDURE — 74011250636 HC RX REV CODE- 250/636: Performed by: FAMILY MEDICINE

## 2021-01-27 RX ORDER — SODIUM CHLORIDE 9 MG/ML
75 INJECTION, SOLUTION INTRAVENOUS CONTINUOUS
Status: DISCONTINUED | OUTPATIENT
Start: 2021-01-27 | End: 2021-01-27

## 2021-01-27 RX ADMIN — Medication 10 ML: at 14:45

## 2021-01-27 RX ADMIN — MORPHINE SULFATE 1 MG: 2 INJECTION, SOLUTION INTRAMUSCULAR; INTRAVENOUS at 11:57

## 2021-01-27 RX ADMIN — INSULIN LISPRO 10 UNITS: 100 INJECTION, SOLUTION INTRAVENOUS; SUBCUTANEOUS at 16:54

## 2021-01-27 RX ADMIN — INSULIN HUMAN 12 UNITS: 100 INJECTION, SUSPENSION SUBCUTANEOUS at 08:00

## 2021-01-27 RX ADMIN — DEXAMETHASONE 6 MG: 4 TABLET ORAL at 21:38

## 2021-01-27 RX ADMIN — INSULIN LISPRO 2 UNITS: 100 INJECTION, SOLUTION INTRAVENOUS; SUBCUTANEOUS at 09:50

## 2021-01-27 RX ADMIN — PANTOPRAZOLE SODIUM 40 MG: 40 TABLET, DELAYED RELEASE ORAL at 16:55

## 2021-01-27 RX ADMIN — Medication 10 ML: at 05:11

## 2021-01-27 RX ADMIN — ACETAMINOPHEN 650 MG: 325 TABLET, FILM COATED ORAL at 14:44

## 2021-01-27 RX ADMIN — GLYCERIN, PETROLATUM, PHENYLEPHRINE HCL, PRAMOXINE HCL: 144; 2.5; 10; 15 CREAM TOPICAL at 09:00

## 2021-01-27 RX ADMIN — PANTOPRAZOLE SODIUM 40 MG: 40 TABLET, DELAYED RELEASE ORAL at 05:11

## 2021-01-27 RX ADMIN — GUAIFENESIN 600 MG: 600 TABLET ORAL at 09:49

## 2021-01-27 RX ADMIN — ROSUVASTATIN 10 MG: 10 TABLET, FILM COATED ORAL at 21:39

## 2021-01-27 RX ADMIN — Medication 10 ML: at 21:42

## 2021-01-27 RX ADMIN — DEXAMETHASONE 6 MG: 4 TABLET ORAL at 09:49

## 2021-01-27 RX ADMIN — INSULIN LISPRO 4 UNITS: 100 INJECTION, SOLUTION INTRAVENOUS; SUBCUTANEOUS at 11:19

## 2021-01-27 RX ADMIN — NYSTATIN: 100000 POWDER TOPICAL at 17:00

## 2021-01-27 RX ADMIN — SODIUM CHLORIDE 75 ML/HR: 900 INJECTION, SOLUTION INTRAVENOUS at 14:44

## 2021-01-27 RX ADMIN — GLYCERIN, PETROLATUM, PHENYLEPHRINE HCL, PRAMOXINE HCL: 144; 2.5; 10; 15 CREAM TOPICAL at 21:00

## 2021-01-27 RX ADMIN — GUAIFENESIN 600 MG: 600 TABLET ORAL at 21:39

## 2021-01-27 RX ADMIN — MORPHINE SULFATE 1 MG: 2 INJECTION, SOLUTION INTRAMUSCULAR; INTRAVENOUS at 04:37

## 2021-01-27 RX ADMIN — NYSTATIN: 100000 POWDER TOPICAL at 09:48

## 2021-01-27 RX ADMIN — SODIUM CHLORIDE 75 ML/HR: 900 INJECTION, SOLUTION INTRAVENOUS at 00:51

## 2021-01-27 RX ADMIN — SODIUM CHLORIDE 75 ML/HR: 9 INJECTION, SOLUTION INTRAVENOUS at 16:00

## 2021-01-27 NOTE — PROGRESS NOTES
Pt assisted to chair with physical therapy. O2 saturation decreased to 82 to 84%. O2 increased back to 8L, o2 saturation came up to 90%.

## 2021-01-27 NOTE — PROGRESS NOTES
Monitor room called and pts heart rate is 42. In to assess pt, pt is not symptomatic. Perfect serve sent to Dr. Eloise Gonzales, no new orders. Will monitor.

## 2021-01-27 NOTE — PROGRESS NOTES
Pt laying in bed. A&Ox4. Pt wearing 6L high flow nasal cannula. PT not having and pain or distress at this time. Pt eating his breakfast. Call Light within reach. Safety measures activated.

## 2021-01-27 NOTE — PROGRESS NOTES
Progress Note    Patient: Darien Berry MRN: [id number]  SSN: [id number]    YOB: 1933  Age: 80 y.o. Sex: male      Admit Date: 1/8/2021    LOS: 19 days     Subjective:     Mr. Trini Chisholm is a 81 yo  male hx of DM type II, GERD, admitted for COVID19, acute hypoxic respiratory failure, and rectal bleeding. GI consulted and diagnosed with external hemorrhoids. Recommends Tuck pads and anusol SC BID. Hg stable trends. S/p convalescent plasma on 1/8/21. Also received remdesivir 1/8-1/12. He is slow O2 wean. On 1-24-21, he got up without O2 and desatted, took time to improve and required up to 15 L NC. He has a Stage 2 small sacral wound, needs Frequent turning. He has had mild hyperkalemia, improved after receiving lokelma.      Discharge plans pending to rehab.    1-27-21 up to chair, feels better, less short of breath, no cough, denies chest pain or edema, no BM change or anorexia, rectal pain improved     Current Facility-Administered Medications   Medication Dose Route Frequency    0.9% sodium chloride infusion  75 mL/hr IntraVENous CONTINUOUS    insulin NPH (NOVOLIN N, HUMULIN N) injection 12 Units  12 Units SubCUTAneous Q12H    And    dexAMETHasone (DECADRON) tablet 6 mg  6 mg Oral Q12H    morphine injection 1 mg  1 mg IntraVENous Q3H PRN    guaiFENesin ER (MUCINEX) tablet 600 mg  600 mg Oral Q12H    nystatin (MYCOSTATIN) 100,000 unit/gram powder   Topical BID    wpiuxmhqo-Qbiragfk-Hmsyn-W.Pet (PREPARATION H MAXIMUM STRENGTH) 0.25-1 % cream   Rectal Q12H    phenylephrine suppository 1 Suppository  1 Suppository Rectal Q12H    pantoprazole (PROTONIX) tablet 40 mg  40 mg Oral ACB&D    witch hazel-glycerin (TUCKS) 12.5-50 % pads 1 Pad  1 Pad PeriANAL PRN    dextrose 40% (GLUTOSE) oral gel 1 Tube  15 g Oral PRN    glucagon (GLUCAGEN) injection 1 mg  1 mg IntraMUSCular PRN    dextrose (D50W) injection syrg 12.5-25 g  25-50 mL IntraVENous PRN    influenza vaccine 2020-21 (6 mos+)(PF) (FLUARIX/FLULAVAL/FLUZONE QUAD) injection 0.5 mL  0.5 mL IntraMUSCular PRIOR TO DISCHARGE    [Held by provider] aspirin delayed-release tablet 81 mg  81 mg Oral QHS    rosuvastatin (CRESTOR) tablet 10 mg  10 mg Oral QHS    sodium chloride (NS) flush 5-40 mL  5-40 mL IntraVENous Q8H    sodium chloride (NS) flush 5-40 mL  5-40 mL IntraVENous PRN    acetaminophen (TYLENOL) tablet 650 mg  650 mg Oral Q6H PRN    Or    acetaminophen (TYLENOL) suppository 650 mg  650 mg Rectal Q6H PRN    ondansetron (ZOFRAN) injection 4 mg  4 mg IntraVENous Q6H PRN    senna (SENOKOT) tablet 8.6 mg  1 Tab Oral DAILY PRN    0.9% sodium chloride infusion 250 mL  250 mL IntraVENous PRN    insulin lispro (HUMALOG) injection   SubCUTAneous AC&HS       Objective:     Vitals:    01/27/21 0800 01/27/21 1042 01/27/21 1258 01/27/21 1454   BP:  118/61  (!) 146/71   Pulse: 60 (!) 57  (!) 50   Resp:  20  20   Temp:  98.2 °F (36.8 °C)  98.2 °F (36.8 °C)   SpO2:  92% 90% 100%   Height:             Intake and Output:  Current Shift: 01/27 0701 - 01/27 1900  In: 240 [P.O.:240]  Out: -   Last three shifts: 01/25 1901 - 01/27 0700  In: 360 [P.O.:360]  Out: 810 [Urine:810]    Physical Exam:   General:  Alert, agitated, no distress, elderly   Lungs:   Clear to auscultation bilaterally anterior    Heart:  Regular rate and rhythm, S1, S2 normal, no edema    Abdomen:   Soft, non-tender.  Bowel sounds normal.    Neurologic: Grossly intact , pleasant        Lab/Data Review:    Recent Results (from the past 24 hour(s))   METABOLIC PANEL, BASIC    Collection Time: 01/26/21  5:05 PM   Result Value Ref Range    Sodium 133 (L) 136 - 145 mmol/L    Potassium 4.5 3.5 - 5.1 mmol/L    Chloride 101 98 - 107 mmol/L    CO2 30 21 - 32 mmol/L    Anion gap 2 (L) 7 - 16 mmol/L    Glucose 255 (H) 65 - 100 mg/dL    BUN 36 (H) 8 - 23 MG/DL    Creatinine 1.08 0.8 - 1.5 MG/DL    GFR est AA >60 >60 ml/min/1.73m2    GFR est non-AA >60 >60 ml/min/1.73m2    Calcium 7.7 (L) 8.3 - 10.4 MG/DL   GLUCOSE, POC    Collection Time: 01/26/21  9:19 PM   Result Value Ref Range    Glucose (POC) 149 (H) 65 - 100 mg/dL   CBC WITH AUTOMATED DIFF    Collection Time: 01/27/21  5:05 AM   Result Value Ref Range    WBC 13.1 (H) 4.3 - 11.1 K/uL    RBC 3.95 (L) 4.23 - 5.6 M/uL    HGB 11.0 (L) 13.6 - 17.2 g/dL    HCT 35.6 (L) 41.1 - 50.3 %    MCV 90.1 79.6 - 97.8 FL    MCH 27.8 26.1 - 32.9 PG    MCHC 30.9 (L) 31.4 - 35.0 g/dL    RDW 19.9 (H) 11.9 - 14.6 %    PLATELET 613 594 - 009 K/uL    MPV 11.6 9.4 - 12.3 FL    ABSOLUTE NRBC 0.00 0.0 - 0.2 K/uL    DF AUTOMATED      NEUTROPHILS 95 (H) 43 - 78 %    LYMPHOCYTES 2 (L) 13 - 44 %    MONOCYTES 2 (L) 4.0 - 12.0 %    EOSINOPHILS 0 (L) 0.5 - 7.8 %    BASOPHILS 0 0.0 - 2.0 %    IMMATURE GRANULOCYTES 1 0.0 - 5.0 %    ABS. NEUTROPHILS 12.4 (H) 1.7 - 8.2 K/UL    ABS. LYMPHOCYTES 0.3 (L) 0.5 - 4.6 K/UL    ABS. MONOCYTES 0.2 0.1 - 1.3 K/UL    ABS. EOSINOPHILS 0.0 0.0 - 0.8 K/UL    ABS. BASOPHILS 0.0 0.0 - 0.2 K/UL    ABS. IMM.  GRANS. 0.2 0.0 - 0.5 K/UL   METABOLIC PANEL, BASIC    Collection Time: 01/27/21  5:05 AM   Result Value Ref Range    Sodium 136 136 - 145 mmol/L    Potassium 5.2 (H) 3.5 - 5.1 mmol/L    Chloride 103 98 - 107 mmol/L    CO2 30 21 - 32 mmol/L    Anion gap 3 (L) 7 - 16 mmol/L    Glucose 164 (H) 65 - 100 mg/dL    BUN 29 (H) 8 - 23 MG/DL    Creatinine 0.94 0.8 - 1.5 MG/DL    GFR est AA >60 >60 ml/min/1.73m2    GFR est non-AA >60 >60 ml/min/1.73m2    Calcium 7.8 (L) 8.3 - 10.4 MG/DL   GLUCOSE, POC    Collection Time: 01/27/21  7:06 AM   Result Value Ref Range    Glucose (POC) 163 (H) 65 - 100 mg/dL   GLUCOSE, POC    Collection Time: 01/27/21 10:40 AM   Result Value Ref Range    Glucose (POC) 245 (H) 65 - 100 mg/dL   GLUCOSE, POC    Collection Time: 01/27/21  2:56 PM   Result Value Ref Range    Glucose (POC) 363 (H) 65 - 100 mg/dL       Assessment/ Plan:     Principal Problem:    Acute respiratory failure with hypoxia ([facility] (1/7/2021)    Active Problems:    COVID-19 (1/7/2021)      Acute metabolic encephalopathy (4/0/1462)      Type 2 diabetes mellitus, with long-term current use of insulin ([facility] (1/7/2021)      Gastrointestinal hemorrhage associated with anorectal source (1/14/2021)      Decubitus ulcer of buttock, stage 2 ([facility] (1/19/2021)    Acute hypoxic respiratory failure secondary to COVID19 pneumonia:   ·  decadron resumed BID 1-18-21, D10 today  · S/p plasma and remdesivir  · O2 taper as tolerant, on 8 L NC  · Consider CTA chest if fails to wean    Rectal bleeding due to hemorrhoids  ·  phenylephrine suppository  · Stable CBC trends   · ASA held    Stage 2 sacral ulcers   ·  Frequent turning    DM type II   · Continued  NPH, wean after stopping steroids  · continued SSI       Hyperkalemia:  ·  repeat lab improved   · Gentle IVF for 12 hours  · EKG 1-25 -reviewed and abnormal , repeat STAT with troponin negative, remote tele       Bradycardia:  · followup tele  · No complaints    Discharge plans pending O2 taper and bed placement - hopefully for 1-29-21    DVT  Prophylaxis - SCDs  Signed By: James Gale MD     January 27, 2021

## 2021-01-27 NOTE — PROGRESS NOTES
CM discussed discharge planning with MD Umu Seymour, during IDT rounds. Per MD, patient attentively to be discharged Friday 1/29. Lawrence Mckeon with [facility] will provide bed availability tomorrow 1/28. CM also sent referral to [facility]  this day, to assess patient for STR. CM will then provide SNF choices to patient's spouse and niece , to confirm facility 1/28. Re Garcia Patient's niece was receptive. CM continues to monitor plan of care.

## 2021-01-27 NOTE — PROGRESS NOTES
ACUTE PHYSICAL THERAPY GOALS:  (Developed with and agreed upon by patient and/or caregiver. )  LTGs (assessed and revised as needed 1/19/21):  (1.) Presley  will move from supine to sit and sit to supine , scoot up and down and roll side to side with MODIFIED INDEPENDENCE within 7 treatment day(s). (2.) Presley will transfer from bed to chair and chair to bed with SBA using the least restrictive device within 7 treatment day(s). (3.) Janey [facility] will ambulate with SBA for 100 feet with the least restrictive device within 7 treatment day(s) while maintaining normal vital signs. (4.) Presley will perform standing static and dynamic balance activities x 15 minutes with SBA to improve safety and activity tolerance within 7 treatment day(s). (5.) Presley will ascend and descend 4 stairs using one hand rail(s) with SBA to improve functional mobility and safety within 7 treatment day(s). (6.) Presley will perform bilateral lower extremity exercises x 10 min for HEP with INDEPENDENCE to improve strength, endurance, and functional mobility within 7 treatment day(s). PHYSICAL THERAPY: Daily Note and AM Treatment Day # 1    Presley is a 80 y.o. male   PRIMARY DIAGNOSIS: Acute respiratory failure with hypoxia (HCC)  Acute respiratory failure with hypoxia ([facility] [J96.01]         ASSESSMENT:     REHAB RECOMMENDATIONS: CURRENT LEVEL OF FUNCTION:  (Most Recently Demonstrated)   Recommendation to date pending progress:  Setting:   Short-term Rehab  Equipment:    To Be Determined Bed Mobility:   Contact Guard Assistance  Sit to Stand:   Minimal Assistance  Transfers:   Contact Guard Assistance to min assist  Gait/Mobility:   Contact Guard Assistance to min assist with RW      ASSESSMENT:  Mr. Niki Chisholm has been seen by PT for mobility. It has been very limited due to O2 sats dropping so much with movement.   Today he got to the chair with rolling walker with cg to min assist. Sat monitor was not working well so not as accurate as should have been. RN came in behind with machine and documented sats were 82 but put to 8 liters and rebounded to 90. Treatment ended and let him just sit up. This is the first time in the chair with PT. Strength is not too much of an issue and mobility is clearly pretty good. Only way we will get anywhere is to get him out of the bed more. Hopefully this will help. Still needs to go to rehab at discharge. Goals set remain appropriate for another 7 days. SUBJECTIVE:   Mr. Ulisses Clifford states, \"no pain can you believe that? \"    SOCIAL HISTORY/ LIVING ENVIRONMENT: See initial evaluation     OBJECTIVE:     PAIN: VITAL SIGNS: LINES/DRAINS:   Pre Treatment: Pain Screen  Pain Scale 1: Numeric (0 - 10)  Pain Intensity 1: 0  Post Treatment: 0   None  O2 Device: Hi flow nasal cannula     MOBILITY: I Mod I S SBA CGA Min Mod Max Total  NT x2 Comments:   Bed Mobility    Rolling [] [] [] [x] [] [] [] [] [] [] []    Supine to Sit [] [] [] [] [x] [] [] [] [] [] []    Scooting [] [] [] [] [] [] [] [] [] [] []    Sit to Supine [] [] [] [] [] [] [] [] [] [] []    Transfers    Sit to Stand [] [] [] [] [x] [x] [] [] [] [] [x]    Bed to Chair [] [] [] [] [] [] [] [] [] [] []    Stand to Sit [] [] [] [] [x] [x] [] [] [] [] [x]    I=Independent, Mod I=Modified Independent, S=Supervision, SBA=Standby Assistance, CGA=Contact Guard Assistance,   Min=Minimal Assistance, Mod=Moderate Assistance, Max=Maximal Assistance, Total=Total Assistance, NT=Not Tested    GAIT: I Mod I S SBA CGA Min Mod Max Total  NT x2 Comments:   Level of Assistance [] [] [] [] [x] [x] [] [] [] [] []    Distance 5    DME Rolling Walker    Gait Quality slow    Weightbearing  Status N/A     I=Independent, Mod I=Modified Independent, S=Supervision, SBA=Standby Assistance, CGA=Contact Guard Assistance,   Min=Minimal Assistance, Mod=Moderate Assistance, Max=Maximal Assistance, Total=Total Assistance, NT=Not Tested    PLAN:   FREQUENCY/DURATION: PT Plan of Care: 3 times/week for duration of hospital stay or until stated goals are met, whichever comes first.  TREATMENT:     TREATMENT:   ($$ Therapeutic Activity: 8-22 mins    )  Therapeutic Activity (15 Minutes): Therapeutic activity included Rolling, Supine to Sit, Sit to Supine, Scooting, Transfer Training, Ambulation on level ground and Standing balance to improve functional Mobility, Strength, Activity tolerance and Lung function.     AFTER TREATMENT POSITION/PRECAUTIONS:  Alarm Activated, Bed, Needs within reach and RN notified    INTERDISCIPLINARY COLLABORATION:  RN/PCT    TOTAL TREATMENT DURATION:  PT Patient Time In/Time Out  Time In: 1215  Time Out: [facility],

## 2021-01-28 LAB
ANION GAP SERPL CALC-SCNC: 5 MMOL/L (ref 7–16)
BASOPHILS # BLD: 0 K/UL (ref 0–0.2)
BASOPHILS NFR BLD: 0 % (ref 0–2)
BUN SERPL-MCNC: 26 MG/DL (ref 8–23)
CALCIUM SERPL-MCNC: 8.2 MG/DL (ref 8.3–10.4)
CHLORIDE SERPL-SCNC: 103 MMOL/L (ref 98–107)
CO2 SERPL-SCNC: 27 MMOL/L (ref 21–32)
COVID-19 RAPID TEST, COVR: NOT DETECTED
CREAT SERPL-MCNC: 0.94 MG/DL (ref 0.8–1.5)
DIFFERENTIAL METHOD BLD: ABNORMAL
EOSINOPHIL # BLD: 0 K/UL (ref 0–0.8)
EOSINOPHIL NFR BLD: 0 % (ref 0.5–7.8)
ERYTHROCYTE [DISTWIDTH] IN BLOOD BY AUTOMATED COUNT: 20 % (ref 11.9–14.6)
GLUCOSE BLD STRIP.AUTO-MCNC: 154 MG/DL (ref 65–100)
GLUCOSE BLD STRIP.AUTO-MCNC: 252 MG/DL (ref 65–100)
GLUCOSE BLD STRIP.AUTO-MCNC: 290 MG/DL (ref 65–100)
GLUCOSE BLD STRIP.AUTO-MCNC: 337 MG/DL (ref 65–100)
GLUCOSE SERPL-MCNC: 151 MG/DL (ref 65–100)
HCT VFR BLD AUTO: 35 % (ref 41.1–50.3)
HGB BLD-MCNC: 11 G/DL (ref 13.6–17.2)
IMM GRANULOCYTES # BLD AUTO: 0.1 K/UL (ref 0–0.5)
IMM GRANULOCYTES NFR BLD AUTO: 1 % (ref 0–5)
LYMPHOCYTES # BLD: 0.3 K/UL (ref 0.5–4.6)
LYMPHOCYTES NFR BLD: 2 % (ref 13–44)
MCH RBC QN AUTO: 28.6 PG (ref 26.1–32.9)
MCHC RBC AUTO-ENTMCNC: 31.4 G/DL (ref 31.4–35)
MCV RBC AUTO: 90.9 FL (ref 79.6–97.8)
MONOCYTES # BLD: 0.2 K/UL (ref 0.1–1.3)
MONOCYTES NFR BLD: 1 % (ref 4–12)
NEUTS SEG # BLD: 12.5 K/UL (ref 1.7–8.2)
NEUTS SEG NFR BLD: 95 % (ref 43–78)
NRBC # BLD: 0 K/UL (ref 0–0.2)
PLATELET # BLD AUTO: 225 K/UL (ref 150–450)
PMV BLD AUTO: 11.4 FL (ref 9.4–12.3)
POTASSIUM SERPL-SCNC: 5 MMOL/L (ref 3.5–5.1)
RBC # BLD AUTO: 3.85 M/UL (ref 4.23–5.6)
SARS-COV-2, COV2: NORMAL
SODIUM SERPL-SCNC: 135 MMOL/L (ref 138–145)
SOURCE, COVRS: NORMAL
WBC # BLD AUTO: 13.1 K/UL (ref 4.3–11.1)

## 2021-01-28 PROCEDURE — 82962 GLUCOSE BLOOD TEST: CPT

## 2021-01-28 PROCEDURE — 36415 COLL VENOUS BLD VENIPUNCTURE: CPT

## 2021-01-28 PROCEDURE — 74011250637 HC RX REV CODE- 250/637: Performed by: PHYSICIAN ASSISTANT

## 2021-01-28 PROCEDURE — 80048 BASIC METABOLIC PNL TOTAL CA: CPT

## 2021-01-28 PROCEDURE — 74011636637 HC RX REV CODE- 636/637: Performed by: INTERNAL MEDICINE

## 2021-01-28 PROCEDURE — 74011250637 HC RX REV CODE- 250/637: Performed by: INTERNAL MEDICINE

## 2021-01-28 PROCEDURE — 65660000000 HC RM CCU STEPDOWN

## 2021-01-28 PROCEDURE — 74011250636 HC RX REV CODE- 250/636: Performed by: INTERNAL MEDICINE

## 2021-01-28 PROCEDURE — 85025 COMPLETE CBC W/AUTO DIFF WBC: CPT

## 2021-01-28 PROCEDURE — 87635 SARS-COV-2 COVID-19 AMP PRB: CPT

## 2021-01-28 PROCEDURE — 74011250636 HC RX REV CODE- 250/636: Performed by: FAMILY MEDICINE

## 2021-01-28 PROCEDURE — 77030040829 HC CATH EXT URINE MDII -B

## 2021-01-28 PROCEDURE — 74011250637 HC RX REV CODE- 250/637: Performed by: FAMILY MEDICINE

## 2021-01-28 RX ADMIN — Medication 10 ML: at 22:49

## 2021-01-28 RX ADMIN — INSULIN LISPRO 6 UNITS: 100 INJECTION, SOLUTION INTRAVENOUS; SUBCUTANEOUS at 22:47

## 2021-01-28 RX ADMIN — Medication 10 ML: at 14:00

## 2021-01-28 RX ADMIN — GLYCERIN, PETROLATUM, PHENYLEPHRINE HCL, PRAMOXINE HCL: 144; 2.5; 10; 15 CREAM TOPICAL at 08:07

## 2021-01-28 RX ADMIN — INSULIN HUMAN 12 UNITS: 100 INJECTION, SUSPENSION SUBCUTANEOUS at 08:06

## 2021-01-28 RX ADMIN — PANTOPRAZOLE SODIUM 40 MG: 40 TABLET, DELAYED RELEASE ORAL at 04:23

## 2021-01-28 RX ADMIN — NYSTATIN: 100000 POWDER TOPICAL at 17:00

## 2021-01-28 RX ADMIN — DEXAMETHASONE 6 MG: 4 TABLET ORAL at 08:06

## 2021-01-28 RX ADMIN — GUAIFENESIN 600 MG: 600 TABLET ORAL at 22:49

## 2021-01-28 RX ADMIN — ROSUVASTATIN 10 MG: 10 TABLET, FILM COATED ORAL at 22:48

## 2021-01-28 RX ADMIN — GUAIFENESIN 600 MG: 600 TABLET ORAL at 08:06

## 2021-01-28 RX ADMIN — GLYCERIN, PETROLATUM, PHENYLEPHRINE HCL, PRAMOXINE HCL: 144; 2.5; 10; 15 CREAM TOPICAL at 22:55

## 2021-01-28 RX ADMIN — MORPHINE SULFATE 1 MG: 2 INJECTION, SOLUTION INTRAMUSCULAR; INTRAVENOUS at 04:21

## 2021-01-28 RX ADMIN — NYSTATIN: 100000 POWDER TOPICAL at 08:07

## 2021-01-28 RX ADMIN — PHENYLEPHRINE HYDROCHLORIDE AND FAT, HARD 1 SUPPOSITORY: 5; 1.77 SUPPOSITORY RECTAL at 22:45

## 2021-01-28 RX ADMIN — INSULIN LISPRO 4 UNITS: 100 INJECTION, SOLUTION INTRAVENOUS; SUBCUTANEOUS at 11:46

## 2021-01-28 RX ADMIN — INSULIN LISPRO 8 UNITS: 100 INJECTION, SOLUTION INTRAVENOUS; SUBCUTANEOUS at 16:15

## 2021-01-28 RX ADMIN — HUMAN INSULIN 10 UNITS: 100 INJECTION, SUSPENSION SUBCUTANEOUS at 22:47

## 2021-01-28 RX ADMIN — INSULIN LISPRO 2 UNITS: 100 INJECTION, SOLUTION INTRAVENOUS; SUBCUTANEOUS at 07:47

## 2021-01-28 RX ADMIN — Medication 10 ML: at 04:23

## 2021-01-28 RX ADMIN — PANTOPRAZOLE SODIUM 40 MG: 40 TABLET, DELAYED RELEASE ORAL at 16:14

## 2021-01-28 NOTE — PROGRESS NOTES
Pt resting in bed. Pt has overall had a good day. Oxygen now on 5L high flow nasal cannula. Saturation 91%. Pt has shortness of breath with exertion. Pt has no pain or distress at present. Call light within reach. Safety measures in place. Preparing to give report to oncoming shift.

## 2021-01-28 NOTE — PROGRESS NOTES
Mr. Lynda Silva frustrated today about being here. He wants to go home. Doesn't understand why he is not getting better. Tried to explain to him but he is just upset today. He declined PT. Will try tomorrow unless transferred.    Darci Bob, PT

## 2021-01-28 NOTE — PROGRESS NOTES
Progress Note    Patient: Gretta Alcantara MRN: [id number]  SSN: [id number]    YOB: 1933  Age: 80 y.o. Sex: male      Admit Date: 1/8/2021    LOS: 20 days     Subjective:     Mr. Benitez Mao is a 79 yo  male hx of DM type II, GERD, admitted for COVID19, acute hypoxic respiratory failure, and rectal bleeding. GI consulted and diagnosed with external hemorrhoids. Recommends Tuck pads and anusol OR BID. Hg stable trends. S/p convalescent plasma on 1/8/21. Also received remdesivir 1/8-1/12. He is slow O2 wean. On 1-24-21, he got up without O2 and desatted, took time to improve and required up to 15 L NC. He has a Stage 2 small sacral wound, needs Frequent turning. He has had mild hyperkalemia, improved after receiving lokelma. Discharge plans pending to rehab. Interval History (1/28): patient examined at bedside. No acute overnight events. Patient frustrated about still being on oxygen and wants to go home, does not understand why \"I just can't go home with oxygen. \" He denies fevers/chills, chest pain, shortness of breath, abdominal pain. He is eating and drinking well.      Current Facility-Administered Medications   Medication Dose Route Frequency    insulin NPH (NOVOLIN N, HUMULIN N) injection 12 Units  12 Units SubCUTAneous Q12H    And    dexAMETHasone (DECADRON) tablet 6 mg  6 mg Oral Q12H    morphine injection 1 mg  1 mg IntraVENous Q3H PRN    guaiFENesin ER (MUCINEX) tablet 600 mg  600 mg Oral Q12H    nystatin (MYCOSTATIN) 100,000 unit/gram powder   Topical BID    getvnctrd-Tjigxkoi-Hhgnd-W.Pet (PREPARATION H MAXIMUM STRENGTH) 0.25-1 % cream   Rectal Q12H    phenylephrine suppository 1 Suppository  1 Suppository Rectal Q12H    pantoprazole (PROTONIX) tablet 40 mg  40 mg Oral ACB&D    witch hazel-glycerin (TUCKS) 12.5-50 % pads 1 Pad  1 Pad PeriANAL PRN    dextrose 40% (GLUTOSE) oral gel 1 Tube  15 g Oral PRN    glucagon (GLUCAGEN) injection 1 mg  1 mg IntraMUSCular PRN    dextrose (D50W) injection syrg 12.5-25 g  25-50 mL IntraVENous PRN    influenza vaccine 2020-21 (6 mos+)(PF) (FLUARIX/FLULAVAL/FLUZONE QUAD) injection 0.5 mL  0.5 mL IntraMUSCular PRIOR TO DISCHARGE    [Held by provider] aspirin delayed-release tablet 81 mg  81 mg Oral QHS    rosuvastatin (CRESTOR) tablet 10 mg  10 mg Oral QHS    sodium chloride (NS) flush 5-40 mL  5-40 mL IntraVENous Q8H    sodium chloride (NS) flush 5-40 mL  5-40 mL IntraVENous PRN    acetaminophen (TYLENOL) tablet 650 mg  650 mg Oral Q6H PRN    Or    acetaminophen (TYLENOL) suppository 650 mg  650 mg Rectal Q6H PRN    ondansetron (ZOFRAN) injection 4 mg  4 mg IntraVENous Q6H PRN    senna (SENOKOT) tablet 8.6 mg  1 Tab Oral DAILY PRN    0.9% sodium chloride infusion 250 mL  250 mL IntraVENous PRN    insulin lispro (HUMALOG) injection   SubCUTAneous AC&HS       Objective:     Vitals:    01/28/21 0646 01/28/21 0759 01/28/21 1105 01/28/21 1532   BP: (!) 145/68  (!) 104/58 134/68   Pulse: 61 90 60 (!) 54   Resp: 18  18 18   Temp: 97.4 °F (36.3 °C)  98.2 °F (36.8 °C) 97.8 °F (36.6 °C)   SpO2: 96%  97% 97%   Height:             Intake and Output:  Current Shift: 01/28 0701 - 01/28 1900  In: 240 [P.O.:240]  Out: -   Last three shifts: 01/26 1901 - 01/28 0700  In: 240 [P.O.:240]  Out: 250 [Urine:250]    Physical Exam:   General:  Alert, agitated, no distress, elderly   Lungs:   Clear to auscultation bilaterally anterior    Heart:  Regular rate and rhythm, S1, S2 normal, no edema    Abdomen:   Soft, non-tender.  Bowel sounds normal.    Neurologic: Grossly intact , pleasant        Lab/Data Review:    Recent Results (from the past 24 hour(s))   GLUCOSE, POC    Collection Time: 01/27/21  8:19 PM   Result Value Ref Range    Glucose (POC) 98 65 - 100 mg/dL   GLUCOSE, POC    Collection Time: 01/28/21  6:49 AM   Result Value Ref Range    Glucose (POC) 154 (H) 65 - 100 mg/dL   CBC WITH AUTOMATED DIFF    Collection Time: 01/28/21  6:54 AM   Result Value Ref Range    WBC 13.1 (H) 4.3 - 11.1 K/uL    RBC 3.85 (L) 4.23 - 5.6 M/uL    HGB 11.0 (L) 13.6 - 17.2 g/dL    HCT 35.0 (L) 41.1 - 50.3 %    MCV 90.9 79.6 - 97.8 FL    MCH 28.6 26.1 - 32.9 PG    MCHC 31.4 31.4 - 35.0 g/dL    RDW 20.0 (H) 11.9 - 14.6 %    PLATELET 631 257 - 915 K/uL    MPV 11.4 9.4 - 12.3 FL    ABSOLUTE NRBC 0.00 0.0 - 0.2 K/uL    DF AUTOMATED      NEUTROPHILS 95 (H) 43 - 78 %    LYMPHOCYTES 2 (L) 13 - 44 %    MONOCYTES 1 (L) 4.0 - 12.0 %    EOSINOPHILS 0 (L) 0.5 - 7.8 %    BASOPHILS 0 0.0 - 2.0 %    IMMATURE GRANULOCYTES 1 0.0 - 5.0 %    ABS. NEUTROPHILS 12.5 (H) 1.7 - 8.2 K/UL    ABS. LYMPHOCYTES 0.3 (L) 0.5 - 4.6 K/UL    ABS. MONOCYTES 0.2 0.1 - 1.3 K/UL    ABS. EOSINOPHILS 0.0 0.0 - 0.8 K/UL    ABS. BASOPHILS 0.0 0.0 - 0.2 K/UL    ABS. IMM.  GRANS. 0.1 0.0 - 0.5 K/UL   METABOLIC PANEL, BASIC    Collection Time: 01/28/21  6:54 AM   Result Value Ref Range    Sodium 135 (L) 138 - 145 mmol/L    Potassium 5.0 3.5 - 5.1 mmol/L    Chloride 103 98 - 107 mmol/L    CO2 27 21 - 32 mmol/L    Anion gap 5 (L) 7 - 16 mmol/L    Glucose 151 (H) 65 - 100 mg/dL    BUN 26 (H) 8 - 23 MG/DL    Creatinine 0.94 0.8 - 1.5 MG/DL    GFR est AA >60 >60 ml/min/1.73m2    GFR est non-AA >60 >60 ml/min/1.73m2    Calcium 8.2 (L) 8.3 - 10.4 MG/DL   SARS-COV-2    Collection Time: 01/28/21 10:45 AM   Result Value Ref Range    SARS-CoV-2 Please find results under separate order     COVID-19 RAPID TEST    Collection Time: 01/28/21 10:45 AM   Result Value Ref Range    Specimen source NASAL O2      COVID-19 rapid test Not detected NOTD     GLUCOSE, POC    Collection Time: 01/28/21 11:08 AM   Result Value Ref Range    Glucose (POC) 290 (H) 65 - 100 mg/dL       Assessment/ Plan:     Principal Problem:    Acute respiratory failure with hypoxia ([address] (1/7/2021)    Active Problems:    COVID-19 (1/7/2021)      Acute metabolic encephalopathy (2/4/5291)      Type 2 diabetes mellitus, with long-term current use of insulin (HCC) (1/7/2021)      Gastrointestinal hemorrhage associated with anorectal source (1/14/2021)      Decubitus ulcer of buttock, stage 2 ([address] (1/19/2021)      Acute hypoxic respiratory failure secondary to COVID19 pneumonia:   - finished Decadron  - already finished convalescent plasma and remdesevir  - down to 6L NC, continue to wean as tolerated  - goal SpO2>90%    Rectal bleeding due to hemorrhoids  - continue with phenylephrine suppository   - trend CBCs    Stage II sacral ulcers  - frequent turning    DM type II  - continue current regimen  - would recommend weaning off NPH since he is off steroids now  - SSI and serial CBGs    Hyperkalemia  - continue to monitor    # Bradycardia, asymptomatic  - continue to monitor  - avoid BBs and CCBs    F/E/N: no fluids, replete electrolytes as needed, diabetic diet    Ppx: SCDs for VTE (avoid chemical anticoagulation with bleeding)    Code Status: DNR    Disposition: pending clinical improvement with plan as above. Discussed with patient at bedside. Down to 6L, can go to SNF if on 5L of oxygen or less, hopefully in the next 1-2 days. All questions answered.      Signed By: Abel Bernheim, DO     January 28, 2021

## 2021-01-28 NOTE — PROGRESS NOTES
CM discussed discharge planning with patient's spouse Alexia Sahni and niece Alfred Valdivia. CM informed spouse and niece, bed offer has been provided by [facility] [facility], [facility] and [facility]. Per Ranny Fees, family would like referral placed with [facility]. CM sent referral this day, as family requested. CM awaiting response from [address] [place] [address] with Admissions. Per Ranny Fees, patient's spouse and niece would like to accept [facility] bed offer, if [facility] is unable to accept. CM was receptive to this information. CM awaiting updates.

## 2021-01-29 LAB
ANION GAP SERPL CALC-SCNC: 4 MMOL/L (ref 7–16)
BASOPHILS # BLD: 0 K/UL (ref 0–0.2)
BASOPHILS NFR BLD: 0 % (ref 0–2)
BUN SERPL-MCNC: 28 MG/DL (ref 8–23)
CALCIUM SERPL-MCNC: 8.4 MG/DL (ref 8.3–10.4)
CHLORIDE SERPL-SCNC: 102 MMOL/L (ref 98–107)
CO2 SERPL-SCNC: 31 MMOL/L (ref 21–32)
CREAT SERPL-MCNC: 1.02 MG/DL (ref 0.8–1.5)
DIFFERENTIAL METHOD BLD: ABNORMAL
EOSINOPHIL # BLD: 0.1 K/UL (ref 0–0.8)
EOSINOPHIL NFR BLD: 1 % (ref 0.5–7.8)
ERYTHROCYTE [DISTWIDTH] IN BLOOD BY AUTOMATED COUNT: 19.8 % (ref 11.9–14.6)
GLUCOSE BLD STRIP.AUTO-MCNC: 114 MG/DL (ref 65–100)
GLUCOSE BLD STRIP.AUTO-MCNC: 114 MG/DL (ref 65–100)
GLUCOSE BLD STRIP.AUTO-MCNC: 117 MG/DL (ref 65–100)
GLUCOSE BLD STRIP.AUTO-MCNC: 190 MG/DL (ref 65–100)
GLUCOSE SERPL-MCNC: 106 MG/DL (ref 65–100)
HCT VFR BLD AUTO: 40 % (ref 41.1–50.3)
HGB BLD-MCNC: 12.5 G/DL (ref 13.6–17.2)
IMM GRANULOCYTES # BLD AUTO: 0.2 K/UL (ref 0–0.5)
IMM GRANULOCYTES NFR BLD AUTO: 1 % (ref 0–5)
LYMPHOCYTES # BLD: 0.9 K/UL (ref 0.5–4.6)
LYMPHOCYTES NFR BLD: 5 % (ref 13–44)
MCH RBC QN AUTO: 27.9 PG (ref 26.1–32.9)
MCHC RBC AUTO-ENTMCNC: 31.3 G/DL (ref 31.4–35)
MCV RBC AUTO: 89.3 FL (ref 79.6–97.8)
MONOCYTES # BLD: 0.6 K/UL (ref 0.1–1.3)
MONOCYTES NFR BLD: 4 % (ref 4–12)
NEUTS SEG # BLD: 14.5 K/UL (ref 1.7–8.2)
NEUTS SEG NFR BLD: 89 % (ref 43–78)
NRBC # BLD: 0 K/UL (ref 0–0.2)
PLATELET # BLD AUTO: 242 K/UL (ref 150–450)
PMV BLD AUTO: 11.1 FL (ref 9.4–12.3)
POTASSIUM SERPL-SCNC: 4.5 MMOL/L (ref 3.5–5.1)
RBC # BLD AUTO: 4.48 M/UL (ref 4.23–5.6)
SODIUM SERPL-SCNC: 137 MMOL/L (ref 138–145)
WBC # BLD AUTO: 16.3 K/UL (ref 4.3–11.1)

## 2021-01-29 PROCEDURE — 77010033711 HC HIGH FLOW OXYGEN

## 2021-01-29 PROCEDURE — 74011250637 HC RX REV CODE- 250/637: Performed by: INTERNAL MEDICINE

## 2021-01-29 PROCEDURE — 74011250637 HC RX REV CODE- 250/637: Performed by: FAMILY MEDICINE

## 2021-01-29 PROCEDURE — 74011250637 HC RX REV CODE- 250/637: Performed by: PHYSICIAN ASSISTANT

## 2021-01-29 PROCEDURE — 74011636637 HC RX REV CODE- 636/637: Performed by: INTERNAL MEDICINE

## 2021-01-29 PROCEDURE — 94762 N-INVAS EAR/PLS OXIMTRY CONT: CPT

## 2021-01-29 PROCEDURE — 97530 THERAPEUTIC ACTIVITIES: CPT

## 2021-01-29 PROCEDURE — 80048 BASIC METABOLIC PNL TOTAL CA: CPT

## 2021-01-29 PROCEDURE — 2709999900 HC NON-CHARGEABLE SUPPLY

## 2021-01-29 PROCEDURE — 82962 GLUCOSE BLOOD TEST: CPT

## 2021-01-29 PROCEDURE — 65270000029 HC RM PRIVATE

## 2021-01-29 PROCEDURE — 77030040829 HC CATH EXT URINE MDII -B

## 2021-01-29 PROCEDURE — 85025 COMPLETE CBC W/AUTO DIFF WBC: CPT

## 2021-01-29 RX ADMIN — PHENYLEPHRINE HYDROCHLORIDE AND FAT, HARD 1 SUPPOSITORY: 5; 1.77 SUPPOSITORY RECTAL at 21:35

## 2021-01-29 RX ADMIN — NYSTATIN: 100000 POWDER TOPICAL at 09:10

## 2021-01-29 RX ADMIN — ROSUVASTATIN 10 MG: 10 TABLET, FILM COATED ORAL at 21:35

## 2021-01-29 RX ADMIN — Medication 10 ML: at 14:00

## 2021-01-29 RX ADMIN — PHENYLEPHRINE HYDROCHLORIDE AND FAT, HARD 1 SUPPOSITORY: 5; 1.77 SUPPOSITORY RECTAL at 09:10

## 2021-01-29 RX ADMIN — INSULIN LISPRO 2 UNITS: 100 INJECTION, SOLUTION INTRAVENOUS; SUBCUTANEOUS at 11:30

## 2021-01-29 RX ADMIN — PANTOPRAZOLE SODIUM 40 MG: 40 TABLET, DELAYED RELEASE ORAL at 07:00

## 2021-01-29 RX ADMIN — Medication 10 ML: at 07:00

## 2021-01-29 RX ADMIN — PANTOPRAZOLE SODIUM 40 MG: 40 TABLET, DELAYED RELEASE ORAL at 17:43

## 2021-01-29 RX ADMIN — HUMAN INSULIN 5 UNITS: 100 INJECTION, SUSPENSION SUBCUTANEOUS at 21:34

## 2021-01-29 RX ADMIN — NYSTATIN: 100000 POWDER TOPICAL at 18:00

## 2021-01-29 RX ADMIN — HUMAN INSULIN 10 UNITS: 100 INJECTION, SUSPENSION SUBCUTANEOUS at 09:10

## 2021-01-29 RX ADMIN — GUAIFENESIN 600 MG: 600 TABLET ORAL at 21:36

## 2021-01-29 RX ADMIN — GUAIFENESIN 600 MG: 600 TABLET ORAL at 09:10

## 2021-01-29 RX ADMIN — GLYCERIN, PETROLATUM, PHENYLEPHRINE HCL, PRAMOXINE HCL: 144; 2.5; 10; 15 CREAM TOPICAL at 09:10

## 2021-01-29 NOTE — PROGRESS NOTES
Comprehensive Nutrition Assessment    Type and Reason for Visit: Initial, RD nutrition re-screen/LOS    Nutrition Recommendations/Plan:   · Meals and Snacks:  · Continue current diet. · Nutrition Supplement Therapy:  · Medical food supplement therapy:  Continue Glucerna Shake three times per day (this provides 220 kcal and 10 grams protein per bottle)      Nutrition Assessment:   Nutrition History: Unable to obtain hx. Nutrition Background: PMH remarkable for coronary artery disease, diabetes mellitus presented to emergency room with altered mental status. Admitted with acute respiratory failure with hypoxia, Covid PNA, ALFRED resolved, DM, GI. Daily Update:  Spoke with patient over the phone. Pt reports that he is eating well and drinks a full glucerna each day, sometimes more. He also reports he is experiencing gas which can make eating uncomfortable. Spoke with RN who reports today so far he has been eating medium about 50%, she also reports he still has wounds and more are presenting themselves. RN will encourage use of glucerna at meals and hope to get patient eating more again. She reports he does have good and bad days but doing much better than when first admitted. Nutrition Related Findings:   NFPE deferred due to isolation Wound Type: Stage II(sacral)    Current Nutrition Therapies:  DIET DIABETIC WITH OPTIONS Consistent Carb 1500-1600kcal; Mechanical Soft  DIET NUTRITIONAL SUPPLEMENTS All Meals; Glucerna Shake ( )    Current Intake:   Average Meal Intake: 26-50% Average Supplement Intake: (Drinks at least 1 glucerna each day)      Anthropometric Measures:  Height: 5' 6\" (167.6 cm)  Current Body Wt: 77.1 kg (169 lb 15.6 oz)(1/7), Weight source: Stated  BMI: 27.4, Overweight (BMI 25.0-29. 9)  Admission Body Weight: 169 lb 15.6 oz(stated)  Ideal Body Wt: 142 lbs (65 kg), 119.7 %  Usual Body Wt: (no hx available), Percent weight change:            Estimated Daily Nutrient Needs:  Energy (kcal/day): 7451-9530 ((20-25), Weight Used: Admission(77.1 kg))  Protein (g/day): 77-93 ( 1-1.2g/kg) Weight Used: (Admission)  Fluid (ml/day):   (1 ml/kcal)    Nutrition Diagnosis:   · Inadequate oral intake related to altered GI function(fatigue, poor appetite) as evidenced by (po intake slowly improving meeting 50-65% needs)    Nutrition Interventions:   Food and/or Nutrient Delivery: Continue current diet, Continue oral nutrition supplement     Coordination of Nutrition Care: Continue to monitor while inpatient  Plan of Care discussed with RN. Goals:   Previous Goal Met: Progress towards goal(s) declining  Active Goal: Meet >75% estimated needs by nutrition follow-up    Nutrition Monitoring and Evaluation:      Food/Nutrient Intake Outcomes: Food and nutrient intake, Supplement intake  Physical Signs/Symptoms Outcomes: Biochemical data, GI status    Discharge Planning:     Too soon to determine    Electronically signed by Toya Bazzi MS, RD, LD on 1/29/2021 at 1:32 PM.  Contact: [phone]    Disaster Mode active

## 2021-01-29 NOTE — PROGRESS NOTES
Pt placed on Heated High Flow 50L 70% after working with physical therapy and desat to 70s. Pt comfortable in bed and updated O2 95%.

## 2021-01-29 NOTE — PROGRESS NOTES
Chart reviewed by CM for discharge planning. The patient is not medically stable for discharge this day. Kate with [facility] [phone], will reassess the patient for STR placement  2/1/2021, if patient remains inpatient, as they are unable to assist with admission over the weekend, if discharged. CM confirmed with patient's cecille Johnson, if the patient is medically stable within 1-2 days, patient can discharge to [facility] , if 02 sats are stable and no more than 4-5 liters, per [facility] . Patient's cecille Johnson [phone] was receptive to this information. CM continues to monitor plan of care.

## 2021-01-29 NOTE — PROGRESS NOTES
Hourly rounds complete this shift, no new complaints at this time, bed in low, locked position, call light and bedside table within reach,  all needs met. Will continue to monitor Report to day shift nurse.

## 2021-01-29 NOTE — PROGRESS NOTES
Hospitalist Progress Note    2021  Admit Date: 2021  1:50 AM   NAME: Jonas Hutchinson   :  1933   MRN:  [id number]   Attending: Lloyd Moseley MD  PCP:  Og Olson MD    SUBJECTIVE:   Jonas Hutchinson is a 80 y.o. male hx of DM type II, GERD, admitted for COVID19, acute hypoxic respiratory failure, and rectal bleeding. GI consulted and diagnosed with external hemorrhoids. Recommends Tuck pads and anusol CT BID. Hg stable trends. Derick Cobos has a Stage 2 small sacral wound, needs Frequent turning. He has had mild hyperkalemia, improved after receiving lokelma. S/p convalescent plasma on 21. Also received remdesivir -. On 21, he got up without O2 and desatted, took time to improve and required up to 15 L NC. He is slow O2 wean.       2021: No Events over night. No new complaints. o2 cont on 5L high flow. BP low but pt asymptomatic. Denies worsening sob, cough. Denies CP fever, chills, abd pain. Tolerating po. Review of Systems negative with exception of pertinent positives noted above  PHYSICAL EXAM     Visit Vitals  BP (!) 95/58 (BP 1 Location: Right upper arm, BP Patient Position: Sitting)   Pulse 90   Temp 98.2 °F (36.8 °C)   Resp 18   Ht 5' 6\" (1.676 m)   SpO2 98%   BMI 27.44 kg/m²      Temp (24hrs), Av.9 °F (36.6 °C), Min:97.5 °F (36.4 °C), Max:98.2 °F (36.8 °C)    Oxygen Therapy  O2 Sat (%): 98 % (21 1136)  Pulse via Oximetry: 48 beats per minute (21 0730)  O2 Device: Hi flow nasal cannula (21 1820)  O2 Flow Rate (L/min): 5 l/min (21 1820)  FIO2 (%): 40 % (21 1300)    Intake/Output Summary (Last 24 hours) at 2021 1544  Last data filed at 2021 0800  Gross per 24 hour   Intake 240 ml   Output 1800 ml   Net -1560 ml      General: Alert, No acute distress. Eye: EOMI, Normal conjunctiva, Vision Unchanged. HENT: Normocephalic, atraumatic, hard of hearing, moist mucous membranes. Neck: Supple, Non-tender.   Respiratory: Lungs are clear to auscultation, Respirations are non-labored. o2 via nc 5L  Cardiovascular: Normal rate, Regular rhythm, No murmur. Gastrointestinal: Soft, Non-tender, nondistended, positive bowel sounds   Musculoskeletal: No cyanosis, clubbing or edema. Integumentary: Warm, Dry, Pink, no rash. Neurologic: Alert, No focal deficits. Psychiatric: Cooperative, Appropriate mood & affect. ASSESSMENT & PLAN     Acute hypoxic respiratory failure secondary to COVID19 pneumonia:   - completed Decadron course  - completed convalescent plasma and remdesevir  - down to 5L NC, continue to wean as tolerated  - goal SpO2>90%    Hypotension  - asymptomatic  - monitor bp and trend  - not on antihypertensives  - IVFs as needed     Rectal bleeding due to hemorrhoids  - continue with phenylephrine suppository   - trend CBCs     Stage II sacral ulcers  - frequent turning     DM type II  - continue current regimen  - SSI and serial CBGs  - follow since he is now off steriods and adjust as needed.      Hyperkalemia  - continue to monitor  - resolved     Bradycardia, asymptomatic  - continue to monitor  - avoid BBs and CCBs     F/E/N: no fluids, replete electrolytes as needed, diabetic diet     Ppx: SCDs for VTE (avoid chemical anticoagulation with bleeding)     Code Status: DNR     Disposition: pending clinical improvement with plan as above. Discussed with patient at bedside. Anticipate dc to SNF if on 5L of oxygen or less (not highflow) and bp stable, hopefully in the next 1-2 days. Reviewed prior records, labs, vitals, diagnostic tests, flow sheet, home medications, inpatient medications, nursing and provider notes.     Manual JAVAD Mccloud, MPAS

## 2021-01-29 NOTE — PROGRESS NOTES
ACUTE PHYSICAL THERAPY GOALS:  (Developed with and agreed upon by patient and/or caregiver. )  LTGs (assessed and revised as needed 1/19/21):  (1.) King Ian  will move from supine to sit and sit to supine , scoot up and down and roll side to side with MODIFIED INDEPENDENCE within 7 treatment day(s). (2.) King Ian will transfer from bed to chair and chair to bed with SBA using the least restrictive device within 7 treatment day(s). (3.) [facility] will ambulate with SBA for 100 feet with the least restrictive device within 7 treatment day(s) while maintaining normal vital signs. (4.) King Ian will perform standing static and dynamic balance activities x 15 minutes with SBA to improve safety and activity tolerance within 7 treatment day(s). (5.) King Ian will ascend and descend 4 stairs using one hand rail(s) with SBA to improve functional mobility and safety within 7 treatment day(s). (6.) King Ian will perform bilateral lower extremity exercises x 10 min for HEP with INDEPENDENCE to improve strength, endurance, and functional mobility within 7 treatment day(s). PHYSICAL THERAPY: Daily Note and AM Treatment Day # 2    King Ian is a 80 y.o. male   PRIMARY DIAGNOSIS: Acute respiratory failure with hypoxia (HCC)  [facility] ([facility] [J96.01]         ASSESSMENT:     REHAB RECOMMENDATIONS: CURRENT LEVEL OF FUNCTION:  (Most Recently Demonstrated)   Recommendation to date pending progress:  Setting:   Short-term Rehab  Equipment:    To Be Determined Bed Mobility:   Minimal Assistance  Sit to Stand:   Minimal Assistance  Transfers:   Minimal Assistance  Gait/Mobility:   not really able today     ASSESSMENT:  Mr. Kiana De La Torre was on 5L upon arrival.  He is up for trying to get up and walk. He sat up on the EOB with very little assist and almost right away complained of dizziness and like he was going to fall over.   Spent considerable time trying to get accurate saturation readings on 2 monitors at once. His sats were reading in the 50's on both. Got RT in the room who confirmed situation and ended up putting him on a non re breather mask. He sat up on the EOB, transferred to and from the chair and the bed and was with him for about 1 hour. No progress and certainly not ready for discharge.      SUBJECTIVE:   Mr. Coretta Álvarez states, \"the COVID got me bad\"    SOCIAL HISTORY/ LIVING ENVIRONMENT: See initial evaluation     OBJECTIVE:     PAIN: VITAL SIGNS: LINES/DRAINS:   Pre Treatment: Pain Screen  Pain Scale 1: FLACC  Pain Intensity 1: 0  Post Treatment: 0   Melchor Catheter  O2 Device: Hi flow nasal cannula     MOBILITY: I Mod I S SBA CGA Min Mod Max Total  NT x2 Comments:   Bed Mobility    Rolling [] [] [] [] [] [] [] [] [] [] []    Supine to Sit [] [] [] [] [] [x] [] [] [] [] []    Scooting [] [] [] [] [] [] [] [] [] [] []    Sit to Supine [] [] [] [] [] [x] [] [] [] [] []    Transfers    Sit to Stand [] [] [] [] [] [x] [] [] [] [] []    Bed to Chair [] [] [] [] [] [x] [x] [] [] [] []    Stand to Sit [] [] [] [] [] [x] [] [] [] [] []    I=Independent, Mod I=Modified Independent, S=Supervision, SBA=Standby Assistance, CGA=Contact Guard Assistance,   Min=Minimal Assistance, Mod=Moderate Assistance, Max=Maximal Assistance, Total=Total Assistance, NT=Not Tested    GAIT: I Mod I S SBA CGA Min Mod Max Total  NT x2 Comments:   Level of Assistance [] [] [] [] [] [] [] [] [] [] []    Distance     DME Rolling Walker    Gait Quality slow    Weightbearing  Status N/A     I=Independent, Mod I=Modified Independent, S=Supervision, SBA=Standby Assistance, CGA=Contact Guard Assistance,   Min=Minimal Assistance, Mod=Moderate Assistance, Max=Maximal Assistance, Total=Total Assistance, NT=Not Tested    PLAN:   FREQUENCY/DURATION: PT Plan of Care: 3 times/week for duration of hospital stay or until stated goals are met, whichever comes first.  TREATMENT:     TREATMENT:   ($$ Therapeutic Activity: 53-67 mins    )  Therapeutic Activity: (    60 minutes): Therapeutic activities including Bed transfers, Chair transfers and Ambulation on level ground to improve mobility, strength and balance. Required moderate   to promote static and dynamic balance in standing.        AFTER TREATMENT POSITION/PRECAUTIONS:  Alarm Activated, Bed, Needs within reach and RN notified    INTERDISCIPLINARY COLLABORATION:  RN/PCT and RT    TOTAL TREATMENT DURATION:  PT Patient Time In/Time Out  Time In: 1015  Time Out: 1115    Agustín Landry, PTA

## 2021-01-30 ENCOUNTER — APPOINTMENT (OUTPATIENT)
Dept: GENERAL RADIOLOGY | Age: 86
DRG: 177 | End: 2021-01-30
Attending: HOSPITALIST
Payer: MEDICARE

## 2021-01-30 LAB
ANION GAP SERPL CALC-SCNC: 8 MMOL/L (ref 7–16)
BASOPHILS # BLD: 0 K/UL (ref 0–0.2)
BASOPHILS NFR BLD: 0 % (ref 0–2)
BUN SERPL-MCNC: 28 MG/DL (ref 8–23)
CALCIUM SERPL-MCNC: 8 MG/DL (ref 8.3–10.4)
CHLORIDE SERPL-SCNC: 105 MMOL/L (ref 98–107)
CO2 SERPL-SCNC: 27 MMOL/L (ref 21–32)
CREAT SERPL-MCNC: 1.05 MG/DL (ref 0.8–1.5)
DIFFERENTIAL METHOD BLD: ABNORMAL
EOSINOPHIL # BLD: 0.3 K/UL (ref 0–0.8)
EOSINOPHIL NFR BLD: 3 % (ref 0.5–7.8)
ERYTHROCYTE [DISTWIDTH] IN BLOOD BY AUTOMATED COUNT: 20.6 % (ref 11.9–14.6)
GLUCOSE BLD STRIP.AUTO-MCNC: 169 MG/DL (ref 65–100)
GLUCOSE BLD STRIP.AUTO-MCNC: 234 MG/DL (ref 65–100)
GLUCOSE BLD STRIP.AUTO-MCNC: 290 MG/DL (ref 65–100)
GLUCOSE BLD STRIP.AUTO-MCNC: 79 MG/DL (ref 65–100)
GLUCOSE SERPL-MCNC: 79 MG/DL (ref 65–100)
HCT VFR BLD AUTO: 37.2 % (ref 41.1–50.3)
HGB BLD-MCNC: 11.3 G/DL (ref 13.6–17.2)
IMM GRANULOCYTES # BLD AUTO: 0.1 K/UL (ref 0–0.5)
IMM GRANULOCYTES NFR BLD AUTO: 1 % (ref 0–5)
LYMPHOCYTES # BLD: 0.6 K/UL (ref 0.5–4.6)
LYMPHOCYTES NFR BLD: 4 % (ref 13–44)
MCH RBC QN AUTO: 27.9 PG (ref 26.1–32.9)
MCHC RBC AUTO-ENTMCNC: 30.4 G/DL (ref 31.4–35)
MCV RBC AUTO: 91.9 FL (ref 79.6–97.8)
MONOCYTES # BLD: 0.7 K/UL (ref 0.1–1.3)
MONOCYTES NFR BLD: 6 % (ref 4–12)
NEUTS SEG # BLD: 11.5 K/UL (ref 1.7–8.2)
NEUTS SEG NFR BLD: 87 % (ref 43–78)
NRBC # BLD: 0 K/UL (ref 0–0.2)
PLATELET # BLD AUTO: 180 K/UL (ref 150–450)
PMV BLD AUTO: 11.6 FL (ref 9.4–12.3)
POTASSIUM SERPL-SCNC: 4.5 MMOL/L (ref 3.5–5.1)
RBC # BLD AUTO: 4.05 M/UL (ref 4.23–5.6)
SODIUM SERPL-SCNC: 140 MMOL/L (ref 136–145)
WBC # BLD AUTO: 13.2 K/UL (ref 4.3–11.1)

## 2021-01-30 PROCEDURE — 65270000029 HC RM PRIVATE

## 2021-01-30 PROCEDURE — 74011636637 HC RX REV CODE- 636/637: Performed by: INTERNAL MEDICINE

## 2021-01-30 PROCEDURE — 74011250637 HC RX REV CODE- 250/637: Performed by: PHYSICIAN ASSISTANT

## 2021-01-30 PROCEDURE — 94762 N-INVAS EAR/PLS OXIMTRY CONT: CPT

## 2021-01-30 PROCEDURE — 71045 X-RAY EXAM CHEST 1 VIEW: CPT

## 2021-01-30 PROCEDURE — 80048 BASIC METABOLIC PNL TOTAL CA: CPT

## 2021-01-30 PROCEDURE — 74011250636 HC RX REV CODE- 250/636: Performed by: FAMILY MEDICINE

## 2021-01-30 PROCEDURE — 2709999900 HC NON-CHARGEABLE SUPPLY

## 2021-01-30 PROCEDURE — 94640 AIRWAY INHALATION TREATMENT: CPT

## 2021-01-30 PROCEDURE — 77010033711 HC HIGH FLOW OXYGEN

## 2021-01-30 PROCEDURE — 74011250637 HC RX REV CODE- 250/637: Performed by: FAMILY MEDICINE

## 2021-01-30 PROCEDURE — 74011250637 HC RX REV CODE- 250/637: Performed by: HOSPITALIST

## 2021-01-30 PROCEDURE — 77030021668 HC NEB PREFIL KT VYRM -A

## 2021-01-30 PROCEDURE — 82962 GLUCOSE BLOOD TEST: CPT

## 2021-01-30 PROCEDURE — 74011250636 HC RX REV CODE- 250/636: Performed by: HOSPITALIST

## 2021-01-30 PROCEDURE — 74011250637 HC RX REV CODE- 250/637: Performed by: INTERNAL MEDICINE

## 2021-01-30 PROCEDURE — 36415 COLL VENOUS BLD VENIPUNCTURE: CPT

## 2021-01-30 PROCEDURE — 77030040393 HC DRSG OPTIFOAM GENT MDII -B

## 2021-01-30 PROCEDURE — 85025 COMPLETE CBC W/AUTO DIFF WBC: CPT

## 2021-01-30 RX ORDER — DEXAMETHASONE 4 MG/1
6 TABLET ORAL EVERY 12 HOURS
Status: COMPLETED | OUTPATIENT
Start: 2021-01-31 | End: 2021-02-04

## 2021-01-30 RX ORDER — ALBUTEROL SULFATE 90 UG/1
2 AEROSOL, METERED RESPIRATORY (INHALATION)
Status: DISCONTINUED | OUTPATIENT
Start: 2021-01-30 | End: 2021-02-01

## 2021-01-30 RX ORDER — BUDESONIDE AND FORMOTEROL FUMARATE DIHYDRATE 160; 4.5 UG/1; UG/1
2 AEROSOL RESPIRATORY (INHALATION)
Status: DISCONTINUED | OUTPATIENT
Start: 2021-01-30 | End: 2021-02-07 | Stop reason: HOSPADM

## 2021-01-30 RX ORDER — DEXAMETHASONE SODIUM PHOSPHATE 100 MG/10ML
10 INJECTION INTRAMUSCULAR; INTRAVENOUS ONCE
Status: COMPLETED | OUTPATIENT
Start: 2021-01-30 | End: 2021-01-30

## 2021-01-30 RX ORDER — GUAIFENESIN 600 MG/1
1200 TABLET, EXTENDED RELEASE ORAL EVERY 12 HOURS
Status: DISCONTINUED | OUTPATIENT
Start: 2021-01-30 | End: 2021-02-07 | Stop reason: HOSPADM

## 2021-01-30 RX ADMIN — PHENYLEPHRINE HYDROCHLORIDE AND FAT, HARD 1 SUPPOSITORY: 5; 1.77 SUPPOSITORY RECTAL at 11:47

## 2021-01-30 RX ADMIN — INSULIN LISPRO 4 UNITS: 100 INJECTION, SOLUTION INTRAVENOUS; SUBCUTANEOUS at 16:30

## 2021-01-30 RX ADMIN — Medication 10 ML: at 14:00

## 2021-01-30 RX ADMIN — ALBUTEROL SULFATE 2 PUFF: 108 AEROSOL, METERED RESPIRATORY (INHALATION) at 16:33

## 2021-01-30 RX ADMIN — BUDESONIDE AND FORMOTEROL FUMARATE DIHYDRATE 2 PUFF: 160; 4.5 AEROSOL RESPIRATORY (INHALATION) at 19:42

## 2021-01-30 RX ADMIN — ALBUTEROL SULFATE 2 PUFF: 108 AEROSOL, METERED RESPIRATORY (INHALATION) at 19:42

## 2021-01-30 RX ADMIN — DEXAMETHASONE SODIUM PHOSPHATE 10 MG: 10 INJECTION INTRAMUSCULAR; INTRAVENOUS at 12:37

## 2021-01-30 RX ADMIN — HUMAN INSULIN 10 UNITS: 100 INJECTION, SUSPENSION SUBCUTANEOUS at 09:00

## 2021-01-30 RX ADMIN — ROSUVASTATIN 10 MG: 10 TABLET, FILM COATED ORAL at 22:20

## 2021-01-30 RX ADMIN — INSULIN LISPRO 2 UNITS: 100 INJECTION, SOLUTION INTRAVENOUS; SUBCUTANEOUS at 11:30

## 2021-01-30 RX ADMIN — PANTOPRAZOLE SODIUM 40 MG: 40 TABLET, DELAYED RELEASE ORAL at 05:49

## 2021-01-30 RX ADMIN — GLYCERIN, PETROLATUM, PHENYLEPHRINE HCL, PRAMOXINE HCL: 144; 2.5; 10; 15 CREAM TOPICAL at 09:00

## 2021-01-30 RX ADMIN — NYSTATIN: 100000 POWDER TOPICAL at 18:00

## 2021-01-30 RX ADMIN — MORPHINE SULFATE 1 MG: 2 INJECTION, SOLUTION INTRAMUSCULAR; INTRAVENOUS at 12:49

## 2021-01-30 RX ADMIN — GUAIFENESIN 600 MG: 600 TABLET ORAL at 08:59

## 2021-01-30 RX ADMIN — PANTOPRAZOLE SODIUM 40 MG: 40 TABLET, DELAYED RELEASE ORAL at 17:06

## 2021-01-30 RX ADMIN — INSULIN LISPRO 6 UNITS: 100 INJECTION, SOLUTION INTRAVENOUS; SUBCUTANEOUS at 22:21

## 2021-01-30 RX ADMIN — BUDESONIDE AND FORMOTEROL FUMARATE DIHYDRATE 2 PUFF: 160; 4.5 AEROSOL RESPIRATORY (INHALATION) at 13:40

## 2021-01-30 RX ADMIN — GUAIFENESIN 1200 MG: 600 TABLET ORAL at 22:20

## 2021-01-30 RX ADMIN — NYSTATIN: 100000 POWDER TOPICAL at 09:00

## 2021-01-30 RX ADMIN — PHENYLEPHRINE HYDROCHLORIDE AND FAT, HARD 1 SUPPOSITORY: 5; 1.77 SUPPOSITORY RECTAL at 22:23

## 2021-01-30 RX ADMIN — HUMAN INSULIN 10 UNITS: 100 INJECTION, SUSPENSION SUBCUTANEOUS at 22:21

## 2021-01-30 RX ADMIN — ALBUTEROL SULFATE 2 PUFF: 108 AEROSOL, METERED RESPIRATORY (INHALATION) at 13:40

## 2021-01-30 NOTE — PROGRESS NOTES
01/30/21 0550   Oxygen Therapy   Pulse via Oximetry 63 beats per minute   O2 Device Heated; Hi flow nasal cannula   O2 Flow Rate (L/min) 45 l/min   O2 Temperature 87.8 °F (31 °C)   FIO2 (%) 90 %     Pt rounded on hourly and PRN. No complaints of pain, nausea,vomiting. Bed is low, locked, call bell within reach. All needs met this shift. Will continue to monitor until next RN comes on.

## 2021-01-30 NOTE — PROGRESS NOTES
Hospitalist Progress Note    2021  Admit Date: 2021  1:50 AM   NAME: Robbin Kitchen   :  1933   MRN:  [id number]   Attending: Viktoria Hamilton MD  PCP:  Dutch Purvis MD    SUBJECTIVE:   Robbin Kitchen is a 80 y.o. male hx of DM type II, GERD, admitted for COVID19, acute hypoxic respiratory failure, and rectal bleeding. GI consulted and diagnosed with external hemorrhoids. Recommends Tuck pads and anusol NM BID. Hg stable trends. [facility] has a Stage 2 small sacral wound, needs frequent turning. He has had mild hyperkalemia, improved after receiving lokelma. S/p convalescent plasma on 21. Also received remdesivir -. On 21, he got up without O2 and desatted, took time to improve and required up to 15 L NC.  had been back down to 5L high flow o2. Then o2 dropped to 70% after working with PT and had a difficult time getting his o2 levels up. Placed on Heated High Flow 50L.     2021: Continued progressive worsening hypoxia now requiring heated high flow. Patient states he feels like he is dying. +SOB. Sad mood. States he is lonely. Denies CP fever, chills, abd pain. Tolerating only minimal amounts po. Review of Systems negative with exception of pertinent positives noted above  PHYSICAL EXAM     Visit Vitals  /68 (BP 1 Location: Right upper arm, BP Patient Position: Supine)   Pulse 60   Temp 98.3 °F (36.8 °C)   Resp 20   Ht 5' 6\" (1.676 m)   SpO2 100%   BMI 27.44 kg/m²      Temp (24hrs), Av.1 °F (36.7 °C), Min:97.7 °F (36.5 °C), Max:98.4 °F (36.9 °C)    Oxygen Therapy  O2 Sat (%): 100 % (21 0750)  Pulse via Oximetry: 63 beats per minute (21 0550)  O2 Device: Heated; Hi flow nasal cannula (21 0550)  O2 Flow Rate (L/min): 45 l/min (21 0550)  O2 Temperature: 87.8 °F (31 °C) (21 0550)  FIO2 (%): 90 % (21 0550)  No intake or output data in the 24 hours ending 21 1136     General: Alert, Oriented mild distress.    Eye: EOMI, Normal conjunctiva, Vision Unchanged. HENT: Normocephalic, atraumatic, hard of hearing, moist mucous membranes. Neck: Supple, Non-tender. Respiratory: diminished b/l Respirations are non-labored. o2 via heated high flow 45  Cardiovascular: Normal rate, Regular rhythm, No murmur. Gastrointestinal: Soft, Non-tender, nondistended, positive bowel sounds   Musculoskeletal: No cyanosis, clubbing or edema. Integumentary: Warm, Dry, chronic sacral ulcers  Neurologic: Alert, No focal deficits. Psychiatric: Cooperative, depressed mood & affect. ASSESSMENT & PLAN     Acute hypoxic respiratory failure secondary to COVID19 pneumonia:   - worsening   - CXR 1/30  - Restart a Decadron course  - Add Albuterol and Symbicort  - previously completed convalescent plasma and remdesevir  - supplemental O2 as needed to keep goal SpO2>90%  - high risk for clinical decompensation and death    Hypotension  - resolved  - continue to monitor bp and trend  - not on antihypertensives  - IVFs as needed     Rectal bleeding due to hemorrhoids  - continue with phenylephrine suppository   - Hgb stable  - recheck cbc as clinically indicated     Stage II sacral ulcers  - frequent turning     DM type II  - continue current regimen  - SSI and serial CBGs  - will need adjustment in next 24 hours since we are restarting steriods. - monitor daily insulin requirements     Hyperkalemia  - resolved    Hyponatremia  - resolved     Bradycardia, asymptomatic  - continue to monitor  - avoid BBs and CCBs     F/E/N: no fluids, replete electrolytes as needed, diabetic diet     Ppx: SCDs for VTE (avoid chemical anticoagulation with bleeding)     Code Status: DNR     Disposition: failed disposition to rehab over last two days due to clinically worsening hypoxia. Discussed with patient at bedside and on phone with niece and wife. Discussed advance care planning. High risk for continued decline. Continue advanced care planning discussions with family.  Our phone call got cut off and return phone call went straight to voicemail.      Oz Mckay, JAVAD, MPAS

## 2021-01-31 LAB
ANION GAP SERPL CALC-SCNC: 4 MMOL/L (ref 7–16)
BASOPHILS # BLD: 0 K/UL (ref 0–0.2)
BASOPHILS NFR BLD: 0 % (ref 0–2)
BUN SERPL-MCNC: 25 MG/DL (ref 8–23)
CALCIUM SERPL-MCNC: 8 MG/DL (ref 8.3–10.4)
CHLORIDE SERPL-SCNC: 105 MMOL/L (ref 98–107)
CO2 SERPL-SCNC: 30 MMOL/L (ref 21–32)
CREAT SERPL-MCNC: 0.98 MG/DL (ref 0.8–1.5)
DIFFERENTIAL METHOD BLD: ABNORMAL
EOSINOPHIL # BLD: 0.2 K/UL (ref 0–0.8)
EOSINOPHIL NFR BLD: 2 % (ref 0.5–7.8)
ERYTHROCYTE [DISTWIDTH] IN BLOOD BY AUTOMATED COUNT: 20 % (ref 11.9–14.6)
GLUCOSE BLD STRIP.AUTO-MCNC: 190 MG/DL (ref 65–100)
GLUCOSE BLD STRIP.AUTO-MCNC: 210 MG/DL (ref 65–100)
GLUCOSE BLD STRIP.AUTO-MCNC: 298 MG/DL (ref 65–100)
GLUCOSE BLD STRIP.AUTO-MCNC: 421 MG/DL (ref 65–100)
GLUCOSE SERPL-MCNC: 192 MG/DL (ref 65–100)
HCT VFR BLD AUTO: 31.6 % (ref 41.1–50.3)
HGB BLD-MCNC: 10.2 G/DL (ref 13.6–17.2)
IMM GRANULOCYTES # BLD AUTO: 0.1 K/UL (ref 0–0.5)
IMM GRANULOCYTES NFR BLD AUTO: 1 % (ref 0–5)
LYMPHOCYTES # BLD: 0.4 K/UL (ref 0.5–4.6)
LYMPHOCYTES NFR BLD: 3 % (ref 13–44)
MCH RBC QN AUTO: 29.1 PG (ref 26.1–32.9)
MCHC RBC AUTO-ENTMCNC: 32.3 G/DL (ref 31.4–35)
MCV RBC AUTO: 90 FL (ref 79.6–97.8)
MONOCYTES # BLD: 0.6 K/UL (ref 0.1–1.3)
MONOCYTES NFR BLD: 4 % (ref 4–12)
NEUTS SEG # BLD: 13 K/UL (ref 1.7–8.2)
NEUTS SEG NFR BLD: 91 % (ref 43–78)
NRBC # BLD: 0 K/UL (ref 0–0.2)
PLATELET # BLD AUTO: 172 K/UL (ref 150–450)
PMV BLD AUTO: 11.6 FL (ref 9.4–12.3)
POTASSIUM SERPL-SCNC: 5.1 MMOL/L (ref 3.5–5.1)
RBC # BLD AUTO: 3.51 M/UL (ref 4.23–5.6)
SODIUM SERPL-SCNC: 139 MMOL/L (ref 136–145)
WBC # BLD AUTO: 14.3 K/UL (ref 4.3–11.1)

## 2021-01-31 PROCEDURE — 74011250637 HC RX REV CODE- 250/637: Performed by: FAMILY MEDICINE

## 2021-01-31 PROCEDURE — 74011250636 HC RX REV CODE- 250/636: Performed by: HOSPITALIST

## 2021-01-31 PROCEDURE — 97110 THERAPEUTIC EXERCISES: CPT

## 2021-01-31 PROCEDURE — 74011250637 HC RX REV CODE- 250/637: Performed by: INTERNAL MEDICINE

## 2021-01-31 PROCEDURE — 82962 GLUCOSE BLOOD TEST: CPT

## 2021-01-31 PROCEDURE — 65270000029 HC RM PRIVATE

## 2021-01-31 PROCEDURE — 94762 N-INVAS EAR/PLS OXIMTRY CONT: CPT

## 2021-01-31 PROCEDURE — 94640 AIRWAY INHALATION TREATMENT: CPT

## 2021-01-31 PROCEDURE — 74011000250 HC RX REV CODE- 250: Performed by: INTERNAL MEDICINE

## 2021-01-31 PROCEDURE — 74011250637 HC RX REV CODE- 250/637: Performed by: PHYSICIAN ASSISTANT

## 2021-01-31 PROCEDURE — 74011636637 HC RX REV CODE- 636/637: Performed by: INTERNAL MEDICINE

## 2021-01-31 PROCEDURE — 74011250637 HC RX REV CODE- 250/637: Performed by: HOSPITALIST

## 2021-01-31 PROCEDURE — 2709999900 HC NON-CHARGEABLE SUPPLY

## 2021-01-31 PROCEDURE — 85025 COMPLETE CBC W/AUTO DIFF WBC: CPT

## 2021-01-31 PROCEDURE — 80048 BASIC METABOLIC PNL TOTAL CA: CPT

## 2021-01-31 PROCEDURE — 74011636637 HC RX REV CODE- 636/637: Performed by: FAMILY MEDICINE

## 2021-01-31 PROCEDURE — 36415 COLL VENOUS BLD VENIPUNCTURE: CPT

## 2021-01-31 RX ADMIN — DEXAMETHASONE 6 MG: 4 TABLET ORAL at 21:37

## 2021-01-31 RX ADMIN — PANTOPRAZOLE SODIUM 40 MG: 40 TABLET, DELAYED RELEASE ORAL at 05:29

## 2021-01-31 RX ADMIN — ALBUTEROL SULFATE 2 PUFF: 108 AEROSOL, METERED RESPIRATORY (INHALATION) at 04:09

## 2021-01-31 RX ADMIN — INSULIN LISPRO 2 UNITS: 100 INJECTION, SOLUTION INTRAVENOUS; SUBCUTANEOUS at 08:40

## 2021-01-31 RX ADMIN — HUMAN INSULIN 10 UNITS: 100 INJECTION, SUSPENSION SUBCUTANEOUS at 08:40

## 2021-01-31 RX ADMIN — PHENYLEPHRINE HYDROCHLORIDE AND FAT, HARD 1 SUPPOSITORY: 5; 1.77 SUPPOSITORY RECTAL at 21:37

## 2021-01-31 RX ADMIN — ALBUTEROL SULFATE 2 PUFF: 108 AEROSOL, METERED RESPIRATORY (INHALATION) at 20:34

## 2021-01-31 RX ADMIN — BUDESONIDE AND FORMOTEROL FUMARATE DIHYDRATE 2 PUFF: 160; 4.5 AEROSOL RESPIRATORY (INHALATION) at 20:34

## 2021-01-31 RX ADMIN — INSULIN LISPRO 6 UNITS: 100 INJECTION, SOLUTION INTRAVENOUS; SUBCUTANEOUS at 16:55

## 2021-01-31 RX ADMIN — INSULIN LISPRO 4 UNITS: 100 INJECTION, SOLUTION INTRAVENOUS; SUBCUTANEOUS at 11:54

## 2021-01-31 RX ADMIN — ALBUTEROL SULFATE 2 PUFF: 108 AEROSOL, METERED RESPIRATORY (INHALATION) at 00:29

## 2021-01-31 RX ADMIN — ALBUTEROL SULFATE 2 PUFF: 108 AEROSOL, METERED RESPIRATORY (INHALATION) at 09:27

## 2021-01-31 RX ADMIN — INSULIN LISPRO 10 UNITS: 100 INJECTION, SOLUTION INTRAVENOUS; SUBCUTANEOUS at 21:38

## 2021-01-31 RX ADMIN — DIPHENHYDRAMINE HYDROCHLORIDE 5 ML: 12.5 SOLUTION ORAL at 12:30

## 2021-01-31 RX ADMIN — GLYCERIN, PETROLATUM, PHENYLEPHRINE HCL, PRAMOXINE HCL: 144; 2.5; 10; 15 CREAM TOPICAL at 09:00

## 2021-01-31 RX ADMIN — GUAIFENESIN 1200 MG: 600 TABLET ORAL at 21:37

## 2021-01-31 RX ADMIN — NYSTATIN: 100000 POWDER TOPICAL at 08:40

## 2021-01-31 RX ADMIN — DIPHENHYDRAMINE HYDROCHLORIDE 5 ML: 12.5 SOLUTION ORAL at 18:11

## 2021-01-31 RX ADMIN — NYSTATIN: 100000 POWDER TOPICAL at 16:56

## 2021-01-31 RX ADMIN — PANTOPRAZOLE SODIUM 40 MG: 40 TABLET, DELAYED RELEASE ORAL at 16:57

## 2021-01-31 RX ADMIN — HUMAN INSULIN 5 UNITS: 100 INJECTION, SUSPENSION SUBCUTANEOUS at 21:50

## 2021-01-31 RX ADMIN — DEXAMETHASONE 6 MG: 4 TABLET ORAL at 08:41

## 2021-01-31 RX ADMIN — ROSUVASTATIN 10 MG: 10 TABLET, FILM COATED ORAL at 21:37

## 2021-01-31 RX ADMIN — Medication 10 ML: at 14:31

## 2021-01-31 RX ADMIN — GLYCERIN, PETROLATUM, PHENYLEPHRINE HCL, PRAMOXINE HCL: 144; 2.5; 10; 15 CREAM TOPICAL at 21:41

## 2021-01-31 RX ADMIN — BUDESONIDE AND FORMOTEROL FUMARATE DIHYDRATE 2 PUFF: 160; 4.5 AEROSOL RESPIRATORY (INHALATION) at 09:27

## 2021-01-31 RX ADMIN — PHENYLEPHRINE HYDROCHLORIDE AND FAT, HARD 1 SUPPOSITORY: 5; 1.77 SUPPOSITORY RECTAL at 08:43

## 2021-01-31 RX ADMIN — Medication 10 ML: at 21:49

## 2021-01-31 RX ADMIN — GUAIFENESIN 1200 MG: 600 TABLET ORAL at 08:41

## 2021-01-31 RX ADMIN — HUMAN INSULIN 10 UNITS: 100 INJECTION, SUSPENSION SUBCUTANEOUS at 21:39

## 2021-01-31 NOTE — PROGRESS NOTES
01/31/21 0409   Oxygen Therapy   Pulse via Oximetry 59 beats per minute   O2 Device Hi flow nasal cannula; Heated   O2 Flow Rate (L/min) 45 l/min   O2 Temperature 87.8 °F (31 °C)   FIO2 (%) 70 %     Pt rounded on hourly and PRN. No complaints of pain, nausea,vomiting. Bed is low, locked, call bell within reach. All needs met this shift. Will continue to monitor until next RN comes on.

## 2021-01-31 NOTE — PROGRESS NOTES
reviewed notes for spiritual concerns   noted that the patient felt like \"he was dying \" due to oxygen shortage   staff continues to be very compassionate in his care

## 2021-01-31 NOTE — PROGRESS NOTES
Progress Note  Date:2021       Room:[facility]  Patient Name:Damian Flores     Date of Birth:     Age:88 y.o. Subjective    Subjective   80year old CM PMH DM type II, GERD, admitted  for COVID19, acute hypoxic respiratory failure, and rectal bleeding. He was seen by GI who stated bleeding from hemorrhoids, Tucks pads and Anusol NH ordered. He has required oxygen on airvo since  (was on NC). S/P Convalescent plasma on , Remdesivir -.      :  Patient alert and oriented to name, place. Some baseline confusion to situation. Saturations mid 90s on 45 liters/88% today. **Spouse-Karen Flores-updated in patient's room on phone. All questions answered. Review of Systems   negative with exception of pertinent positives noted above  Objective         Vitals Last 24 Hours:  TEMPERATURE:  Temp  Av.9 °F (36.6 °C)  Min: 97.7 °F (36.5 °C)  Max: 98.2 °F (36.8 °C)  RESPIRATIONS RANGE: Resp  Av.7  Min: 20  Max: 24  PULSE OXIMETRY RANGE: SpO2  Av.9 %  Min: 95 %  Max: 99 %  PULSE RANGE: Pulse  Av.5  Min: 56  Max: 72  BLOOD PRESSURE RANGE: Systolic (98CCM), SMQ:597 , Min:113 , UJ   ; Diastolic (92ZFV), LOQ:89, Min:57, Max:68    I/O (24Hr): Intake/Output Summary (Last 24 hours) at 2021 1038  Last data filed at 2021 1006  Gross per 24 hour   Intake 480 ml   Output 1580 ml   Net -1100 ml     Objective   General:  Alert, calm, no distress, elderly   Lungs:   Clear to auscultation bilaterally anterior;, diminished lower bases    Heart:  Regular rate and rhythm, S1, S2 normal, no edema    Abdomen:   Soft, non-tender.  Bowel sounds normal.    Neurologic: Grossly intact , pleasant        Labs/Imaging/Diagnostics    Labs:  CBC:  Recent Labs     21  0812 21  0626   WBC 13.2* 16.3*   RBC 4.05* 4.48   HGB 11.3* 12.5*   HCT 37.2* 40.0*   MCV 91.9 89.3   RDW 20.6* 19.8*    242     CHEMISTRIES:  Recent Labs 01/30/21  0812 01/29/21  0626    137*   K 4.5 4.5    102   CO2 27 31   BUN 28* 28*   CA 8.0* 8.4   PT/INR:No results for input(s): INR, INREXT in the last 72 hours. No lab exists for component: PROTIME  APTT:No results for input(s): APTT in the last 72 hours. LIVER PROFILE:No results for input(s): AST, ALT in the last 72 hours. No lab exists for component: Radha Rasheed ALKPHOS  Lab Results   Component Value Date/Time    ALT (SGPT) 26 01/22/2021 04:41 AM    AST (SGOT) 18 01/22/2021 04:41 AM    Alk. phosphatase 81 01/22/2021 04:41 AM    Bilirubin, total 0.4 01/22/2021 04:41 AM       Imaging Last 24 Hours:  Xr Chest Sngl V    Result Date: 1/30/2021  Portable chest: History: WORSENING BILATERAL PULMONARY INFILTRATES. Comparison: 01/25/2021 Findings: A single view of the chest was obtained at 12 1:00 PM.. The cardiac and mediastinal silhouette are normal in size and configuration. There are asymmetric interstitial opacities, right greater than left without significant change. The pulmonary vascularity is within normal limits. There are no pleural effusions. A right shoulder arthroplasty is present. No significant change. Assessment//Plan   Principal Problem:    Acute respiratory failure with hypoxia ([address] (1/7/2021)    Active Problems:    COVID-19 (1/7/2021)      Acute metabolic encephalopathy (1/9/4564)      Type 2 diabetes mellitus, with long-term current use of insulin ([address] (1/7/2021)      Gastrointestinal hemorrhage associated with anorectal source (1/14/2021)      Decubitus ulcer of buttock, stage 2 ([address] (1/19/2021)      Assessment & Plan  1. Acute hypoxic respiratory failure secondary to COVID19 pneumonia:   - finished Decadron  - already finished convalescent plasma and remdesevir  - wean airvo as tolerated, 45 Liters and 88% today's settings.   - goal SpO2>90%     2. Rectal bleeding due to hemorrhoids  - continue with phenylephrine suppository   - trend CBCs     3. Stage II sacral ulcers  - frequent turning     4. DM type II  - continue current regimen  - would recommend weaning off NPH since he is off steroids now  - SSI and serial CBGs     5. Hyperkalemia  - Resolved     6.  Bradycardia, asymptomatic  -in 60s-70s except for 1 reading that was 56 today  - continue to monitor  - avoid BBs and CCBs    Electronically signed by Alix Love NP on 1/31/2021 at 10:38 AM

## 2021-01-31 NOTE — PROGRESS NOTES
ACUTE OT GOALS:  (Developed with and agreed upon by patient and/or caregiver.)  1. Patient will complete lower body bathing and dressing with Mod I and adaptive equipment as needed. 2. Patient will complete toileting with Mod I and adaptive equipment as needed. 3. Patient will complete seated ADL for at least 8 minutes with good static and good dynamic seated balance. 4. Patient will complete functional transfers with Mod I and adaptive equipment as needed. 5. Patient will complete standing ADL with Mod I and good static and good dynamic standing balance.     Timeframe: 7 visits     OCCUPATIONAL THERAPY: Daily Note and PM OT Treatment Day # 6    Cat Moser is a 80 y.o. male   PRIMARY DIAGNOSIS: Acute respiratory failure with hypoxia (HCC)  Acute respiratory failure with hypoxia ([facility] [J96.01]       Payor: [employer] MEDICARE / Plan: [address] [place] [address] HMO / Product Type: Managed Care Medicare /   ASSESSMENT:     REHAB RECOMMENDATIONS: CURRENT LEVEL OF FUNCTION:  (Most Recently Demonstrated)   Recommendation to date pending progress:  Setting:   Short-term Rehab   Equipment:    To Be Determined Bathing:   Not tested  Dressing:   Not tested . Feeding/Grooming:   Not tested   Toileting:   Not tested  Functional Mobility:   Not tested      ASSESSMENT:  Mr. Omar Nuñez was admitted for Acute respiratory failure with hypoxia. Pt now on AirVo 45L 70%. Pt is now C19-. Pt was in the bed upon arrival. Pt did not want to get up today. Pt completed the exercises below on B UE's. Minimal progress made. Continue POC. SUBJECTIVE:   Mr. Omar Nuñez states, \"I wonder if I will make it out of here alive. \"    SOCIAL HISTORY/LIVING ENVIRONMENT: Pt lives with spouse in two story home (all needs met on first floor) with 4 YVROSE. Pt is typically Mod I for performance of ADLs and Mod I for functional mobility with use of SPC.  Pt reports one fall and no home O2 use.     OBJECTIVE:     PAIN: VITAL SIGNS: LINES/DRAINS:   Pre Treatment: Post Treatment: Unchanged     Pt observed to desaturate to 68%. Pt O2 increased to 15L O2 secondary to desaturation. O2 decreased to 12L O2 and pt able to maintain O2 sats at 92% following return to supine. O2 Device: Nasal Cannula     ACTIVITIES OF DAILY LIVING: I Mod I S SBA CGA Min Mod Max Total NT Comments   BASIC ADLs:              Bathing/ Showering [] [] [] [] [] [] [] [] [] [x]    Toileting [] [] [] [] [] [] [] [] [] [x]    Dressing [] [] [] [] [] [] [] [] [] [x]    Feeding [] [] [] [] [] [] [] [] [] [x]    Grooming [] [] [] [] [] [] [] [] [] [x]    Personal Device Care [] [] [] [] [] [] [] [] [] [x]    Functional Mobility [] [] [] [] [] [] [] [] [] [x]    I=Independent, Mod I=Modified Independent, S=Supervision, SBA=Standby Assistance, CGA=Contact Guard Assistance,   Min=Minimal Assistance, Mod=Moderate Assistance, Max=Maximal Assistance, Total=Total Assistance, NT=Not Tested    MOBILITY: I Mod I S SBA CGA Min Mod Max Total  NT x2 Comments:   Supine to sit [] [] [] [] [] [] [] [] [] [x] []    Sit to supine [] [] [] [] [] [] [] [] [] [x] []    Sit to stand [] [] [] [] [] [] [] [] [] [x] [] . Bed to chair [] [] [] [] [] [] [] [] [] [x] []    I=Independent, Mod I=Modified Independent, S=Supervision, SBA=Standby Assistance, CGA=Contact Guard Assistance,   Min=Minimal Assistance, Mod=Moderate Assistance, Max=Maximal Assistance, Total=Total Assistance, NT=Not Tested    PLAN:   FREQUENCY/DURATION: OT Plan of Care: 3 times/week for duration of hospital stay or until stated goals are met, whichever comes first.    TREATMENT:   TREATMENT:   Therapeutic Exercise: (  15):  Exercises per grid below to improve mobility and strength. Required min verbal cues to promote proper body posture and promote proper body mechanics. Progressed range and repetitions as indicated.     ROM ex on B UE's  Date:  1/31/21 Date:   Date:     Activity/Exercise Parameters Parameters Parameters   Shoulder flex/ex 20 reps      Shoulder hor abd/add 20 reps      Elbow flex/ex 20 reps      Wrist flex/ex  20 reps      Hand squeezes  20 reps                      AFTER TREATMENT POSITION/PRECAUTIONS:  Alarm Activated, Bed, Needs within reach and RN notified    INTERDISCIPLINARY COLLABORATION:  RN/PCT and OT/PETIT    TOTAL TREATMENT DURATION:   Time in: 1400  Time out: [address],

## 2021-02-01 LAB
ANION GAP SERPL CALC-SCNC: 6 MMOL/L (ref 7–16)
BASOPHILS # BLD: 0 K/UL (ref 0–0.2)
BASOPHILS NFR BLD: 0 % (ref 0–2)
BUN SERPL-MCNC: 26 MG/DL (ref 8–23)
CALCIUM SERPL-MCNC: 8 MG/DL (ref 8.3–10.4)
CHLORIDE SERPL-SCNC: 103 MMOL/L (ref 98–107)
CO2 SERPL-SCNC: 28 MMOL/L (ref 21–32)
CREAT SERPL-MCNC: 0.98 MG/DL (ref 0.8–1.5)
DIFFERENTIAL METHOD BLD: ABNORMAL
EOSINOPHIL # BLD: 0 K/UL (ref 0–0.8)
EOSINOPHIL NFR BLD: 0 % (ref 0.5–7.8)
ERYTHROCYTE [DISTWIDTH] IN BLOOD BY AUTOMATED COUNT: 19.9 % (ref 11.9–14.6)
GLUCOSE BLD STRIP.AUTO-MCNC: 235 MG/DL (ref 65–100)
GLUCOSE BLD STRIP.AUTO-MCNC: 306 MG/DL (ref 65–100)
GLUCOSE BLD STRIP.AUTO-MCNC: 328 MG/DL (ref 65–100)
GLUCOSE BLD STRIP.AUTO-MCNC: 410 MG/DL (ref 65–100)
GLUCOSE SERPL-MCNC: 300 MG/DL (ref 65–100)
HCT VFR BLD AUTO: 31.4 % (ref 41.1–50.3)
HGB BLD-MCNC: 10.2 G/DL (ref 13.6–17.2)
IMM GRANULOCYTES # BLD AUTO: 0.1 K/UL (ref 0–0.5)
IMM GRANULOCYTES NFR BLD AUTO: 1 % (ref 0–5)
LYMPHOCYTES # BLD: 0.2 K/UL (ref 0.5–4.6)
LYMPHOCYTES NFR BLD: 1 % (ref 13–44)
MCH RBC QN AUTO: 29.3 PG (ref 26.1–32.9)
MCHC RBC AUTO-ENTMCNC: 32.5 G/DL (ref 31.4–35)
MCV RBC AUTO: 90.2 FL (ref 79.6–97.8)
MONOCYTES # BLD: 0.1 K/UL (ref 0.1–1.3)
MONOCYTES NFR BLD: 1 % (ref 4–12)
NEUTS SEG # BLD: 11.1 K/UL (ref 1.7–8.2)
NEUTS SEG NFR BLD: 97 % (ref 43–78)
NRBC # BLD: 0 K/UL (ref 0–0.2)
PLATELET # BLD AUTO: 164 K/UL (ref 150–450)
PMV BLD AUTO: 12.3 FL (ref 9.4–12.3)
POTASSIUM SERPL-SCNC: 5.2 MMOL/L (ref 3.5–5.1)
RBC # BLD AUTO: 3.48 M/UL (ref 4.23–5.6)
SODIUM SERPL-SCNC: 137 MMOL/L (ref 136–145)
WBC # BLD AUTO: 11.5 K/UL (ref 4.3–11.1)

## 2021-02-01 PROCEDURE — 74011250637 HC RX REV CODE- 250/637: Performed by: HOSPITALIST

## 2021-02-01 PROCEDURE — 97530 THERAPEUTIC ACTIVITIES: CPT

## 2021-02-01 PROCEDURE — 82962 GLUCOSE BLOOD TEST: CPT

## 2021-02-01 PROCEDURE — 74011250637 HC RX REV CODE- 250/637: Performed by: INTERNAL MEDICINE

## 2021-02-01 PROCEDURE — 94762 N-INVAS EAR/PLS OXIMTRY CONT: CPT

## 2021-02-01 PROCEDURE — 85025 COMPLETE CBC W/AUTO DIFF WBC: CPT

## 2021-02-01 PROCEDURE — 74011636637 HC RX REV CODE- 636/637: Performed by: NURSE PRACTITIONER

## 2021-02-01 PROCEDURE — 74011636637 HC RX REV CODE- 636/637: Performed by: INTERNAL MEDICINE

## 2021-02-01 PROCEDURE — 77010033711 HC HIGH FLOW OXYGEN

## 2021-02-01 PROCEDURE — 74011250636 HC RX REV CODE- 250/636: Performed by: HOSPITALIST

## 2021-02-01 PROCEDURE — 74011250636 HC RX REV CODE- 250/636: Performed by: FAMILY MEDICINE

## 2021-02-01 PROCEDURE — 94640 AIRWAY INHALATION TREATMENT: CPT

## 2021-02-01 PROCEDURE — 65270000029 HC RM PRIVATE

## 2021-02-01 PROCEDURE — 80048 BASIC METABOLIC PNL TOTAL CA: CPT

## 2021-02-01 PROCEDURE — 74011250637 HC RX REV CODE- 250/637: Performed by: PHYSICIAN ASSISTANT

## 2021-02-01 RX ORDER — INSULIN GLARGINE 100 [IU]/ML
30 INJECTION, SOLUTION SUBCUTANEOUS
Status: DISCONTINUED | OUTPATIENT
Start: 2021-02-01 | End: 2021-02-02

## 2021-02-01 RX ORDER — INSULIN LISPRO 100 [IU]/ML
6 INJECTION, SOLUTION INTRAVENOUS; SUBCUTANEOUS
Status: DISCONTINUED | OUTPATIENT
Start: 2021-02-01 | End: 2021-02-02

## 2021-02-01 RX ORDER — ALBUTEROL SULFATE 90 UG/1
2 AEROSOL, METERED RESPIRATORY (INHALATION)
Status: DISCONTINUED | OUTPATIENT
Start: 2021-02-01 | End: 2021-02-04

## 2021-02-01 RX ADMIN — GUAIFENESIN 1200 MG: 600 TABLET ORAL at 08:46

## 2021-02-01 RX ADMIN — BUDESONIDE AND FORMOTEROL FUMARATE DIHYDRATE 2 PUFF: 160; 4.5 AEROSOL RESPIRATORY (INHALATION) at 20:32

## 2021-02-01 RX ADMIN — MORPHINE SULFATE 1 MG: 2 INJECTION, SOLUTION INTRAMUSCULAR; INTRAVENOUS at 14:15

## 2021-02-01 RX ADMIN — INSULIN LISPRO 10 UNITS: 100 INJECTION, SOLUTION INTRAVENOUS; SUBCUTANEOUS at 12:23

## 2021-02-01 RX ADMIN — ALBUTEROL SULFATE 2 PUFF: 108 AEROSOL, METERED RESPIRATORY (INHALATION) at 08:15

## 2021-02-01 RX ADMIN — INSULIN LISPRO 4 UNITS: 100 INJECTION, SOLUTION INTRAVENOUS; SUBCUTANEOUS at 21:16

## 2021-02-01 RX ADMIN — ROSUVASTATIN 10 MG: 10 TABLET, FILM COATED ORAL at 21:08

## 2021-02-01 RX ADMIN — HUMAN INSULIN 10 UNITS: 100 INJECTION, SUSPENSION SUBCUTANEOUS at 08:44

## 2021-02-01 RX ADMIN — DIPHENHYDRAMINE HYDROCHLORIDE 5 ML: 12.5 SOLUTION ORAL at 23:00

## 2021-02-01 RX ADMIN — PANTOPRAZOLE SODIUM 40 MG: 40 TABLET, DELAYED RELEASE ORAL at 05:14

## 2021-02-01 RX ADMIN — DIPHENHYDRAMINE HYDROCHLORIDE 5 ML: 12.5 SOLUTION ORAL at 07:05

## 2021-02-01 RX ADMIN — INSULIN GLARGINE 30 UNITS: 100 INJECTION, SOLUTION SUBCUTANEOUS at 21:08

## 2021-02-01 RX ADMIN — Medication 10 ML: at 12:25

## 2021-02-01 RX ADMIN — MORPHINE SULFATE 1 MG: 2 INJECTION, SOLUTION INTRAMUSCULAR; INTRAVENOUS at 00:43

## 2021-02-01 RX ADMIN — Medication 10 ML: at 05:13

## 2021-02-01 RX ADMIN — NYSTATIN: 100000 POWDER TOPICAL at 09:00

## 2021-02-01 RX ADMIN — ALBUTEROL SULFATE 2 PUFF: 108 INHALANT RESPIRATORY (INHALATION) at 20:32

## 2021-02-01 RX ADMIN — NYSTATIN: 100000 POWDER TOPICAL at 17:05

## 2021-02-01 RX ADMIN — DEXAMETHASONE 6 MG: 4 TABLET ORAL at 21:08

## 2021-02-01 RX ADMIN — PANTOPRAZOLE SODIUM 40 MG: 40 TABLET, DELAYED RELEASE ORAL at 17:07

## 2021-02-01 RX ADMIN — GUAIFENESIN 1200 MG: 600 TABLET ORAL at 21:08

## 2021-02-01 RX ADMIN — ALBUTEROL SULFATE 2 PUFF: 108 AEROSOL, METERED RESPIRATORY (INHALATION) at 12:12

## 2021-02-01 RX ADMIN — Medication 10 ML: at 21:41

## 2021-02-01 RX ADMIN — DEXAMETHASONE 6 MG: 4 TABLET ORAL at 08:46

## 2021-02-01 RX ADMIN — PHENYLEPHRINE HYDROCHLORIDE AND FAT, HARD 1 SUPPOSITORY: 5; 1.77 SUPPOSITORY RECTAL at 08:44

## 2021-02-01 RX ADMIN — ALBUTEROL SULFATE 2 PUFF: 108 AEROSOL, METERED RESPIRATORY (INHALATION) at 01:44

## 2021-02-01 RX ADMIN — INSULIN LISPRO 8 UNITS: 100 INJECTION, SOLUTION INTRAVENOUS; SUBCUTANEOUS at 08:45

## 2021-02-01 RX ADMIN — GLYCERIN, PETROLATUM, PHENYLEPHRINE HCL, PRAMOXINE HCL: 144; 2.5; 10; 15 CREAM TOPICAL at 09:00

## 2021-02-01 RX ADMIN — INSULIN LISPRO 6 UNITS: 100 INJECTION, SOLUTION INTRAVENOUS; SUBCUTANEOUS at 16:45

## 2021-02-01 RX ADMIN — BUDESONIDE AND FORMOTEROL FUMARATE DIHYDRATE 2 PUFF: 160; 4.5 AEROSOL RESPIRATORY (INHALATION) at 08:15

## 2021-02-01 RX ADMIN — INSULIN LISPRO 8 UNITS: 100 INJECTION, SOLUTION INTRAVENOUS; SUBCUTANEOUS at 16:40

## 2021-02-01 NOTE — PROGRESS NOTES
ACUTE PHYSICAL THERAPY GOALS:  (Developed with and agreed upon by patient and/or caregiver. )  LTGs (assessed and revised as needed 1/19/21):  (1.) Steven Robles  will move from supine to sit and sit to supine , scoot up and down and roll side to side with MODIFIED INDEPENDENCE within 7 treatment day(s). (2.) Steven Robles will transfer from bed to chair and chair to bed with SBA using the least restrictive device within 7 treatment day(s). (3.) Megan Flores will ambulate with SBA for 100 feet with the least restrictive device within 7 treatment day(s) while maintaining normal vital signs. (4.) Steven Robles will perform standing static and dynamic balance activities x 15 minutes with SBA to improve safety and activity tolerance within 7 treatment day(s). (5.) Steven Robles will ascend and descend 4 stairs using one hand rail(s) with SBA to improve functional mobility and safety within 7 treatment day(s). (6.) Steven Robles will perform bilateral lower extremity exercises x 10 min for HEP with INDEPENDENCE to improve strength, endurance, and functional mobility within 7 treatment day(s). PHYSICAL THERAPY: Daily Note and AM Treatment Day # 3    Steven Robles is a 80 y.o. male   PRIMARY DIAGNOSIS: Acute respiratory failure with hypoxia (HCC)  Acute respiratory failure with hypoxia ([facility] [address] [J96.01]         ASSESSMENT:     REHAB RECOMMENDATIONS: CURRENT LEVEL OF FUNCTION:  (Most Recently Demonstrated)   Recommendation to date pending progress:  Setting:   Short-term Rehab  Equipment:    To Be Determined Bed Mobility:   Minimal Assistance  Sit to Stand:   Minimal Assistance  Transfers:   Minimal Assistance  Gait/Mobility:   Not tested     ASSESSMENT:  Mr. Angelina Brower presents on airvo with sats above 90% supine. Isolation is really getting to him. Patient speaks of wanting to die. NP entered room and was notified. Patient states he is just lonely. Sats EOB were low 80s on airvo.  Patient transferred to standing with min assist where he took side steps towards HOB. Low activity tolerance. Will continue PT efforts. SUBJECTIVE:   Mr. Ajit Adams states, \"I'm ready to die\"    SOCIAL HISTORY/ LIVING ENVIRONMENT: See initial evaluation     OBJECTIVE:     PAIN: VITAL SIGNS: LINES/DRAINS:   Pre Treatment: Pain Screen  Pain Scale 1: FLACC  Pain Intensity 1: 0  Post Treatment: 0   Melchor Catheter  O2 Device: Heated, Hi flow nasal cannula     MOBILITY: I Mod I S SBA CGA Min Mod Max Total  NT x2 Comments:   Bed Mobility    Rolling [] [] [] [] [] [] [] [] [] [] []    Supine to Sit [] [] [] [] [] [x] [] [] [] [] []    Scooting [] [] [] [] [] [] [] [] [] [] []    Sit to Supine [] [] [] [] [] [x] [] [] [] [] []    Transfers    Sit to Stand [] [] [] [] [] [x] [] [] [] [] []    Bed to Chair [] [] [] [] [] [] [] [] [] [] []    Stand to Sit [] [] [] [] [] [x] [] [] [] [] []    I=Independent, Mod I=Modified Independent, S=Supervision, SBA=Standby Assistance, CGA=Contact Guard Assistance,   Min=Minimal Assistance, Mod=Moderate Assistance, Max=Maximal Assistance, Total=Total Assistance, NT=Not Tested    GAIT: I Mod I S SBA CGA Min Mod Max Total  NT x2 Comments:   Level of Assistance [] [] [] [] [] [x] [] [] [] [] []    Distance Side steps towards HOB    DME Rolling Walker    Gait Quality slow    Weightbearing  Status N/A     I=Independent, Mod I=Modified Independent, S=Supervision, SBA=Standby Assistance, CGA=Contact Guard Assistance,   Min=Minimal Assistance, Mod=Moderate Assistance, Max=Maximal Assistance, Total=Total Assistance, NT=Not Tested    PLAN:   FREQUENCY/DURATION: PT Plan of Care: 3 times/week for duration of hospital stay or until stated goals are met, whichever comes first.  TREATMENT:     TREATMENT:   ($$ Therapeutic Activity: 38-52 mins    )  Therapeutic Activity (46 Minutes):  Therapeutic activity included Supine to Sit, Sit to Supine, Transfer Training, Ambulation on level ground, Standing balance and LE exercises to improve functional Mobility, Strength, ROM and Activity tolerance.       AFTER TREATMENT POSITION/PRECAUTIONS:  Alarm Activated, Bed, Needs within reach and RN notified    INTERDISCIPLINARY COLLABORATION:  RN/PCT    TOTAL TREATMENT DURATION:  PT Patient Time In/Time Out  Time In: 8988  Time Out: 700 [facility]

## 2021-02-01 NOTE — PROGRESS NOTES
Progress Note    2021  Admit Date: 2021  1:50 AM   NAME: Srinivasan Flores   :  1933   MRN:  [id number]   Attending: Valeria Amaro MD  PCP:  Christina Howard MD  Treatment Team: Attending Provider: Valeria Amaro MD; Utilization Review: Osiris Campbell; Utilization Review: Eduardo Forde, STEFAN; Care Manager: Martha Ferreira; Hospitalist: Valeria Amaro MD; Hospitalist: Shirley Mccloud NP; Primary Nurse: Nika James, STEFAN; Physical Therapy Assistant: Napoleon Garcia PTA; Occupational Therapist: Michael Solano OT    DNR   SUBJECTIVE:     Mr. Jose Armando Kowalski is a 80year old CM PMH DM type II, GERD, admitted  for COVID19, acute hypoxic respiratory failure, and rectal bleeding. He was seen by GI who stated bleeding from hemorrhoids, Tucks pads and Anusol DE ordered. He has required oxygen on airvo since  (was on NC). S/P Convalescent plasma on , Remdesivir -. He has been weaned to 45 L/70% today. Today he reports he is lonely has he has been isolation. Denies CP, SOB. Past Medical History:   Diagnosis Date    Acquired hallux valgus of left foot     Arthritis     OA- shoulders, knees, toes    CAD (coronary artery disease)     MI in , no c/o - no stents or surgery    Diabetes ([facility] [address]     Type 2 Insulin Dep-Flexpen (started ), average AM sugar -   today in -   hypo- < 80    High cholesterol     takes Crestor    Nausea & vomiting     with anesthesia- none with previous 2 foot surgeries    TIA     temporarily lost sight in RIGHT eye, lost hearing RIGHT ear, denies deficits at this time, \"everything is back to normal now\"    Tinnitus     Unspecified adverse effect of anesthesia     difficulty waking up- stays very sleepy- pt states \"Easy to put out\"    Unspecified sleep apnea     no C-PAP use.       Recent Results (from the past 24 hour(s))   GLUCOSE, POC    Collection Time: 21 10:54 AM   Result Value Ref Range Glucose (POC) 210 (H) 65 - 100 mg/dL   CBC WITH AUTOMATED DIFF    Collection Time: 01/31/21 12:39 PM   Result Value Ref Range    WBC 14.3 (H) 4.3 - 11.1 K/uL    RBC 3.51 (L) 4.23 - 5.6 M/uL    HGB 10.2 (L) 13.6 - 17.2 g/dL    HCT 31.6 (L) 41.1 - 50.3 %    MCV 90.0 79.6 - 97.8 FL    MCH 29.1 26.1 - 32.9 PG    MCHC 32.3 31.4 - 35.0 g/dL    RDW 20.0 (H) 11.9 - 14.6 %    PLATELET 632 509 - 752 K/uL    MPV 11.6 9.4 - 12.3 FL    ABSOLUTE NRBC 0.00 0.0 - 0.2 K/uL    DF AUTOMATED      NEUTROPHILS 91 (H) 43 - 78 %    LYMPHOCYTES 3 (L) 13 - 44 %    MONOCYTES 4 4.0 - 12.0 %    EOSINOPHILS 2 0.5 - 7.8 %    BASOPHILS 0 0.0 - 2.0 %    IMMATURE GRANULOCYTES 1 0.0 - 5.0 %    ABS. NEUTROPHILS 13.0 (H) 1.7 - 8.2 K/UL    ABS. LYMPHOCYTES 0.4 (L) 0.5 - 4.6 K/UL    ABS. MONOCYTES 0.6 0.1 - 1.3 K/UL    ABS. EOSINOPHILS 0.2 0.0 - 0.8 K/UL    ABS. BASOPHILS 0.0 0.0 - 0.2 K/UL    ABS. IMM.  GRANS. 0.1 0.0 - 0.5 K/UL   METABOLIC PANEL, BASIC    Collection Time: 01/31/21 12:39 PM   Result Value Ref Range    Sodium 139 136 - 145 mmol/L    Potassium 5.1 3.5 - 5.1 mmol/L    Chloride 105 98 - 107 mmol/L    CO2 30 21 - 32 mmol/L    Anion gap 4 (L) 7 - 16 mmol/L    Glucose 192 (H) 65 - 100 mg/dL    BUN 25 (H) 8 - 23 MG/DL    Creatinine 0.98 0.8 - 1.5 MG/DL    GFR est AA >60 >60 ml/min/1.73m2    GFR est non-AA >60 >60 ml/min/1.73m2    Calcium 8.0 (L) 8.3 - 10.4 MG/DL   GLUCOSE, POC    Collection Time: 01/31/21  3:34 PM   Result Value Ref Range    Glucose (POC) 298 (H) 65 - 100 mg/dL   GLUCOSE, POC    Collection Time: 01/31/21  8:44 PM   Result Value Ref Range    Glucose (POC) 421 (H) 65 - 100 mg/dL   CBC WITH AUTOMATED DIFF    Collection Time: 02/01/21  5:26 AM   Result Value Ref Range    WBC 11.5 (H) 4.3 - 11.1 K/uL    RBC 3.48 (L) 4.23 - 5.6 M/uL    HGB 10.2 (L) 13.6 - 17.2 g/dL    HCT 31.4 (L) 41.1 - 50.3 %    MCV 90.2 79.6 - 97.8 FL    MCH 29.3 26.1 - 32.9 PG    MCHC 32.5 31.4 - 35.0 g/dL    RDW 19.9 (H) 11.9 - 14.6 %    PLATELET 678 382 - 969 K/uL    MPV 12.3 9.4 - 12.3 FL    ABSOLUTE NRBC 0.00 0.0 - 0.2 K/uL    DF AUTOMATED      NEUTROPHILS 97 (H) 43 - 78 %    LYMPHOCYTES 1 (L) 13 - 44 %    MONOCYTES 1 (L) 4.0 - 12.0 %    EOSINOPHILS 0 (L) 0.5 - 7.8 %    BASOPHILS 0 0.0 - 2.0 %    IMMATURE GRANULOCYTES 1 0.0 - 5.0 %    ABS. NEUTROPHILS 11.1 (H) 1.7 - 8.2 K/UL    ABS. LYMPHOCYTES 0.2 (L) 0.5 - 4.6 K/UL    ABS. MONOCYTES 0.1 0.1 - 1.3 K/UL    ABS. EOSINOPHILS 0.0 0.0 - 0.8 K/UL    ABS. BASOPHILS 0.0 0.0 - 0.2 K/UL    ABS. IMM.  GRANS. 0.1 0.0 - 0.5 K/UL   METABOLIC PANEL, BASIC    Collection Time: 02/01/21  5:26 AM   Result Value Ref Range    Sodium 137 136 - 145 mmol/L    Potassium 5.2 (H) 3.5 - 5.1 mmol/L    Chloride 103 98 - 107 mmol/L    CO2 28 21 - 32 mmol/L    Anion gap 6 (L) 7 - 16 mmol/L    Glucose 300 (H) 65 - 100 mg/dL    BUN 26 (H) 8 - 23 MG/DL    Creatinine 0.98 0.8 - 1.5 MG/DL    GFR est AA >60 >60 ml/min/1.73m2    GFR est non-AA >60 >60 ml/min/1.73m2    Calcium 8.0 (L) 8.3 - 10.4 MG/DL   GLUCOSE, POC    Collection Time: 02/01/21  6:46 AM   Result Value Ref Range    Glucose (POC) 306 (H) 65 - 100 mg/dL     Allergies   Allergen Reactions    Betadine [Povidone-Iodine] Swelling     And Blisters on skin      Current Facility-Administered Medications   Medication Dose Route Frequency Provider Last Rate Last Admin    magic mouthwash (ANA ROSA) oral suspension 5 mL  5 mL Swish and Spit Q4H PRN Muriel Hernández MD   5 mL at 02/01/21 0705    albuterol (PROVENTIL HFA, VENTOLIN HFA, PROAIR HFA) inhaler 2 Puff  2 Puff Inhalation Q4H RT Neva Paulson MD   2 Puff at 02/01/21 0815    budesonide-formoteroL (SYMBICORT) 160-4.5 mcg/actuation HFA inhaler 2 Puff  2 Puff Inhalation BID RT Neva Paulson MD   2 Puff at 02/01/21 0815    guaiFENesin ER (MUCINEX) tablet 1,200 mg  1,200 mg Oral Q12H Neva Paulson MD   1,200 mg at 02/01/21 0846    dexAMETHasone (DECADRON) tablet 6 mg  6 mg Oral Q12H Neva Paulson MD   6 mg at 02/01/21 0846    insulin NPH (NOVOLIN N, HUMULIN N) injection 10 Units  10 Units SubCUTAneous Q12H CiBridgette padilla Michelle, DO   10 Units at 02/01/21 0844    morphine injection 1 mg  1 mg IntraVENous Q3H PRN Ronald Montano DO   1 mg at 02/01/21 0043    nystatin (MYCOSTATIN) 100,000 unit/gram powder   Topical BID Kal Florence MD   Given at 02/01/21 0900    qsluvhlsd-Giareelp-Uwdfh-W.Pet (PREPARATION H MAXIMUM STRENGTH) 0.25-1 % cream   Rectal Q12H Jeromy Capo, DO   Given at 01/31/21 2141    phenylephrine suppository 1 Suppository  1 Suppository Rectal Q12H ChestMELISA Posada   1 Suppository at 02/01/21 0844    pantoprazole (PROTONIX) tablet 40 mg  40 mg Oral ACB&D Jessica Alcon, DO   40 mg at 02/01/21 8933    witch hazel-glycerin (TUCKS) 12.5-50 % pads 1 Pad  1 Pad PeriANAL PRN Chris Hooks MD   1 Pad at 01/16/21 1843    dextrose 40% (GLUTOSE) oral gel 1 Tube  15 g Oral PRN Jessica Alcon, DO        glucagon (GLUCAGEN) injection 1 mg  1 mg IntraMUSCular PRN Jessica Alcon, DO        dextrose (D50W) injection syrg 12.5-25 g  25-50 mL IntraVENous PRN Jessica Alcon, DO        influenza vaccine 2020-21 (6 mos+)(PF) (FLUARIX/FLULAVAL/FLUZONE QUAD) injection 0.5 mL  0.5 mL IntraMUSCular PRIOR TO DISCHARGE Albin Díaz DO        [Held by provider] aspirin delayed-release tablet 81 mg  81 mg Oral QHS Himanshu Cevallos MD   81 mg at 01/12/21 2203    rosuvastatin (CRESTOR) tablet 10 mg  10 mg Oral QHS Himanshu Cevallos MD   10 mg at 01/31/21 2137    sodium chloride (NS) flush 5-40 mL  5-40 mL IntraVENous Q8H Himanshu Cevallos MD   10 mL at 02/01/21 0513    sodium chloride (NS) flush 5-40 mL  5-40 mL IntraVENous PRN Himanshu Bernardo MD   10 mL at 01/21/21 0829    acetaminophen (TYLENOL) tablet 650 mg  650 mg Oral Q6H PRN Himanshu Bernardo MD   650 mg at 01/27/21 1444    Or    acetaminophen (TYLENOL) suppository 650 mg  650 mg Rectal Q6H PRN Himanshu Bernrado MD        ondansetron (ZOFRAN) injection 4 mg  4 mg IntraVENous Q6H PRN Anna Acharya MD        senna (SENOKOT) tablet 8.6 mg  1 Tab Oral DAILY PRN Himanshu Robison MD        0.9% sodium chloride infusion 250 mL  250 mL IntraVENous PRN Himanshu Robison MD        insulin lispro (HUMALOG) injection   SubCUTAneous AC&HS Anna Acharya MD   8 Units at 21 0845       Review of Systems negative with exception of pertinent positives noted above  PHYSICAL EXAM     Visit Vitals  /62 (BP 1 Location: Right upper arm, BP Patient Position: At rest)   Pulse 72   Temp 98 °F (36.7 °C)   Resp 20   Ht 5' 6\" (1.676 m)   Wt 54.9 kg (121 lb)   SpO2 99%   BMI 19.53 kg/m²      Temp (24hrs), Av.9 °F (36.6 °C), Min:97.4 °F (36.3 °C), Max:98.3 °F (36.8 °C)    Oxygen Therapy  O2 Sat (%): 99 % (21 1046)  Pulse via Oximetry: 76 beats per minute (21)  O2 Device: Heated; Hi flow nasal cannula (21)  O2 Flow Rate (L/min): 40 l/min (21 0815)  O2 Temperature: 87.8 °F (31 °C) (21)  FIO2 (%): 55 % (21 0815)    Intake/Output Summary (Last 24 hours) at 2021 1053  Last data filed at 2021 0852  Gross per 24 hour   Intake 840 ml   Output 700 ml   Net 140 ml      General: No acute distress    Lungs: CTA bilaterally. Resp even and nonlabored  Heart:  S1S2 present without murmurs rubs gallops. RRR. No LE edema  Abdomen: Soft, non tender, non distended. BS present  Extremities: Moves ext spontaneously. No cyanosis  Neurologic:  A/O X3.  No focal deficits      Results summary of Diagnostic Studies/Procedures copied from within [facility] EMR:        [address] Problems    Diagnosis Date Noted    Decubitus ulcer of buttock, stage 2 ([address] 2021    Gastrointestinal hemorrhage associated with anorectal source 2021    Acute respiratory failure with hypoxia ([facility] 2021    COVID-19 2021    Acute metabolic encephalopathy     Type 2 diabetes mellitus, with long-term current use of insulin ([facility] 01/07/2021     Plan:  Acute hypoxic respiratory failure secondary to COVID19 pneumonia  completed Decadron  completed convalescent plasma and remdesevir  wean airvo as tolerated, 45 Liters and 70% today's settings.   goal SpO2>90%     Rectal bleeding due to hemorrhoids  continue with phenylephrine suppository   trend CBCs     Stage II sacral ulcers   frequent turning      DM type II  continue current regimen  would recommend weaning off NPH since he is off steroids now  SSI and serial CBGs     Hyperkalemia  Resolved     Bradycardia, asymptomatic   continue to monitor  avoid BBs and CCBs      Notes, labs, VS, diagnostic testing reviewed    DVT Prophylaxis: SCD  Plan of Care Discussed with: Supervising MD Dr. Shelby Caceres, care team, pt      Ashley Sesay, NP       Addendum  Called pt wife and updated on pt condition and plan of care, cecille Johnson on the phone with wife

## 2021-02-01 NOTE — PROGRESS NOTES
CM received call from patient's cecille Wyatt [phone], who is requesting updates regarding patient's plan of care. CM notified NP Gianfranco, updates are requested by patient's family. CM reviewed chart for discharge planning. Per NP Gianfranco, 45 Liters and 70% today's settings. Patient is not medically stable for discharge. CM will continue to follow plan of care and request SNFs' ( [facility]) reassess, at the appropriate time. CM continues to follow plan of care.

## 2021-02-01 NOTE — DIABETES MGMT
Patient's blood glucose ranged 190-421 yesterday with patient receiving NPH 25 units, Humalog 22 units, and dexamethasone 12 mg. Blood glucose 306 this morning and 410 at lunch. Spoke with provider and order received to discontinue NPH, start Lantus 30 units, and initiate Humalog 6 units with meals. Will follow along.

## 2021-02-01 NOTE — ROUTINE PROCESS
Respiratory Care Services       Policy Number: [id number]    Title: Patient Care Assessment Protocol    Effective Date: 01/1999    Revised Date: 05/2014, 04/2018, 12/2018, 07/2019    Reviewed Date: 06/2013/ 03/2015, 03/2016, 06/2017, 11/2020        Overview  In an effort to improve quality and reduce costs of respiratory care at [facility], the Respiratory Department has developed a number of Patient Care Protocols. These protocols have been developed according to [place] 3 and are utilized for those patients who are ordered respiratory therapy using therapeutic indications and standardized approaches for accomplishing objectives. Patient Care Protocols are intended to improve care by:   Defining the indications and standards of care agreed upon by the Pulmonary Medicine and [address] [place] [address] of [facility].  Training respiratory care practitioners to apply those criteria to individual patients and modify therapy as indicated by the protocols.  Documenting the indication and care plan as part of the initial ordering process.  Tapering or discontinuing treatments once the indication for therapy changes. The Patient Care Protocols shall be universally applied throughout the hospital as determined by   the Pulmonary Medicine and [address] [place] [address].     Rationale for Patient Care Assessment Protocols:   Continuous Quality Improvement   Cost containment   Standardization of care   Enhanced continuity of care   Utilization review   Timely intervention    The following patient care assessment protocols have been developed:   Aerosolized Medication Protocol    Bronchial Hygiene Protocol    Oxygen Protocol   CVRU Fast Track Weaning Protocol    Asthma Treatment Protocol ER   Pediatric Asthma Treatment Protocol ER   Alpha-1 Antitrypsin Deficiency Protocol   Prone Positioning Protocol    COPD Protocol  Home Oxygen Assessment Protocol   Ventilator Weaning Protocol    Lung Volume Expansion Protocol    The Director of [address] [place] [address] oversees the Patient Care Assessment Program. The Respiratory Educator is responsible for protocol development and training. The Supervisor is responsible for implementation and  activities. Each patient with an order for respiratory treatments will receive an evaluation. Respiratory Care Practitioners (RCP's) will perform the evaluations. The same evaluation tool will be utilized for initial and follow-up assessments. If the patient does not meet criteria for ordered therapy, the therapy will be discontinued. If the patient demonstrates an adverse response to initially ordered therapy, the therapy will be discontinued and the physician will be contacted. Specific physician's orders that deviate from protocols and are deemed \"inappropriate\" or \"unsafe\" will be addressed with ordering physician and/or medical director as required. Respiratory Patient Care Assessment Protocols    I. Policy: In an effort to provide quality patient care and effective utilization of services, physicians who order respiratory therapy will have their patients treated via the protocols established (see attached) Respiratory Care Practitioners (RCP's) will complete the initial assessment which will indicate patient needs,  the care plan developed and will performed within 24 hours of admission. Frequency of the therapy will be set according to the results of the respiratory therapy evaluation and frequency guidelines policy. Reassessment will be continued every 48 hours and more frequently as needed for the individual patient. II. Purpose:     A. To provide a process that will allow for ongoing assessment and care plan modification for patients receiving respiratory services based on both objective and or subjective patient responses to interventions. This process of protocol utilization will assist in patient care progression while eliminating the need for the physician to continually update respiratory therapy orders. B. To assure continuity of respiratory care that meets [facility] Clinical Practice Guidelines. III. Initiation:  Implement Respiratory Care Protocols for patients who are ordered by physician          to receive respiratory therapy procedures or for ventilator management. IV. Protocol:  A. Upon receiving an order for therapy the RCP will review the patient's EMR (electronic medical record) for all pertinent information includin. Physician's order for therapy  2. Patient history and physical examination  3. Physician progress notes  4. Diagnostic. X-rays, PFT's, arterial blood gases etc.  B. The RCP will perform a respiratory assessment in the following manner:  1. General observations: color, pattern and effort of breathing, chest expansion, (symmetrical and bilateral), level of consciousness and the ability to ambulate. 2. The RCP will assess patient's cough ability and determine if bronchial hygiene is needed. If patient is unable to produce sputum, at that time, the RCP should question the patient with regard to their sputum: production, color consistency, frequency and amount. 3. Auscultation: Using a stethoscope, the RCP will listen and note quality of breath sounds and presence or absence of adventitious breath sounds in all lung fields, both anteriorly and posteriorly. 4. Upon completing the EMR review and physical assessment, the RCP will document findings in the RT Assessment section of the EMR. The score level will be provided and will be used to determine the frequency of therapy. V. Indications:   A. Bronchial Hygiene Protocol indications:   1. Potential for or presence of atelectasis. 1. Need for hydration and removal of retained secretions. 1. Need for improvement of cough effectiveness.   1. Presence of conditions associated with disorder of pulmonary clearance:  a. Cystic fibrosis  b. Bronchiectasis  c. Neuromuscular disease  d. Obstructive lung diseases  e. Restrictive lung diseases   Aerosolized Medication(s) Protocol indications:Treatment of bronchospasm/wheezing  2. Improvement of mucociliary clearance  3. Treatment of stridor  4. History of Bronchiectasis, Asthma or COPD  C. Oxygen Therapy Protocol indications:   1. Documented hypoxemia  2. Severe trauma  3. Acute myocardial infarction  4. Short-term therapy (e.g. post anesthesia recovery)  D. CVRU Ventilator Weaning Protocol indications:  1. All mechanically ventilated surgical patients unless they have a no wean order. E. Asthma Treatment Protocol ER indications:  1. Patients 15years of age and older that have been triaged or diagnosed with   asthma exacerbation shall be indicated for the ER Asthma Treatment Protocol. A physician order will be required to initiate the protocol. F. Pediatric Asthma protocol in the ER indications:  1. Patients less than 15years old that have been triaged or diagnosed with asthma exacerbation shall be indicated for the Pediatric Asthma protocol. A physician order will be required to initiate the protocol. G. Alpha-1 Antitrypsin Deficiency Testing protocol indications:         1. Patients admitted and diagnosed with COPD. H. Prone Positioning Protocol indications:         1. Acute lung injury         2. Acute respiratory distress syndrome (ARDS)   I. Respiratory Care COPD Protocol indications:         1. History of COPD in patient's records         2. Smoking history   J. Home Oxygen Assessment Protocol indications:         1. Chronic lung disease         2. Cor pulmonale         3. Unable to wean to room air 48 hours prior to anticipated discharge. K.  Ventilator Weaning Protocol indications:         1. Patient's mechanically ventilated          2. Managed by intensivist  L.  Lung Volume Expansion Protocol indications:        1. Any patient at risk for pulmonary complications. VI. Maintenance:    A. Timely patient assessment is an integral part of this protocol therefore the following will be applied:  1. All non- critical care patients will be evaluated upon receiving initial respiratory care orders within 24 hours and re-evaluated within 48 hours (or more as needed). 2. Orders requesting a Respiratory Consult will be responded to in the following manner:  a. In patient emergency situations, the RCP assigned to the floor will respond immediately to the patient, provide an initial respiratory assessment, and contact the patient's physician as necessary for appropriate orders. b. In non-emergent situations, the RCP assigned to the floor will respond to the patient within 90 minutes and provide an initial respiratory assessment and contact patient's physician as necessary for appropriate orders. c. An RCP will provide a comprehensive assessment as soon as possible. 3. Upon completion of an evaluation, the RCP will complete documentation in the patient's EMR in the RT Assessment section. 4. The RCP who completes the assessment will document orders for therapy in the orders section of the patient's EMR selecting new order. Next, per protocol should be selected indicating it is a protocol order and sign orders should be selected to complete the process. The applicable protocol must be added to the progress note per Joint Commission guidelines. 5. The Pharmacy and Therapeutics (P&T) Committee has mandated that the medication Xopenex may be changed to unit dose albuterol without an order, except for those patients receiving Xopenex due to cardiac arrhythmias. 1. The dosage for these patients should be 0.63 mg. and may be changed from 1.25 mg. to 0.63 mg per P & T Committee by the RCP completing the assessment.   6. Patients who are not experiencing cardiac arrhythmias, and are ordered Xopenex and Atrovent may be changed to Duoneb. VII. Safety:        A. The following safety issues shall be monitored:  1. The RCP will perform hand hygiene per hospital policy utilizing Standard Precautions for all patients and following transmission-based isolation as indicated per hospital policy. 2. The RCP must exercise professional judgment in classifying the patient for frequency of therapy. 3. Appropriate classification of the patient will require an evaluation utilizing the Therapy Assessment Protocol Guidelines. 4. The RCP will confer with the physician concerning the care of the patient at any time questions or problems arise. 5. If during therapy, the patient exhibits no improvement, or deterioration in clinical status the RCP will notify the physician and the patient's nurse. VIII. Interventions:   A. The patient's nurse is responsible concerning all items related to his/her care. Ongoing communication with nursing is essential to successful protocol management. B. The RCP recognizes the value of the team approach in meeting the patient's needs. Nursing input regarding the patient's pulmonary condition will be sought as needed. IX. Reportable conditions: The RCP will inform the physician if:  1. There are acute changes in patient's respiratory status. 2. The therapist is unable to determine appropriate care plan upon assessment. 3. The patient fails to reach therapeutic objective. 4. A change or additional medication is needed. X.  Patient Education:    A. Patient will receive instruction on the followin. The treatment modality, including objectives and proper technique of therapy  2. Respiratory medications  B. Documentation shall occur in the patient education section of the patient's EMR. XI. Documentation: Record all findings as described above in the patient's EMR. Related Protocols: A. Aerosolized Medication Protocol  B. Bronchial Hygiene   C. Oxygen Protocol   D.  CVRU Fast Track Weaning Protocol  E. Asthma Treatment protocol ER  F. Alpha-1 antitrypsin Deficiency Protocol  G. Prone Positioning Protocol  H. Respiratory Care COPD Protocol  I. Home Oxygen assessment Protocol  J. Ventilator Weaning Protocols   K. Volume Expansion protocol    Indications      Frequency          Level  A. Aerosol therapy   1.  q4h     Severe SOB, wheezing, unable to sleep 1   2.  qid, q4 wa or q6h   Moderate SOB, wheezing   2   3.  tid      Hx of asthma, or COPD mild wheezing,         or facilitate secretion removal              3   4.  bid      Asthma, or COPD, Intermittent wheezing 4   5. PRN, i.e. tid PRN, qid PRN Asthma, or COPD, occasional wheezing 5       B. Bronchopulmonary Hygiene    1. q4h             Copious secretions, SOB, unable to sleep 1   2. qid & PRN            Moderate amounts of secretions   2   3. tid           Small amounts of secretions and poor cough,               history of secretions    3    4. PRN, i.e. tid PRN, qid PRN     Breathing exercises, encourage cough only 4      C. Oxygen Therapy     Follow hospital approved Oxygen Protocol      Note:  qid treatments are due 0800, 1200, 1600, and 2000. tid treatments are due 0800, 1400, and 2000  Q6h treatments are due 0800, 1400, 2000, 0200  Q4 wa teatments are due 0800, 1200, 1600, and 2000. Q4h treatments are due  0800, 1200, 1600, 2000, 0000, and 0400. The Level 1-5 rating system is only to be used as criteria for determining appropriate frequency of therapy. References:   N   Joint Commission Consolidated Carlos Standard   L    Respiratory Care Department Policy, Procedure and Protocol Guideline Manual, 1995, JD Verde. L  Therapist Driven Respiratory Care Protocols - A Practitioner's Guide for Criteria-Based Respiratory Care by Elizabeth Azevedo M.D., and JD Ramirez RRT. L  The rationale for therapist-driven protocols: an update.  Respiratory Care 1998; 29:558-902   L Therapist Driven Respiratory Care Protocols - A Practitioner's Guide for Criteria-Based Respiratory Care by Herb Sharma M.D., and JUAN Sandy The rationale for therapist-driven protocols: an update. Respiratory Care 1998; [id number]. N   [facility] Clinical Practice Guidelines. A. The RCP will perform a respiratory assessment in the following manner:  1. Perform hand hygiene per hospital policy utilizing Standard Precautions for all patients and following transmission-based isolation as indicated per policy. 2. Identify patient via ID bracelet verifying patient name and birth date. 3. General observations: color, pattern and effort of breathing, chest expansion, (symmetrical and bilateral), level of consciousness and the ability to ambulate. 4. The RCP will assess patients cough ability and determine if Nasotracheal suctioning is needed. If patient is unable to produce sputum, at that time, the RCP should question the patient with regard to their sputum: production, color consistency, frequency and amount. 5. Auscultation: Using a stethoscope, the RCP will listen and note quality of breath sounds and presence or absence of adventitious breath sounds in all lung fields, both anteriorly and posteriorly. 6. Upon completing the EMR review and physical assessment, the RCP will document findings in the RT Assessment section of the EMR. The score level will be provided and will be used to determine the frequency of therapy. V. Indications:   A. Indications for Bronchial Hygiene Protocol will include:  1. Potential for or presence of atelectasis. 2. Need for hydration and removal of retained secretions. 3. Need for improvement of cough effectiveness. 4. Presence of conditions associated with disorder of pulmonary clearance:  a. Cystic fibrosis  b. Bronchiectasis  B. Indications for Aerosolized Medication(s) Protocol should include:  1. Treatment of bronchospasm/wheezing  2. Improvement of mucociliary clearance  3.  Treatment of stridor  4. History of Asthma or COPD             C.  Indications for Oxygen Therapy Protocol should include:  1. Documented hypoxemia  2. Severe trauma  3. Acute myocardial infarction  4. Short-term therapy (e.g. post anesthesia recovery)    VI. Maintenance:    A. Timely patient assessment is an integral part of this protocol therefore the following will be applied:  1. All non- critical care patients will be evaluated upon receiving initial respiratory care orders within 24 hours and re-evaluated within 48 hours (or more as needed). 2. Orders requesting a Respiratory Consult will be responded to in the following manner:  a. In patient emergency situations, the RCP assigned to the floor will respond immediately to the patient, provide an initial respiratory assessment, and contact the patients physician as necessary for appropriate orders. b. In non-emergent situations, the RCP assigned to the floor will respond to the patient within 90 minutes and provide an initial respiratory assessment and contact patients physician as necessary for appropriate orders. c. An RCP will provide a comprehensive assessment as soon as possible. 3. Upon completion of an evaluation, the RCP will complete documentation in the patients EMR in the RT Assessment section. 4. The RCP who completes the assessment will document orders for therapy in the orders section of the patients EMR selecting new order. Next, per protocol should be selected indicating it is a protocol order and sign orders should be selected to complete the process. 5. The Pharmacy and Therapeutics (P&T) Committee has mandated that the medication Xopenex may be changed to unit dose albuterol without an order, except for those patients receiving Xopenex due to cardiac arrhythmias. The dosage for these patients should be 0.63 mg. and may be changed from 1.25 mg. to 0.63 mg per P & T Committee by the RCP completing the assessment.   6. Patients who are not experiencing cardiac arrhythmias, and are ordered Xopenex and Atrovent may be changed to Duoneb. VII. Safety:        A. The following safety issues shall be monitored:  1. The RCP will perform hand hygiene per hospital policy utilizing Standard Precautions for all patients and following transmission-based isolation as indicated per hospital policy. 2. The RCP must exercise professional judgment in classifying the patient for frequency of therapy. 3. Appropriate classification of the patient will require an evaluation utilizing the Therapy Assessment Protocol Guidelines. 4. The RCP will confer with the physician concerning the care of the patient at any time questions or problems arise. 5. If during therapy, the patient exhibits no improvement or deterioration in clinical status the RCP will notify the physician and the patients nurse. VIII. Interventions:   A. The patients nurse is responsible concerning all items related to his/her care. Ongoing communication with nursing is essential to successful protocol management. B. The RCP recognizes the value of the team approach in meeting the patients needs. Nursing input regarding the patients pulmonary condition will be sought as needed. IX. Reportable conditions: The RCP will inform the physician if:  1. There are acute changes in patients respiratory status. 2. The therapist is unable to determine appropriate care plan upon assessment. 3. The patient fails to reach therapeutic objective. 4. A change or additional medication is needed. X.  Patient Education:    A. Patient will receive instruction on the followin. The treatment modality, including objectives and proper technique of therapy  2. Respiratory medications  B. Documentation shall occur in the patient education section of the patients EMR. XI. Documentation: Record all findings as described above in the patients EMR. Related Protocols: A.  Aerosolized Medication Protocol  B. Bronchial Hygiene  C. Oxygen Protocol   D. Volume Expansion/Secretion Clearance  E. Ventilator Weaning Protocols    References:  N   Joint Commission Consolidated Carlos Standard   L    Respiratory Care Department Policy, Procedure and Protocol Guideline Manual, 1995, JD FLAHERTY  Therapist Driven Respiratory Care Protocols - A Practitioners Guide for Criteria-Based Respiratory Care by Jana Trent M.D., and JUAN Alfonso  The rationale for therapist-driven protocols: an update. Respiratory Care 1998; [address] [facility] Guidelines. Respiratory Care Services     Policy Number: [id number]    Title: Oxygen Protocol    Effective Date: 01/1996    Revised Date: 06/2013, 02/29/2016, 4/2018, 7/2019    Reviewed Date: 05/2014, 03/2015, 06/2017, 11/2020        I. Policy: The Oxygen Protocol will be initiated for all patients upon written order from physician for administration of oxygen therapy or if a patient is found to have an oxygen saturation of 88% or less. Special consideration: the goal of oxygen therapy for COPD patients is to maintain oxygen saturation between 88% - 92% to comply with GOLD Guidelines. II. Purpose: To provide protocol driven respiratory therapy for the administration of oxygen at concentrations greater than that in ambient air with the intent of treating or preventing the symptoms and manifestations of hypoxia. III. Responsibility: Director Respiratory Care Services, all Respiratory Care Practitioners     IV. Indications:   C. Implement this protocol for patients when physician orders oxygen to be administered or when patient is found to have an oxygen saturation of 88% or less. D. To assure routine monitoring of patient's oxygen saturation b.i.d. and to make appropriate adjustments in accordance with ordered oxygen saturation parameters.   E. To assure continuity of respiratory care that meets [facility] Clinical Practice Guidelines and GOLD Guidelines. F. Hb < 8  G. Sickle Cell anemia crisis    V. Assessment:  Assess the following parameters to determine the need to adjust oxygen:  C. Measurement of patient's oxygen saturation via pulse oximetry. D. Observation of patient's color, respiratory effort, and responsiveness. E. Measurement of heart rate and respiratory rate. F. Complete a three-step ambulatory oxygen saturation when ordered. VI. Initiation:  Upon receipt of an order for oxygen, the RCP will:   B. Verify order in the patient's EMR, which should include the desired oxygen saturation to be maintained. C. The patient shall be placed on oxygen with humidity (except for those oxygen delivery devices that do not require humidity, i.e. venturi masks and non-rebreather masks) as ordered by the physician to achieve the prescribed oxygen saturation. D. In the event that no saturation is specified, a saturation of 90% will be maintained. E. Patients, who are found to have a SaO2 of 88% or less, may be started on supplemental oxygen as described above. F. Patients admitted with cardiac problems/disease shall be maintained at 92% per Chest Committee recommendation. G. The patient will be informed of the \"no smoking policy\" and instructed in the proper use of oxygen therapy. H. Once desired oxygen saturation has been achieved, the RCP will document FIO2 and oxygen saturation in the respiratory section of the patient's EMR. VII. Maintenance:   B. 30-second oxygen saturation check will be taken to maintain the saturation ordered by the physician each day. C. Patients will be assessed each shift and as needed by pulse oximetry to determine if oxygen needs to be decreased, increased or discontinued. D.  If changes in FIO2 are indicated, all changes will be documented in the respiratory section of the patient's EMR.   E. If no changes in FIO2 are required, the patient's oxygen flow rate and saturation will be recorded in the respiratory section of the patient's EMR. F. Per [facility], patients who are receiving oxygen therapy but are not on oxygen at home, should be weaned off oxygen as soon as possible or when anticipated discharge becomes evident. G. Oxygen will be discontinued after oxygen saturation has been maintained for 24 hours on room air and documented in the patient's EMR. H. Patients on the Inpatient Rehabilitation area on 9th floor will be exempt from having their oxygen discontinued per protocol. Oxygen may be weaned but will be changed to prn to meet the needs of the patient when exercising and participating in therapy. I. The goal of oxygen therapy is to maintain patients with a diagnosis of COPD at oxygen saturation between 88% - 92% to comply with GOLD Guidelines. VIII. Safety: RCP will address the following safety issues:  C. Identify patient using the two patient identifiers name and birth date via ID bracelet. D. Perform hand hygiene per hospital policy utilizing Standard Precautions for all patients and following transmission-based isolation as indicated per hospital policy. E. Cardiac patients will be maintained at an oxygen saturation of 92%. F. If a patient's FIO2 requirements necessitate changing oxygen delivery devices to a high concentration of oxygen, documentation indicating the change must be included in the progress notes, as well as in the respiratory flowsheet. G. If a patient has a hemoglobin level <8 mg. RCP will consult physician before discontinuing oxygen. IX. Interventions:   C. RCP will assess patient for signs of respiratory distress or suspicion of CO2 retention. D. An ABG may be obtained for patients exhibiting respiratory distress or sickle cell crisis. E. An order should be entered into patient's EMR for ABG under per protocol. X. Documentation  A. Document assessment findings in the respiratory section of the patient's EMR.   B. Document changes in therapy per protocol in the respiratory orders section and in the care plan section of the patient's EMR. C. Document patient education in the patient education section of the patient's EMR. XI. Reportable Conditions:  Report to the physician immediately:  B. Acute changes in patient's respiratory status. C. An oxygen saturation <85%. D. A change in oxygen delivery device to provide a high concentration of oxygen. XII. Patient Instructions: Review with Patient  B. Purpose of oxygen therapy  C. Proper technique for using oxygen  D. No smoking policy    Approval: Pulmonary Committee (1-25-96)  Revision: Chest Committee (4-28-05)    L - Respiratory Care Department Policy, Procedure and Protocol Guideline Manual, 1995,         JD Verde. L - Therapist Driven Respiratory Care Protocols - A Practitioner's Guide for Criteria-Based       Respiratory Care by Brenna Baumgarten, M.D., and JD Singh, SHEELA. L - The rationale for therapist-driven protocols: an update. Respiratory Care 1998. [facility] Clinical Practice Guidelines. Respiratory Care Services Policy Number: [id number]    Title: Aerosolized Medication Protocol    Effective Date: 10/1998    Revised Date: 06/13, 03/16, 11/17, 07/19     Reviewed Date: 05/14/ 03/15 , 06/17, 5/18, 11/2020   I. Policy: The Aerosolized Medication Protocol shall by implemented by Respiratory Care Practitioners (RCP) for patients with orders to receive aerosol therapy with medication. II. Purpose: To open and maintain obstructed airways, the RCP, will utilize the following   protocol to select the indicated aerosolized medication(s) and determine the most effective method of delivery to the patient. III. Patient Type: All patients who are determined to meet aerosolized medication criteria as          outlined in this protocol. IV.  Responsibility: Director, [address], registered Respiratory Care Practitioners (RCP's) with documented competency in the performance of respiratory therapeutic techniques. V. Equipment needed:  H. Stethoscope  I. Pulse oximeter  J. AeroEclipse nebulizer  K. Dry Powder Inhaler (DPI)     VI. Protocol:   G. The following conditions are accepted indications for aerosolized medication therapy. 5. Bronchospasm/wheezing  6. Impaired mucociliary clearance  7. Tracheobronchial mucosal congestion/and laryngeal stridor  8. Diseases which commonly require aerosolized medication therapy include, but are not limited to:  f. Asthma/reactive airway disease  g. Bronchitis/emphysema (COPD)  h. Cystic fibrosis  i. Severe laryngitis/tracheitis  j. Bronchiectasis  k. Smoke inhalation or chemical trauma to the lung or upper airway  l. Physical trauma to the upper airway  m. Laryngotracheobronchitis  n. Bronchiolitis  o. Non-specific wheezing              B. Indications for bronchodilator medications will include:  d. Bronchospasm/ wheezing  e. Asthma/reactive airway disease  f. Chronic obstructive pulmonary disease  g. Obstructive defect on pulmonary function testing  C. Administration of medications  5. If a bronchodilator or any other type of respiratory medication is needed, a physician order must be indicated in the medication section in the patient's EMR. 6. When the physician specifies the medication and dosage at the time of request, the ordered medication will be used as part of the care plan. D. The following guidelines will be utilized in the evaluation and selection of the appropriate delivery device for indicated medication(s):  1. Unassisted aerosol (UA) is the preferred method of aerosol delivery and indicated if  c. Ventilation is inadequate  d. Patient demonstrates wheezing   e. Routine treatments shall be given via the AeroEclipse nebulizer. f. The Aerogen nebulizer shall be used in the following circumstances:  i. ER patients and they will continue with this nebulizer if admitted to 8th floor or ICU.  ii.  Patients in ICU iii. Patients on 8th floor with severe wheezing (at the RCP's discretion)  2. Dry PowderInhaler (DPI)   d. Patient should be alert/cooperative  e. Able to perform 3 second breath hold. f. Patient has used DPI therapy previously, either at home or in the hospital.  g. Note: The only approved inhaler on formulary is Spiriva. VII. Guidelines:   Monitor patient's vital signs and evaluate patient's clinical status. The need to change medication and/or modality may be indicated by:  5. A pulse greater than 120 bpm, or if a pulse increase of 20 bpm occurs with bronchodilator medications. 6. Significant worsening of dyspnea or wheezing occurring during or within 30 minutes of discontinuing therapy. 7. Worsening of patient's sensorium (e.g. patient becomes confused or obtunded, and unable to follow directions). 8. Worsening of patient's chest x-ray. 9. Change in sputum (e.g. increased pulmonary infiltrate, which might indicate need for volume expansion therapy). 10. Patient has difficulty coughing up secretions, which might indicate need for acetylcysteine and/or bronchial hygiene therapy. 11. Call physician immediately if dyspnea worsens and is not responsive to modifications allowed by protocol. VIII. Clinical Responsibility:  2. The therapy assessment guidelines will be used to evaluate all patients receiving aerosolized medications with the exception of critical care areas. 5. RCP's will perform changes in therapy per protocol. 6. It will be the responsibility of RCP to provide instruction regarding respiratory medications, possible side effects, aerosol therapy and proper DPI technique, as well as, spacer usage to patients ordered DPI therapy. 7. Current therapy that is part of a patient's home regimen will not be discontinued. a. Provide spacer and educate patient on proper inhaler technique if needed. IX. Documentation  D.  Document assessment findings in the respiratory assessment section of the patient's EMR. E. Document changes in therapy per protocol in the respiratory orders section and in the care plan section of the patient's EMR. F. Document patient education in the patient education section of the patient's EMR. X. Outcome Criteria:  I. Relief of wheezes and obstruction  J. Improved cough and sputum color and consistency  K. Improved chest x-ray  L. Improved arterial oxygen tension and or SaO2  M. Improved Peak Flow on asthmatic patients        XI. Related Protocols:  J. Respiratory Patient Care Protocols  K. Bronchial Hygiene Therapy  L. Oxygen Protocol    Reference:  L - Respiratory Care Department Policy, Procedure and Protocol Guideline Manual, 1995, JD Verde. L -  Therapist Driven Respiratory Care Protocols - A Practitioner's Guide for Criteria-Based Respiratory Care by Elizabeth Azevedo M.D., and JD Ramirez, SHEELA. L - The rationale for therapist-driven protocols: an update. Respiratory Care 1998; [id number]. N -[facility] Clinical Practice Guidelines.

## 2021-02-01 NOTE — PROGRESS NOTES
Heated high-flow oxygen therapy ordered  at   40L, FiO2  55% per oxygen protocol. Albuterol HFA decreased to q6 wa per respiratory protocol.

## 2021-02-02 LAB
ANION GAP SERPL CALC-SCNC: 4 MMOL/L (ref 7–16)
BASOPHILS # BLD: 0 K/UL (ref 0–0.2)
BASOPHILS NFR BLD: 0 % (ref 0–2)
BUN SERPL-MCNC: 26 MG/DL (ref 8–23)
CALCIUM SERPL-MCNC: 8.2 MG/DL (ref 8.3–10.4)
CHLORIDE SERPL-SCNC: 102 MMOL/L (ref 98–107)
CO2 SERPL-SCNC: 30 MMOL/L (ref 21–32)
CREAT SERPL-MCNC: 0.92 MG/DL (ref 0.8–1.5)
DIFFERENTIAL METHOD BLD: ABNORMAL
EOSINOPHIL # BLD: 0 K/UL (ref 0–0.8)
EOSINOPHIL NFR BLD: 0 % (ref 0.5–7.8)
ERYTHROCYTE [DISTWIDTH] IN BLOOD BY AUTOMATED COUNT: 19.9 % (ref 11.9–14.6)
GLUCOSE BLD STRIP.AUTO-MCNC: 188 MG/DL (ref 65–100)
GLUCOSE BLD STRIP.AUTO-MCNC: 221 MG/DL (ref 65–100)
GLUCOSE BLD STRIP.AUTO-MCNC: 256 MG/DL (ref 65–100)
GLUCOSE BLD STRIP.AUTO-MCNC: 330 MG/DL (ref 65–100)
GLUCOSE SERPL-MCNC: 212 MG/DL (ref 65–100)
HCT VFR BLD AUTO: 33.5 % (ref 41.1–50.3)
HGB BLD-MCNC: 10.6 G/DL (ref 13.6–17.2)
IMM GRANULOCYTES # BLD AUTO: 0.1 K/UL (ref 0–0.5)
IMM GRANULOCYTES NFR BLD AUTO: 1 % (ref 0–5)
LYMPHOCYTES # BLD: 0.3 K/UL (ref 0.5–4.6)
LYMPHOCYTES NFR BLD: 2 % (ref 13–44)
MCH RBC QN AUTO: 28.7 PG (ref 26.1–32.9)
MCHC RBC AUTO-ENTMCNC: 31.6 G/DL (ref 31.4–35)
MCV RBC AUTO: 90.8 FL (ref 79.6–97.8)
MONOCYTES # BLD: 0.3 K/UL (ref 0.1–1.3)
MONOCYTES NFR BLD: 2 % (ref 4–12)
NEUTS SEG # BLD: 13.5 K/UL (ref 1.7–8.2)
NEUTS SEG NFR BLD: 96 % (ref 43–78)
NRBC # BLD: 0 K/UL (ref 0–0.2)
PLATELET # BLD AUTO: 190 K/UL (ref 150–450)
PMV BLD AUTO: 11.9 FL (ref 9.4–12.3)
POTASSIUM SERPL-SCNC: 5.3 MMOL/L (ref 3.5–5.1)
RBC # BLD AUTO: 3.69 M/UL (ref 4.23–5.6)
SODIUM SERPL-SCNC: 136 MMOL/L (ref 138–145)
WBC # BLD AUTO: 14.2 K/UL (ref 4.3–11.1)

## 2021-02-02 PROCEDURE — 74011250637 HC RX REV CODE- 250/637: Performed by: HOSPITALIST

## 2021-02-02 PROCEDURE — 74011250637 HC RX REV CODE- 250/637: Performed by: NURSE PRACTITIONER

## 2021-02-02 PROCEDURE — 94762 N-INVAS EAR/PLS OXIMTRY CONT: CPT

## 2021-02-02 PROCEDURE — 74011636637 HC RX REV CODE- 636/637: Performed by: INTERNAL MEDICINE

## 2021-02-02 PROCEDURE — 65270000029 HC RM PRIVATE

## 2021-02-02 PROCEDURE — 74011636637 HC RX REV CODE- 636/637: Performed by: NURSE PRACTITIONER

## 2021-02-02 PROCEDURE — 74011250637 HC RX REV CODE- 250/637: Performed by: INTERNAL MEDICINE

## 2021-02-02 PROCEDURE — 36415 COLL VENOUS BLD VENIPUNCTURE: CPT

## 2021-02-02 PROCEDURE — 97168 OT RE-EVAL EST PLAN CARE: CPT

## 2021-02-02 PROCEDURE — 82962 GLUCOSE BLOOD TEST: CPT

## 2021-02-02 PROCEDURE — 74011250637 HC RX REV CODE- 250/637: Performed by: PHYSICIAN ASSISTANT

## 2021-02-02 PROCEDURE — 85025 COMPLETE CBC W/AUTO DIFF WBC: CPT

## 2021-02-02 PROCEDURE — 97112 NEUROMUSCULAR REEDUCATION: CPT

## 2021-02-02 PROCEDURE — 80048 BASIC METABOLIC PNL TOTAL CA: CPT

## 2021-02-02 PROCEDURE — 97530 THERAPEUTIC ACTIVITIES: CPT

## 2021-02-02 PROCEDURE — 94640 AIRWAY INHALATION TREATMENT: CPT

## 2021-02-02 PROCEDURE — 74011250636 HC RX REV CODE- 250/636: Performed by: HOSPITALIST

## 2021-02-02 PROCEDURE — 77010033711 HC HIGH FLOW OXYGEN

## 2021-02-02 PROCEDURE — 74011250637 HC RX REV CODE- 250/637: Performed by: FAMILY MEDICINE

## 2021-02-02 RX ORDER — SODIUM POLYSTYRENE SULFONATE 15 G/60ML
30 SUSPENSION ORAL; RECTAL
Status: COMPLETED | OUTPATIENT
Start: 2021-02-02 | End: 2021-02-02

## 2021-02-02 RX ORDER — INSULIN LISPRO 100 [IU]/ML
10 INJECTION, SOLUTION INTRAVENOUS; SUBCUTANEOUS
Status: DISCONTINUED | OUTPATIENT
Start: 2021-02-02 | End: 2021-02-05

## 2021-02-02 RX ORDER — INSULIN GLARGINE 100 [IU]/ML
35 INJECTION, SOLUTION SUBCUTANEOUS
Status: DISCONTINUED | OUTPATIENT
Start: 2021-02-02 | End: 2021-02-03

## 2021-02-02 RX ADMIN — DEXAMETHASONE 6 MG: 4 TABLET ORAL at 08:19

## 2021-02-02 RX ADMIN — INSULIN LISPRO 4 UNITS: 100 INJECTION, SOLUTION INTRAVENOUS; SUBCUTANEOUS at 08:00

## 2021-02-02 RX ADMIN — Medication 10 ML: at 05:16

## 2021-02-02 RX ADMIN — NYSTATIN: 100000 POWDER TOPICAL at 18:00

## 2021-02-02 RX ADMIN — PHENYLEPHRINE HYDROCHLORIDE AND FAT, HARD 1 SUPPOSITORY: 5; 1.77 SUPPOSITORY RECTAL at 15:54

## 2021-02-02 RX ADMIN — INSULIN GLARGINE 35 UNITS: 100 INJECTION, SOLUTION SUBCUTANEOUS at 21:11

## 2021-02-02 RX ADMIN — BUDESONIDE AND FORMOTEROL FUMARATE DIHYDRATE 2 PUFF: 160; 4.5 AEROSOL RESPIRATORY (INHALATION) at 08:52

## 2021-02-02 RX ADMIN — INSULIN LISPRO 6 UNITS: 100 INJECTION, SOLUTION INTRAVENOUS; SUBCUTANEOUS at 08:00

## 2021-02-02 RX ADMIN — GUAIFENESIN 1200 MG: 600 TABLET ORAL at 20:48

## 2021-02-02 RX ADMIN — ALBUTEROL SULFATE 2 PUFF: 108 INHALANT RESPIRATORY (INHALATION) at 14:11

## 2021-02-02 RX ADMIN — PHENYLEPHRINE HYDROCHLORIDE AND FAT, HARD 1 SUPPOSITORY: 5; 1.77 SUPPOSITORY RECTAL at 20:54

## 2021-02-02 RX ADMIN — INSULIN LISPRO 8 UNITS: 100 INJECTION, SOLUTION INTRAVENOUS; SUBCUTANEOUS at 11:39

## 2021-02-02 RX ADMIN — ALBUTEROL SULFATE 2 PUFF: 108 INHALANT RESPIRATORY (INHALATION) at 19:39

## 2021-02-02 RX ADMIN — PANTOPRAZOLE SODIUM 40 MG: 40 TABLET, DELAYED RELEASE ORAL at 15:55

## 2021-02-02 RX ADMIN — GUAIFENESIN 1200 MG: 600 TABLET ORAL at 08:20

## 2021-02-02 RX ADMIN — INSULIN LISPRO 10 UNITS: 100 INJECTION, SOLUTION INTRAVENOUS; SUBCUTANEOUS at 17:59

## 2021-02-02 RX ADMIN — Medication 10 ML: at 15:54

## 2021-02-02 RX ADMIN — SODIUM POLYSTYRENE SULFONATE 30 G: 15 SUSPENSION ORAL; RECTAL at 10:19

## 2021-02-02 RX ADMIN — PANTOPRAZOLE SODIUM 40 MG: 40 TABLET, DELAYED RELEASE ORAL at 05:16

## 2021-02-02 RX ADMIN — ALBUTEROL SULFATE 2 PUFF: 108 INHALANT RESPIRATORY (INHALATION) at 08:52

## 2021-02-02 RX ADMIN — NYSTATIN: 100000 POWDER TOPICAL at 08:19

## 2021-02-02 RX ADMIN — BUDESONIDE AND FORMOTEROL FUMARATE DIHYDRATE 2 PUFF: 160; 4.5 AEROSOL RESPIRATORY (INHALATION) at 19:39

## 2021-02-02 RX ADMIN — DEXAMETHASONE 6 MG: 4 TABLET ORAL at 20:48

## 2021-02-02 RX ADMIN — GLYCERIN, PETROLATUM, PHENYLEPHRINE HCL, PRAMOXINE HCL: 144; 2.5; 10; 15 CREAM TOPICAL at 09:18

## 2021-02-02 RX ADMIN — INSULIN LISPRO 6 UNITS: 100 INJECTION, SOLUTION INTRAVENOUS; SUBCUTANEOUS at 17:59

## 2021-02-02 RX ADMIN — Medication 10 ML: at 20:49

## 2021-02-02 RX ADMIN — ROSUVASTATIN 10 MG: 10 TABLET, FILM COATED ORAL at 20:47

## 2021-02-02 RX ADMIN — INSULIN LISPRO 10 UNITS: 100 INJECTION, SOLUTION INTRAVENOUS; SUBCUTANEOUS at 12:02

## 2021-02-02 RX ADMIN — INSULIN LISPRO 2 UNITS: 100 INJECTION, SOLUTION INTRAVENOUS; SUBCUTANEOUS at 21:10

## 2021-02-02 NOTE — PROGRESS NOTES
ACUTE OT GOALS:  (Developed with and agreed upon by patient and/or caregiver.)  1. Patient will complete lower body bathing and dressing with Mod I and adaptive equipment as needed. CONTINUE  2. Patient will complete toileting with Mod I and adaptive equipment as needed. CONTINUE  3. Patient will complete seated ADL for at least 8 minutes with good static and good dynamic seated balance. CONTINUE  4. Patient will complete functional transfers with Mod I and adaptive equipment as needed. CONTINUE  5. Patient will complete standing ADL with Mod I and good static and good dynamic standing balance. CONTINUE  6. Patient will tolerate 15 minutes of OT activity with O2 levels >90% to improve activity tolerance for ADL/transfers. NEW GOAL    Timeframe: 7 visits     OCCUPATIONAL THERAPY ASSESSMENT: Daily Note and Re-evaluation OT Treatment Day # 1    Ata Alcocer is a 80 y.o. male   PRIMARY DIAGNOSIS: Acute respiratory failure with hypoxia ([address]  Acute respiratory failure with hypoxia ([facility] [address] [J96.01]    Reason for Referral:  Generalized Weakness  ICD-10: Treatment Diagnosis: Generalized Muscle Weakness (M62.81)  Difficulty in walking, Not elsewhere classified (R26.2)  INPATIENT: Payor: [employer] MEDICARE / Plan: [facility] [employer] MEDICARE HMO / Product Type: Managed Care Medicare /   ASSESSMENT:     REHAB RECOMMENDATIONS:   Recommendation to date pending progress:  Setting:   Short-term Rehab.   Equipment:    None     PRIOR LEVEL OF FUNCTION:  (Prior to Hospitalization)  INITIAL/CURRENT LEVEL OF FUNCTION:  (Most Recently Demonstrated)   Bathing:   Modified Independent  Dressing:   Modified Independent  Feeding/Grooming:   Independent  Toileting:   Modified Independent  Functional Mobility:   Modified Independent with cane Bathing:   Moderate Assistance  Dressing:   Moderate Assistance  Feeding/Grooming:   Minimal Assistance  Toileting:   Moderate Assistance   Functional Mobility:   Not tested    All ADL limited due to poor activity tolerance levels      ASSESSMENT:  Mr. Isamar Vincent was admitted for acute respiratory failure and is C19+. Pt is no longer contagious but continues to demonstrate increased O2 demands. Pt appears slightly confused at times and reports feeling isolated and bored in his room. Pt on 10 L of O2 with levels dropping into the 70's with sitting balance edge of bed. Pt cued for upright sitting posture and demonstrated fair balance edge of bed. Pt is limited with all ADL/functional transfers due to poor activity tolerance. Pt unable to stand due to O2 levels. Pt with limited progress towards goals due to respiratory status. Pt will continue to benefit from OT services to address stated goals and plan of care. SUBJECTIVE:   Mr. Isamar Vincent states, \"I think I need to lay back down! \"    SOCIAL HISTORY/LIVING ENVIRONMENT: Pt lives with spouse in two story home (all needs met on first floor) with 4 YVROSE. Pt is typically Mod I for performance of ADLs and Mod I for functional mobility with use of SPC. Pt reports one fall and no home O2 use.     OBJECTIVE:     PAIN: VITAL SIGNS: LINES/DRAINS:   Pre Treatment: Pain Screen  Pain Scale 1: Numeric (0 - 10)  Pain Intensity 1: 0  Post Treatment: Unchanged   O2 Device: Hi flow nasal cannula     GROSS EVALUATION:  BUE Within Functional Limits Abnormal/ Functional Abnormal/ Non-Functional (see comments) Not Tested Comments:   AROM [x] [] [] []    PROM [] [] [] [x]    Strength [] [x] [] []    Balance [] [x] [] []    Posture [] [x] [] []    Sensation [] [] [] [x]    Coordination [x] [] [] []    Tone [] [] [] [x]    Edema [] [] [] [x]    Activity Tolerance [] [x] [] [] Poor activity tolerance; O2 levels dropped to 70's sitting EOB    [] [] [] []      COGNITION/  PERCEPTION: Intact Impaired   (see comments) Comments:   Orientation [] [x]    Vision [x] []    Hearing [x] []    Judgment/ Insight [] [x]    Attention [] [x]    Memory [x] []    Command Following [] [x]    Emotional Regulation [x] []     [] []      ACTIVITIES OF DAILY LIVING: I Mod I S SBA CGA Min Mod Max Total NT Comments   BASIC ADLs:              Bathing/ Showering [] [] [] [] [] [] [] [] [] [x]    Toileting [] [] [] [] [] [] [] [] [] [x] For transfer   Dressing [] [] [] [] [] [] [] [] [] [x]    Feeding [] [] [] [] [] [] [] [] [] [x]    Grooming [] [] [] [] [] [] [] [] [] [x]    Personal Device Care [] [] [] [] [] [] [] [] [] [x]    Functional Mobility [] [] [] [] [x] [x] [] [] [] [x]    I=Independent, Mod I=Modified Independent, S=Supervision, SBA=Standby Assistance, CGA=Contact Guard Assistance,   Min=Minimal Assistance, Mod=Moderate Assistance, Max=Maximal Assistance, Total=Total Assistance, NT=Not Tested    MOBILITY: I Mod I S SBA CGA Min Mod Max Total  NT x2 Comments:   Supine to sit [] [] [] [] [] [x] [] [] [] [] [x]    Sit to supine [] [] [] [] [] [x] [] [] [] [] [x]    Sit to stand [] [] [] [] [] [] [] [] [] [x] []    Bed to chair [] [] [] [] [] [] [] [] [] [x] [] With use of RW   I=Independent, Mod I=Modified Independent, S=Supervision, SBA=Standby Assistance, CGA=Contact Guard Assistance,   Min=Minimal Assistance, Mod=Moderate Assistance, Max=Maximal Assistance, Total=Total Assistance, NT=Not Tested    [address] [facility] [address] [address] AM-PAC 6 Clicks   Daily Activity Inpatient Short Form        How much help from another person does the patient currently need. .. Total A Lot A Little None   1. Putting on and taking off regular lower body clothing? [] 1   [x] 2   [] 3   [] 4   2. Bathing (including washing, rinsing, drying)? [] 1   [x] 2   [] 3   [] 4   3. Toileting, which includes using toilet, bedpan or urinal?   [] 1   [x] 2   [] 3   [] 4   4. Putting on and taking off regular upper body clothing? [] 1   [] 2   [x] 3   [] 4   5. Taking care of personal grooming such as brushing teeth? [] 1   [] 2   [x] 3   [] 4   6. Eating meals?    [] 1   [] 2   [x] 3   [x] 4   © 2007, Trustees of [address] [place] [address] [address] [id number], under license to [employer]. All rights reserved     Score:  Initial: 19, completed, 1/8/2021 Most Recent: 15 (Date:2 -2-21 )   Interpretation of Tool:  Represents activities that are increasingly more difficult (i.e. Bed mobility, Transfers, Gait). PLAN:   FREQUENCY/DURATION: OT Plan of Care: 3 times/week for duration of hospital stay or until stated goals are met, whichever comes first.    PROBLEM LIST:   (Skilled intervention is medically necessary to address:)  1. Decreased ADL/Functional Activities  2. Decreased Activity Tolerance  3. Decreased AROM/PROM  4. Decreased Balance  5. Decreased Gait Ability  6. Decreased Strength  7. Decreased Transfer Abilities   INTERVENTIONS PLANNED:   (Benefits and precautions of occupational therapy have been discussed with the patient.)  1. Self Care Training  2. Therapeutic Activity  3. Therapeutic Exercise/HEP  4. Neuromuscular Re-education  5. Education     TREATMENT:     EVALUATION: Low Complexity : (Untimed Charge)    TREATMENT:   Co-Treatment PT/OT necessary due to patient's decreased overall endurance/tolerance levels, as well as need for high level skilled assistance to complete functional transfers/mobility and functional tasks  Neuromuscular Re-education (8 Minutes): Neuromuscular Re-education included Postural training and Sitting balance training to improve Balance and Postural Control.   Re-evaluation     AFTER TREATMENT POSITION/PRECAUTIONS:  Bed, Needs within reach and RN notified    INTERDISCIPLINARY COLLABORATION:  RN/PCT, PT/PTA and OT/PETIT    TOTAL TREATMENT DURATION:  OT Patient Time In/Time Out  Time In: 1472  Time Out: Steffany 1850, OT, OTR/L

## 2021-02-02 NOTE — DIABETES MGMT
Patient admitted with acute respiratory failure with hypoxia. Blood glucose ranged 235-410 yesterday with patient receiving Lantus 30 units, Humalog 36 units, Decadron 12mg, and NPH 10 units. Blood glucose this morning was 221. Most recent FSBS 330. Reviewed patient current regimen: Lantus 30 units nightly, Humalog 6 units with meals, Humalog SSI, and Decadron 6mg BID. Patient would likely benefit from an increase in basal insulin as fasting blood glucose is not at goal. Patient would also likely benefit from an increase in prandial insulin to help improve glycemic during the day. Updated provider via [employer]u regarding recommendations and patient glycemic control. Spoke with provider new orders received to increase to Lantus 35 units nightly and increase Humalog 10 units with meals.

## 2021-02-02 NOTE — PROGRESS NOTES
Assumed care of the patient. Off going report given by  Jihan Bennett RN. The patient is AO x 4 with a little confusion at times. IV access: PIV is saline locked  Oxygen needs: Hi flow at 8 L and continuous pulse ox. Pain assessment: NAD  Safety: Bed is low and call light is within reach. Patient understands to call for assistance.

## 2021-02-02 NOTE — PROGRESS NOTES
Patient desaturating on 8L Hi-flow to the low 70% as he was talking. I asked in to breath in his nose slowly and out his mouth and the saturation improved but did not reach 90%. I increased the O2 to 10L.

## 2021-02-02 NOTE — PROGRESS NOTES
ACUTE PHYSICAL THERAPY GOALS:  (Developed with and agreed upon by patient and/or caregiver. )  LTGs (assessed and revised as needed 1/19/21):  (1.) Tabatha Hastings  will move from supine to sit and sit to supine , scoot up and down and roll side to side with MODIFIED INDEPENDENCE within 7 treatment day(s). (2.) Tabatha Hastings will transfer from bed to chair and chair to bed with SBA using the least restrictive device within 7 treatment day(s). (3.) [facility] will ambulate with SBA for 100 feet with the least restrictive device within 7 treatment day(s) while maintaining normal vital signs. (4.) Tabatha Hastings will perform standing static and dynamic balance activities x 15 minutes with SBA to improve safety and activity tolerance within 7 treatment day(s). (5.) Tabatha Hastings will ascend and descend 4 stairs using one hand rail(s) with SBA to improve functional mobility and safety within 7 treatment day(s). (6.) Tabatha Hastings will perform bilateral lower extremity exercises x 10 min for HEP with INDEPENDENCE to improve strength, endurance, and functional mobility within 7 treatment day(s). PHYSICAL THERAPY: Daily Note Treatment Day # 4    Tabatha Hastings is a 80 y.o. male   PRIMARY DIAGNOSIS: Acute respiratory failure with hypoxia (HCC)  Acute respiratory failure with hypoxia ([facility] [address] [J96.01]    ASSESSMENT:     REHAB RECOMMENDATIONS: CURRENT LEVEL OF FUNCTION:  (Most Recently Demonstrated)   Recommendation to date pending progress:  Setting:   Short-term Rehab  Equipment:    To Be Determined Bed Mobility:   Minimal Assistance  Sit to Stand:   Minimal Assistance  Transfers:   Minimal Assistance  Gait/Mobility:   Not tested     ASSESSMENT:  Mr. Colletta Clap presents on 10 L HFNC, 90%. Able to sit up edge of bed, but unable to stand. Fatigues quickly, SpO2 drops with minimal exertion, rises back to 90% with increased time once returned to sidelying. Low activity tolerance, limited progress.  Will continue PT efforts. SUBJECTIVE:   Mr. Sadie Nam states, \"I want to call my wife\"    SOCIAL HISTORY/ LIVING ENVIRONMENT: See initial evaluation     OBJECTIVE:     PAIN: VITAL SIGNS: LINES/DRAINS:   Pre Treatment: Pain Screen  Pain Scale 1: Numeric (0 - 10)  Pain Intensity 1: 0  Post Treatment: 0 Vital Signs  O2 Sat (%): 90 %  O2 Device: Hi flow nasal cannula  O2 Flow Rate (L/min): 10 l/min Melchor Catheter  O2 Device: Hi flow nasal cannula     MOBILITY: I Mod I S SBA CGA Min Mod Max Total  NT x2 Comments:   Bed Mobility    Rolling [] [] [] [] [] [x] [] [] [] [] []    Supine to Sit [] [] [] [] [] [x] [] [] [] [] []    Scooting [] [] [] [] [] [x] [] [] [] [] []    Sit to Supine [] [] [] [] [] [x] [] [] [] [] []    Transfers    Sit to Stand [] [] [] [] [] [] [] [] [] [x] []    Bed to Chair [] [] [] [] [] [] [] [] [] [x] []    Stand to Sit [] [] [] [] [] [] [] [] [] [x] []    I=Independent, Mod I=Modified Independent, S=Supervision, SBA=Standby Assistance, CGA=Contact Guard Assistance,   Min=Minimal Assistance, Mod=Moderate Assistance, Max=Maximal Assistance, Total=Total Assistance, NT=Not Tested    GAIT: I Mod I S SBA CGA Min Mod Max Total  NT x2 Comments:   Level of Assistance [] [] [] [] [] [] [] [] [] [x] []    Distance N/A    DME N/A    Gait Quality N/A    Weightbearing  Status N/A     I=Independent, Mod I=Modified Independent, S=Supervision, SBA=Standby Assistance, CGA=Contact Guard Assistance,   Min=Minimal Assistance, Mod=Moderate Assistance, Max=Maximal Assistance, Total=Total Assistance, NT=Not Tested    PLAN:   FREQUENCY/DURATION: PT Plan of Care: 3 times/week for duration of hospital stay or until stated goals are met, whichever comes first.  TREATMENT:     TREATMENT:   ($$ Therapeutic Activity: 8-22 mins    )  Therapeutic Activity (17 Minutes):  Therapeutic activity included Supine to Sit, Sit to Supine, Scooting, Lateral Scooting and Sitting balance  to improve functional Mobility, Strength and Activity tolerance.       AFTER TREATMENT POSITION/PRECAUTIONS:  Bed, Needs within reach and RN notified    INTERDISCIPLINARY COLLABORATION:  RN/PCT, PT/PTA and OT/PETIT    TOTAL TREATMENT DURATION:  PT Patient Time In/Time Out  Time In: 8402  Time Out: PRATIBHA Barber

## 2021-02-02 NOTE — PROGRESS NOTES
Progress Note    2021  Admit Date: 2021  1:50 AM   NAME: Deborah Sicard Ward   :  1933   MRN:  [id number]   Attending: Chip Gant MD  PCP:  Rahel Lee MD  Treatment Team: Attending Provider: Chip Gant MD; Utilization Review: Christina Campbell; Utilization Review: Tata Figueroa RN; Care Manager: Kane Drew; Hospitalist: Chip Gant MD; Hospitalist: Parish Machado NP; Primary Nurse: Valery Sarah, STEFAN; Physical Therapist: Jen Perry, PT; Occupational Therapist: Damita Simmonds, OT    DNR   SUBJECTIVE:     Mr. Ramya Tang is a 80year old [facility] DM type II, GERD, admitted  for COVID19, acute hypoxic respiratory failure, and rectal bleeding. He was seen by GI who stated bleeding from hemorrhoids, Tucks pads and Anusol SD ordered. He has required oxygen on airvo since  (was on NC). S/P Convalescent plasma on , Remdesivir -. He has been weaned to [address]. K+ 5.3 today. Denies CP, SOB. Past Medical History:   Diagnosis Date    Acquired hallux valgus of left foot     Arthritis     OA- shoulders, knees, toes    CAD (coronary artery disease)     MI in , no c/o - no stents or surgery    Diabetes ([facility] [address]     Type 2 Insulin Dep-Flexpen (started ), average AM sugar -   today in -   hypo- < 80    High cholesterol     takes Crestor    Nausea & vomiting     with anesthesia- none with previous 2 foot surgeries    TIA     temporarily lost sight in RIGHT eye, lost hearing RIGHT ear, denies deficits at this time, \"everything is back to normal now\"    Tinnitus     Unspecified adverse effect of anesthesia     difficulty waking up- stays very sleepy- pt states \"Easy to put out\"    Unspecified sleep apnea     no C-PAP use.       Recent Results (from the past 24 hour(s))   GLUCOSE, POC    Collection Time: 21  2:45 PM   Result Value Ref Range    Glucose (POC) 328 (H) 65 - 100 mg/dL   GLUCOSE, POC    Collection Time: 02/01/21  8:55 PM   Result Value Ref Range    Glucose (POC) 235 (H) 65 - 100 mg/dL   CBC WITH AUTOMATED DIFF    Collection Time: 02/02/21  6:08 AM   Result Value Ref Range    WBC 14.2 (H) 4.3 - 11.1 K/uL    RBC 3.69 (L) 4.23 - 5.6 M/uL    HGB 10.6 (L) 13.6 - 17.2 g/dL    HCT 33.5 (L) 41.1 - 50.3 %    MCV 90.8 79.6 - 97.8 FL    MCH 28.7 26.1 - 32.9 PG    MCHC 31.6 31.4 - 35.0 g/dL    RDW 19.9 (H) 11.9 - 14.6 %    PLATELET 131 051 - 216 K/uL    MPV 11.9 9.4 - 12.3 FL    ABSOLUTE NRBC 0.00 0.0 - 0.2 K/uL    DF AUTOMATED      NEUTROPHILS 96 (H) 43 - 78 %    LYMPHOCYTES 2 (L) 13 - 44 %    MONOCYTES 2 (L) 4.0 - 12.0 %    EOSINOPHILS 0 (L) 0.5 - 7.8 %    BASOPHILS 0 0.0 - 2.0 %    IMMATURE GRANULOCYTES 1 0.0 - 5.0 %    ABS. NEUTROPHILS 13.5 (H) 1.7 - 8.2 K/UL    ABS. LYMPHOCYTES 0.3 (L) 0.5 - 4.6 K/UL    ABS. MONOCYTES 0.3 0.1 - 1.3 K/UL    ABS. EOSINOPHILS 0.0 0.0 - 0.8 K/UL    ABS. BASOPHILS 0.0 0.0 - 0.2 K/UL    ABS. IMM.  GRANS. 0.1 0.0 - 0.5 K/UL   METABOLIC PANEL, BASIC    Collection Time: 02/02/21  6:08 AM   Result Value Ref Range    Sodium 136 (L) 138 - 145 mmol/L    Potassium 5.3 (H) 3.5 - 5.1 mmol/L    Chloride 102 98 - 107 mmol/L    CO2 30 21 - 32 mmol/L    Anion gap 4 (L) 7 - 16 mmol/L    Glucose 212 (H) 65 - 100 mg/dL    BUN 26 (H) 8 - 23 MG/DL    Creatinine 0.92 0.8 - 1.5 MG/DL    GFR est AA >60 >60 ml/min/1.73m2    GFR est non-AA >60 >60 ml/min/1.73m2    Calcium 8.2 (L) 8.3 - 10.4 MG/DL   GLUCOSE, POC    Collection Time: 02/02/21  6:46 AM   Result Value Ref Range    Glucose (POC) 221 (H) 65 - 100 mg/dL   GLUCOSE, POC    Collection Time: 02/02/21 10:49 AM   Result Value Ref Range    Glucose (POC) 330 (H) 65 - 100 mg/dL     Allergies   Allergen Reactions    Betadine [Povidone-Iodine] Swelling     And Blisters on skin      Current Facility-Administered Medications   Medication Dose Route Frequency Provider Last Rate Last Admin    insulin glargine (LANTUS) injection 35 Units  35 Units SubCUTAneous QHS Anola Yeison, Alexandra Kirk, NP        insulin lispro (HUMALOG) injection 10 Units  10 Units SubCUTAneous TIDAC Junaid Griffith, NP        albuterol (PROVENTIL HFA, VENTOLIN HFA, PROAIR HFA) inhaler 2 Puff  2 Puff Inhalation Q6HWA RT Jenifer Conte MD   2 Puff at 02/02/21 3196    magic mouthwash (ANA ROSA) oral suspension 5 mL  5 mL Swish and Spit Q4H PRN Jenifer Conte MD   5 mL at 02/01/21 2300    budesonide-formoteroL (SYMBICORT) 160-4.5 mcg/actuation HFA inhaler 2 Puff  2 Puff Inhalation BID RT Brittany Montilla MD   2 Puff at 02/02/21 0852    guaiFENesin ER (MUCINEX) tablet 1,200 mg  1,200 mg Oral Q12H Brittany Montilla MD   1,200 mg at 02/02/21 0820    dexAMETHasone (DECADRON) tablet 6 mg  6 mg Oral Q12H Brittany Montilla MD   6 mg at 02/02/21 6076    morphine injection 1 mg  1 mg IntraVENous Q3H PRN Christy Alonzo, DO   1 mg at 02/01/21 1415    nystatin (MYCOSTATIN) 100,000 unit/gram powder   Topical BID Chelsie Diana MD   Given at 02/02/21 8238    rceejzmgu-Tsexnmtm-Vaqji-W.Pet (PREPARATION H MAXIMUM STRENGTH) 0.25-1 % cream   Rectal Q12H Radonna Imelda, DO   Given at 02/02/21 1126    phenylephrine suppository 1 Suppository  1 Suppository Rectal Q12H MELISA Powers   1 Suppository at 02/01/21 0844    pantoprazole (PROTONIX) tablet 40 mg  40 mg Oral ACB&D Marleyelizabeth Dennis, DO   40 mg at 02/02/21 0516    witch hazel-glycerin (TUCKS) 12.5-50 % pads 1 Pad  1 Pad PeriANAL PRN Nichole Godwin MD   1 Pad at 01/16/21 1843    dextrose 40% (GLUTOSE) oral gel 1 Tube  15 g Oral PRN Zulema Collins DO        glucagon (GLUCAGEN) injection 1 mg  1 mg IntraMUSCular PRN Zulema Collins DO        dextrose (D50W) injection syrg 12.5-25 g  25-50 mL IntraVENous PRN Zulema Collins DO        influenza vaccine 2020-21 (6 mos+)(PF) (FLUARIX/FLULAVAL/FLUZONE QUAD) injection 0.5 mL  0.5 mL IntraMUSCular PRIOR TO DISCHARGE Frank Díaz DO        [Held by provider] aspirin delayed-release tablet 81 mg  81 mg Oral QHS Himanshu Tenorio MD   81 mg at 21 2203    rosuvastatin (CRESTOR) tablet 10 mg  10 mg Oral QHS Himanshu Tenorio MD   10 mg at 21 2108    sodium chloride (NS) flush 5-40 mL  5-40 mL IntraVENous Q8H Himanshu Cevallos MD   10 mL at 21 0516    sodium chloride (NS) flush 5-40 mL  5-40 mL IntraVENous PRN Himanshu Tenorio MD   10 mL at 21 1342    acetaminophen (TYLENOL) tablet 650 mg  650 mg Oral Q6H PRN Himanshu Tenorio MD   650 mg at 21 1444    Or    acetaminophen (TYLENOL) suppository 650 mg  650 mg Rectal Q6H PRN José Miguel Portillo MD        ondansetron (ZOFRAN) injection 4 mg  4 mg IntraVENous Q6H PRN Himanshu Tenorio MD        senna (SENOKOT) tablet 8.6 mg  1 Tab Oral DAILY PRN Himanshu Tenorio MD        0.9% sodium chloride infusion 250 mL  250 mL IntraVENous PRN Himanshu Tenorio MD        insulin lispro (HUMALOG) injection   SubCUTAneous AC&HS José Miguel Portillo MD   8 Units at 21 1139       Review of Systems negative with exception of pertinent positives noted above  PHYSICAL EXAM     Visit Vitals  /63 (BP 1 Location: Right upper arm, BP Patient Position: At rest)   Pulse 92   Temp 98.9 °F (37.2 °C)   Resp 20   Ht 5' 6\" (1.676 m)   Wt 54.9 kg (121 lb)   SpO2 94%   BMI 19.53 kg/m²      Temp (24hrs), Av.9 °F (36.6 °C), Min:97.3 °F (36.3 °C), Max:98.9 °F (37.2 °C)    Oxygen Therapy  O2 Sat (%): 94 % (21 1046)  Pulse via Oximetry: 76 beats per minute (21 0852)  O2 Device: Hi flow nasal cannula (21)  O2 Flow Rate (L/min): 10 l/min (21 0852)  O2 Temperature: 87.8 °F (31 °C) (21 121)  FIO2 (%): 40 % (21 121)    Intake/Output Summary (Last 24 hours) at 2021 1151  Last data filed at 2021 0816  Gross per 24 hour   Intake 720 ml   Output 1125 ml   Net -405 ml      General:       No acute distress    Lungs:          CTA bilaterally.  Resp even and nonlabored  Heart:            S1S2 present without murmurs rubs gallops. RRR. No LE edema  Abdomen:    Soft, non tender, non distended. BS present  Extremities: Moves ext spontaneously. No cyanosis  Neurologic:  A/O X3.  No focal deficits      Results summary of Diagnostic Studies/Procedures copied from within [facility] EMR:      Power Mccabe 96 Problems    Diagnosis Date Noted    Decubitus ulcer of buttock, stage 2 ([facility] [address] 01/19/2021    Gastrointestinal hemorrhage associated with anorectal source 01/14/2021    Acute respiratory failure with hypoxia ([facility] [address] 01/07/2021    COVID-19 01/07/2021    Acute metabolic encephalopathy 04/07/0634    Type 2 diabetes mellitus, with long-term current use of insulin ([facility] [address] 01/07/2021     Plan:    Acute hypoxic respiratory failure secondary to COVID19 pneumonia  completed Decadron  completed convalescent plasma and remdesevir  wean O2 as tolerated, on 8 L currently  goal SpO2>90%     Rectal bleeding due to hemorrhoids  continue with phenylephrine suppository   trend CBCs     Stage II sacral ulcers   frequent turning      DM type II  continue current regimen  SSI      Hyperkalemia  Kayexalate X1 dose  Low K diet     Bradycardia, asymptomatic   continue to monitor  avoid BBs and CCBs        Notes, labs, VS, diagnostic testing reviewed     DVT Prophylaxis: SCD  Plan of Care Discussed with: Supervising MD  Dr. Antoinette Garcia, care team, pt        Annalee Giles NP       Addendum  Called pt wife and updated on plan of care and pt condition.

## 2021-02-03 LAB
ANION GAP SERPL CALC-SCNC: 4 MMOL/L (ref 7–16)
BASOPHILS # BLD: 0 K/UL (ref 0–0.2)
BASOPHILS NFR BLD: 0 % (ref 0–2)
BUN SERPL-MCNC: 27 MG/DL (ref 8–23)
CALCIUM SERPL-MCNC: 8.2 MG/DL (ref 8.3–10.4)
CHLORIDE SERPL-SCNC: 103 MMOL/L (ref 98–107)
CO2 SERPL-SCNC: 31 MMOL/L (ref 21–32)
CREAT SERPL-MCNC: 0.9 MG/DL (ref 0.8–1.5)
DIFFERENTIAL METHOD BLD: ABNORMAL
EOSINOPHIL # BLD: 0 K/UL (ref 0–0.8)
EOSINOPHIL NFR BLD: 0 % (ref 0.5–7.8)
ERYTHROCYTE [DISTWIDTH] IN BLOOD BY AUTOMATED COUNT: 20.2 % (ref 11.9–14.6)
GLUCOSE BLD STRIP.AUTO-MCNC: 206 MG/DL (ref 65–100)
GLUCOSE BLD STRIP.AUTO-MCNC: 295 MG/DL (ref 65–100)
GLUCOSE BLD STRIP.AUTO-MCNC: 62 MG/DL (ref 65–100)
GLUCOSE BLD STRIP.AUTO-MCNC: 88 MG/DL (ref 65–100)
GLUCOSE SERPL-MCNC: 54 MG/DL (ref 65–100)
HCT VFR BLD AUTO: 36.5 % (ref 41.1–50.3)
HGB BLD-MCNC: 11.6 G/DL (ref 13.6–17.2)
IMM GRANULOCYTES # BLD AUTO: 0.2 K/UL (ref 0–0.5)
IMM GRANULOCYTES NFR BLD AUTO: 1 % (ref 0–5)
LYMPHOCYTES # BLD: 0.4 K/UL (ref 0.5–4.6)
LYMPHOCYTES NFR BLD: 3 % (ref 13–44)
MCH RBC QN AUTO: 28.6 PG (ref 26.1–32.9)
MCHC RBC AUTO-ENTMCNC: 31.8 G/DL (ref 31.4–35)
MCV RBC AUTO: 90.1 FL (ref 79.6–97.8)
MONOCYTES # BLD: 0.5 K/UL (ref 0.1–1.3)
MONOCYTES NFR BLD: 3 % (ref 4–12)
NEUTS SEG # BLD: 15.1 K/UL (ref 1.7–8.2)
NEUTS SEG NFR BLD: 93 % (ref 43–78)
NRBC # BLD: 0 K/UL (ref 0–0.2)
PLATELET # BLD AUTO: 198 K/UL (ref 150–450)
PMV BLD AUTO: 11.9 FL (ref 9.4–12.3)
POTASSIUM SERPL-SCNC: 4.8 MMOL/L (ref 3.5–5.1)
RBC # BLD AUTO: 4.05 M/UL (ref 4.23–5.6)
SODIUM SERPL-SCNC: 138 MMOL/L (ref 138–145)
WBC # BLD AUTO: 16.2 K/UL (ref 4.3–11.1)

## 2021-02-03 PROCEDURE — 36415 COLL VENOUS BLD VENIPUNCTURE: CPT

## 2021-02-03 PROCEDURE — 74011250637 HC RX REV CODE- 250/637: Performed by: HOSPITALIST

## 2021-02-03 PROCEDURE — 74011250636 HC RX REV CODE- 250/636: Performed by: FAMILY MEDICINE

## 2021-02-03 PROCEDURE — 74011250636 HC RX REV CODE- 250/636: Performed by: HOSPITALIST

## 2021-02-03 PROCEDURE — 65270000029 HC RM PRIVATE

## 2021-02-03 PROCEDURE — 74011250637 HC RX REV CODE- 250/637: Performed by: PHYSICIAN ASSISTANT

## 2021-02-03 PROCEDURE — 74011250637 HC RX REV CODE- 250/637: Performed by: INTERNAL MEDICINE

## 2021-02-03 PROCEDURE — 97530 THERAPEUTIC ACTIVITIES: CPT

## 2021-02-03 PROCEDURE — 85025 COMPLETE CBC W/AUTO DIFF WBC: CPT

## 2021-02-03 PROCEDURE — 77010033711 HC HIGH FLOW OXYGEN

## 2021-02-03 PROCEDURE — 74011636637 HC RX REV CODE- 636/637: Performed by: INTERNAL MEDICINE

## 2021-02-03 PROCEDURE — 74011636637 HC RX REV CODE- 636/637: Performed by: NURSE PRACTITIONER

## 2021-02-03 PROCEDURE — 82962 GLUCOSE BLOOD TEST: CPT

## 2021-02-03 PROCEDURE — 94640 AIRWAY INHALATION TREATMENT: CPT

## 2021-02-03 PROCEDURE — 80048 BASIC METABOLIC PNL TOTAL CA: CPT

## 2021-02-03 PROCEDURE — 94762 N-INVAS EAR/PLS OXIMTRY CONT: CPT

## 2021-02-03 RX ORDER — INSULIN GLARGINE 100 [IU]/ML
20 INJECTION, SOLUTION SUBCUTANEOUS
Status: DISCONTINUED | OUTPATIENT
Start: 2021-02-03 | End: 2021-02-04

## 2021-02-03 RX ADMIN — INSULIN GLARGINE 20 UNITS: 100 INJECTION, SOLUTION SUBCUTANEOUS at 23:42

## 2021-02-03 RX ADMIN — NYSTATIN: 100000 POWDER TOPICAL at 10:37

## 2021-02-03 RX ADMIN — PHENYLEPHRINE HYDROCHLORIDE AND FAT, HARD 1 SUPPOSITORY: 5; 1.77 SUPPOSITORY RECTAL at 22:01

## 2021-02-03 RX ADMIN — NYSTATIN: 100000 POWDER TOPICAL at 18:53

## 2021-02-03 RX ADMIN — GUAIFENESIN 1200 MG: 600 TABLET ORAL at 21:57

## 2021-02-03 RX ADMIN — BUDESONIDE AND FORMOTEROL FUMARATE DIHYDRATE 2 PUFF: 160; 4.5 AEROSOL RESPIRATORY (INHALATION) at 20:23

## 2021-02-03 RX ADMIN — MORPHINE SULFATE 1 MG: 2 INJECTION, SOLUTION INTRAMUSCULAR; INTRAVENOUS at 21:57

## 2021-02-03 RX ADMIN — Medication 10 ML: at 05:31

## 2021-02-03 RX ADMIN — DEXAMETHASONE 6 MG: 4 TABLET ORAL at 21:57

## 2021-02-03 RX ADMIN — Medication 10 ML: at 21:59

## 2021-02-03 RX ADMIN — INSULIN LISPRO 6 UNITS: 100 INJECTION, SOLUTION INTRAVENOUS; SUBCUTANEOUS at 23:43

## 2021-02-03 RX ADMIN — ALBUTEROL SULFATE 2 PUFF: 108 INHALANT RESPIRATORY (INHALATION) at 08:03

## 2021-02-03 RX ADMIN — DEXAMETHASONE 6 MG: 4 TABLET ORAL at 10:36

## 2021-02-03 RX ADMIN — PHENYLEPHRINE HYDROCHLORIDE AND FAT, HARD 1 SUPPOSITORY: 5; 1.77 SUPPOSITORY RECTAL at 10:36

## 2021-02-03 RX ADMIN — ROSUVASTATIN 10 MG: 10 TABLET, FILM COATED ORAL at 21:57

## 2021-02-03 RX ADMIN — PANTOPRAZOLE SODIUM 40 MG: 40 TABLET, DELAYED RELEASE ORAL at 10:37

## 2021-02-03 RX ADMIN — GUAIFENESIN 1200 MG: 600 TABLET ORAL at 10:37

## 2021-02-03 RX ADMIN — ALBUTEROL SULFATE 2 PUFF: 108 INHALANT RESPIRATORY (INHALATION) at 20:23

## 2021-02-03 RX ADMIN — Medication 10 ML: at 14:30

## 2021-02-03 RX ADMIN — GLYCERIN, PETROLATUM, PHENYLEPHRINE HCL, PRAMOXINE HCL: 144; 2.5; 10; 15 CREAM TOPICAL at 10:38

## 2021-02-03 RX ADMIN — BUDESONIDE AND FORMOTEROL FUMARATE DIHYDRATE 2 PUFF: 160; 4.5 AEROSOL RESPIRATORY (INHALATION) at 08:03

## 2021-02-03 RX ADMIN — INSULIN LISPRO 4 UNITS: 100 INJECTION, SOLUTION INTRAVENOUS; SUBCUTANEOUS at 12:05

## 2021-02-03 RX ADMIN — INSULIN LISPRO 10 UNITS: 100 INJECTION, SOLUTION INTRAVENOUS; SUBCUTANEOUS at 12:02

## 2021-02-03 RX ADMIN — ALBUTEROL SULFATE 2 PUFF: 108 INHALANT RESPIRATORY (INHALATION) at 14:21

## 2021-02-03 RX ADMIN — GLYCERIN, PETROLATUM, PHENYLEPHRINE HCL, PRAMOXINE HCL: 144; 2.5; 10; 15 CREAM TOPICAL at 22:02

## 2021-02-03 RX ADMIN — PANTOPRAZOLE SODIUM 40 MG: 40 TABLET, DELAYED RELEASE ORAL at 18:53

## 2021-02-03 NOTE — PROGRESS NOTES
ACUTE PHYSICAL THERAPY GOALS:  (Developed with and agreed upon by patient and/or caregiver. )  LTGs (assessed and revised as needed 1/19/21):  (1.) Walker Lund  will move from supine to sit and sit to supine , scoot up and down and roll side to side with MODIFIED INDEPENDENCE within 7 treatment day(s). (2.) Walker Lund will transfer from bed to chair and chair to bed with SBA using the least restrictive device within 7 treatment day(s). (3.) [facility] will ambulate with SBA for 100 feet with the least restrictive device within 7 treatment day(s) while maintaining normal vital signs. (4.) Walker Lund will perform standing static and dynamic balance activities x 15 minutes with SBA to improve safety and activity tolerance within 7 treatment day(s). (5.) Walker Lund will ascend and descend 4 stairs using one hand rail(s) with SBA to improve functional mobility and safety within 7 treatment day(s). (6.) Walker Lund will perform bilateral lower extremity exercises x 10 min for HEP with INDEPENDENCE to improve strength, endurance, and functional mobility within 7 treatment day(s). PHYSICAL THERAPY: Daily Note Treatment Day # 5    Walker Lund is a 80 y.o. male   PRIMARY DIAGNOSIS: Acute respiratory failure with hypoxia (HCC)  Acute respiratory failure with hypoxia (HCC) [J96.01]    ASSESSMENT:     REHAB RECOMMENDATIONS: CURRENT LEVEL OF FUNCTION:  (Most Recently Demonstrated)   Recommendation to date pending progress:  Setting:   Short-term Rehab  Equipment:    To Be Determined Bed Mobility:   Modified Independent  Sit to Stand:   Minimal Assistance  Transfers:   Minimal Assistance  Gait/Mobility:   Minimal Assistance     ASSESSMENT:  Mr. Jose Armando Kowalski presents on 6L HFNC with sats 94%. He sat himself up and almost immediately drops to high 70's.   Increased O2 all the way up to 13L with sats in mid 80's at best.  He wanted to get to the chair so he took a few steps with the walker and minimal assist to the chair. RT involved, changed sensor, increased O2 to 15L and was about to apply non re breather mask but he improved to high 80's. Left up in the chair 15 minutes and returned to assist back. Within 1 minute of him lying down his sats increased to mid 90's back on 8L. Incredibly poor reserve to the point we have been unable to progress his mobility.      SUBJECTIVE:   Mr. Roberto Robbins states, \"my wife is coming at 2:00\"    SOCIAL HISTORY/ LIVING ENVIRONMENT: See initial evaluation     OBJECTIVE:     PAIN: VITAL SIGNS: LINES/DRAINS:   Pre Treatment: Pain Screen  Pain Scale 1: FLACC  Pain Intensity 1: 0  Post Treatment: 0   Melchor Catheter  O2 Device: Hi flow nasal cannula     MOBILITY: I Mod I S SBA CGA Min Mod Max Total  NT x2 Comments:   Bed Mobility    Rolling [] [] [] [] [] [] [] [] [] [] []    Supine to Sit [] [x] [] [] [] [] [] [] [] [] []    Scooting [] [x] [] [] [] [] [] [] [] [] []    Sit to Supine [] [x] [] [] [] [] [] [] [] [] []    Transfers    Sit to Stand [] [] [] [] [] [x] [] [] [] [] []    Bed to Chair [] [] [] [] [] [x] [] [] [] [] []    Stand to Sit [] [] [] [] [] [x] [] [] [] [] []    I=Independent, Mod I=Modified Independent, S=Supervision, SBA=Standby Assistance, CGA=Contact Guard Assistance,   Min=Minimal Assistance, Mod=Moderate Assistance, Max=Maximal Assistance, Total=Total Assistance, NT=Not Tested    GAIT: I Mod I S SBA CGA Min Mod Max Total  NT x2 Comments:   Level of Assistance [] [] [] [] [] [] [] [] [] [x] []    Distance 2 bed to chair    DME Rolling Walker    Gait Quality shuffled    Weightbearing  Status N/A     I=Independent, Mod I=Modified Independent, S=Supervision, SBA=Standby Assistance, CGA=Contact Guard Assistance,   Min=Minimal Assistance, Mod=Moderate Assistance, Max=Maximal Assistance, Total=Total Assistance, NT=Not Tested    PLAN:   FREQUENCY/DURATION: PT Plan of Care: 3 times/week for duration of hospital stay or until stated goals are met, whichever comes first.  TREATMENT:     TREATMENT:   ($$ Therapeutic Activity: 38-52 mins    )  Therapeutic Activity (45 Minutes): Therapeutic activity included Supine to Sit, Sit to Supine, Scooting, Transfer Training, Ambulation on level ground and Sitting balance  to improve functional Mobility, Strength and Activity tolerance.       AFTER TREATMENT POSITION/PRECAUTIONS:  Bed, Needs within reach and RN notified    INTERDISCIPLINARY COLLABORATION:  RN/PCT, PT/PTA and RT    TOTAL TREATMENT DURATION:  PT Patient Time In/Time Out  Time In: 1400  Time Out: [address] [facility], [facility]

## 2021-02-03 NOTE — PROGRESS NOTES
Progress Note    2/3/2021  Admit Date: 2021  1:50 AM   NAME: Kayode Flores   :  1933   MRN:  [id number]   Attending: Nighat Best MD  PCP:  Tomasa Arzate MD  Treatment Team: Attending Provider: Nighat Best MD; Utilization Review: Alyson Moi; Utilization Review: Valente Zamorano, RN; Care Manager: Nellie Simmons; Hospitalist: Nighat Best MD; Hospitalist: Carito Rinaldi NP; Primary Nurse: Padmaja Leach, RN; Primary Nurse: Norberto Robles, RN; Physical Therapist: Crow Cordova PT    DNR   SUBJECTIVE:     Mr. Trini Nolanland is [place] year old CM PMH DM type II, GERD, admitted  for COVID19, acute hypoxic respiratory failure, and rectal bleeding. He was seen by GI who stated bleeding from hemorrhoids, Tucks pads and Anusol HI ordered. He has required oxygen on airvo since  (was on NC). S/P Convalescent plasma on , Remdesivir -.  He has been weaned to [address]. had hyperkalemia yesterday, tx with kayexalate. K+ 4.8 today.   Denies CP, SOB. Past Medical History:   Diagnosis Date    Acquired hallux valgus of left foot     Arthritis     OA- shoulders, knees, toes    CAD (coronary artery disease)     MI in , no c/o - no stents or surgery    Diabetes ([facility] [address]     Type 2 Insulin Dep-Flexpen (started ), average AM sugar -   today in -   hypo- < 80    High cholesterol     takes Crestor    Nausea & vomiting     with anesthesia- none with previous 2 foot surgeries    TIA     temporarily lost sight in RIGHT eye, lost hearing RIGHT ear, denies deficits at this time, \"everything is back to normal now\"    Tinnitus     Unspecified adverse effect of anesthesia     difficulty waking up- stays very sleepy- pt states \"Easy to put out\"    Unspecified sleep apnea     no C-PAP use.       Recent Results (from the past 24 hour(s))   GLUCOSE, POC    Collection Time: 21 10:49 AM   Result Value Ref Range    Glucose (POC) 330 (H) 65 - 100 mg/dL   GLUCOSE, POC    Collection Time: 02/02/21  3:13 PM   Result Value Ref Range    Glucose (POC) 256 (H) 65 - 100 mg/dL   GLUCOSE, POC    Collection Time: 02/02/21  9:04 PM   Result Value Ref Range    Glucose (POC) 188 (H) 65 - 100 mg/dL   CBC WITH AUTOMATED DIFF    Collection Time: 02/03/21  6:12 AM   Result Value Ref Range    WBC 16.2 (H) 4.3 - 11.1 K/uL    RBC 4.05 (L) 4.23 - 5.6 M/uL    HGB 11.6 (L) 13.6 - 17.2 g/dL    HCT 36.5 (L) 41.1 - 50.3 %    MCV 90.1 79.6 - 97.8 FL    MCH 28.6 26.1 - 32.9 PG    MCHC 31.8 31.4 - 35.0 g/dL    RDW 20.2 (H) 11.9 - 14.6 %    PLATELET 223 544 - 952 K/uL    MPV 11.9 9.4 - 12.3 FL    ABSOLUTE NRBC 0.00 0.0 - 0.2 K/uL    DF AUTOMATED      NEUTROPHILS 93 (H) 43 - 78 %    LYMPHOCYTES 3 (L) 13 - 44 %    MONOCYTES 3 (L) 4.0 - 12.0 %    EOSINOPHILS 0 (L) 0.5 - 7.8 %    BASOPHILS 0 0.0 - 2.0 %    IMMATURE GRANULOCYTES 1 0.0 - 5.0 %    ABS. NEUTROPHILS 15.1 (H) 1.7 - 8.2 K/UL    ABS. LYMPHOCYTES 0.4 (L) 0.5 - 4.6 K/UL    ABS. MONOCYTES 0.5 0.1 - 1.3 K/UL    ABS. EOSINOPHILS 0.0 0.0 - 0.8 K/UL    ABS. BASOPHILS 0.0 0.0 - 0.2 K/UL    ABS. IMM.  GRANS. 0.2 0.0 - 0.5 K/UL   METABOLIC PANEL, BASIC    Collection Time: 02/03/21  6:12 AM   Result Value Ref Range    Sodium 138 138 - 145 mmol/L    Potassium 4.8 3.5 - 5.1 mmol/L    Chloride 103 98 - 107 mmol/L    CO2 31 21 - 32 mmol/L    Anion gap 4 (L) 7 - 16 mmol/L    Glucose 54 (L) 65 - 100 mg/dL    BUN 27 (H) 8 - 23 MG/DL    Creatinine 0.90 0.8 - 1.5 MG/DL    GFR est AA >60 >60 ml/min/1.73m2    GFR est non-AA >60 >60 ml/min/1.73m2    Calcium 8.2 (L) 8.3 - 10.4 MG/DL   GLUCOSE, POC    Collection Time: 02/03/21  7:13 AM   Result Value Ref Range    Glucose (POC) 62 (L) 65 - 100 mg/dL     Allergies   Allergen Reactions    Betadine [Povidone-Iodine] Swelling     And Blisters on skin      Current Facility-Administered Medications   Medication Dose Route Frequency Provider Last Rate Last Admin    insulin glargine (LANTUS) injection 35 Units  35 Units SubCUTAneous QHS Bedelia Osler B, NP   35 Units at 02/02/21 2111    insulin lispro (HUMALOG) injection 10 Units  10 Units SubCUTAneous TIDAC Bedelia Osler B, NP   Stopped at 02/03/21 1028    albuterol (PROVENTIL HFA, VENTOLIN HFA, PROAIR HFA) inhaler 2 Puff  2 Puff Inhalation Q6HWA RT Sin Jordan MD   2 Puff at 02/03/21 0803    magic mouthwash (ANA ROSA) oral suspension 5 mL  5 mL Swish and Spit Q4H PRN Sin Jordan MD   5 mL at 02/01/21 2300    budesonide-formoteroL (SYMBICORT) 160-4.5 mcg/actuation HFA inhaler 2 Puff  2 Puff Inhalation BID RT Nacho Webber MD   2 Puff at 02/03/21 0803    guaiFENesin ER (MUCINEX) tablet 1,200 mg  1,200 mg Oral Q12H Nacho Webber MD   1,200 mg at 02/02/21 2048    dexAMETHasone (DECADRON) tablet 6 mg  6 mg Oral Q12H Nacho Webber MD   6 mg at 02/02/21 2048    morphine injection 1 mg  1 mg IntraVENous Q3H PRN Marcela Bowman DO   1 mg at 02/01/21 1415    nystatin (MYCOSTATIN) 100,000 unit/gram powder   Topical BID Jenae Willis MD   Given at 02/02/21 1800    yfookvlne-Rkidvcin-Crbit-W.Pet (PREPARATION H MAXIMUM STRENGTH) 0.25-1 % cream   Rectal Q12H Zuleyka GARNICA DO   Stopped at 02/02/21 2100    phenylephrine suppository 1 Suppository  1 Suppository Rectal Q12H Brie Rivers PA   1 Suppository at 02/02/21 2054    pantoprazole (PROTONIX) tablet 40 mg  40 mg Oral ACB&D Bridget Showers, DO   40 mg at 02/02/21 1555    witch hazel-glycerin (TUCKS) 12.5-50 % pads 1 Pad  1 Pad PeriANAL PRN Hay Yuen MD   1 Pad at 01/16/21 1843    dextrose 40% (GLUTOSE) oral gel 1 Tube  15 g Oral PRN Díaz, Neena Felt, DO        glucagon (GLUCAGEN) injection 1 mg  1 mg IntraMUSCular PRN Bridget Showers, DO        dextrose (D50W) injection syrg 12.5-25 g  25-50 mL IntraVENous PRN Bridget Showers, DO        influenza vaccine 2020-21 (6 mos+)(PF) (FLUARIX/FLULAVAL/FLUZONE QUAD) injection 0.5 mL  0.5 mL IntraMUSCular PRIOR TO DISCHARGE Pratik Díaz DO        [Held by provider] aspirin delayed-release tablet 81 mg  81 mg Oral QHS Himanshu Lama MD   81 mg at 21 2203    rosuvastatin (CRESTOR) tablet 10 mg  10 mg Oral QHS Himanshu Lama MD   10 mg at 21 2047    sodium chloride (NS) flush 5-40 mL  5-40 mL IntraVENous Q8H Himanshu Lama MD   10 mL at 21 0531    sodium chloride (NS) flush 5-40 mL  5-40 mL IntraVENous PRN Himanshu Lama MD   10 mL at 21 3374    acetaminophen (TYLENOL) tablet 650 mg  650 mg Oral Q6H PRN Adrián Perez MD   650 mg at 21 1444    Or    acetaminophen (TYLENOL) suppository 650 mg  650 mg Rectal Q6H PRN Adrián Perez MD        ondansetron (ZOFRAN) injection 4 mg  4 mg IntraVENous Q6H PRN Himanshu Lama MD        senna (SENOKOT) tablet 8.6 mg  1 Tab Oral DAILY PRN Himanshu Lama MD        0.9% sodium chloride infusion 250 mL  250 mL IntraVENous PRN Himanshu Lama MD        insulin lispro (HUMALOG) injection   SubCUTAneous AC&HS Adrián Perez MD   Stopped at 21 1029       Review of Systems negative with exception of pertinent positives noted above  PHYSICAL EXAM     Visit Vitals  /65 (BP 1 Location: Right upper arm, BP Patient Position: At rest)   Pulse 60   Temp 97.3 °F (36.3 °C)   Resp 18   Ht 5' 6\" (1.676 m)   Wt 54.9 kg (121 lb)   SpO2 95%   BMI 19.53 kg/m²      Temp (24hrs), Av.1 °F (36.7 °C), Min:97.3 °F (36.3 °C), Max:98.9 °F (37.2 °C)    Oxygen Therapy  O2 Sat (%): 95 % (21)  Pulse via Oximetry: 64 beats per minute (21 193)  O2 Device: Hi flow nasal cannula (02/803)  O2 Flow Rate (L/min): 8 l/min(decreased from 10L) (21)  O2 Temperature: 87.8 °F (31 °C) (21)  FIO2 (%): 40 % (21)    Intake/Output Summary (Last 24 hours) at 2/3/2021 1033  Last data filed at 2/3/2021 0923  Gross per 24 hour   Intake --   Output 500 ml   Net -500 ml      General:       No acute distress    Lungs:          CTA bilaterally. Resp even and nonlabored  Heart:            S1S2 present without murmurs rubs gallops. RRR. No LE edema  Abdomen:    Soft, non tender, non distended. BS present  Extremities: Moves ext spontaneously. No cyanosis  Neurologic:  A/O X3.  No focal deficits      Results summary of Diagnostic Studies/Procedures copied from within [facility] EMR:      Power Mccabe 96 Problems    Diagnosis Date Noted    Decubitus ulcer of buttock, stage 2 ([facility] [address] 01/19/2021    Gastrointestinal hemorrhage associated with anorectal source 01/14/2021    Acute respiratory failure with hypoxia ([facility] [address] 01/07/2021    COVID-19 01/07/2021    Acute metabolic encephalopathy 75/26/5265    Type 2 diabetes mellitus, with long-term current use of insulin ([facility] [address] 01/07/2021     Plan:    Acute hypoxic respiratory failure secondary to COVID19 pneumonia  completed Decadron  completed convalescent plasma and remdesevir  wean O2 as tolerated, on 8 L currently  goal SpO2>90%     Rectal bleeding due to hemorrhoids  continue with phenylephrine suppository   trend CBCs     Stage II sacral ulcers   frequent turning      DM type II  continue current regimen  SSI      Hyperkalemia  resolved  Completed Kayexalate X1 dose  Low K diet     Bradycardia, asymptomatic   continue to monitor  avoid BBs and CCBs        Notes, labs, VS, diagnostic testing reviewed     DVT Prophylaxis: SCD  Plan of Care Discussed with: Supervising MD Dr. Sharif Murray, care team, pt        Ta Nathan NP

## 2021-02-03 NOTE — DIABETES MGMT
Patient's blood glucose ranged 188-330 yesterday with patient receiving Lantus 35 units, Humalog 46 units, and dexamethasone 12 mg. Blood glucose 54/62 this morning and 206 at lunch. Spoke with provider and order received to reduce Lantus to 20 units. Patient's new regimen: Lantus 35 units QHS, Humalog 10 units with meals, Humalog SSI, and dexamethasone 6 Q12.

## 2021-02-03 NOTE — PROGRESS NOTES
NEW CONDOM CATHETER JUST PLACED. Paged and spoke with Dr. Nunez, re:  HS bs = 152.   Will give Lantus as scheduled.

## 2021-02-03 NOTE — PROGRESS NOTES
Weaned Pt from 8 L to 6 L NC and O2 sat was 98%. Weaned down to 4 L NC and O2 Sat 97% at this time. Pt tolerating weaning well.

## 2021-02-03 NOTE — PROGRESS NOTES
Chart reviewed by CM for discharge planning. Patient has been recommended for STR. Per Primary Nurse Paz Reap, patient has been weaned to 6L. CM faxed SNF referral to [facility], to re-assess patient. CM continues to monitor plan of care. Update: CM contacted admissions 2/3, to obtain updates regarding referral sent. CM was unsuccessful at reaching SNF liaison . CM left a voicemail requesting call back. CM continues to monitor plan of care. CM will identify if the patient is medically stable for discharge this day, as the patient will require pre-cert for STR.

## 2021-02-04 LAB
ANION GAP SERPL CALC-SCNC: 5 MMOL/L (ref 7–16)
BASOPHILS # BLD: 0 K/UL (ref 0–0.2)
BASOPHILS NFR BLD: 0 % (ref 0–2)
BUN SERPL-MCNC: 26 MG/DL (ref 8–23)
CALCIUM SERPL-MCNC: 8 MG/DL (ref 8.3–10.4)
CHLORIDE SERPL-SCNC: 102 MMOL/L (ref 98–107)
CO2 SERPL-SCNC: 29 MMOL/L (ref 21–32)
CREAT SERPL-MCNC: 0.77 MG/DL (ref 0.8–1.5)
DIFFERENTIAL METHOD BLD: ABNORMAL
EOSINOPHIL # BLD: 0 K/UL (ref 0–0.8)
EOSINOPHIL NFR BLD: 0 % (ref 0.5–7.8)
ERYTHROCYTE [DISTWIDTH] IN BLOOD BY AUTOMATED COUNT: 19.6 % (ref 11.9–14.6)
GLUCOSE BLD STRIP.AUTO-MCNC: 115 MG/DL (ref 65–100)
GLUCOSE BLD STRIP.AUTO-MCNC: 153 MG/DL (ref 65–100)
GLUCOSE BLD STRIP.AUTO-MCNC: 173 MG/DL (ref 65–100)
GLUCOSE BLD STRIP.AUTO-MCNC: 81 MG/DL (ref 65–100)
GLUCOSE SERPL-MCNC: 152 MG/DL (ref 65–100)
HCT VFR BLD AUTO: 31.9 % (ref 41.1–50.3)
HGB BLD-MCNC: 10.2 G/DL (ref 13.6–17.2)
IMM GRANULOCYTES # BLD AUTO: 0.1 K/UL (ref 0–0.5)
IMM GRANULOCYTES NFR BLD AUTO: 1 % (ref 0–5)
LYMPHOCYTES # BLD: 0.3 K/UL (ref 0.5–4.6)
LYMPHOCYTES NFR BLD: 2 % (ref 13–44)
MCH RBC QN AUTO: 28.4 PG (ref 26.1–32.9)
MCHC RBC AUTO-ENTMCNC: 32 G/DL (ref 31.4–35)
MCV RBC AUTO: 88.9 FL (ref 79.6–97.8)
MONOCYTES # BLD: 0.2 K/UL (ref 0.1–1.3)
MONOCYTES NFR BLD: 2 % (ref 4–12)
NEUTS SEG # BLD: 11.5 K/UL (ref 1.7–8.2)
NEUTS SEG NFR BLD: 95 % (ref 43–78)
NRBC # BLD: 0 K/UL (ref 0–0.2)
PLATELET # BLD AUTO: 157 K/UL (ref 150–450)
PMV BLD AUTO: 11.9 FL (ref 9.4–12.3)
POTASSIUM SERPL-SCNC: 4.4 MMOL/L (ref 3.5–5.1)
RBC # BLD AUTO: 3.59 M/UL (ref 4.23–5.6)
SODIUM SERPL-SCNC: 136 MMOL/L (ref 138–145)
WBC # BLD AUTO: 12.1 K/UL (ref 4.3–11.1)

## 2021-02-04 PROCEDURE — 74011636637 HC RX REV CODE- 636/637: Performed by: STUDENT IN AN ORGANIZED HEALTH CARE EDUCATION/TRAINING PROGRAM

## 2021-02-04 PROCEDURE — 74011250636 HC RX REV CODE- 250/636: Performed by: FAMILY MEDICINE

## 2021-02-04 PROCEDURE — 94762 N-INVAS EAR/PLS OXIMTRY CONT: CPT

## 2021-02-04 PROCEDURE — 80048 BASIC METABOLIC PNL TOTAL CA: CPT

## 2021-02-04 PROCEDURE — 74011250637 HC RX REV CODE- 250/637: Performed by: INTERNAL MEDICINE

## 2021-02-04 PROCEDURE — 74011250637 HC RX REV CODE- 250/637: Performed by: PHYSICIAN ASSISTANT

## 2021-02-04 PROCEDURE — 82962 GLUCOSE BLOOD TEST: CPT

## 2021-02-04 PROCEDURE — 77010033678 HC OXYGEN DAILY

## 2021-02-04 PROCEDURE — 74011250636 HC RX REV CODE- 250/636: Performed by: HOSPITALIST

## 2021-02-04 PROCEDURE — 36415 COLL VENOUS BLD VENIPUNCTURE: CPT

## 2021-02-04 PROCEDURE — 65270000029 HC RM PRIVATE

## 2021-02-04 PROCEDURE — 74011250637 HC RX REV CODE- 250/637: Performed by: HOSPITALIST

## 2021-02-04 PROCEDURE — 2709999900 HC NON-CHARGEABLE SUPPLY

## 2021-02-04 PROCEDURE — 74011636637 HC RX REV CODE- 636/637: Performed by: NURSE PRACTITIONER

## 2021-02-04 PROCEDURE — 74011636637 HC RX REV CODE- 636/637: Performed by: INTERNAL MEDICINE

## 2021-02-04 PROCEDURE — 94640 AIRWAY INHALATION TREATMENT: CPT

## 2021-02-04 PROCEDURE — 85025 COMPLETE CBC W/AUTO DIFF WBC: CPT

## 2021-02-04 RX ORDER — INSULIN GLARGINE 100 [IU]/ML
15 INJECTION, SOLUTION SUBCUTANEOUS
Status: DISCONTINUED | OUTPATIENT
Start: 2021-02-04 | End: 2021-02-07 | Stop reason: HOSPADM

## 2021-02-04 RX ORDER — ALBUTEROL SULFATE 90 UG/1
2 AEROSOL, METERED RESPIRATORY (INHALATION)
Status: DISCONTINUED | OUTPATIENT
Start: 2021-02-04 | End: 2021-02-07 | Stop reason: HOSPADM

## 2021-02-04 RX ADMIN — ACETAMINOPHEN 650 MG: 325 TABLET, FILM COATED ORAL at 09:11

## 2021-02-04 RX ADMIN — NYSTATIN: 100000 POWDER TOPICAL at 18:00

## 2021-02-04 RX ADMIN — ALBUTEROL SULFATE 2 PUFF: 108 INHALANT RESPIRATORY (INHALATION) at 07:47

## 2021-02-04 RX ADMIN — ROSUVASTATIN 10 MG: 10 TABLET, FILM COATED ORAL at 21:27

## 2021-02-04 RX ADMIN — BUDESONIDE AND FORMOTEROL FUMARATE DIHYDRATE 2 PUFF: 160; 4.5 AEROSOL RESPIRATORY (INHALATION) at 07:47

## 2021-02-04 RX ADMIN — GLYCERIN, PETROLATUM, PHENYLEPHRINE HCL, PRAMOXINE HCL: 144; 2.5; 10; 15 CREAM TOPICAL at 09:00

## 2021-02-04 RX ADMIN — GUAIFENESIN 1200 MG: 600 TABLET ORAL at 09:11

## 2021-02-04 RX ADMIN — INSULIN LISPRO 2 UNITS: 100 INJECTION, SOLUTION INTRAVENOUS; SUBCUTANEOUS at 11:57

## 2021-02-04 RX ADMIN — INSULIN LISPRO 10 UNITS: 100 INJECTION, SOLUTION INTRAVENOUS; SUBCUTANEOUS at 09:12

## 2021-02-04 RX ADMIN — INSULIN LISPRO 10 UNITS: 100 INJECTION, SOLUTION INTRAVENOUS; SUBCUTANEOUS at 17:54

## 2021-02-04 RX ADMIN — MORPHINE SULFATE 1 MG: 2 INJECTION, SOLUTION INTRAMUSCULAR; INTRAVENOUS at 12:01

## 2021-02-04 RX ADMIN — GUAIFENESIN 1200 MG: 600 TABLET ORAL at 21:28

## 2021-02-04 RX ADMIN — PHENYLEPHRINE HYDROCHLORIDE AND FAT, HARD 1 SUPPOSITORY: 5; 1.77 SUPPOSITORY RECTAL at 21:28

## 2021-02-04 RX ADMIN — PANTOPRAZOLE SODIUM 40 MG: 40 TABLET, DELAYED RELEASE ORAL at 17:54

## 2021-02-04 RX ADMIN — Medication 10 ML: at 05:35

## 2021-02-04 RX ADMIN — INSULIN GLARGINE 15 UNITS: 100 INJECTION, SOLUTION SUBCUTANEOUS at 21:29

## 2021-02-04 RX ADMIN — DEXAMETHASONE 6 MG: 4 TABLET ORAL at 09:11

## 2021-02-04 RX ADMIN — PANTOPRAZOLE SODIUM 40 MG: 40 TABLET, DELAYED RELEASE ORAL at 05:35

## 2021-02-04 RX ADMIN — NYSTATIN: 100000 POWDER TOPICAL at 09:13

## 2021-02-04 RX ADMIN — Medication 10 ML: at 14:33

## 2021-02-04 RX ADMIN — Medication 10 ML: at 21:30

## 2021-02-04 RX ADMIN — BUDESONIDE AND FORMOTEROL FUMARATE DIHYDRATE 2 PUFF: 160; 4.5 AEROSOL RESPIRATORY (INHALATION) at 20:19

## 2021-02-04 RX ADMIN — INSULIN LISPRO 10 UNITS: 100 INJECTION, SOLUTION INTRAVENOUS; SUBCUTANEOUS at 11:57

## 2021-02-04 RX ADMIN — DEXAMETHASONE 6 MG: 4 TABLET ORAL at 21:27

## 2021-02-04 RX ADMIN — PHENYLEPHRINE HYDROCHLORIDE AND FAT, HARD 1 SUPPOSITORY: 5; 1.77 SUPPOSITORY RECTAL at 09:11

## 2021-02-04 RX ADMIN — MORPHINE SULFATE 1 MG: 2 INJECTION, SOLUTION INTRAMUSCULAR; INTRAVENOUS at 16:16

## 2021-02-04 RX ADMIN — INSULIN LISPRO 2 UNITS: 100 INJECTION, SOLUTION INTRAVENOUS; SUBCUTANEOUS at 09:12

## 2021-02-04 NOTE — DIABETES MGMT
Patient's blood glucose ranged  yesterday with patient receiving Humalog 20 units and dexamethasone 12 units. Blood glucose 153 this morning. Current regimen: Lantus 20 units QHS, Humalog 10 units with meals, and Humalog SSI and dexamethasone 6 mg Q12. Will follow along and assess prandial needs today note that nurse held prandial and SSI twice yesterday so may need titration in bolus regimen.

## 2021-02-04 NOTE — ROUTINE PROCESS
Sacral Allevyn pad changed this shift. Hourly and PRN rounds performed during this shift; all needs met at this time. Bed in low/locked position and call light, personal items within reach.

## 2021-02-04 NOTE — PROGRESS NOTES
[facility] (formerly [facility]) Hospitalist Service Progress Note      Assessment / Plan:    Acute hypoxic respiratory failure secondary to COVID19 pneumonia  completed Decadron  completed convalescent plasma and remdesevir  wean O2 as tolerated, on 8 L currently  goal SpO2>90%     Rectal bleeding due to hemorrhoids  continue with phenylephrine suppository   trend CBCs     Stage II sacral ulcers   frequent turning      DM type II  continue current regimen  SSI      Hyperkalemia  resolved  Completed Kayexalate X1 dose  Low K diet     Bradycardia, asymptomatic   continue to monitor  avoid BBs and CCBs    Chief Complaint : No chief complaint on file. Subjective:  80year old  PMH DM type II, GERD, admitted 1/30 for COVID19, acute hypoxic respiratory failure, and rectal bleeding. He was seen by GI who stated bleeding from hemorrhoids, Tucks pads and Anusol CO ordered. He has required oxygen on airvo since 1/30 (was on NC). S/P Convalescent plasma on 1/8, Remdesivir 1/8-1/12.     2/4:  [facility] has been weaned to 3L O2 today with saturations mid 90s. Afebrile, leukocytosis present but improved. Noted on steroids, last dose today.     No acute events overnight, patient has no new complaints today,  Cardiovascular, respiratory, GI review of system negative except mentioned above  Objective:  Visit Vitals  /60   Pulse 68   Temp 97.3 °F (36.3 °C)   Resp 18   Ht 5' 6\" (1.676 m)   Wt 54.9 kg (121 lb)   SpO2 96%   BMI 19.53 kg/m²                 Physical Exam:  General: No acute distress, speaking in full sentences, no use of accessory muscles   HEENT: Pupils equal and reactive to light and accommodation, oropharynx is clear   Neck: Supple, no lymphadenopathy, no JVD   Lungs: Clear to auscultation bilaterally   Cardiovascular: Regular rate and rhythm with normal S1 and S2   Abdomen: Soft, nontender, nondistended, normoactive bowel sounds   Extremities: No cyanosis clubbing or edema   Neuro: Nonfocal, A&O x3   Psych: Normal affect     Intake and Output:  Date 02/03/21 0700 - 02/04/21 0659 02/04/21 0700 - 02/05/21 0659   Shift 0700-1859 1900-0659 24 Hour Total 0468-8882 1395-1980 24 Hour Total   INTAKE   P.O. 750  750 480  480     P. O. 750  750 480  480   Shift Total(mL/kg) 750(13.7)  750(13.7) 480(8.7)  480(8.7)   OUTPUT   Urine(mL/kg/hr) 650(1) 600(0.9) 1250(0.9)        Urine Output (mL) (Condom Catheter 01/28/21)       Stool  1 1        Stool Occurrence(s) 2 x  2 x        Stool  1 1      Shift Total(mL/kg) 650(11.8) 601(11) 1251(22.8)       -601 -538 480  480   Weight (kg) 54.9 54.9 54.9 54.9 54.9 54.9       LAB:  No results displayed because visit has over 200 results. IMAGING:  Xr Chest Sngl V    Result Date: 1/30/2021  No significant change. EKG:  No results found for this or any previous visit.           Ellen Powers NP  2/4/2021 12:48 PM

## 2021-02-04 NOTE — PROGRESS NOTES
CM was informed by Kate with [facility], staff is unable to accept patient, due to complex care needs. Per patient's niece Lincoln Loan [phone], patient's spouse and niece would like to accept bed offer with [facility] . CM confirmed with NP Rajan Ann and Jeri Marks , CM can begin pre-cert.  Awaiting insurance approval.

## 2021-02-04 NOTE — PROGRESS NOTES
Was able to wean Pt to 4 L nc at one point this shift however, Pt desat tremendously with ANY activity. Updated wife at bedside. Pt at 6 l NC at shift change.  Report given to Gunnar Ocampo

## 2021-02-05 ENCOUNTER — APPOINTMENT (OUTPATIENT)
Dept: GENERAL RADIOLOGY | Age: 86
DRG: 177 | End: 2021-02-05
Attending: NURSE PRACTITIONER
Payer: MEDICARE

## 2021-02-05 LAB
ANION GAP SERPL CALC-SCNC: 7 MMOL/L (ref 7–16)
BASOPHILS # BLD: 0 K/UL (ref 0–0.2)
BASOPHILS NFR BLD: 0 % (ref 0–2)
BUN SERPL-MCNC: 29 MG/DL (ref 8–23)
CALCIUM SERPL-MCNC: 8.2 MG/DL (ref 8.3–10.4)
CHLORIDE SERPL-SCNC: 102 MMOL/L (ref 98–107)
CO2 SERPL-SCNC: 28 MMOL/L (ref 21–32)
CREAT SERPL-MCNC: 0.82 MG/DL (ref 0.8–1.5)
DIFFERENTIAL METHOD BLD: ABNORMAL
EOSINOPHIL # BLD: 0 K/UL (ref 0–0.8)
EOSINOPHIL NFR BLD: 0 % (ref 0.5–7.8)
ERYTHROCYTE [DISTWIDTH] IN BLOOD BY AUTOMATED COUNT: 19.9 % (ref 11.9–14.6)
GLUCOSE BLD STRIP.AUTO-MCNC: 128 MG/DL (ref 65–100)
GLUCOSE BLD STRIP.AUTO-MCNC: 146 MG/DL (ref 65–100)
GLUCOSE BLD STRIP.AUTO-MCNC: 159 MG/DL (ref 65–100)
GLUCOSE BLD STRIP.AUTO-MCNC: 194 MG/DL (ref 65–100)
GLUCOSE SERPL-MCNC: 112 MG/DL (ref 65–100)
HCT VFR BLD AUTO: 37.2 % (ref 41.1–50.3)
HGB BLD-MCNC: 11.6 G/DL (ref 13.6–17.2)
IMM GRANULOCYTES # BLD AUTO: 0.1 K/UL (ref 0–0.5)
IMM GRANULOCYTES NFR BLD AUTO: 1 % (ref 0–5)
LYMPHOCYTES # BLD: 0.4 K/UL (ref 0.5–4.6)
LYMPHOCYTES NFR BLD: 3 % (ref 13–44)
MCH RBC QN AUTO: 28.6 PG (ref 26.1–32.9)
MCHC RBC AUTO-ENTMCNC: 31.2 G/DL (ref 31.4–35)
MCV RBC AUTO: 91.6 FL (ref 79.6–97.8)
MONOCYTES # BLD: 0.2 K/UL (ref 0.1–1.3)
MONOCYTES NFR BLD: 2 % (ref 4–12)
NEUTS SEG # BLD: 11.9 K/UL (ref 1.7–8.2)
NEUTS SEG NFR BLD: 94 % (ref 43–78)
NRBC # BLD: 0 K/UL (ref 0–0.2)
PLATELET # BLD AUTO: 177 K/UL (ref 150–450)
PMV BLD AUTO: 12.2 FL (ref 9.4–12.3)
POTASSIUM SERPL-SCNC: 5 MMOL/L (ref 3.5–5.1)
RBC # BLD AUTO: 4.06 M/UL (ref 4.23–5.6)
SODIUM SERPL-SCNC: 137 MMOL/L (ref 138–145)
WBC # BLD AUTO: 12.6 K/UL (ref 4.3–11.1)

## 2021-02-05 PROCEDURE — 36415 COLL VENOUS BLD VENIPUNCTURE: CPT

## 2021-02-05 PROCEDURE — 82962 GLUCOSE BLOOD TEST: CPT

## 2021-02-05 PROCEDURE — 94762 N-INVAS EAR/PLS OXIMTRY CONT: CPT

## 2021-02-05 PROCEDURE — 80048 BASIC METABOLIC PNL TOTAL CA: CPT

## 2021-02-05 PROCEDURE — 85025 COMPLETE CBC W/AUTO DIFF WBC: CPT

## 2021-02-05 PROCEDURE — 74011636637 HC RX REV CODE- 636/637: Performed by: INTERNAL MEDICINE

## 2021-02-05 PROCEDURE — 2709999900 HC NON-CHARGEABLE SUPPLY

## 2021-02-05 PROCEDURE — 94640 AIRWAY INHALATION TREATMENT: CPT

## 2021-02-05 PROCEDURE — 74011250637 HC RX REV CODE- 250/637: Performed by: HOSPITALIST

## 2021-02-05 PROCEDURE — 74011636637 HC RX REV CODE- 636/637: Performed by: NURSE PRACTITIONER

## 2021-02-05 PROCEDURE — 71046 X-RAY EXAM CHEST 2 VIEWS: CPT

## 2021-02-05 PROCEDURE — 74011250636 HC RX REV CODE- 250/636: Performed by: FAMILY MEDICINE

## 2021-02-05 PROCEDURE — 97530 THERAPEUTIC ACTIVITIES: CPT

## 2021-02-05 PROCEDURE — 65270000029 HC RM PRIVATE

## 2021-02-05 PROCEDURE — 74011250637 HC RX REV CODE- 250/637: Performed by: INTERNAL MEDICINE

## 2021-02-05 PROCEDURE — 77010033678 HC OXYGEN DAILY

## 2021-02-05 RX ORDER — INSULIN LISPRO 100 [IU]/ML
5 INJECTION, SOLUTION INTRAVENOUS; SUBCUTANEOUS
Status: DISCONTINUED | OUTPATIENT
Start: 2021-02-05 | End: 2021-02-07 | Stop reason: HOSPADM

## 2021-02-05 RX ADMIN — MORPHINE SULFATE 1 MG: 2 INJECTION, SOLUTION INTRAMUSCULAR; INTRAVENOUS at 01:54

## 2021-02-05 RX ADMIN — INSULIN LISPRO 5 UNITS: 100 INJECTION, SOLUTION INTRAVENOUS; SUBCUTANEOUS at 17:48

## 2021-02-05 RX ADMIN — BUDESONIDE AND FORMOTEROL FUMARATE DIHYDRATE 2 PUFF: 160; 4.5 AEROSOL RESPIRATORY (INHALATION) at 21:17

## 2021-02-05 RX ADMIN — INSULIN LISPRO 2 UNITS: 100 INJECTION, SOLUTION INTRAVENOUS; SUBCUTANEOUS at 12:31

## 2021-02-05 RX ADMIN — Medication 10 ML: at 17:49

## 2021-02-05 RX ADMIN — NYSTATIN: 100000 POWDER TOPICAL at 17:50

## 2021-02-05 RX ADMIN — Medication 10 ML: at 06:00

## 2021-02-05 RX ADMIN — PANTOPRAZOLE SODIUM 40 MG: 40 TABLET, DELAYED RELEASE ORAL at 17:48

## 2021-02-05 RX ADMIN — PANTOPRAZOLE SODIUM 40 MG: 40 TABLET, DELAYED RELEASE ORAL at 05:35

## 2021-02-05 RX ADMIN — INSULIN LISPRO 5 UNITS: 100 INJECTION, SOLUTION INTRAVENOUS; SUBCUTANEOUS at 12:30

## 2021-02-05 RX ADMIN — BUDESONIDE AND FORMOTEROL FUMARATE DIHYDRATE 2 PUFF: 160; 4.5 AEROSOL RESPIRATORY (INHALATION) at 08:56

## 2021-02-05 RX ADMIN — NYSTATIN: 100000 POWDER TOPICAL at 08:54

## 2021-02-05 RX ADMIN — GUAIFENESIN 1200 MG: 600 TABLET ORAL at 08:53

## 2021-02-05 NOTE — PROGRESS NOTES
Per NP Willy Brittle, patient is not medically stable for discharge this day. CM contacted [facility] [phone], to request update regarding pre-cert initiated 2/4. Per NP Piter Stein, patient could potentially discharge over the weekend, if SNF can accept patient. CM was receptive. Per [facility] [address], the patient's authorization has been approved- 2/4. Auth ID: [id number]. Next Review Date is 2/8. CM was receptive. CM remains available.

## 2021-02-05 NOTE — ROUTINE PROCESS
Patient O2 dropped with bathing activity. Patient is O2 sat drop to 67-70% with small amount of activity. Patient unable to recover with 1L NC at rest. RT called, placed him on 15 Highflow to recover. Now at 98%  Kay Carreon NP notifed. Wife Notified of delayed discharge planning.

## 2021-02-05 NOTE — PROGRESS NOTES
Problem: Diabetes Self-Management  Goal: *Disease process and treatment process  Description: Define diabetes and identify own type of diabetes; list 3 options for treating diabetes. Outcome: Progressing Towards Goal  Goal: *Incorporating nutritional management into lifestyle  Description: Describe effect of type, amount and timing of food on blood glucose; list 3 methods for planning meals. Outcome: Progressing Towards Goal  Goal: *Incorporating physical activity into lifestyle  Description: State effect of exercise on blood glucose levels. Outcome: Progressing Towards Goal  Goal: *Developing strategies to promote health/change behavior  Description: Define the ABC's of diabetes; identify appropriate screenings, schedule and personal plan for screenings. Outcome: Progressing Towards Goal  Goal: *Using medications safely  Description: State effect of diabetes medications on diabetes; name diabetes medication taking, action and side effects. Outcome: Progressing Towards Goal  Goal: *Monitoring blood glucose, interpreting and using results  Description: Identify recommended blood glucose targets  and personal targets. Outcome: Progressing Towards Goal  Goal: *Prevention, detection, treatment of acute complications  Description: List symptoms of hyper- and hypoglycemia; describe how to treat low blood sugar and actions for lowering  high blood glucose level. Outcome: Progressing Towards Goal  Goal: *Prevention, detection and treatment of chronic complications  Description: Define the natural course of diabetes and describe the relationship of blood glucose levels to long term complications of diabetes.   Outcome: Progressing Towards Goal  Goal: *Developing strategies to address psychosocial issues  Description: Describe feelings about living with diabetes; identify support needed and support network  Outcome: Progressing Towards Goal  Goal: *Insulin pump training  Outcome: Progressing Towards Goal  Goal: *Sick day guidelines  Outcome: Progressing Towards Goal  Goal: *Patient Specific Goal (EDIT GOAL, INSERT TEXT)  Outcome: Progressing Towards Goal     Problem: Patient Education: Go to Patient Education Activity  Goal: Patient/Family Education  Outcome: Progressing Towards Goal     Problem: Pressure Injury - Risk of  Goal: *Prevention of pressure injury  Description: Document Franklin Scale and appropriate interventions in the flowsheet. Outcome: Progressing Towards Goal  Note: Pressure Injury Interventions:  Sensory Interventions: Assess changes in LOC, Assess need for specialty bed, Avoid rigorous massage over bony prominences    Moisture Interventions: Absorbent underpads, Apply protective barrier, creams and emollients, Assess need for specialty bed    Activity Interventions: Increase time out of bed, Pressure redistribution bed/mattress(bed type), PT/OT evaluation    Mobility Interventions: PT/OT evaluation, Pressure redistribution bed/mattress (bed type), HOB 30 degrees or less    Nutrition Interventions: Document food/fluid/supplement intake    Friction and Shear Interventions: Apply protective barrier, creams and emollients, Feet elevated on foot rest, Foam dressings/transparent film/skin sealants                Problem: Patient Education: Go to Patient Education Activity  Goal: Patient/Family Education  Outcome: Progressing Towards Goal     Problem: Falls - Risk of  Goal: *Absence of Falls  Description: Document Joy Fall Risk and appropriate interventions in the flowsheet.   Outcome: Progressing Towards Goal  Note: Fall Risk Interventions:  Mobility Interventions: Communicate number of staff needed for ambulation/transfer, Patient to call before getting OOB, PT Consult for mobility concerns    Mentation Interventions: Bed/chair exit alarm, Door open when patient unattended, Evaluate medications/consider consulting pharmacy    Medication Interventions: Bed/chair exit alarm, Evaluate medications/consider consulting pharmacy, Patient to call before getting OOB    Elimination Interventions: Call light in reach, Patient to call for help with toileting needs    History of Falls Interventions: Consult care management for discharge planning, Door open when patient unattended, Evaluate medications/consider consulting pharmacy         Problem: Patient Education: Go to Patient Education Activity  Goal: Patient/Family Education  Outcome: Progressing Towards Goal     Problem: Patient Education: Go to Patient Education Activity  Goal: Patient/Family Education  Outcome: Progressing Towards Goal     Problem: Patient Education: Go to Patient Education Activity  Goal: Patient/Family Education  Outcome: Progressing Towards Goal

## 2021-02-05 NOTE — PROGRESS NOTES
Comprehensive Nutrition Assessment    Type and Reason for Visit: Reassess, RD nutrition re-screen/LOS    Nutrition Recommendations/Plan:   Meals and Snacks:  Continue current diet. Nutrition Supplement Therapy:   Medical food supplement therapy:  Change Glucerna Shake 4 servings daily per day (this provides 220 kcal and 10 grams protein per bottle)      Malnutrition Assessment:  Malnutrition Status: Moderate malnutrition  Context: Chronic illness  Findings of clinical characteristics of malnutrition:   Energy Intake:  Unable to assess(pt provides inconsistent hx)  Weight Loss:  (Current wt is 72% stated UBW)     Body Fat Loss:  1 - Mild body fat loss, Triceps, Orbital   Muscle Mass Loss:  1 - Mild muscle mass loss, Temples (temporalis), Hand (interosseous), Calf (gastrocnemius)  Fluid Accumulation:  Unable to assess,     Strength:  Not performed     Nutrition Assessment:   Nutrition History: Unable to obtain hx. Nutrition Background: PMH remarkable for coronary artery disease, diabetes mellitus presented to emergency room with altered mental status. Admitted with acute respiratory failure with hypoxia, Covid PNA, ALFRED resolved, DM, GI. Daily Update:  Pt sitting in bed at RD visit. He reports she is eating more, his recall of po far exceed recorded data and visualization of trays by current nursing staff. He indicates he drinks the glucerna well and asks if he can get two each time. Nutrition Related Findings:   NFPE conducted 2/5.   Current BW (54.9 kg) appears more visually accurate than stated wt on admission and reported UBW,  Wound Type: Stage II(sacral)    Current Nutrition Therapies:  DIET NUTRITIONAL SUPPLEMENTS All Meals; Glucerna Shake ( )  DIET DIABETIC WITH OPTIONS Consistent Carb 1500-1600kcal; Mechanical Soft    Current Intake:   Average Meal Intake: 26-50% Average Supplement Intake: %      Anthropometric Measures:  Height: 5' 6\" (167.6 cm)  Current Body Wt: 54.9 kg (121 lb 0.5 oz)(1/31), Weight source: Not specified  BMI: 19.5, Underweight (BMI less than 22) age over 72  Admission Body Weight: 169 lb 15.6 oz(stated)  Ideal Body Wt: 142 lbs (65 kg), 119.7 %  Usual Body Wt: (169# stated) no recent wt hx available in EMR. States UBW is reflective of historical weights available. Estimated Daily Nutrient Needs:  Energy (kcal/day): 6615-7320 (Kcal/kg(30-35), Weight Used: Current(54.9 kg))  Protein (g/day): 66-71 (1.2-1.3 g/kg) Weight Used: (Current)  Fluid (ml/day):   (1 ml/kcal)    Nutrition Diagnosis:   · Inadequate oral intake related to altered GI function(fatigue, poor appetite) as evidenced by (majority of intake from oral supplements ~50-75% needs)   · Moderate malnutrition in context of chronic disease related to inadequate protein-energy intake as evidenced by criteria as identified in malnutrition assessment. Nutrition Interventions:   Food and/or Nutrient Delivery: Continue current diet, Modify oral nutrition supplement     Coordination of Nutrition Care: Continue to monitor while inpatient  Plan of Care discussed with Aure Vilchis RN and NORIS Lion. Goals:   Previous Goal Met: Progressing toward goal(s)  Active Goal: Meet >75% estimated needs by nutrition follow-up    Nutrition Monitoring and Evaluation:      Food/Nutrient Intake Outcomes: Food and nutrient intake, Supplement intake  Physical Signs/Symptoms Outcomes: Biochemical data, GI status    Discharge Planning:     Too soon to determine    Trung Carcamo RD, ARMIDAN on 2/5/2021 at 12:04 PM  Contact: [phone]     Disaster Mode active

## 2021-02-05 NOTE — PROGRESS NOTES
ACUTE PHYSICAL THERAPY GOALS:  (Developed with and agreed upon by patient and/or caregiver. )  LTGs (assessed and revised as needed 1/19/21):  (1.) Jesusita Benson  will move from supine to sit and sit to supine , scoot up and down and roll side to side with MODIFIED INDEPENDENCE within 7 treatment day(s). (2.) Jesusita Benson will transfer from bed to chair and chair to bed with SBA using the least restrictive device within 7 treatment day(s). (3.) [facility] will ambulate with SBA for 100 feet with the least restrictive device within 7 treatment day(s) while maintaining normal vital signs. (4.) Jesusita Benson will perform standing static and dynamic balance activities x 15 minutes with SBA to improve safety and activity tolerance within 7 treatment day(s). (5.) Jesusita Benson will ascend and descend 4 stairs using one hand rail(s) with SBA to improve functional mobility and safety within 7 treatment day(s). (6.) Jesusita Benson will perform bilateral lower extremity exercises x 10 min for HEP with INDEPENDENCE to improve strength, endurance, and functional mobility within 7 treatment day(s). PHYSICAL THERAPY: Daily Note and AM Treatment Day # 6    Jesusita Benson is a 80 y.o. male   PRIMARY DIAGNOSIS: Acute respiratory failure with hypoxia (HCC)  Acute respiratory failure with hypoxia ([facility] [address] [J96.01]    ASSESSMENT:     REHAB RECOMMENDATIONS: CURRENT LEVEL OF FUNCTION:  (Most Recently Demonstrated)   Recommendation to date pending progress:  Setting:   Short-term Rehab  Equipment:    To Be Determined Bed Mobility:   Modified Independent  Sit to Stand:   Minimal Assistance  Transfers:   Minimal Assistance  Gait/Mobility:   Minimal Assistance     ASSESSMENT:  Mr. Ajit Adams presents with RT as he again dropped to 70's with little activity with CNA. He again needed 15L and non re breather mask to recover sitting in chair. Assisted him back to the bed with min/mod A where he recovered.   No goals met and still not tolerating any activity without extreme drop in sats. Talked to RN and SW and they will make NP know. SUBJECTIVE:   Mr. Lynda Silva states, \"my bottom hurts\"    SOCIAL HISTORY/ LIVING ENVIRONMENT: See initial evaluation     OBJECTIVE:     PAIN: VITAL SIGNS: LINES/DRAINS:   Pre Treatment: Pain Screen  Pain Scale 1: FLACC  Pain Intensity 1: 3  Pain Location 1: Buttocks  Post Treatment: 0   Melchor Catheter  O2 Device: Nasal cannula     MOBILITY: I Mod I S SBA CGA Min Mod Max Total  NT x2 Comments:   Bed Mobility    Rolling [] [] [] [] [] [] [] [] [] [] []    Supine to Sit [] [x] [] [] [] [] [] [] [] [] []    Scooting [] [x] [] [] [] [] [] [] [] [] []    Sit to Supine [] [x] [] [] [] [] [] [] [] [] []    Transfers    Sit to Stand [] [] [] [] [] [x] [] [] [] [] []    Bed to Chair [] [] [] [] [] [x] [] [] [] [] []    Stand to Sit [] [] [] [] [] [x] [] [] [] [] []    I=Independent, Mod I=Modified Independent, S=Supervision, SBA=Standby Assistance, CGA=Contact Guard Assistance,   Min=Minimal Assistance, Mod=Moderate Assistance, Max=Maximal Assistance, Total=Total Assistance, NT=Not Tested    GAIT: I Mod I S SBA CGA Min Mod Max Total  NT x2 Comments:   Level of Assistance [] [] [] [] [] [] [] [] [] [x] []    Distance 2 bed to chair    DME N/A    Gait Quality shuffled    Weightbearing  Status N/A     I=Independent, Mod I=Modified Independent, S=Supervision, SBA=Standby Assistance, CGA=Contact Guard Assistance,   Min=Minimal Assistance, Mod=Moderate Assistance, Max=Maximal Assistance, Total=Total Assistance, NT=Not Tested    PLAN:   FREQUENCY/DURATION: PT Plan of Care: 3 times/week for duration of hospital stay or until stated goals are met, whichever comes first.  TREATMENT:     TREATMENT:   ($$ Therapeutic Activity: 8-22 mins    )  Therapeutic Activity (10 Minutes): Therapeutic activity included Sit to Supine, Scooting and Transfer Training to improve functional Mobility, Strength and Activity tolerance.       AFTER TREATMENT POSITION/PRECAUTIONS:  Bed, Needs within reach and RN notified    INTERDISCIPLINARY COLLABORATION:  RN/PCT, PT/PTA and RT    TOTAL TREATMENT DURATION:  PT Patient Time In/Time Out  Time In: 1020  Time Out: Marylin 87, PTA

## 2021-02-05 NOTE — DIABETES MGMT
Patient admitted with acute respiratory failure with hypoxia. Blood glucose ranged  yesterday with patient receiving Lantus 15 units, Humalog 34 units, and Decadron 12mg. Blood glucose this morning was 128. Reviewed patient current regimen: Lantus 15 units nightly, Humalog 10 units with meals, Humalog SSI, and Decadron 6mg BID. Given patient age and comorbidities patient would likely benefit from less stringent glycemic target goals. Patient would likely benefit from a reduction in prandial insulin to reduce risk of hypoglycemia. Provider updated via [employer] regarding recommendations and patient glycemic control.

## 2021-02-05 NOTE — PROGRESS NOTES
Rounding done every hour and PRN while patient in room. Patient resting in room. No signs or symptoms of distress. No changes in status. Patient denies any further needs or pain at this time. O2 sat has continued to fluctuate throughout day with activity and talking. 67%-100%  PAtient on 15 L High Flow  O2 sat prob placed to forehead to attempt better read of sats. Wife has been to bedside today.

## 2021-02-05 NOTE — PROGRESS NOTES
Hourly rounds performed. All needs met. Bed is in low position and call light is within reach. Pt complained of pain during shift. Pt received PRN pain med. Will continue to monitor and report to oncoming nurse.

## 2021-02-05 NOTE — PROGRESS NOTES
[facility]      Assessment / Plan:    Acute hypoxic respiratory failure secondary to COVID19 pneumonia  completed Decadron  completed convalescent plasma and remdesevir  wean O2 as tolerated  goal SpO2>90%     Rectal bleeding due to hemorrhoids  continue with phenylephrine suppository   trend CBCs     Stage II sacral ulcers   frequent turning      DM type II  continue current regimen  SSI      Hyperkalemia  resolved  Completed Kayexalate X1 dose  Low K diet     Bradycardia, asymptomatic   continue to monitor  avoid BBs and CCBs    Chief Complaint : No chief complaint on file. Subjective:  80year old CM PMH DM type II, GERD, admitted 1/30 for COVID19, acute hypoxic respiratory failure, and rectal bleeding. He was seen by GI who stated bleeding from hemorrhoids, Tucks pads and Anusol UT ordered. He has required oxygen on airvo since 1/30 (was on NC). S/P Convalescent plasma on 1/8, Remdesivir 1/8-1/12.     2/5:  Patient had been tolerating 1 liter with saturations high 90s, noted to drop and had to be placed on high flow NC at 15 liters, saturations 99%. Will begin to wean again as tolerated. **Karen [id number] via phone. All questions answered.     No acute events overnight, patient has no new complaints today,  Cardiovascular, respiratory, GI review of system negative except mentioned above  Objective:  Visit Vitals  /64   Pulse 86   Temp 98 °F (36.7 °C)   Resp 18   Ht 5' 6\" (1.676 m)   Wt 54.9 kg (121 lb)   SpO2 97%   BMI 19.53 kg/m²                 Physical Exam:  General: No acute distress, speaking in full sentences, no use of accessory muscles   HEENT: Pupils equal and reactive to light and accommodation, oropharynx is clear   Neck: Supple, no lymphadenopathy, no JVD   Lungs: Clear to auscultation bilaterally   Cardiovascular: Regular rate and rhythm with normal S1 and S2   Abdomen: Soft, nontender, nondistended, normoactive bowel sounds   Extremities: No cyanosis clubbing or edema   Neuro: Nonfocal, A&O x3   Psych: Normal affect     Intake and Output:  Date 02/04/21 0700 - 02/05/21 0659 02/05/21 0700 - 02/06/21 0659   Shift [id number] 1900-0659 24 Hour Total 5148-8014 5756-9439 24 Hour Total   INTAKE   P.O. 480  480 240  240     P. O. 480  480 240  240   Shift Total(mL/kg) 480(8.7)  480(8.7) 240(4.4)  240(4.4)   OUTPUT   Shift Total(mL/kg)           480 240  240   Weight (kg) 54.9 54.9 54.9 54.9 54.9 54.9       LAB:  No results displayed because visit has over 200 results. IMAGING:  Xr Chest Sngl V    Result Date: 1/30/2021  No significant change. EKG:  No results found for this or any previous visit.           Stan Pederson NP  2/5/2021 12:48 PM

## 2021-02-06 LAB
ANION GAP SERPL CALC-SCNC: 5 MMOL/L (ref 7–16)
BASOPHILS # BLD: 0 K/UL (ref 0–0.2)
BASOPHILS NFR BLD: 0 % (ref 0–2)
BUN SERPL-MCNC: 29 MG/DL (ref 8–23)
CALCIUM SERPL-MCNC: 8.2 MG/DL (ref 8.3–10.4)
CHLORIDE SERPL-SCNC: 102 MMOL/L (ref 98–107)
CO2 SERPL-SCNC: 31 MMOL/L (ref 21–32)
CREAT SERPL-MCNC: 0.91 MG/DL (ref 0.8–1.5)
DIFFERENTIAL METHOD BLD: ABNORMAL
EOSINOPHIL # BLD: 0.4 K/UL (ref 0–0.8)
EOSINOPHIL NFR BLD: 4 % (ref 0.5–7.8)
ERYTHROCYTE [DISTWIDTH] IN BLOOD BY AUTOMATED COUNT: 19.6 % (ref 11.9–14.6)
GLUCOSE BLD STRIP.AUTO-MCNC: 163 MG/DL (ref 65–100)
GLUCOSE BLD STRIP.AUTO-MCNC: 164 MG/DL (ref 65–100)
GLUCOSE BLD STRIP.AUTO-MCNC: 205 MG/DL (ref 65–100)
GLUCOSE BLD STRIP.AUTO-MCNC: 67 MG/DL (ref 65–100)
GLUCOSE BLD STRIP.AUTO-MCNC: 95 MG/DL (ref 65–100)
GLUCOSE SERPL-MCNC: 63 MG/DL (ref 65–100)
HCT VFR BLD AUTO: 35.8 % (ref 41.1–50.3)
HGB BLD-MCNC: 11.3 G/DL (ref 13.6–17.2)
IMM GRANULOCYTES # BLD AUTO: 0.1 K/UL (ref 0–0.5)
IMM GRANULOCYTES NFR BLD AUTO: 1 % (ref 0–5)
LYMPHOCYTES # BLD: 0.5 K/UL (ref 0.5–4.6)
LYMPHOCYTES NFR BLD: 4 % (ref 13–44)
MCH RBC QN AUTO: 28.5 PG (ref 26.1–32.9)
MCHC RBC AUTO-ENTMCNC: 31.6 G/DL (ref 31.4–35)
MCV RBC AUTO: 90.2 FL (ref 79.6–97.8)
MONOCYTES # BLD: 0.4 K/UL (ref 0.1–1.3)
MONOCYTES NFR BLD: 3 % (ref 4–12)
NEUTS SEG # BLD: 10.4 K/UL (ref 1.7–8.2)
NEUTS SEG NFR BLD: 89 % (ref 43–78)
NRBC # BLD: 0 K/UL (ref 0–0.2)
PLATELET # BLD AUTO: 174 K/UL (ref 150–450)
PMV BLD AUTO: 11 FL (ref 9.4–12.3)
POTASSIUM SERPL-SCNC: 4.5 MMOL/L (ref 3.5–5.1)
RBC # BLD AUTO: 3.97 M/UL (ref 4.23–5.6)
SODIUM SERPL-SCNC: 138 MMOL/L (ref 136–145)
WBC # BLD AUTO: 11.8 K/UL (ref 4.3–11.1)

## 2021-02-06 PROCEDURE — 74011250637 HC RX REV CODE- 250/637: Performed by: INTERNAL MEDICINE

## 2021-02-06 PROCEDURE — 80048 BASIC METABOLIC PNL TOTAL CA: CPT

## 2021-02-06 PROCEDURE — 65270000029 HC RM PRIVATE

## 2021-02-06 PROCEDURE — 74011636637 HC RX REV CODE- 636/637: Performed by: STUDENT IN AN ORGANIZED HEALTH CARE EDUCATION/TRAINING PROGRAM

## 2021-02-06 PROCEDURE — 74011636637 HC RX REV CODE- 636/637: Performed by: INTERNAL MEDICINE

## 2021-02-06 PROCEDURE — 85025 COMPLETE CBC W/AUTO DIFF WBC: CPT

## 2021-02-06 PROCEDURE — 74011250637 HC RX REV CODE- 250/637: Performed by: PHYSICIAN ASSISTANT

## 2021-02-06 PROCEDURE — 82962 GLUCOSE BLOOD TEST: CPT

## 2021-02-06 PROCEDURE — 77010033678 HC OXYGEN DAILY

## 2021-02-06 PROCEDURE — 94762 N-INVAS EAR/PLS OXIMTRY CONT: CPT

## 2021-02-06 PROCEDURE — 74011636637 HC RX REV CODE- 636/637: Performed by: NURSE PRACTITIONER

## 2021-02-06 PROCEDURE — 36415 COLL VENOUS BLD VENIPUNCTURE: CPT

## 2021-02-06 PROCEDURE — 94640 AIRWAY INHALATION TREATMENT: CPT

## 2021-02-06 PROCEDURE — 74011250637 HC RX REV CODE- 250/637: Performed by: HOSPITALIST

## 2021-02-06 RX ADMIN — BUDESONIDE AND FORMOTEROL FUMARATE DIHYDRATE 2 PUFF: 160; 4.5 AEROSOL RESPIRATORY (INHALATION) at 09:11

## 2021-02-06 RX ADMIN — ROSUVASTATIN 10 MG: 10 TABLET, FILM COATED ORAL at 21:32

## 2021-02-06 RX ADMIN — Medication 10 ML: at 05:26

## 2021-02-06 RX ADMIN — PHENYLEPHRINE HYDROCHLORIDE AND FAT, HARD 1 SUPPOSITORY: 5; 1.77 SUPPOSITORY RECTAL at 08:17

## 2021-02-06 RX ADMIN — INSULIN LISPRO 5 UNITS: 100 INJECTION, SOLUTION INTRAVENOUS; SUBCUTANEOUS at 11:53

## 2021-02-06 RX ADMIN — INSULIN GLARGINE 15 UNITS: 100 INJECTION, SOLUTION SUBCUTANEOUS at 22:06

## 2021-02-06 RX ADMIN — BUDESONIDE AND FORMOTEROL FUMARATE DIHYDRATE 2 PUFF: 160; 4.5 AEROSOL RESPIRATORY (INHALATION) at 21:44

## 2021-02-06 RX ADMIN — NYSTATIN: 100000 POWDER TOPICAL at 08:19

## 2021-02-06 RX ADMIN — INSULIN LISPRO 2 UNITS: 100 INJECTION, SOLUTION INTRAVENOUS; SUBCUTANEOUS at 22:06

## 2021-02-06 RX ADMIN — GLYCERIN, PETROLATUM, PHENYLEPHRINE HCL, PRAMOXINE HCL: 144; 2.5; 10; 15 CREAM TOPICAL at 09:00

## 2021-02-06 RX ADMIN — Medication 10 ML: at 14:55

## 2021-02-06 RX ADMIN — GUAIFENESIN 1200 MG: 600 TABLET ORAL at 08:17

## 2021-02-06 RX ADMIN — INSULIN LISPRO 2 UNITS: 100 INJECTION, SOLUTION INTRAVENOUS; SUBCUTANEOUS at 17:37

## 2021-02-06 RX ADMIN — NYSTATIN: 100000 POWDER TOPICAL at 17:40

## 2021-02-06 RX ADMIN — INSULIN LISPRO 5 UNITS: 100 INJECTION, SOLUTION INTRAVENOUS; SUBCUTANEOUS at 17:37

## 2021-02-06 RX ADMIN — INSULIN LISPRO 4 UNITS: 100 INJECTION, SOLUTION INTRAVENOUS; SUBCUTANEOUS at 11:53

## 2021-02-06 RX ADMIN — GUAIFENESIN 1200 MG: 600 TABLET ORAL at 21:31

## 2021-02-06 RX ADMIN — Medication 10 ML: at 21:54

## 2021-02-06 RX ADMIN — PHENYLEPHRINE HYDROCHLORIDE AND FAT, HARD 1 SUPPOSITORY: 5; 1.77 SUPPOSITORY RECTAL at 22:08

## 2021-02-06 RX ADMIN — PANTOPRAZOLE SODIUM 40 MG: 40 TABLET, DELAYED RELEASE ORAL at 08:17

## 2021-02-06 RX ADMIN — PANTOPRAZOLE SODIUM 40 MG: 40 TABLET, DELAYED RELEASE ORAL at 17:37

## 2021-02-06 NOTE — PROGRESS NOTES
[facility] Progress [facility]      Assessment / Plan:    Acute hypoxic respiratory failure secondary to COVID19 pneumonia  completed Decadron  completed convalescent plasma and remdesevir  wean O2 as tolerated  goal SpO2>90%  Chest x ray 2/5 Persistent increased reticular interstitial lung markings particularly  throughout periphery of right lung, unchanged from 1/30        Rectal bleeding due to hemorrhoids  continue with phenylephrine suppository   trend CBCs     Stage II sacral ulcers   frequent turning      DM type II  continue current regimen  SSI      Hyperkalemia  resolved  Completed Kayexalate X1 dose  Low K diet     Bradycardia, asymptomatic   continue to monitor  avoid BBs and CCBs    Chief Complaint : No chief complaint on file. Subjective:  80year old CM PMH DM type II, GERD, admitted 1/30 for COVID19, acute hypoxic respiratory failure, and rectal bleeding. He was seen by GI who stated bleeding from hemorrhoids, Tucks pads and Anusol MS ordered. He has required oxygen on airvo since 1/30 (was on NC). S/P Convalescent plasma on 1/8, Remdesivir 1/8-1/12.     2/6:  Patient now on 2 liter with saturations high 90s. Afebrile. **Discharge plan:  [facility] tomorrow if remains stable.     No acute events overnight, patient has no new complaints today,  Cardiovascular, respiratory, GI review of system negative except mentioned above  Objective:  Visit Vitals  BP (!) 99/48 (BP 1 Location: Right upper arm, BP Patient Position: At rest)   Pulse 70   Temp 97 °F (36.1 °C)   Resp 20   Ht 5' 6\" (1.676 m)   Wt 54.9 kg (121 lb)   SpO2 100%   BMI 19.53 kg/m²                 Physical Exam:  General: No acute distress, speaking in full sentences, no use of accessory muscles   HEENT: Pupils equal and reactive to light and accommodation, oropharynx is clear   Neck: Supple, no lymphadenopathy, no JVD   Lungs: Clear to auscultation bilaterally   Cardiovascular: Regular rate and rhythm with normal S1 and S2   Abdomen: Soft, nontender, nondistended, normoactive bowel sounds   Extremities: No cyanosis clubbing or edema   Neuro: Nonfocal, A&O x3   Psych: Normal affect     Intake and Output:  Date 02/05/21 0700 - 02/06/21 0659 02/06/21 0700 - 02/07/21 0659   Shift 4223-0599 9470-4469 24 Hour Total 5066-1946 2348-3876 24 Hour Total   INTAKE   P.O. 480  480 600  600     P. O. 480  480 600  600   Shift Total(mL/kg) 480(8.7)  480(8.7) 600(10.9)  600(10.9)   OUTPUT   Urine(mL/kg/hr)           Urine Occurrence(s)  1 x 1 x      Shift Total(mL/kg)           480 600  600   Weight (kg) 54.9 54.9 54.9 54.9 54.9 54.9       LAB:  No results displayed because visit has over 200 results. IMAGING:  Xr Chest Sngl V    Result Date: 1/30/2021  No significant change. Xr Chest Pa Lat    Result Date: 2/5/2021  1. Persistent increased reticular interstitial lung markings particularly throughout periphery of right lung. CPT code(s) 82842       EKG:  No results found for this or any previous visit.           Claire Peres NP  2/6/2021 12:48 PM

## 2021-02-06 NOTE — PROGRESS NOTES
Hourly rounding performed throughout shift. Patient weaned from hi flow NC 15L to 2L NC throughout shift, maintaining 02 sats >90, currently at 96% on continuous pulse ox with forehead probe. Patient refused all scheduled night time medications stating \"I don't want them, just let me rest\".

## 2021-02-07 VITALS
HEIGHT: 66 IN | BODY MASS INDEX: 20.03 KG/M2 | TEMPERATURE: 97.6 F | RESPIRATION RATE: 24 BRPM | DIASTOLIC BLOOD PRESSURE: 61 MMHG | SYSTOLIC BLOOD PRESSURE: 105 MMHG | OXYGEN SATURATION: 99 % | HEART RATE: 79 BPM | WEIGHT: 124.6 LBS

## 2021-02-07 LAB
ANION GAP SERPL CALC-SCNC: 5 MMOL/L (ref 7–16)
BASOPHILS # BLD: 0 K/UL (ref 0–0.2)
BASOPHILS NFR BLD: 0 % (ref 0–2)
BUN SERPL-MCNC: 21 MG/DL (ref 8–23)
CALCIUM SERPL-MCNC: 8.2 MG/DL (ref 8.3–10.4)
CHLORIDE SERPL-SCNC: 103 MMOL/L (ref 98–107)
CO2 SERPL-SCNC: 29 MMOL/L (ref 21–32)
CREAT SERPL-MCNC: 0.9 MG/DL (ref 0.8–1.5)
DIFFERENTIAL METHOD BLD: ABNORMAL
EOSINOPHIL # BLD: 0.4 K/UL (ref 0–0.8)
EOSINOPHIL NFR BLD: 4 % (ref 0.5–7.8)
ERYTHROCYTE [DISTWIDTH] IN BLOOD BY AUTOMATED COUNT: 19.5 % (ref 11.9–14.6)
GLUCOSE BLD STRIP.AUTO-MCNC: 155 MG/DL (ref 65–100)
GLUCOSE BLD STRIP.AUTO-MCNC: 199 MG/DL (ref 65–100)
GLUCOSE SERPL-MCNC: 170 MG/DL (ref 65–100)
HCT VFR BLD AUTO: 38.2 % (ref 41.1–50.3)
HGB BLD-MCNC: 12 G/DL (ref 13.6–17.2)
IMM GRANULOCYTES # BLD AUTO: 0.1 K/UL (ref 0–0.5)
IMM GRANULOCYTES NFR BLD AUTO: 1 % (ref 0–5)
LYMPHOCYTES # BLD: 0.5 K/UL (ref 0.5–4.6)
LYMPHOCYTES NFR BLD: 5 % (ref 13–44)
MCH RBC QN AUTO: 28.2 PG (ref 26.1–32.9)
MCHC RBC AUTO-ENTMCNC: 31.4 G/DL (ref 31.4–35)
MCV RBC AUTO: 89.7 FL (ref 79.6–97.8)
MONOCYTES # BLD: 0.3 K/UL (ref 0.1–1.3)
MONOCYTES NFR BLD: 3 % (ref 4–12)
NEUTS SEG # BLD: 8.7 K/UL (ref 1.7–8.2)
NEUTS SEG NFR BLD: 88 % (ref 43–78)
NRBC # BLD: 0 K/UL (ref 0–0.2)
PLATELET # BLD AUTO: 156 K/UL (ref 150–450)
PMV BLD AUTO: 11.2 FL (ref 9.4–12.3)
POTASSIUM SERPL-SCNC: 4.5 MMOL/L (ref 3.5–5.1)
RBC # BLD AUTO: 4.26 M/UL (ref 4.23–5.6)
SODIUM SERPL-SCNC: 137 MMOL/L (ref 136–145)
WBC # BLD AUTO: 9.9 K/UL (ref 4.3–11.1)

## 2021-02-07 PROCEDURE — 74011636637 HC RX REV CODE- 636/637: Performed by: NURSE PRACTITIONER

## 2021-02-07 PROCEDURE — 82962 GLUCOSE BLOOD TEST: CPT

## 2021-02-07 PROCEDURE — 85025 COMPLETE CBC W/AUTO DIFF WBC: CPT

## 2021-02-07 PROCEDURE — 74011250637 HC RX REV CODE- 250/637: Performed by: HOSPITALIST

## 2021-02-07 PROCEDURE — 94762 N-INVAS EAR/PLS OXIMTRY CONT: CPT

## 2021-02-07 PROCEDURE — 94640 AIRWAY INHALATION TREATMENT: CPT

## 2021-02-07 PROCEDURE — 74011250637 HC RX REV CODE- 250/637: Performed by: INTERNAL MEDICINE

## 2021-02-07 PROCEDURE — 80048 BASIC METABOLIC PNL TOTAL CA: CPT

## 2021-02-07 PROCEDURE — 36415 COLL VENOUS BLD VENIPUNCTURE: CPT

## 2021-02-07 PROCEDURE — 74011636637 HC RX REV CODE- 636/637: Performed by: INTERNAL MEDICINE

## 2021-02-07 PROCEDURE — 2709999900 HC NON-CHARGEABLE SUPPLY

## 2021-02-07 PROCEDURE — 77010033678 HC OXYGEN DAILY

## 2021-02-07 RX ORDER — ALBUTEROL SULFATE 90 UG/1
2 AEROSOL, METERED RESPIRATORY (INHALATION)
Qty: 1 INHALER | Refills: 0 | Status: SHIPPED
Start: 2021-02-07

## 2021-02-07 RX ORDER — INSULIN LISPRO 100 [IU]/ML
INJECTION, SOLUTION INTRAVENOUS; SUBCUTANEOUS
Qty: 1 VIAL | Refills: 0 | Status: SHIPPED
Start: 2021-02-07 | End: 2021-09-27

## 2021-02-07 RX ORDER — BUDESONIDE AND FORMOTEROL FUMARATE DIHYDRATE 160; 4.5 UG/1; UG/1
2 AEROSOL RESPIRATORY (INHALATION) 2 TIMES DAILY
Qty: 1 INHALER | Refills: 1 | Status: SHIPPED
Start: 2021-02-07 | End: 2021-03-09

## 2021-02-07 RX ORDER — INSULIN GLARGINE 100 [IU]/ML
15 INJECTION, SOLUTION SUBCUTANEOUS
Qty: 1 VIAL | Refills: 0 | Status: SHIPPED
Start: 2021-02-07 | End: 2021-09-27

## 2021-02-07 RX ADMIN — INSULIN LISPRO 2 UNITS: 100 INJECTION, SOLUTION INTRAVENOUS; SUBCUTANEOUS at 11:32

## 2021-02-07 RX ADMIN — NYSTATIN: 100000 POWDER TOPICAL at 09:00

## 2021-02-07 RX ADMIN — PANTOPRAZOLE SODIUM 40 MG: 40 TABLET, DELAYED RELEASE ORAL at 05:21

## 2021-02-07 RX ADMIN — INSULIN LISPRO 2 UNITS: 100 INJECTION, SOLUTION INTRAVENOUS; SUBCUTANEOUS at 09:04

## 2021-02-07 RX ADMIN — Medication 10 ML: at 05:21

## 2021-02-07 RX ADMIN — Medication 10 ML: at 13:11

## 2021-02-07 RX ADMIN — INSULIN LISPRO 5 UNITS: 100 INJECTION, SOLUTION INTRAVENOUS; SUBCUTANEOUS at 09:04

## 2021-02-07 RX ADMIN — GUAIFENESIN 1200 MG: 600 TABLET ORAL at 09:04

## 2021-02-07 RX ADMIN — INSULIN LISPRO 5 UNITS: 100 INJECTION, SOLUTION INTRAVENOUS; SUBCUTANEOUS at 11:32

## 2021-02-07 RX ADMIN — BUDESONIDE AND FORMOTEROL FUMARATE DIHYDRATE 2 PUFF: 160; 4.5 AEROSOL RESPIRATORY (INHALATION) at 09:05

## 2021-02-07 NOTE — PROGRESS NOTES
Patient to [facility] to [facility]  for [employer]. Patient's room number is 408 B and report line is [phone]. [facility] has been arranged.  time has been scheduled for 2:00pm. CM provided this information to primary nurse. Patient's Spouse Britney Germain notified via phone. CM also discussed patient's code status with spouse, as per chart, patient is DNR. CM unable to obtain consent from patient, due confusion, per RN. Spouse stated the patient is a full code. CM notified NP Remy Harmony via perfect serve. No needs voiced at this time. Please consult or notify CM if any needs shall arise. CM remains available. Care Management Interventions  PCP Verified by CM: Yes  Mode of Transport at Discharge: BLS  Transition of Care Consult (CM Consult): SNF  Partner SNF: Yes  Discharge Durable Medical Equipment: No  Physical Therapy Consult: Yes  Occupational Therapy Consult: Yes  Current Support Network: Lives with Spouse, Own Home  The Plan for Transition of Care is Related to the Following Treatment Goals : Discharge to [facility]. The Patient and/or Patient Representative was Provided with a Choice of Provider and Agrees with the Discharge Plan?: Yes  Name of the Patient Representative Who was Provided with a Choice of Provider and Agrees with the Discharge Plan: Patient's Spouse Renu Tidwell/Patient's Niece Katie Collins.    Freedom of Choice List was Provided with Basic Dialogue that Supports the Patient's Individualized Plan of Care/Goals, Treatment Preferences and Shares the Quality Data Associated with the Providers?: Yes  [employer] Information Provided?: No  Discharge Location  Discharge Placement: Skilled nursing facility([facility]. )

## 2021-02-07 NOTE — DISCHARGE SUMMARY
Hospitalist Discharge Summary     Admit Date:  2021  1:50 AM   Name:  Yusef Manzo   Age:  80 y.o.  :  1933   MRN:  [id number]   PCP:  Tre Rosa MD  Treatment Team: Attending Provider: Lyndsey Bishop MD; Utilization Review: Gavin Samayoa; Utilization Review: Kinjal Mendoza RN; Care Manager: Ainsley Mcintosh; Hospitalist: Kim Colindres NP; Hospitalist: Lyndsey Bishop MD    Problem List for this Hospitalization:  Hospital Problems as of 2021 Never Reviewed          Codes Class Noted - Resolved POA    Decubitus ulcer of buttock, stage 2 ([facility] ICD-10-CM: N38.613  ICD-9-CM: 707.05, 707.22  2021 - Present Unknown        Gastrointestinal hemorrhage associated with anorectal source ICD-10-CM: K62.5  ICD-9-CM: 569.3  2021 - Present Unknown        * (Principal) Acute respiratory failure with hypoxia ([facility] ICD-10-CM: J96.01  ICD-9-CM: 518.81  2021 - Present Unknown        COVID-19 ICD-10-CM: U07.1  ICD-9-CM: 079.89  2021 - Present Yes        Acute metabolic encephalopathy UCD-10-AE: G93.41  ICD-9-CM: 348.31  2021 - Present Yes        Type 2 diabetes mellitus, with long-term current use of insulin ([facility] ICD-10-CM: E11.9, Z79.4  ICD-9-CM: 250.00, V58.67  2021 - Present Yes                Admission HPI from 2021:    80year old CM PMH DM type II, GERD, admitted  for COVID19, acute hypoxic respiratory failure, and rectal bleeding. He was seen by GI who stated bleeding from hemorrhoids, Tucks pads and Anusol LA ordered. He has required oxygen on airvo since  (was on NC). S/P Convalescent plasma on , Remdesivir -1/12.   He was slow to wean from airvo but has been able to tolerate NC. 2 days ago had some episodes of saturations dropping with movement but easily recovered. Probe changed from finger to forehead with improved readings. He has maintained saturations mid to high 90s on 1-2 liters oxygen.  Day of discharge he is noted pleasant, eager to go to rehab. Afebrile, no leukocytosis.   He is discharging to [facility]. Follow up instructions and discharge meds at bottom of this note. Plan was discussed with patient, nursing, CM. All questions answered. Patient was stable at time of discharge. 10 systems reviewed and negative except as noted in HPI. Diagnostic Imaging/Tests:   No results found. Echocardiogram results:  No results found for this visit on 01/08/21. All Micro Results     Procedure Component Value Units Date/Time    COVID-19 RAPID TEST [[id number]] Collected: 01/28/21 1045    Order Status: Completed Specimen: Nasopharyngeal Updated: 01/28/21 1347     Specimen source NASAL O2        COVID-19 rapid test Not detected        Comment:      The specimen is NEGATIVE for SARS-CoV-2, the novel coronavirus associated with COVID-19. A negative result does not rule out COVID-19. This test has been authorized by the FDA under an Emergency Use Authorization (EUA) for use by authorized laboratories.         Fact sheet for Healthcare Providers: ConventionUpdate.co.nz  Fact sheet for Patients: ConventionUpdate.co.nz       Methodology: Isothermal Nucleic Acid Amplification         CULTURE, URINE [[id number]] Collected: 01/08/21 0400    Order Status: Completed Specimen: Urine from Clean catch Updated: 01/10/21 0802     Special Requests: NO SPECIAL REQUESTS        Culture result:       <10,000 COLONIES/mL MIXED SKIN KIM ISOLATED                Labs: Results:       BMP, Mg, Phos Recent Labs     02/07/21 0628 02/06/21  0638 02/05/21  0557    138 137*   K 4.5 4.5 5.0    102 102   CO2 29 31 28   AGAP 5* 5* 7   BUN 21 29* 29*   CREA 0.90 0.91 0.82   CA 8.2* 8.2* 8.2*   * 63* 112*      CBC Recent Labs     02/07/21 0628 02/06/21  0638 02/05/21  0557   WBC 9.9 11.8* 12.6*   RBC 4.26 3.97* 4.06*   HGB 12.0* 11.3* 11.6*   HCT 38.2* 35.8* 37.2*    174 177   GRANS 88* 89* 94*   LYMPH 5* 4* 3*   EOS 4 4 0*   MONOS 3* 3* 2*   BASOS 0 0 0   IG 1 1 1   ANEU 8.7* 10.4* 11.9*   ABL 0.5 0.5 0.4*   TEDDY 0.4 0.4 0.0   ABM 0.3 0.4 0.2   ABB 0.0 0.0 0.0   AIG 0.1 0.1 0.1      LFT No results for input(s): ALT, TBIL, AP, TP, ALB, GLOB, AGRAT in the last 72 hours.     No lab exists for component: SGOT, GPT   Cardiac Testing No results found for: BNPP, BNP, CPK, RCK1, RCK2, RCK3, RCK4, CKMB, CKNDX, CKND1, TROPT, TROIQ   Coagulation Tests No results found for: PTP, INR, APTT, INREXT   A1c Lab Results   Component Value Date/Time    Hemoglobin A1c 7.5 (H) 01/07/2021 03:37 PM      Lipid Panel No results found for: CHOL, CHOLPOCT, CHOLX, CHLST, CHOLV, 248313, HDL, HDLP, LDL, LDLC, DLDLP, 657997, VLDLC, VLDL, TGLX, TRIGL, TRIGP, TGLPOCT, CHHD, [facility]   Thyroid Panel Lab Results   Component Value Date/Time    TSH 1.380 01/07/2021 03:37 PM    T4, Free 1.2 01/07/2021 03:37 PM        Most Recent UA Lab Results   Component Value Date/Time    Color YELLOW 01/16/2021 01:21 PM    Appearance CLEAR 01/16/2021 01:21 PM    Specific gravity 1.021 01/08/2021 04:00 AM    pH (UA) 5.0 01/16/2021 01:21 PM    Protein Negative 01/16/2021 01:21 PM    Glucose Negative 01/16/2021 01:21 PM    Ketone TRACE (A) 01/16/2021 01:21 PM    Bilirubin Negative 01/16/2021 01:21 PM    Blood Negative 01/16/2021 01:21 PM    Urobilinogen 1.0 01/16/2021 01:21 PM    Nitrites Negative 01/16/2021 01:21 PM    Leukocyte Esterase SMALL (A) 01/16/2021 01:21 PM        Allergies   Allergen Reactions    Betadine [Povidone-Iodine] Swelling     And Blisters on skin      Immunization History   Administered Date(s) Administered    TB Skin Test (PPD) Intradermal 01/18/2021       All Labs from Last 24 Hrs:  Recent Results (from the past 24 hour(s))   GLUCOSE, POC    Collection Time: 02/06/21 11:08 AM   Result Value Ref Range    Glucose (POC) 205 (H) 65 - 100 mg/dL   GLUCOSE, POC    Collection Time: 02/06/21  3:28 PM   Result Value Ref Range    Glucose (POC) 164 (H) 65 - 100 mg/dL   GLUCOSE, POC    Collection Time: 02/06/21  9:57 PM   Result Value Ref Range    Glucose (POC) 163 (H) 65 - 100 mg/dL   CBC WITH AUTOMATED DIFF    Collection Time: 02/07/21  6:28 AM   Result Value Ref Range    WBC 9.9 4.3 - 11.1 K/uL    RBC 4.26 4.23 - 5.6 M/uL    HGB 12.0 (L) 13.6 - 17.2 g/dL    HCT 38.2 (L) 41.1 - 50.3 %    MCV 89.7 79.6 - 97.8 FL    MCH 28.2 26.1 - 32.9 PG    MCHC 31.4 31.4 - 35.0 g/dL    RDW 19.5 (H) 11.9 - 14.6 %    PLATELET 659 900 - 744 K/uL    MPV 11.2 9.4 - 12.3 FL    ABSOLUTE NRBC 0.00 0.0 - 0.2 K/uL    DF AUTOMATED      NEUTROPHILS 88 (H) 43 - 78 %    LYMPHOCYTES 5 (L) 13 - 44 %    MONOCYTES 3 (L) 4.0 - 12.0 %    EOSINOPHILS 4 0.5 - 7.8 %    BASOPHILS 0 0.0 - 2.0 %    IMMATURE GRANULOCYTES 1 0.0 - 5.0 %    ABS. NEUTROPHILS 8.7 (H) 1.7 - 8.2 K/UL    ABS. LYMPHOCYTES 0.5 0.5 - 4.6 K/UL    ABS. MONOCYTES 0.3 0.1 - 1.3 K/UL    ABS. EOSINOPHILS 0.4 0.0 - 0.8 K/UL    ABS. BASOPHILS 0.0 0.0 - 0.2 K/UL    ABS. IMM.  GRANS. 0.1 0.0 - 0.5 K/UL   METABOLIC PANEL, BASIC    Collection Time: 02/07/21  6:28 AM   Result Value Ref Range    Sodium 137 136 - 145 mmol/L    Potassium 4.5 3.5 - 5.1 mmol/L    Chloride 103 98 - 107 mmol/L    CO2 29 21 - 32 mmol/L    Anion gap 5 (L) 7 - 16 mmol/L    Glucose 170 (H) 65 - 100 mg/dL    BUN 21 8 - 23 MG/DL    Creatinine 0.90 0.8 - 1.5 MG/DL    GFR est AA >60 >60 ml/min/1.73m2    GFR est non-AA >60 >60 ml/min/1.73m2    Calcium 8.2 (L) 8.3 - 10.4 MG/DL   GLUCOSE, POC    Collection Time: 02/07/21  6:46 AM   Result Value Ref Range    Glucose (POC) 155 (H) 65 - 100 mg/dL       Discharge Exam:  Patient Vitals for the past 24 hrs:   Temp Pulse Resp BP SpO2   02/07/21 0905 -- -- -- -- 90 %   02/07/21 0729 97.8 °F (36.6 °C) 79 16 123/70 99 %   02/07/21 0418 97.9 °F (36.6 °C) 73 16 128/68 95 %   02/06/21 2340 97.6 °F (36.4 °C) 70 16 122/60 93 %   02/06/21 2144 -- -- -- -- 96 %   02/06/21 2012 97.8 °F (36.6 °C) 72 16 (!) 119/59 92 %   02/06/21 3999 97.7 °F (36.5 °C) 79 16 108/61 97 %   02/06/21 1200 98.4 °F (36.9 °C) 71 20 (!) 115/55 97 %     Oxygen Therapy  O2 Sat (%): 90 % (02/07/21 0905)  Pulse via Oximetry: 78 beats per minute (02/07/21 0905)  O2 Device: Hi flow nasal cannula (02/07/21 0905)  O2 Flow Rate (L/min): 2 l/min (02/07/21 0905)  O2 Temperature: 87.8 °F (31 °C) (02/01/21 1212)  FIO2 (%): 40 % (02/01/21 1212)  No intake or output data in the 24 hours ending 02/07/21 1045      Physical exam:  General:    Well nourished. Alert. No distress. Eyes:   Normal sclera. Extraocular movements intact. ENT:  Normocephalic, atraumatic. Moist mucous membranes  CV:   Regular rate and rhythm. No murmur, rub, or gallop. Lungs:  Clear to auscultation bilaterally. No wheezing, rhonchi, or rales. Abdomen: Soft, nontender, nondistended. Bowel sounds normal.   Extremities: Warm and dry. No cyanosis or edema. Neurologic: No focal deficits  Skin:     No rashes or jaundice. Psych:  Normal mood and affect. Discharge Info:   Current Discharge Medication List      START taking these medications    Details   budesonide-formoteroL (SYMBICORT) 160-4.5 mcg/actuation HFAA Take 2 Puffs by inhalation two (2) times a day for 30 days. Qty: 1 Inhaler, Refills: 1      albuterol (PROVENTIL HFA, VENTOLIN HFA, PROAIR HFA) 90 mcg/actuation inhaler Take 2 Puffs by inhalation every six (6) hours as needed for Wheezing or Shortness of Breath. Qty: 1 Inhaler, Refills: 0      phenylephrine 0.25 % suppository Insert 1 Suppository into rectum every twelve (12) hours for 30 days. Qty: 60 Suppository, Refills: 0      witch hazel-glycerin (TUCKS) 12.5-50 % pad 1 Pad by PeriANAL route as needed for Pain for up to 30 days. Qty: 1 Box, Refills: 0      guaiFENesin ER (MUCINEX) 1,200 mg Ta12 ER tablet Take 1 Tab by mouth every twelve (12) hours for 30 days. Qty: 60 Tab, Refills: 0      insulin glargine (LANTUS) 100 unit/mL injection 15 Units by SubCUTAneous route nightly.   Qty: 1 Vial, Refills: 0      insulin lispro (HUMALOG) 100 unit/mL injection Before meals and at bedtime:  Less than 150 =   0 units           150 -199 =   2 units  200 -249 =   4 units  250 -299 =   6 units  300 -349 =   8 units  350 and above = 10 units and Call Physician  Qty: 1 Vial, Refills: 0         STOP taking these medications       HYDROcodone-acetaminophen (LORTAB 7.5) 7.5-500 mg per tablet Comments:   Reason for Stopping:         chlorhexidine (HIBICLENS) 4 % liquid Comments:   Reason for Stopping:         insulin detemir (LEVEMIR FLEXPEN) 100 unit/mL flexpen Comments:   Reason for Stopping:         aspirin 81 mg tablet Comments:   Reason for Stopping:         cetirizine (ZYRTEC) 10 mg tablet Comments:   Reason for Stopping:         rosuvastatin (CRESTOR) 10 mg tablet Comments:   Reason for Stopping:         metformin (GLUCOPHAGE) 500 mg tablet Comments:   Reason for Stopping:                 Disposition: SNF    Activity: Activity as tolerated  Diet: DIET DIABETIC WITH OPTIONS Consistent Carb 1500-1600kcal; Mechanical Soft  DIET NUTRITIONAL SUPPLEMENTS All Meals, HS Snack; Glucerna Shake ( )    Follow-up Appointments   Procedures    FOLLOW UP VISIT Appointment in: 3 - 5 Days [facility] next lab day     [facility] next lab day     Standing Status:   Standing     Number of Occurrences:   1     Order Specific Question:   Appointment in     Answer:   3 - 5 Days         Follow-up Information     Follow up With Specialties Details Why Contact [place] [address] [address] [address]  [address] [facility], [address] [facility], MD Family Medicine   [place] [address] [address]  [phone]                Time spent in patient discharge planning and coordination 35 minutes.     Signed:  Willy Brittle, NP

## 2021-02-07 NOTE — PROGRESS NOTES
Hourly rounds completed. Pt on 1/2 L NC, with no complaints of SOB. Currently on 95%, on continuous pulse ox. Dressing changed to sacrum. Did not have a good sleep this shift. Denies pain. Call light within reach, will give report to oncoming RN.

## 2021-02-10 ENCOUNTER — HOSPITAL ENCOUNTER (OUTPATIENT)
Dept: LAB | Age: 86
Discharge: HOME | End: 2021-02-10

## 2021-02-10 LAB
ALBUMIN SERPL-MCNC: 2 G/DL (ref 3.2–4.6)
ALBUMIN/GLOB SERPL: 0.8 {RATIO} (ref 1.2–3.5)
ALP SERPL-CCNC: 96 U/L (ref 50–136)
ALT SERPL-CCNC: 22 U/L (ref 12–65)
ANION GAP SERPL CALC-SCNC: 7 MMOL/L (ref 7–16)
AST SERPL-CCNC: 23 U/L (ref 15–37)
BILIRUB SERPL-MCNC: 0.3 MG/DL (ref 0.2–1.1)
BUN SERPL-MCNC: 26 MG/DL (ref 8–23)
CALCIUM SERPL-MCNC: 7.7 MG/DL (ref 8.3–10.4)
CHLORIDE SERPL-SCNC: 105 MMOL/L (ref 98–107)
CO2 SERPL-SCNC: 27 MMOL/L (ref 21–32)
CREAT SERPL-MCNC: 0.91 MG/DL (ref 0.8–1.5)
GLOBULIN SER CALC-MCNC: 2.6 G/DL (ref 2.3–3.5)
GLUCOSE SERPL-MCNC: 149 MG/DL (ref 65–100)
POTASSIUM SERPL-SCNC: 4.8 MMOL/L (ref 3.5–5.1)
PROT SERPL-MCNC: 4.6 G/DL (ref 6.3–8.2)
SODIUM SERPL-SCNC: 139 MMOL/L (ref 136–145)

## 2021-02-10 PROCEDURE — 80053 COMPREHEN METABOLIC PANEL: CPT

## 2021-02-24 ENCOUNTER — HOME HEALTH ADMISSION (OUTPATIENT)
Dept: HOME HEALTH SERVICES | Facility: HOME HEALTH | Age: 86
End: 2021-02-24

## 2021-02-24 ENCOUNTER — HOME HEALTH ADMISSION (OUTPATIENT)
Dept: HOME HEALTH SERVICES | Facility: HOME HEALTH | Age: 86
End: 2021-02-24
Payer: MEDICARE

## 2021-02-26 ENCOUNTER — HOME CARE VISIT (OUTPATIENT)
Dept: SCHEDULING | Facility: HOME HEALTH | Age: 86
End: 2021-02-26
Payer: MEDICARE

## 2021-02-26 ENCOUNTER — PATIENT OUTREACH (OUTPATIENT)
Dept: CASE MANAGEMENT | Age: 86
End: 2021-02-26

## 2021-02-26 VITALS
DIASTOLIC BLOOD PRESSURE: 66 MMHG | SYSTOLIC BLOOD PRESSURE: 124 MMHG | TEMPERATURE: 97.3 F | RESPIRATION RATE: 18 BRPM | OXYGEN SATURATION: 98 % | HEART RATE: 74 BPM

## 2021-02-26 PROCEDURE — G0299 HHS/HOSPICE OF RN EA 15 MIN: HCPCS

## 2021-02-26 PROCEDURE — A6212 FOAM DRG <=16 SQ IN W/BORDER: HCPCS

## 2021-02-26 PROCEDURE — 3331090002 HH PPS REVENUE DEBIT

## 2021-02-26 PROCEDURE — 400013 HH SOC

## 2021-02-26 PROCEDURE — 3331090001 HH PPS REVENUE CREDIT

## 2021-02-26 PROCEDURE — 400018 HH-NO PAY CLAIM PROCEDURE

## 2021-02-26 NOTE — PROGRESS NOTES
Received call from [facility] regarding patient's discharge from [facility]. Patient discharged from [facility] 2/25/21. Outreach call to made to patient. Spoke with Fatoumata Murillo. Ambrocio Rubio confirms patients discharge date. She stated that all prescriptions were at the pharmacy[facility] in . She did not have any medications at discharge and denied any medications in the home including Insulin. Contacted [facility] [place]. Medications are ready for pick-up with the exception of Lantus. Lantus will be filled by [facility] in [address]. Including insulin syringes. [facility] also agreed to provide a few days worth of medications to the patient. Contacted Dr. Jane Mckinnon office to schedule a 7 day follow-up appointment for pt. Appointment scheduled for 3/2/21 at 1030am. Provided this information to Ambrocio Rubio and Mrs. Flores. They confirmed understanding. Confirmed the above with Fatoumata Murillo. Ms. Maria Elena Montesinos confirms that she picked up all medication today. She also confirms that [facility] RN had arrived for the first [facility] visit. [facility] RN, PT, OT and SW. Patient will also be assigned to an ACM.

## 2021-02-27 ENCOUNTER — HOME CARE VISIT (OUTPATIENT)
Dept: HOME HEALTH SERVICES | Facility: HOME HEALTH | Age: 86
End: 2021-02-27
Payer: MEDICARE

## 2021-02-27 ENCOUNTER — HOME CARE VISIT (OUTPATIENT)
Dept: SCHEDULING | Facility: HOME HEALTH | Age: 86
End: 2021-02-27
Payer: MEDICARE

## 2021-02-27 VITALS
OXYGEN SATURATION: 96 % | SYSTOLIC BLOOD PRESSURE: 118 MMHG | TEMPERATURE: 98.4 F | HEART RATE: 76 BPM | DIASTOLIC BLOOD PRESSURE: 62 MMHG

## 2021-02-27 VITALS
RESPIRATION RATE: 18 BRPM | SYSTOLIC BLOOD PRESSURE: 118 MMHG | TEMPERATURE: 98.4 F | DIASTOLIC BLOOD PRESSURE: 62 MMHG | HEART RATE: 76 BPM

## 2021-02-27 PROCEDURE — G0299 HHS/HOSPICE OF RN EA 15 MIN: HCPCS

## 2021-02-27 PROCEDURE — 3331090001 HH PPS REVENUE CREDIT

## 2021-02-27 PROCEDURE — G0152 HHCP-SERV OF OT,EA 15 MIN: HCPCS

## 2021-02-27 PROCEDURE — G0151 HHCP-SERV OF PT,EA 15 MIN: HCPCS

## 2021-02-27 PROCEDURE — 3331090002 HH PPS REVENUE DEBIT

## 2021-02-28 ENCOUNTER — HOME CARE VISIT (OUTPATIENT)
Dept: SCHEDULING | Facility: HOME HEALTH | Age: 86
End: 2021-02-28
Payer: MEDICARE

## 2021-02-28 VITALS
HEART RATE: 84 BPM | DIASTOLIC BLOOD PRESSURE: 83 MMHG | SYSTOLIC BLOOD PRESSURE: 128 MMHG | OXYGEN SATURATION: 95 % | RESPIRATION RATE: 18 BRPM | TEMPERATURE: 98.2 F

## 2021-02-28 PROCEDURE — G0299 HHS/HOSPICE OF RN EA 15 MIN: HCPCS

## 2021-02-28 PROCEDURE — 3331090001 HH PPS REVENUE CREDIT

## 2021-02-28 PROCEDURE — 3331090002 HH PPS REVENUE DEBIT

## 2021-03-01 ENCOUNTER — HOME CARE VISIT (OUTPATIENT)
Dept: SCHEDULING | Facility: HOME HEALTH | Age: 86
End: 2021-03-01
Payer: MEDICARE

## 2021-03-01 VITALS
RESPIRATION RATE: 18 BRPM | HEART RATE: 82 BPM | DIASTOLIC BLOOD PRESSURE: 70 MMHG | OXYGEN SATURATION: 99 % | TEMPERATURE: 97 F | SYSTOLIC BLOOD PRESSURE: 120 MMHG

## 2021-03-01 VITALS
SYSTOLIC BLOOD PRESSURE: 116 MMHG | DIASTOLIC BLOOD PRESSURE: 64 MMHG | RESPIRATION RATE: 18 BRPM | OXYGEN SATURATION: 97 % | HEART RATE: 62 BPM | TEMPERATURE: 98.2 F

## 2021-03-01 PROCEDURE — 3331090002 HH PPS REVENUE DEBIT

## 2021-03-01 PROCEDURE — 3331090001 HH PPS REVENUE CREDIT

## 2021-03-01 PROCEDURE — G0299 HHS/HOSPICE OF RN EA 15 MIN: HCPCS

## 2021-03-01 PROCEDURE — G0156 HHCP-SVS OF AIDE,EA 15 MIN: HCPCS

## 2021-03-02 ENCOUNTER — HOME CARE VISIT (OUTPATIENT)
Dept: SCHEDULING | Facility: HOME HEALTH | Age: 86
End: 2021-03-02
Payer: MEDICARE

## 2021-03-02 VITALS
OXYGEN SATURATION: 98 % | HEART RATE: 80 BPM | SYSTOLIC BLOOD PRESSURE: 114 MMHG | DIASTOLIC BLOOD PRESSURE: 60 MMHG | TEMPERATURE: 98.4 F | RESPIRATION RATE: 17 BRPM

## 2021-03-02 VITALS
RESPIRATION RATE: 18 BRPM | SYSTOLIC BLOOD PRESSURE: 130 MMHG | HEART RATE: 78 BPM | DIASTOLIC BLOOD PRESSURE: 72 MMHG | TEMPERATURE: 98 F

## 2021-03-02 PROCEDURE — 3331090001 HH PPS REVENUE CREDIT

## 2021-03-02 PROCEDURE — G0157 HHC PT ASSISTANT EA 15: HCPCS

## 2021-03-02 PROCEDURE — G0155 HHCP-SVS OF CSW,EA 15 MIN: HCPCS

## 2021-03-02 PROCEDURE — 3331090002 HH PPS REVENUE DEBIT

## 2021-03-03 ENCOUNTER — HOME CARE VISIT (OUTPATIENT)
Dept: SCHEDULING | Facility: HOME HEALTH | Age: 86
End: 2021-03-03
Payer: MEDICARE

## 2021-03-03 VITALS
HEART RATE: 62 BPM | SYSTOLIC BLOOD PRESSURE: 102 MMHG | TEMPERATURE: 97.8 F | OXYGEN SATURATION: 95 % | DIASTOLIC BLOOD PRESSURE: 62 MMHG | RESPIRATION RATE: 18 BRPM

## 2021-03-03 PROCEDURE — G0299 HHS/HOSPICE OF RN EA 15 MIN: HCPCS

## 2021-03-03 PROCEDURE — 3331090002 HH PPS REVENUE DEBIT

## 2021-03-03 PROCEDURE — 3331090001 HH PPS REVENUE CREDIT

## 2021-03-04 ENCOUNTER — HOME CARE VISIT (OUTPATIENT)
Dept: SCHEDULING | Facility: HOME HEALTH | Age: 86
End: 2021-03-04
Payer: MEDICARE

## 2021-03-04 VITALS
TEMPERATURE: 98.1 F | OXYGEN SATURATION: 93 % | RESPIRATION RATE: 16 BRPM | DIASTOLIC BLOOD PRESSURE: 60 MMHG | HEART RATE: 89 BPM | SYSTOLIC BLOOD PRESSURE: 101 MMHG

## 2021-03-04 VITALS
SYSTOLIC BLOOD PRESSURE: 101 MMHG | DIASTOLIC BLOOD PRESSURE: 62 MMHG | HEART RATE: 89 BPM | OXYGEN SATURATION: 98 % | TEMPERATURE: 98.1 F | RESPIRATION RATE: 16 BRPM

## 2021-03-04 PROCEDURE — 3331090001 HH PPS REVENUE CREDIT

## 2021-03-04 PROCEDURE — 3331090002 HH PPS REVENUE DEBIT

## 2021-03-04 PROCEDURE — G0157 HHC PT ASSISTANT EA 15: HCPCS

## 2021-03-04 PROCEDURE — G0158 HHC OT ASSISTANT EA 15: HCPCS

## 2021-03-05 ENCOUNTER — HOME CARE VISIT (OUTPATIENT)
Dept: SCHEDULING | Facility: HOME HEALTH | Age: 86
End: 2021-03-05
Payer: MEDICARE

## 2021-03-05 VITALS
OXYGEN SATURATION: 98 % | SYSTOLIC BLOOD PRESSURE: 101 MMHG | HEART RATE: 81 BPM | TEMPERATURE: 97.5 F | DIASTOLIC BLOOD PRESSURE: 60 MMHG | RESPIRATION RATE: 17 BRPM

## 2021-03-05 VITALS
SYSTOLIC BLOOD PRESSURE: 110 MMHG | DIASTOLIC BLOOD PRESSURE: 60 MMHG | HEART RATE: 67 BPM | RESPIRATION RATE: 18 BRPM | OXYGEN SATURATION: 99 % | TEMPERATURE: 98.1 F

## 2021-03-05 PROCEDURE — G0158 HHC OT ASSISTANT EA 15: HCPCS

## 2021-03-05 PROCEDURE — 3331090001 HH PPS REVENUE CREDIT

## 2021-03-05 PROCEDURE — G0299 HHS/HOSPICE OF RN EA 15 MIN: HCPCS

## 2021-03-05 PROCEDURE — 3331090002 HH PPS REVENUE DEBIT

## 2021-03-06 PROCEDURE — 3331090002 HH PPS REVENUE DEBIT

## 2021-03-06 PROCEDURE — 3331090001 HH PPS REVENUE CREDIT

## 2021-03-07 PROCEDURE — 3331090001 HH PPS REVENUE CREDIT

## 2021-03-07 PROCEDURE — 3331090002 HH PPS REVENUE DEBIT

## 2021-03-08 ENCOUNTER — HOME CARE VISIT (OUTPATIENT)
Dept: SCHEDULING | Facility: HOME HEALTH | Age: 86
End: 2021-03-08
Payer: MEDICARE

## 2021-03-08 VITALS
OXYGEN SATURATION: 94 % | TEMPERATURE: 98.4 F | HEART RATE: 70 BPM | RESPIRATION RATE: 16 BRPM | DIASTOLIC BLOOD PRESSURE: 68 MMHG | SYSTOLIC BLOOD PRESSURE: 110 MMHG

## 2021-03-08 PROCEDURE — G0155 HHCP-SVS OF CSW,EA 15 MIN: HCPCS

## 2021-03-08 PROCEDURE — 3331090001 HH PPS REVENUE CREDIT

## 2021-03-08 PROCEDURE — 3331090002 HH PPS REVENUE DEBIT

## 2021-03-08 PROCEDURE — G0299 HHS/HOSPICE OF RN EA 15 MIN: HCPCS

## 2021-03-09 ENCOUNTER — HOME CARE VISIT (OUTPATIENT)
Dept: SCHEDULING | Facility: HOME HEALTH | Age: 86
End: 2021-03-09
Payer: MEDICARE

## 2021-03-09 VITALS
HEART RATE: 69 BPM | DIASTOLIC BLOOD PRESSURE: 72 MMHG | SYSTOLIC BLOOD PRESSURE: 118 MMHG | TEMPERATURE: 98.4 F | RESPIRATION RATE: 16 BRPM | OXYGEN SATURATION: 97 %

## 2021-03-09 VITALS
SYSTOLIC BLOOD PRESSURE: 118 MMHG | TEMPERATURE: 98.4 F | DIASTOLIC BLOOD PRESSURE: 72 MMHG | HEART RATE: 68 BPM | RESPIRATION RATE: 18 BRPM

## 2021-03-09 VITALS
SYSTOLIC BLOOD PRESSURE: 118 MMHG | TEMPERATURE: 98.4 F | DIASTOLIC BLOOD PRESSURE: 72 MMHG | HEART RATE: 69 BPM | RESPIRATION RATE: 17 BRPM | OXYGEN SATURATION: 96 %

## 2021-03-09 PROCEDURE — 3331090001 HH PPS REVENUE CREDIT

## 2021-03-09 PROCEDURE — 3331090002 HH PPS REVENUE DEBIT

## 2021-03-09 PROCEDURE — G0157 HHC PT ASSISTANT EA 15: HCPCS

## 2021-03-09 PROCEDURE — G0156 HHCP-SVS OF AIDE,EA 15 MIN: HCPCS

## 2021-03-09 PROCEDURE — G0158 HHC OT ASSISTANT EA 15: HCPCS

## 2021-03-10 ENCOUNTER — HOME CARE VISIT (OUTPATIENT)
Dept: SCHEDULING | Facility: HOME HEALTH | Age: 86
End: 2021-03-10
Payer: MEDICARE

## 2021-03-10 VITALS
TEMPERATURE: 98.5 F | DIASTOLIC BLOOD PRESSURE: 62 MMHG | SYSTOLIC BLOOD PRESSURE: 98 MMHG | RESPIRATION RATE: 16 BRPM | OXYGEN SATURATION: 94 % | HEART RATE: 80 BPM

## 2021-03-10 PROCEDURE — G0299 HHS/HOSPICE OF RN EA 15 MIN: HCPCS

## 2021-03-10 PROCEDURE — 3331090001 HH PPS REVENUE CREDIT

## 2021-03-10 PROCEDURE — 3331090002 HH PPS REVENUE DEBIT

## 2021-03-11 ENCOUNTER — HOME CARE VISIT (OUTPATIENT)
Dept: SCHEDULING | Facility: HOME HEALTH | Age: 86
End: 2021-03-11
Payer: MEDICARE

## 2021-03-11 VITALS
TEMPERATURE: 98.4 F | DIASTOLIC BLOOD PRESSURE: 67 MMHG | SYSTOLIC BLOOD PRESSURE: 111 MMHG | RESPIRATION RATE: 17 BRPM | HEART RATE: 67 BPM | OXYGEN SATURATION: 98 %

## 2021-03-11 VITALS
SYSTOLIC BLOOD PRESSURE: 120 MMHG | HEART RATE: 66 BPM | RESPIRATION RATE: 18 BRPM | DIASTOLIC BLOOD PRESSURE: 72 MMHG | TEMPERATURE: 98.4 F

## 2021-03-11 PROCEDURE — G0156 HHCP-SVS OF AIDE,EA 15 MIN: HCPCS

## 2021-03-11 PROCEDURE — G0158 HHC OT ASSISTANT EA 15: HCPCS

## 2021-03-11 PROCEDURE — 3331090001 HH PPS REVENUE CREDIT

## 2021-03-11 PROCEDURE — 3331090002 HH PPS REVENUE DEBIT

## 2021-03-12 ENCOUNTER — HOME CARE VISIT (OUTPATIENT)
Dept: SCHEDULING | Facility: HOME HEALTH | Age: 86
End: 2021-03-12
Payer: MEDICARE

## 2021-03-12 VITALS
RESPIRATION RATE: 16 BRPM | HEART RATE: 72 BPM | TEMPERATURE: 98.7 F | OXYGEN SATURATION: 98 % | DIASTOLIC BLOOD PRESSURE: 62 MMHG | SYSTOLIC BLOOD PRESSURE: 108 MMHG

## 2021-03-12 PROCEDURE — 3331090001 HH PPS REVENUE CREDIT

## 2021-03-12 PROCEDURE — G0157 HHC PT ASSISTANT EA 15: HCPCS

## 2021-03-12 PROCEDURE — 3331090002 HH PPS REVENUE DEBIT

## 2021-03-13 PROCEDURE — 3331090002 HH PPS REVENUE DEBIT

## 2021-03-13 PROCEDURE — 3331090001 HH PPS REVENUE CREDIT

## 2021-03-14 PROCEDURE — 3331090002 HH PPS REVENUE DEBIT

## 2021-03-14 PROCEDURE — 3331090001 HH PPS REVENUE CREDIT

## 2021-03-15 ENCOUNTER — HOME CARE VISIT (OUTPATIENT)
Dept: SCHEDULING | Facility: HOME HEALTH | Age: 86
End: 2021-03-15
Payer: MEDICARE

## 2021-03-15 VITALS
RESPIRATION RATE: 15 BRPM | HEART RATE: 64 BPM | SYSTOLIC BLOOD PRESSURE: 114 MMHG | OXYGEN SATURATION: 98 % | TEMPERATURE: 98.1 F | DIASTOLIC BLOOD PRESSURE: 70 MMHG

## 2021-03-15 VITALS
SYSTOLIC BLOOD PRESSURE: 114 MMHG | HEART RATE: 64 BPM | TEMPERATURE: 98.3 F | RESPIRATION RATE: 17 BRPM | OXYGEN SATURATION: 98 % | DIASTOLIC BLOOD PRESSURE: 70 MMHG

## 2021-03-15 VITALS
DIASTOLIC BLOOD PRESSURE: 72 MMHG | TEMPERATURE: 97.7 F | SYSTOLIC BLOOD PRESSURE: 120 MMHG | HEART RATE: 66 BPM | RESPIRATION RATE: 18 BRPM

## 2021-03-15 PROCEDURE — G0157 HHC PT ASSISTANT EA 15: HCPCS

## 2021-03-15 PROCEDURE — 3331090002 HH PPS REVENUE DEBIT

## 2021-03-15 PROCEDURE — G0156 HHCP-SVS OF AIDE,EA 15 MIN: HCPCS

## 2021-03-15 PROCEDURE — 3331090001 HH PPS REVENUE CREDIT

## 2021-03-15 PROCEDURE — G0158 HHC OT ASSISTANT EA 15: HCPCS

## 2021-03-16 ENCOUNTER — HOME CARE VISIT (OUTPATIENT)
Dept: SCHEDULING | Facility: HOME HEALTH | Age: 86
End: 2021-03-16
Payer: MEDICARE

## 2021-03-16 VITALS
SYSTOLIC BLOOD PRESSURE: 104 MMHG | HEART RATE: 67 BPM | RESPIRATION RATE: 18 BRPM | TEMPERATURE: 98.2 F | OXYGEN SATURATION: 98 % | DIASTOLIC BLOOD PRESSURE: 64 MMHG

## 2021-03-16 PROCEDURE — 3331090001 HH PPS REVENUE CREDIT

## 2021-03-16 PROCEDURE — 3331090002 HH PPS REVENUE DEBIT

## 2021-03-16 PROCEDURE — G0299 HHS/HOSPICE OF RN EA 15 MIN: HCPCS

## 2021-03-17 ENCOUNTER — HOME CARE VISIT (OUTPATIENT)
Dept: SCHEDULING | Facility: HOME HEALTH | Age: 86
End: 2021-03-17
Payer: MEDICARE

## 2021-03-17 VITALS
TEMPERATURE: 98.8 F | DIASTOLIC BLOOD PRESSURE: 70 MMHG | SYSTOLIC BLOOD PRESSURE: 120 MMHG | RESPIRATION RATE: 18 BRPM | HEART RATE: 68 BPM

## 2021-03-17 VITALS
SYSTOLIC BLOOD PRESSURE: 120 MMHG | HEART RATE: 68 BPM | RESPIRATION RATE: 18 BRPM | OXYGEN SATURATION: 98 % | TEMPERATURE: 98.8 F | DIASTOLIC BLOOD PRESSURE: 70 MMHG

## 2021-03-17 VITALS
RESPIRATION RATE: 16 BRPM | TEMPERATURE: 98.6 F | SYSTOLIC BLOOD PRESSURE: 106 MMHG | DIASTOLIC BLOOD PRESSURE: 63 MMHG | HEART RATE: 67 BPM | OXYGEN SATURATION: 99 %

## 2021-03-17 PROCEDURE — 3331090001 HH PPS REVENUE CREDIT

## 2021-03-17 PROCEDURE — G0156 HHCP-SVS OF AIDE,EA 15 MIN: HCPCS

## 2021-03-17 PROCEDURE — G0158 HHC OT ASSISTANT EA 15: HCPCS

## 2021-03-17 PROCEDURE — G0157 HHC PT ASSISTANT EA 15: HCPCS

## 2021-03-17 PROCEDURE — 3331090002 HH PPS REVENUE DEBIT

## 2021-03-18 PROCEDURE — 3331090001 HH PPS REVENUE CREDIT

## 2021-03-18 PROCEDURE — 3331090002 HH PPS REVENUE DEBIT

## 2021-03-19 PROCEDURE — 3331090001 HH PPS REVENUE CREDIT

## 2021-03-19 PROCEDURE — 3331090002 HH PPS REVENUE DEBIT

## 2021-03-20 PROCEDURE — 3331090001 HH PPS REVENUE CREDIT

## 2021-03-20 PROCEDURE — 3331090002 HH PPS REVENUE DEBIT

## 2021-03-21 PROCEDURE — 3331090001 HH PPS REVENUE CREDIT

## 2021-03-21 PROCEDURE — 3331090002 HH PPS REVENUE DEBIT

## 2021-03-22 ENCOUNTER — HOME CARE VISIT (OUTPATIENT)
Dept: SCHEDULING | Facility: HOME HEALTH | Age: 86
End: 2021-03-22
Payer: MEDICARE

## 2021-03-22 PROCEDURE — G0155 HHCP-SVS OF CSW,EA 15 MIN: HCPCS

## 2021-03-22 PROCEDURE — 3331090001 HH PPS REVENUE CREDIT

## 2021-03-22 PROCEDURE — 3331090002 HH PPS REVENUE DEBIT

## 2021-03-23 ENCOUNTER — HOME CARE VISIT (OUTPATIENT)
Dept: SCHEDULING | Facility: HOME HEALTH | Age: 86
End: 2021-03-23
Payer: MEDICARE

## 2021-03-23 VITALS — SYSTOLIC BLOOD PRESSURE: 116 MMHG | RESPIRATION RATE: 16 BRPM | DIASTOLIC BLOOD PRESSURE: 68 MMHG

## 2021-03-23 VITALS
HEART RATE: 77 BPM | SYSTOLIC BLOOD PRESSURE: 131 MMHG | DIASTOLIC BLOOD PRESSURE: 75 MMHG | RESPIRATION RATE: 16 BRPM | TEMPERATURE: 97 F | OXYGEN SATURATION: 97 %

## 2021-03-23 PROCEDURE — G0157 HHC PT ASSISTANT EA 15: HCPCS

## 2021-03-23 PROCEDURE — 3331090001 HH PPS REVENUE CREDIT

## 2021-03-23 PROCEDURE — 3331090002 HH PPS REVENUE DEBIT

## 2021-03-23 PROCEDURE — G0152 HHCP-SERV OF OT,EA 15 MIN: HCPCS

## 2021-03-24 ENCOUNTER — HOME CARE VISIT (OUTPATIENT)
Dept: SCHEDULING | Facility: HOME HEALTH | Age: 86
End: 2021-03-24
Payer: MEDICARE

## 2021-03-24 VITALS
DIASTOLIC BLOOD PRESSURE: 78 MMHG | TEMPERATURE: 97.8 F | RESPIRATION RATE: 17 BRPM | OXYGEN SATURATION: 98 % | SYSTOLIC BLOOD PRESSURE: 118 MMHG | HEART RATE: 80 BPM

## 2021-03-24 PROCEDURE — A6213 FOAM DRG >16<=48 SQ IN W/BDR: HCPCS

## 2021-03-24 PROCEDURE — 3331090001 HH PPS REVENUE CREDIT

## 2021-03-24 PROCEDURE — G0299 HHS/HOSPICE OF RN EA 15 MIN: HCPCS

## 2021-03-24 PROCEDURE — A6212 FOAM DRG <=16 SQ IN W/BORDER: HCPCS

## 2021-03-24 PROCEDURE — 3331090002 HH PPS REVENUE DEBIT

## 2021-03-25 ENCOUNTER — HOME CARE VISIT (OUTPATIENT)
Dept: SCHEDULING | Facility: HOME HEALTH | Age: 86
End: 2021-03-25
Payer: MEDICARE

## 2021-03-25 VITALS
SYSTOLIC BLOOD PRESSURE: 122 MMHG | OXYGEN SATURATION: 97 % | TEMPERATURE: 97.4 F | RESPIRATION RATE: 18 BRPM | DIASTOLIC BLOOD PRESSURE: 70 MMHG | HEART RATE: 66 BPM

## 2021-03-25 PROCEDURE — G0151 HHCP-SERV OF PT,EA 15 MIN: HCPCS

## 2021-03-25 PROCEDURE — 3331090002 HH PPS REVENUE DEBIT

## 2021-03-25 PROCEDURE — 3331090001 HH PPS REVENUE CREDIT

## 2021-03-26 PROCEDURE — 3331090001 HH PPS REVENUE CREDIT

## 2021-03-26 PROCEDURE — 3331090002 HH PPS REVENUE DEBIT

## 2021-03-27 PROCEDURE — 3331090002 HH PPS REVENUE DEBIT

## 2021-03-27 PROCEDURE — 3331090003 HH PPS REVENUE ADJ

## 2021-03-27 PROCEDURE — 3331090001 HH PPS REVENUE CREDIT

## 2021-03-28 PROCEDURE — 400018 HH-NO PAY CLAIM PROCEDURE

## 2021-03-28 PROCEDURE — 3331090001 HH PPS REVENUE CREDIT

## 2021-03-28 PROCEDURE — 3331090002 HH PPS REVENUE DEBIT

## 2021-03-29 PROCEDURE — 3331090001 HH PPS REVENUE CREDIT

## 2021-03-29 PROCEDURE — 3331090002 HH PPS REVENUE DEBIT

## 2021-03-30 PROCEDURE — 3331090002 HH PPS REVENUE DEBIT

## 2021-03-30 PROCEDURE — 3331090001 HH PPS REVENUE CREDIT

## 2021-03-31 ENCOUNTER — HOME CARE VISIT (OUTPATIENT)
Dept: SCHEDULING | Facility: HOME HEALTH | Age: 86
End: 2021-03-31
Payer: MEDICARE

## 2021-03-31 VITALS
TEMPERATURE: 97.7 F | RESPIRATION RATE: 15 BRPM | DIASTOLIC BLOOD PRESSURE: 70 MMHG | OXYGEN SATURATION: 98 % | HEART RATE: 60 BPM | SYSTOLIC BLOOD PRESSURE: 110 MMHG

## 2021-03-31 PROCEDURE — 3331090001 HH PPS REVENUE CREDIT

## 2021-03-31 PROCEDURE — 3331090002 HH PPS REVENUE DEBIT

## 2021-03-31 PROCEDURE — G0157 HHC PT ASSISTANT EA 15: HCPCS

## 2021-03-31 PROCEDURE — 400013 HH SOC

## 2021-04-01 PROCEDURE — 3331090002 HH PPS REVENUE DEBIT

## 2021-04-01 PROCEDURE — 3331090001 HH PPS REVENUE CREDIT

## 2021-04-02 ENCOUNTER — HOME CARE VISIT (OUTPATIENT)
Dept: SCHEDULING | Facility: HOME HEALTH | Age: 86
End: 2021-04-02
Payer: MEDICARE

## 2021-04-02 VITALS
TEMPERATURE: 97.1 F | DIASTOLIC BLOOD PRESSURE: 80 MMHG | HEART RATE: 87 BPM | OXYGEN SATURATION: 99 % | SYSTOLIC BLOOD PRESSURE: 100 MMHG | RESPIRATION RATE: 20 BRPM

## 2021-04-02 PROCEDURE — 3331090002 HH PPS REVENUE DEBIT

## 2021-04-02 PROCEDURE — 3331090001 HH PPS REVENUE CREDIT

## 2021-04-02 PROCEDURE — G0157 HHC PT ASSISTANT EA 15: HCPCS

## 2021-04-03 PROCEDURE — 3331090001 HH PPS REVENUE CREDIT

## 2021-04-03 PROCEDURE — 3331090002 HH PPS REVENUE DEBIT

## 2021-04-04 PROCEDURE — 3331090002 HH PPS REVENUE DEBIT

## 2021-04-04 PROCEDURE — 3331090001 HH PPS REVENUE CREDIT

## 2021-04-05 PROCEDURE — 3331090001 HH PPS REVENUE CREDIT

## 2021-04-05 PROCEDURE — 3331090002 HH PPS REVENUE DEBIT

## 2021-04-06 ENCOUNTER — HOME CARE VISIT (OUTPATIENT)
Dept: SCHEDULING | Facility: HOME HEALTH | Age: 86
End: 2021-04-06
Payer: MEDICARE

## 2021-04-06 VITALS
HEART RATE: 64 BPM | DIASTOLIC BLOOD PRESSURE: 60 MMHG | RESPIRATION RATE: 16 BRPM | TEMPERATURE: 97 F | OXYGEN SATURATION: 99 % | SYSTOLIC BLOOD PRESSURE: 106 MMHG

## 2021-04-06 PROCEDURE — 3331090001 HH PPS REVENUE CREDIT

## 2021-04-06 PROCEDURE — G0157 HHC PT ASSISTANT EA 15: HCPCS

## 2021-04-06 PROCEDURE — 3331090002 HH PPS REVENUE DEBIT

## 2021-04-07 PROCEDURE — 3331090001 HH PPS REVENUE CREDIT

## 2021-04-07 PROCEDURE — 3331090002 HH PPS REVENUE DEBIT

## 2021-04-08 ENCOUNTER — HOME CARE VISIT (OUTPATIENT)
Dept: SCHEDULING | Facility: HOME HEALTH | Age: 86
End: 2021-04-08
Payer: MEDICARE

## 2021-04-08 VITALS
SYSTOLIC BLOOD PRESSURE: 138 MMHG | RESPIRATION RATE: 17 BRPM | OXYGEN SATURATION: 98 % | HEART RATE: 60 BPM | DIASTOLIC BLOOD PRESSURE: 70 MMHG | TEMPERATURE: 98.1 F

## 2021-04-08 PROCEDURE — 3331090001 HH PPS REVENUE CREDIT

## 2021-04-08 PROCEDURE — G0157 HHC PT ASSISTANT EA 15: HCPCS

## 2021-04-08 PROCEDURE — 3331090002 HH PPS REVENUE DEBIT

## 2021-04-09 PROCEDURE — 3331090001 HH PPS REVENUE CREDIT

## 2021-04-09 PROCEDURE — 3331090002 HH PPS REVENUE DEBIT

## 2021-04-10 PROCEDURE — 3331090001 HH PPS REVENUE CREDIT

## 2021-04-10 PROCEDURE — 3331090002 HH PPS REVENUE DEBIT

## 2021-04-11 PROCEDURE — 3331090002 HH PPS REVENUE DEBIT

## 2021-04-11 PROCEDURE — 3331090001 HH PPS REVENUE CREDIT

## 2021-04-12 PROCEDURE — 3331090001 HH PPS REVENUE CREDIT

## 2021-04-12 PROCEDURE — 3331090002 HH PPS REVENUE DEBIT

## 2021-04-13 ENCOUNTER — HOME CARE VISIT (OUTPATIENT)
Dept: SCHEDULING | Facility: HOME HEALTH | Age: 86
End: 2021-04-13
Payer: MEDICARE

## 2021-04-13 VITALS
RESPIRATION RATE: 15 BRPM | DIASTOLIC BLOOD PRESSURE: 65 MMHG | TEMPERATURE: 97.1 F | OXYGEN SATURATION: 100 % | SYSTOLIC BLOOD PRESSURE: 119 MMHG | HEART RATE: 73 BPM

## 2021-04-13 PROCEDURE — G0157 HHC PT ASSISTANT EA 15: HCPCS

## 2021-04-13 PROCEDURE — 3331090002 HH PPS REVENUE DEBIT

## 2021-04-13 PROCEDURE — 3331090001 HH PPS REVENUE CREDIT

## 2021-04-13 NOTE — PROGRESS NOTES
PTA discussed COG/ERIN and the pt ensuring that he only ambulates when he has energy. Pt's spouse insisted that the pt ambulate today, and this nearly caused a LOB. If PTA had not been present for this walk, it likely would have resulted in a fall. Pt verbalized understanding. PTA discussed further with the pt's spouse that she may be pressing a little too hard based on the fact that the pt has been up since 4:00 am dealing with diarrhea related to taking laxatives due to constipation. PTA reinforced with the spouse that she should not press too hard. Pt's spouse verbalized understanding.

## 2021-04-14 PROCEDURE — 3331090001 HH PPS REVENUE CREDIT

## 2021-04-14 PROCEDURE — 3331090002 HH PPS REVENUE DEBIT

## 2021-04-15 ENCOUNTER — HOME CARE VISIT (OUTPATIENT)
Dept: SCHEDULING | Facility: HOME HEALTH | Age: 86
End: 2021-04-15
Payer: MEDICARE

## 2021-04-15 VITALS
TEMPERATURE: 98.3 F | RESPIRATION RATE: 17 BRPM | DIASTOLIC BLOOD PRESSURE: 68 MMHG | SYSTOLIC BLOOD PRESSURE: 128 MMHG | HEART RATE: 63 BPM | OXYGEN SATURATION: 95 %

## 2021-04-15 PROCEDURE — G0151 HHCP-SERV OF PT,EA 15 MIN: HCPCS

## 2021-04-15 PROCEDURE — 3331090002 HH PPS REVENUE DEBIT

## 2021-04-15 PROCEDURE — 3331090001 HH PPS REVENUE CREDIT

## 2021-09-27 PROBLEM — R06.09 DYSPNEA ON EXERTION: Status: ACTIVE | Noted: 2021-09-27

## 2021-09-27 PROBLEM — E11.9 TYPE 2 DIABETES MELLITUS, WITH LONG-TERM CURRENT USE OF INSULIN (HCC): Status: RESOLVED | Noted: 2021-01-07 | Resolved: 2021-09-27

## 2021-09-27 PROBLEM — R09.02 HYPOXIA: Status: ACTIVE | Noted: 2021-09-27

## 2021-09-27 PROBLEM — R00.1 BRADYCARDIA: Status: ACTIVE | Noted: 2021-09-27

## 2021-09-27 PROBLEM — I44.0 1ST DEGREE AV BLOCK: Status: ACTIVE | Noted: 2021-09-27

## 2021-09-27 PROBLEM — Z79.4 TYPE 2 DIABETES MELLITUS, WITH LONG-TERM CURRENT USE OF INSULIN (HCC): Status: RESOLVED | Noted: 2021-01-07 | Resolved: 2021-09-27

## 2021-09-27 PROBLEM — E78.2 MIXED HYPERLIPIDEMIA: Status: ACTIVE | Noted: 2021-09-27

## 2021-12-06 ENCOUNTER — HOSPITAL ENCOUNTER (EMERGENCY)
Age: 86
Discharge: HOME | End: 2021-12-06
Attending: EMERGENCY MEDICINE
Payer: MEDICARE

## 2021-12-06 ENCOUNTER — APPOINTMENT (OUTPATIENT)
Dept: CT IMAGING | Age: 86
End: 2021-12-06
Attending: EMERGENCY MEDICINE
Payer: MEDICARE

## 2021-12-06 VITALS
HEART RATE: 73 BPM | WEIGHT: 121 LBS | HEIGHT: 67 IN | BODY MASS INDEX: 18.99 KG/M2 | OXYGEN SATURATION: 97 % | SYSTOLIC BLOOD PRESSURE: 192 MMHG | TEMPERATURE: 98.6 F | RESPIRATION RATE: 16 BRPM | DIASTOLIC BLOOD PRESSURE: 96 MMHG

## 2021-12-06 DIAGNOSIS — S00.83XA CONTUSION OF FOREHEAD, INITIAL ENCOUNTER: Primary | ICD-10-CM

## 2021-12-06 DIAGNOSIS — G96.08 SUBDURAL HYGROMA: ICD-10-CM

## 2021-12-06 PROBLEM — I10 ESSENTIAL HYPERTENSION, BENIGN: Status: ACTIVE | Noted: 2021-12-06

## 2021-12-06 PROBLEM — I48.92 ATRIAL FLUTTER (HCC): Status: ACTIVE | Noted: 2021-12-06

## 2021-12-06 PROCEDURE — 70450 CT HEAD/BRAIN W/O DYE: CPT

## 2021-12-06 PROCEDURE — 99284 EMERGENCY DEPT VISIT MOD MDM: CPT

## 2021-12-07 NOTE — ED NOTES
I have reviewed discharge instructions with the patient. The patient verbalized understanding. Patient left ED via Discharge Method: stretcher to Home with [facility]. Opportunity for questions and clarification provided. Patient given 0 scripts. To continue your aftercare when you leave the hospital, you may receive an automated call from our care team to check in on how you are doing. This is a free service and part of our promise to provide the best care and service to meet your aftercare needs.  If you have questions, or wish to unsubscribe from this service please call [phone]. Thank you for Choosing our [facility] Emergency Department.

## 2021-12-07 NOTE — DISCHARGE INSTRUCTIONS
As we discussed, it is important for you to get a repeat CT scan of your head in 7 to 10 days. Please either return to the emergency department to do that or have the staff at her living facility make arrangements for an outpatient scan. Return to the ER sooner with any confusion, vomiting, worsening symptoms, or additional concerns.

## 2021-12-07 NOTE — ED PROVIDER NOTES
75-year-old gentleman presents with concerns about falling. He says his right knee just gave out on him and he fell and hit his knee and then his head. He denies any loss of consciousness. He says he has no neck pain. He denies any weakness, numbness, or tingling. He denies any other injuries. Elements of this note were created using speech recognition software. As such, errors of speech recognition may be present. Past Medical History:   Diagnosis Date    Acquired hallux valgus of left foot     Arthritis     OA- shoulders, knees, toes    CAD (coronary artery disease)     MI in 1980, no c/o - no stents or surgery    Diabetes ([facility] [address]     Type 2 Insulin Dep-Flexpen (started 2010), average AM sugar -   today in -   hypo- < 80    High cholesterol     takes Crestor    Nausea & vomiting     with anesthesia- none with previous 2 foot surgeries    TIA 2010    temporarily lost sight in RIGHT eye, lost hearing RIGHT ear, denies deficits at this time, \"everything is back to normal now\"    Tinnitus     Unspecified adverse effect of anesthesia     difficulty waking up- stays very sleepy- pt states \"Easy to put out\"    Unspecified sleep apnea     no C-PAP use. Past Surgical History:   Procedure Laterality Date    FOOT/TOES SURGERY PROC UNLISTED      LEFT (for hammer toe, bunion)    HX CATARACT REMOVAL  2010    LEFT, denies implant- wife states iol    HX MOHS PROCEDURES  1990's    Rt.      HX ORTHOPAEDIC  2011    plate in left foot and then repair of broken plate (2 surg)    HX OTHER SURGICAL  1980s    michael under the chin     HX OTHER SURGICAL      had nose surgery for rosacea    HX TONSILLECTOMY  as child    T&A         Family History:   Problem Relation Age of Onset   Rahman Arthritis-rheumatoid Mother     Arthritis-rheumatoid Father     Malignant Hyperthermia Neg Hx     Pseudocholinesterase Deficiency Neg Hx     Delayed Awakening Neg Hx     Post-op Nausea/Vomiting Neg Hx  Emergence Delirium Neg Hx     Post-op Cognitive Dysfunction Neg Hx     Other Neg Hx        Social History     Socioeconomic History    Marital status:      Spouse name: Not on file    Number of children: Not on file    Years of education: Not on file    Highest education level: Not on file   Occupational History    Not on file   Tobacco Use    Smoking status: Former Smoker     Packs/day: 1.00     Years: 25.00     Pack years: 25.00     Quit date: 2/10/1956     Years since quittin.8    Smokeless tobacco: Former User   Substance and Sexual Activity    Alcohol use: No    Drug use: No    Sexual activity: Not on file   Other Topics Concern    Not on file   Social History Narrative    Not on file     Social Determinants of Health     Financial Resource Strain:     Difficulty of Paying Living Expenses: Not on file   Food Insecurity:     Worried About [address] in the Last Year: Not on file    [address] [employer] in the Last Year: Not on file   Transportation Needs:     Lack of Transportation (Medical): Not on file    Lack of Transportation (Non-Medical):  Not on file   Physical Activity:     Days of Exercise per Week: Not on file    Minutes of Exercise per Session: Not on file   Stress:     Feeling of Stress : Not on file   Social Connections:     Frequency of Communication with Friends and Family: Not on file    Frequency of Social Gatherings with Friends and Family: Not on file    Attends Cheondo[employer] Services: Not on file    Active Member of Clubs or Organizations: Not on file    Attends Club or Organization Meetings: Not on file    Marital Status: Not on file   Intimate Partner Violence:     Fear of Current or Ex-Partner: Not on file    Emotionally Abused: Not on file    Physically Abused: Not on file    Sexually Abused: Not on file   Housing Stability:     Unable to Pay for Housing in the Last Year: Not on file    Number of [place] in the Last Year: Not on file  Unstable Housing in the Last Year: Not on file         ALLERGIES: Betadine [povidone-iodine]    Review of Systems   Constitutional: Negative for chills and fever. Musculoskeletal: Negative for arthralgias, gait problem, joint swelling and myalgias. Skin: Positive for wound. Negative for color change. Neurological: Negative for dizziness and headaches. Vitals:    12/06/21 1949   BP: (!) 192/96   Pulse: 73   Resp: 16   Temp: 98.6 °F (37 °C)   SpO2: 97%   Weight: 54.9 kg (121 lb)   Height: 5' 7\" (1.702 m)            Physical Exam  Vitals and nursing note reviewed. Constitutional:       Appearance: Normal appearance. HENT:      Head:      Comments: Contusion above his right eyebrow with a small abrasion  Musculoskeletal:      Comments: Minor abrasion on his right knee. No deformity. Full range of motion in all 4 extremities. Neurological:      General: No focal deficit present. Mental Status: He is alert and oriented to person, place, and time. MDM  Number of Diagnoses or Management Options  Diagnosis management comments: I will get a CT scan of his head to look for occult intracranial abnormality. ED Course as of 12/06/21 2055   Mon Dec 06, 2021   2032 I will ask neurosurgery to review the CT. 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 I spoke with Dr. Corry Ledesma from neurosurgery who advised follow-up head CT scan in 1 to 2 weeks.  [AC]      ED Course User Index  [AC] Eleuterio Steinberg MD       Procedures

## 2021-12-07 NOTE — ED TRIAGE NOTES
Pt arrived to ED via EMS s/p fall. Pt was walking and fell when his right knee \"gave out\". Hx of falls. Denies LOC. No blood thinners. VSS stable but BP slightly elevated. Pt denies pain. Right forehead lac. Masked.

## 2022-03-18 PROBLEM — G93.41 ACUTE METABOLIC ENCEPHALOPATHY: Status: ACTIVE | Noted: 2021-01-07

## 2022-03-18 PROBLEM — I10 ESSENTIAL HYPERTENSION, BENIGN: Status: ACTIVE | Noted: 2021-12-06

## 2022-03-18 PROBLEM — E78.2 MIXED HYPERLIPIDEMIA: Status: ACTIVE | Noted: 2021-09-27

## 2022-03-18 PROBLEM — R00.1 BRADYCARDIA: Status: ACTIVE | Noted: 2021-09-27

## 2022-03-19 PROBLEM — L89.302 DECUBITUS ULCER OF BUTTOCK, STAGE 2 (HCC): Status: ACTIVE | Noted: 2021-01-19

## 2022-03-19 PROBLEM — I44.0 1ST DEGREE AV BLOCK: Status: ACTIVE | Noted: 2021-09-27

## 2022-03-19 PROBLEM — R06.09 DYSPNEA ON EXERTION: Status: ACTIVE | Noted: 2021-09-27

## 2022-03-19 PROBLEM — J96.01 ACUTE RESPIRATORY FAILURE WITH HYPOXIA (HCC): Status: ACTIVE | Noted: 2021-01-07

## 2022-03-19 PROBLEM — U07.1 COVID-19: Status: ACTIVE | Noted: 2021-01-07

## 2022-03-19 PROBLEM — R09.02 HYPOXIA: Status: ACTIVE | Noted: 2021-09-27

## 2022-03-19 PROBLEM — E11.9 CONTROLLED TYPE 2 DIABETES MELLITUS WITHOUT COMPLICATION, WITHOUT LONG-TERM CURRENT USE OF INSULIN (HCC): Status: ACTIVE | Noted: 2021-01-07

## 2022-03-20 PROBLEM — K62.5 GASTROINTESTINAL HEMORRHAGE ASSOCIATED WITH ANORECTAL SOURCE: Status: ACTIVE | Noted: 2021-01-14

## 2022-03-20 PROBLEM — I48.92 ATRIAL FLUTTER (HCC): Status: ACTIVE | Noted: 2021-12-06

## 2022-06-21 NOTE — TELEPHONE ENCOUNTER
Called patients wife and informed her to keep giving patient the what medications she has been giving him as his blood pressure has been good, averaging 134/74.

## 2022-07-12 ENCOUNTER — OFFICE VISIT (OUTPATIENT)
Dept: CARDIOLOGY CLINIC | Age: 87
End: 2022-07-12
Payer: MEDICARE

## 2022-07-12 VITALS
HEIGHT: 67 IN | BODY MASS INDEX: 20.23 KG/M2 | HEART RATE: 56 BPM | SYSTOLIC BLOOD PRESSURE: 136 MMHG | DIASTOLIC BLOOD PRESSURE: 80 MMHG | WEIGHT: 128.9 LBS

## 2022-07-12 DIAGNOSIS — E78.2 MIXED HYPERLIPIDEMIA: ICD-10-CM

## 2022-07-12 DIAGNOSIS — I44.0 1ST DEGREE AV BLOCK: ICD-10-CM

## 2022-07-12 DIAGNOSIS — R00.1 BRADYCARDIA: ICD-10-CM

## 2022-07-12 DIAGNOSIS — I48.3 TYPICAL ATRIAL FLUTTER (HCC): Primary | ICD-10-CM

## 2022-07-12 DIAGNOSIS — I10 ESSENTIAL HYPERTENSION, BENIGN: ICD-10-CM

## 2022-07-12 PROBLEM — S06.5XAA SUBDURAL HEMATOMA: Status: ACTIVE | Noted: 2022-01-07

## 2022-07-12 PROCEDURE — 99214 OFFICE O/P EST MOD 30 MIN: CPT | Performed by: INTERNAL MEDICINE

## 2022-07-12 PROCEDURE — 1123F ACP DISCUSS/DSCN MKR DOCD: CPT | Performed by: INTERNAL MEDICINE

## 2022-07-12 PROCEDURE — G8420 CALC BMI NORM PARAMETERS: HCPCS | Performed by: INTERNAL MEDICINE

## 2022-07-12 PROCEDURE — G8427 DOCREV CUR MEDS BY ELIG CLIN: HCPCS | Performed by: INTERNAL MEDICINE

## 2022-07-12 PROCEDURE — 1036F TOBACCO NON-USER: CPT | Performed by: INTERNAL MEDICINE

## 2022-07-12 RX ORDER — POTASSIUM CHLORIDE 750 MG/1
10 TABLET, FILM COATED, EXTENDED RELEASE ORAL DAILY
Status: ON HOLD | COMMUNITY
Start: 2021-12-03 | End: 2022-08-26 | Stop reason: SDUPTHER

## 2022-07-12 ASSESSMENT — ENCOUNTER SYMPTOMS
HEMATOCHEZIA: 0
EYE REDNESS: 0
DOUBLE VISION: 0
ABDOMINAL PAIN: 0
STRIDOR: 0
HEMOPTYSIS: 0
WHEEZING: 0
HEMATEMESIS: 0
HOARSE VOICE: 0

## 2022-07-12 NOTE — PROGRESS NOTES
[facility] CARDIOLOGY  [address] [facility], [address]  PHONE: [phone]          22    NAME:  Lisette Barcenas  : 1933  MRN: [id number]         SUBJECTIVE:   Lisette Barcenas is a 80 y.o. male seen for a visit regarding the following:     Chief Complaint   Patient presents with    Atrial Flutter           HPI:    Cardiology problem list:   1. Hypoxic respiratory failure/dyspnea on exertion   -Had COVID-19 infection in January of this year and was in the hospital for over a month and rehab for at least 2 weeks. Still on oxygen   2. Hypertension   3. Hyperlipidemia   4. Type 2 diabetes mellitus   5. Bilateral lower extremity edema   6. Sinus bradycardia with first-degree AV block   7. Atrial flutter- paroxysmal    -[id number] score equals 4   -Holter monitor from 2021 3 days-showed sinus rhythm with an average heart rate of 64 bpm, first-degree AV block, multiple runs of atrial flutter-7% of all beats with the longest lasting 5 hours and 8 minutes.   -Not anticoagulated due to previous subdural hematoma, falls   -Previous patient of Dr. Perdomo but saw him over 10 years ago.       I saw Mr. Christine jay 80year-old gentleman in cardiovascular follow-up on 2022. We last saw him in follow-up on 2022 for hypoxic respiratory failure, hypertension, hyperlipidemia, bradycardia with first-degree AV block along with atrial  flutter. When we last met with him we increased his doxazosin dosing and he called us giving us updates on his blood pressures and they have been much better controlled at home too. Foremost, his average pressures in the 264 systolic range. Hypertension-Denies headaches or blurry vision. No significant lower extremity edema orthopnea PND. He says he goes to the bathroom constantly and is urinating repeatedly but does not seem to evacuate his bladder completely each time.      Paroxysmal  atrial flutter- Holter monitor  actually showed evidence of runs of atrial flutter-typical flutter with rapid ventricular response. Average heart rate was 64 bpm.  Had some first-degree AV block in addition. Now has been placed on carvedilol-low-dose and tolerating it well. No significant palpitations at this point. Not on therapeutic anticoagulation with previous recurrent falls and head bleeds/subdural hematoma. Denies any TIAs or strokelike symptoms. He is not in his wheelchair today and using a cane to ambulate and is clearly at fall risk. His wife said that he has fallen many times and he is now in an assisted living. I am hesitant to start him on therapeutic  anticoagulation because of this. No complaints of any anginal chest discomfort. Past Medical History, Past Surgical History, Family history, Social History, and Medications were all reviewed with the patient today and updated as necessary.      Allergies   Allergen Reactions    Povidone-Iodine Swelling     And Blisters on skin      Patient Active Problem List   Diagnosis    Essential hypertension, benign    Mixed hyperlipidemia    Bradycardia    Acute metabolic encephalopathy    Decubitus ulcer of buttock, stage 2 (HCC)    Hypoxia    Controlled type 2 diabetes mellitus without complication, without long-term current use of insulin (HCC)    Acute respiratory failure with hypoxia (HCC)    1st degree AV block    Dyspnea on exertion    COVID-19    Atrial flutter (HCC)    Gastrointestinal hemorrhage associated with anorectal source    Subdural hematoma (HCC)     Past Medical History:   Diagnosis Date    Acquired hallux valgus of left foot     Arthritis     OA- shoulders, knees, toes    CAD (coronary artery disease)     MI in 1980, no c/o - no stents or surgery    Diabetes ([facility] [address]     Type 2 Insulin Dep-Flexpen (started 2010), average AM sugar -   today in -   hypo- < 80    High cholesterol     takes Crestor    Nausea & vomiting     with anesthesia- none with previous 2 foot surgeries    Stroke [facility])     temporarily lost sight in RIGHT eye, lost hearing RIGHT ear, denies deficits at this time, \"everything is back to normal now\"    Tinnitus     Unspecified adverse effect of anesthesia     difficulty waking up- stays very sleepy- pt states \"Easy to put out\"    Unspecified sleep apnea     no C-PAP use. Past Surgical History:   Procedure Laterality Date    CATARACT REMOVAL  2010    LEFT, denies implant- wife states iol    FOOT/TOES SURGERY PROC UNLISTED      LEFT (for hammer toe, bunion)    MOHS SURGERY      Rt.  ORTHOPEDIC SURGERY      plate in left foot and then repair of broken plate (2 surg)    OTHER SURGICAL HISTORY      had nose surgery for rosacea    OTHER SURGICAL HISTORY  1980s    chloe under the chin     TONSILLECTOMY  as child    T&A     Family History   Problem Relation Age of Onset    Malig Hypertherm Neg Hx     Pseudochol.  Deficiency Neg Hx     Rheum Arthritis Father     Rheum Arthritis Mother     Post-op Nausea/Vomiting Neg Hx     Emergence Delirium Neg Hx     Post-op Cognitive Dysfunction Neg Hx     Delayed Awakening Neg Hx     Other Neg Hx      Social History     Tobacco Use    Smoking status: Former Smoker     Packs/day: 1.00     Quit date: 2/10/1956     Years since quittin.4    Smokeless tobacco: Former User   Substance Use Topics    Alcohol use: No     Current Outpatient Medications   Medication Sig Dispense Refill    potassium chloride (KLOR-CON) 10 MEQ extended release tablet Take 10 mEq by mouth daily      acetaminophen (TYLENOL) 500 MG tablet Take 500 mg by mouth every 6 hours as needed      albuterol sulfate  (90 Base) MCG/ACT inhaler Inhale 2 puffs into the lungs every 6 hours as needed      carvedilol (COREG) 3.125 MG tablet Take 3.125 mg by mouth 2 times daily (with meals)      cetirizine (ZYRTEC) 10 MG tablet Take 10 mg by mouth      vitamin D 25 MCG (1000 UT) CAPS Take 1,000 Units by mouth daily      cyanocobalamin 1000 MCG tablet Take 1,000 mcg by mouth daily      doxazosin (CARDURA) 2 MG tablet Take 4 mg by mouth daily       ferrous sulfate (IRON 325) 325 (65 Fe) MG tablet Take by mouth every morning (before breakfast)      glipiZIDE (GLUCOTROL) 5 MG tablet Take 10 mg by mouth daily       irbesartan (AVAPRO) 300 MG tablet Take 300 mg by mouth      Melatonin 10 MG TABS Take 1 tablet by mouth      ondansetron (ZOFRAN-ODT) 4 MG disintegrating tablet Take 4 mg by mouth every 8 hours as needed      Probiotic Product (ACIDOPHILUS PROBIOTIC) CAPS capsule Take by mouth       No current facility-administered medications for this visit. Review of Systems   Constitutional: Negative for chills and fever. HENT: Negative for ear discharge, hoarse voice and stridor. Eyes: Negative for double vision and redness. Cardiovascular: Negative for cyanosis and syncope. Respiratory: Negative for hemoptysis and wheezing. Endocrine: Negative for polydipsia and polyphagia. Hematologic/Lymphatic: Negative for adenopathy. Skin: Negative for itching and rash. Musculoskeletal: Negative for joint swelling and muscle weakness. Gastrointestinal: Negative for abdominal pain, hematemesis and hematochezia. Genitourinary: Negative for flank pain and nocturia. Neurological: Negative for focal weakness and seizures. Psychiatric/Behavioral: Negative for altered mental status and suicidal ideas. Allergic/Immunologic: Negative for hives.        PHYSICAL EXAM:    /80   Pulse 56   Ht 5' 7\" (1.702 m)   Wt 128 lb 14.4 oz (58.5 kg)   BMI 20.19 kg/m²      Physical Exam    General: Alert and oriented in no acute distress  HEENT: Head is normocephalic, atraumatic, pupils are equal bilaterally, throat appears to be clear  Neck: No significant jugular venous distention no cervical bruits  Cardiovascular: S1 and S2 heard, regular rhythm, bradycardia,  no significant murmurs rubs or gallops. Respiratory: Clear to auscultation bilaterally with no adventitious sounds, respirations are normal  Abdomen: Soft, nontender, nondistended, bowel sounds present. Extremities: No cyanosis clubbing or edema  Peripheral pulses: Bilateral radial artery pulses are palpated. Bilateral pedal pulses are well felt. Neuro: No facial droop and no gross focal motor deficits  Lymphatic: No significant cervical lymphadenopathy noted. Musculoskeletal: No significant redness or swelling noted in all exposed joints. Skin: No significant rashes noted the of the exposed regions. Medical problems and test results were reviewed with the patient today. No results found for this or any previous visit (from the past 672 hour(s)). No results found for: CHOL, CHOLPOCT, CHOLX, CHLST, CHOLV, HDL, HDLPOC, HDLC, LDL, LDLC, VLDLC, VLDL, TGLX, TRIGL,hemoglobin, basic metabolic panel,   Lab Results   Component Value Date/Time    TSH 1.380 01/07/2021 03:37 PM    ,  Lab Results   Component Value Date/Time     02/10/2021 07:00 PM    K 4.8 02/10/2021 07:00 PM     02/10/2021 07:00 PM    CO2 27 02/10/2021 07:00 PM    BUN 26 02/10/2021 07:00 PM    GFRAA >60 02/10/2021 07:00 PM    GLOB 2.6 02/10/2021 07:00 PM    ALT 22 02/10/2021 07:00 PM    AST 23 02/10/2021 07:00 PM      No results found for: LDLCALC, LDLCHOLESTEROL, LDLDIRECT   Lab Results   Component Value Date    CREATININE 0.91 02/10/2021      11/15/21    TRANSTHORACIC ECHOCARDIOGRAM (TTE) COMPLETE (CONTRAST/BUBBLE/3D PRN) 11/16/2021  6:56 PM (Final)    Narrative  This is a summary report. The complete report is available in the patient's medical record. If you cannot access the medical record, please contact the sending organization for a detailed fax or copy. · LV: Estimated LVEF is 60 - 65%. Normal cavity size and systolic function (ejection fraction normal). Mild concentric hypertrophy.  Wall motion: normal. Mild (grade 1) left ventricular diastolic dysfunction E/e' avg=12. · LA: Mildly dilated left atrium. Left Atrium volume index is 32.9 mL/m2. · MV: Mild mitral valve regurgitation is present. Signed by: Erendira Christiansen MD on 11/16/2021  6:56 PM       ASSESSMENT and PLAN      Typical atrial flutter ([address]  -Paroxysmal-maintaining sinus rhythm with just low-dose carvedilol. Unable to anticoagulate with him having recurrent falls.  -Previous history of subdural hematoma. 1st degree AV block  -Being monitored. Only on low-dose carvedilol. Continue    Bradycardia  Has tachybradycardia syndrome-maintaining sinus rhythm at this point with heart rates in the mid 50s at rest.  No strong indication for permanent pacemaker placement so far. Continue low-dose carvedilol    Essential hypertension, benign  -Well-controlled on current therapy with doxazosin and irbesartan along with carvedilol at current dosing. Continue    Mixed hyperlipidemia  -Diet controlled for now. Overall Impression  -Maintaining sinus rhythm with low-dose carvedilol. Blood pressures are better with the changes we made when we last saw him. Continue current therapy. We would only need to see him in follow-up in about 6 months time. I have reminded him to continue to use his cane/walker with ambulation to prevent falls. Return in about 6 months (around 1/12/2023) for Atrial flutter, Hypertension, hyperlipidemia. Thank you for allowing us to participate in the care of your patient. If you have any further questions, please do not hesitate to contact us.   Sincerely,        Pauline Reynaga MD   7/12/2022

## 2022-07-19 NOTE — TELEPHONE ENCOUNTER
Requested Prescriptions     Pending Prescriptions Disp Refills    irbesartan (AVAPRO) 300 MG tablet 90 tablet 3     Sig: Take 1 tablet by mouth in the morning.     ondansetron (ZOFRAN-ODT) 4 MG disintegrating tablet 90 tablet 3     Sig: Take 1 tablet by mouth every 8 hours as needed for Nausea or Vomiting

## 2022-07-20 RX ORDER — IRBESARTAN 300 MG/1
300 TABLET ORAL DAILY
Qty: 90 TABLET | Refills: 3 | Status: ON HOLD | OUTPATIENT
Start: 2022-07-20 | End: 2022-08-26 | Stop reason: SDUPTHER

## 2022-07-20 RX ORDER — ONDANSETRON 4 MG/1
4 TABLET, ORALLY DISINTEGRATING ORAL EVERY 8 HOURS PRN
Qty: 90 TABLET | Refills: 3 | Status: ON HOLD | OUTPATIENT
Start: 2022-07-20 | End: 2022-08-26 | Stop reason: HOSPADM

## 2022-08-17 ENCOUNTER — HOSPITAL ENCOUNTER (OUTPATIENT)
Age: 87
Setting detail: OBSERVATION
Discharge: OTHER INSTITUTION | End: 2022-08-26
Attending: EMERGENCY MEDICINE | Admitting: FAMILY MEDICINE
Payer: MEDICARE

## 2022-08-17 DIAGNOSIS — I10 ESSENTIAL HYPERTENSION: ICD-10-CM

## 2022-08-17 DIAGNOSIS — F03.90 DEMENTIA WITHOUT BEHAVIORAL DISTURBANCE, UNSPECIFIED DEMENTIA TYPE: ICD-10-CM

## 2022-08-17 DIAGNOSIS — Z13.9 ENCOUNTER FOR MEDICAL SCREENING EXAMINATION: ICD-10-CM

## 2022-08-17 DIAGNOSIS — R62.7 FAILURE TO THRIVE IN ADULT: Primary | ICD-10-CM

## 2022-08-17 LAB
GLUCOSE BLD STRIP.AUTO-MCNC: 178 MG/DL (ref 65–100)
SERVICE CMNT-IMP: ABNORMAL

## 2022-08-17 PROCEDURE — 2500000003 HC RX 250 WO HCPCS: Performed by: EMERGENCY MEDICINE

## 2022-08-17 PROCEDURE — 82962 GLUCOSE BLOOD TEST: CPT

## 2022-08-17 PROCEDURE — 99285 EMERGENCY DEPT VISIT HI MDM: CPT

## 2022-08-17 PROCEDURE — 6370000000 HC RX 637 (ALT 250 FOR IP): Performed by: EMERGENCY MEDICINE

## 2022-08-17 RX ORDER — CLONIDINE HYDROCHLORIDE 0.1 MG/1
0.1 TABLET ORAL
Status: COMPLETED | OUTPATIENT
Start: 2022-08-17 | End: 2022-08-17

## 2022-08-17 RX ADMIN — TUBERCULIN PURIFIED PROTEIN DERIVATIVE 5 UNITS: 5 INJECTION, SOLUTION INTRADERMAL at 18:57

## 2022-08-17 RX ADMIN — CLONIDINE HYDROCHLORIDE 0.1 MG: 0.1 TABLET ORAL at 15:13

## 2022-08-17 ASSESSMENT — SOCIAL DETERMINANTS OF HEALTH (SDOH)
WITHIN THE LAST YEAR, HAVE YOU BEEN AFRAID OF YOUR PARTNER OR EX-PARTNER?: YES
WITHIN THE LAST YEAR, HAVE YOU BEEN HUMILIATED OR EMOTIONALLY ABUSED IN OTHER WAYS BY YOUR PARTNER OR EX-PARTNER?: YES
WITHIN THE LAST YEAR, HAVE YOU BEEN KICKED, HIT, SLAPPED, OR OTHERWISE PHYSICALLY HURT BY YOUR PARTNER OR EX-PARTNER?: YES

## 2022-08-17 ASSESSMENT — ENCOUNTER SYMPTOMS
COUGH: 0
SHORTNESS OF BREATH: 0
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
## 2022-08-17 NOTE — ED NOTES
Pt arrived via EMS. EMS called out for a blood sugar then advised that the wife and  living together \"needed to be  for a while\".       Concetta Lmab RN  08/17/22 0235
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
## 2022-08-18 ENCOUNTER — APPOINTMENT (OUTPATIENT)
Dept: GENERAL RADIOLOGY | Age: 87
End: 2022-08-18
Payer: MEDICARE

## 2022-08-18 PROBLEM — Z75.1: Status: ACTIVE | Noted: 2022-08-18

## 2022-08-18 LAB
GLUCOSE BLD STRIP.AUTO-MCNC: 145 MG/DL (ref 65–100)
GLUCOSE BLD STRIP.AUTO-MCNC: 154 MG/DL (ref 65–100)
GLUCOSE BLD STRIP.AUTO-MCNC: 187 MG/DL (ref 65–100)
SERVICE CMNT-IMP: ABNORMAL

## 2022-08-18 PROCEDURE — 71046 X-RAY EXAM CHEST 2 VIEWS: CPT

## 2022-08-18 PROCEDURE — 97116 GAIT TRAINING THERAPY: CPT

## 2022-08-18 PROCEDURE — 6370000000 HC RX 637 (ALT 250 FOR IP): Performed by: EMERGENCY MEDICINE

## 2022-08-18 PROCEDURE — 2580000003 HC RX 258: Performed by: FAMILY MEDICINE

## 2022-08-18 PROCEDURE — G0378 HOSPITAL OBSERVATION PER HR: HCPCS

## 2022-08-18 PROCEDURE — 97112 NEUROMUSCULAR REEDUCATION: CPT

## 2022-08-18 PROCEDURE — 97161 PT EVAL LOW COMPLEX 20 MIN: CPT

## 2022-08-18 PROCEDURE — 82962 GLUCOSE BLOOD TEST: CPT

## 2022-08-18 PROCEDURE — 97165 OT EVAL LOW COMPLEX 30 MIN: CPT

## 2022-08-18 RX ORDER — DOXAZOSIN MESYLATE 4 MG/1
4 TABLET ORAL DAILY
Status: DISCONTINUED | OUTPATIENT
Start: 2022-08-19 | End: 2022-08-26 | Stop reason: HOSPADM

## 2022-08-18 RX ORDER — LOSARTAN POTASSIUM 50 MG/1
100 TABLET ORAL DAILY
Status: DISCONTINUED | OUTPATIENT
Start: 2022-08-18 | End: 2022-08-19

## 2022-08-18 RX ORDER — SODIUM CHLORIDE 9 MG/ML
INJECTION, SOLUTION INTRAVENOUS PRN
Status: DISCONTINUED | OUTPATIENT
Start: 2022-08-18 | End: 2022-08-26 | Stop reason: HOSPADM

## 2022-08-18 RX ORDER — INSULIN LISPRO 100 [IU]/ML
0-8 INJECTION, SOLUTION INTRAVENOUS; SUBCUTANEOUS
Status: DISCONTINUED | OUTPATIENT
Start: 2022-08-18 | End: 2022-08-26 | Stop reason: HOSPADM

## 2022-08-18 RX ORDER — POLYETHYLENE GLYCOL 3350 17 G/17G
17 POWDER, FOR SOLUTION ORAL DAILY PRN
Status: DISCONTINUED | OUTPATIENT
Start: 2022-08-18 | End: 2022-08-26 | Stop reason: HOSPADM

## 2022-08-18 RX ORDER — AMLODIPINE BESYLATE 10 MG/1
10 TABLET ORAL DAILY
Status: ON HOLD | COMMUNITY
End: 2022-08-26 | Stop reason: SDUPTHER

## 2022-08-18 RX ORDER — ONDANSETRON 2 MG/ML
4 INJECTION INTRAMUSCULAR; INTRAVENOUS EVERY 6 HOURS PRN
Status: DISCONTINUED | OUTPATIENT
Start: 2022-08-18 | End: 2022-08-26 | Stop reason: HOSPADM

## 2022-08-18 RX ORDER — ONDANSETRON 4 MG/1
4 TABLET, ORALLY DISINTEGRATING ORAL EVERY 8 HOURS PRN
Status: DISCONTINUED | OUTPATIENT
Start: 2022-08-18 | End: 2022-08-26 | Stop reason: HOSPADM

## 2022-08-18 RX ORDER — ONDANSETRON 4 MG/1
4 TABLET, ORALLY DISINTEGRATING ORAL EVERY 8 HOURS PRN
Status: DISCONTINUED | OUTPATIENT
Start: 2022-08-18 | End: 2022-08-18 | Stop reason: SDUPTHER

## 2022-08-18 RX ORDER — ACETAMINOPHEN 500 MG
500 TABLET ORAL EVERY 8 HOURS PRN
Status: DISCONTINUED | OUTPATIENT
Start: 2022-08-18 | End: 2022-08-18 | Stop reason: SDUPTHER

## 2022-08-18 RX ORDER — CARVEDILOL 3.12 MG/1
3.12 TABLET ORAL 2 TIMES DAILY WITH MEALS
Status: DISCONTINUED | OUTPATIENT
Start: 2022-08-18 | End: 2022-08-26 | Stop reason: HOSPADM

## 2022-08-18 RX ORDER — SODIUM CHLORIDE 0.9 % (FLUSH) 0.9 %
5-40 SYRINGE (ML) INJECTION PRN
Status: DISCONTINUED | OUTPATIENT
Start: 2022-08-18 | End: 2022-08-26 | Stop reason: HOSPADM

## 2022-08-18 RX ORDER — CETIRIZINE HYDROCHLORIDE 10 MG/1
10 TABLET ORAL DAILY
Status: DISCONTINUED | OUTPATIENT
Start: 2022-08-18 | End: 2022-08-19

## 2022-08-18 RX ORDER — ACETAMINOPHEN 650 MG/1
650 SUPPOSITORY RECTAL EVERY 6 HOURS PRN
Status: DISCONTINUED | OUTPATIENT
Start: 2022-08-18 | End: 2022-08-26 | Stop reason: HOSPADM

## 2022-08-18 RX ORDER — ACETAMINOPHEN 325 MG/1
650 TABLET ORAL EVERY 6 HOURS PRN
Status: DISCONTINUED | OUTPATIENT
Start: 2022-08-18 | End: 2022-08-26 | Stop reason: HOSPADM

## 2022-08-18 RX ORDER — VITAMIN B COMPLEX
1000 TABLET ORAL DAILY
Status: DISCONTINUED | OUTPATIENT
Start: 2022-08-18 | End: 2022-08-26 | Stop reason: HOSPADM

## 2022-08-18 RX ORDER — DOCUSATE SODIUM 100 MG/1
100 CAPSULE, LIQUID FILLED ORAL DAILY
Status: ON HOLD | COMMUNITY
End: 2022-08-26 | Stop reason: SDUPTHER

## 2022-08-18 RX ORDER — ALBUTEROL SULFATE 90 UG/1
2 AEROSOL, METERED RESPIRATORY (INHALATION) EVERY 6 HOURS PRN
Status: DISCONTINUED | OUTPATIENT
Start: 2022-08-18 | End: 2022-08-26 | Stop reason: HOSPADM

## 2022-08-18 RX ORDER — INSULIN LISPRO 100 [IU]/ML
0-4 INJECTION, SOLUTION INTRAVENOUS; SUBCUTANEOUS NIGHTLY
Status: DISCONTINUED | OUTPATIENT
Start: 2022-08-18 | End: 2022-08-26 | Stop reason: HOSPADM

## 2022-08-18 RX ORDER — POTASSIUM CHLORIDE 750 MG/1
10 TABLET, EXTENDED RELEASE ORAL DAILY
Status: DISCONTINUED | OUTPATIENT
Start: 2022-08-18 | End: 2022-08-19

## 2022-08-18 RX ORDER — GLIPIZIDE 5 MG/1
10 TABLET ORAL DAILY
Status: DISCONTINUED | OUTPATIENT
Start: 2022-08-19 | End: 2022-08-26

## 2022-08-18 RX ORDER — HYDRALAZINE HYDROCHLORIDE 20 MG/ML
10 INJECTION INTRAMUSCULAR; INTRAVENOUS EVERY 6 HOURS PRN
Status: DISCONTINUED | OUTPATIENT
Start: 2022-08-18 | End: 2022-08-26 | Stop reason: HOSPADM

## 2022-08-18 RX ORDER — LANOLIN ALCOHOL/MO/W.PET/CERES
1000 CREAM (GRAM) TOPICAL DAILY
Status: DISCONTINUED | OUTPATIENT
Start: 2022-08-18 | End: 2022-08-26 | Stop reason: HOSPADM

## 2022-08-18 RX ORDER — ALUMINUM ZIRCONIUM OCTACHLOROHYDREX GLY 16 G/100G
1 GEL TOPICAL DAILY
Status: DISCONTINUED | OUTPATIENT
Start: 2022-08-18 | End: 2022-08-18 | Stop reason: RX

## 2022-08-18 RX ORDER — ENOXAPARIN SODIUM 100 MG/ML
40 INJECTION SUBCUTANEOUS DAILY
Status: DISCONTINUED | OUTPATIENT
Start: 2022-08-19 | End: 2022-08-19

## 2022-08-18 RX ORDER — SODIUM CHLORIDE 0.9 % (FLUSH) 0.9 %
5-40 SYRINGE (ML) INJECTION EVERY 12 HOURS SCHEDULED
Status: DISCONTINUED | OUTPATIENT
Start: 2022-08-18 | End: 2022-08-26 | Stop reason: HOSPADM

## 2022-08-18 RX ORDER — FERROUS SULFATE 325(65) MG
325 TABLET ORAL
Status: DISCONTINUED | OUTPATIENT
Start: 2022-08-19 | End: 2022-08-26 | Stop reason: HOSPADM

## 2022-08-18 RX ORDER — LANOLIN ALCOHOL/MO/W.PET/CERES
3 CREAM (GRAM) TOPICAL NIGHTLY PRN
Status: DISCONTINUED | OUTPATIENT
Start: 2022-08-18 | End: 2022-08-26 | Stop reason: HOSPADM

## 2022-08-18 RX ORDER — BETAMETHASONE DIPROPIONATE 0.05 %
1 OINTMENT (GRAM) TOPICAL 2 TIMES DAILY
Status: ON HOLD | COMMUNITY
End: 2022-08-26 | Stop reason: HOSPADM

## 2022-08-18 RX ADMIN — CYANOCOBALAMIN TAB 1000 MCG 1000 MCG: 1000 TAB at 20:55

## 2022-08-18 RX ADMIN — LOSARTAN POTASSIUM 100 MG: 50 TABLET, FILM COATED ORAL at 17:17

## 2022-08-18 RX ADMIN — CARVEDILOL 3.12 MG: 3.12 TABLET, FILM COATED ORAL at 17:16

## 2022-08-18 RX ADMIN — SODIUM CHLORIDE, PRESERVATIVE FREE 10 ML: 5 INJECTION INTRAVENOUS at 22:50

## 2022-08-18 RX ADMIN — VITAMIN D, TAB 1000IU (100/BT) 1000 UNITS: 25 TAB at 17:17

## 2022-08-18 RX ADMIN — CETIRIZINE HYDROCHLORIDE 10 MG: 10 TABLET ORAL at 17:17

## 2022-08-18 RX ADMIN — POTASSIUM CHLORIDE 10 MEQ: 750 TABLET, EXTENDED RELEASE ORAL at 17:17

## 2022-08-18 NOTE — PROGRESS NOTES
OCCUPATIONAL THERAPY Initial Assessment, Daily Note, and AM       OT Visit Days: 1   Acknowledge Orders  Time  OT Charge Capture  Rehab Caseload Tracker      Moreno Mcmahon is a 80 y.o. male   PRIMARY DIAGNOSIS: <principal problem not specified>  No admission diagnoses are documented for this encounter. Reason for Referral: Generalized Muscle Weakness (M62.81)  Emergency: Payor: Shira Hollins / Plan: [employer] GOLD PLUS HMO / Product Type: *No Product type* /     ASSESSMENT:     REHAB RECOMMENDATIONS:   Recommendation to date pending progress:  Setting:  Short-term Rehab    Equipment:    To Be Determined     ASSESSMENT:  Mr. Rohit Oscar presents with deficits in overall strength, activity tolerance, ADL performance, and functional mobility. Presents in the ED. Hx of dementia however pleasantly oriented today during session; nephew present at bedside. BUE (4/5) are generally decreased but WFL. CGA for functional transfers; Fair + unsupported sitting balance. Pt proceeded to complete functional mobility for short household distances with CGA x SPC; unsteady gait/impaired dynamic standing balance noted today which is limiting ADL performance. Noted decreased safety awareness/impulsivity/limited insight into deficits requiring verbal cues throughout session for safety. At this time, Moreno Mcmahon is functioning below baseline for ADL performance and functional mobility. Patient would benefit from skilled OT services to address goals and plan of care.       [facility] [place] [address] [id number] AM-PAC 6 Clicks Daily Activity Inpatient Short Form:    AM-PAC Daily Activity Inpatient   How much help for putting on and taking off regular lower body clothing?: A Little  How much help for Bathing?: A Little  How much help for Toileting?: A Little  How much help for putting on and taking off regular upper body clothing?: A Little  How much help for taking care of personal grooming?: A Little  How much help for eating meals?: A Little  AM-PAC Inpatient Daily Activity Raw Score: 18  AM-PAC Inpatient ADL T-Scale Score : 38.66  ADL Inpatient CMS 0-100% Score: 46.65  ADL Inpatient CMS G-Code Modifier : CK           SUBJECTIVE:     Mr. Sindy Nielson states, \"I'm not going back to my house. I have had enough. \"     Social/Functional Lives With: Spouse  Type of Home: House  Ambulation Assistance: Independent  Transfer Assistance: Independent  Active : Yes  Occupation: Retired    OBJECTIVE:     RICKIE / Oliver Cline / Haleigh Amalia: None    RESTRICTIONS/PRECAUTIONS:       PAIN: Antonia Nose / O2:   Pre Treatment:       Numeric 0-10  0/10    Post Treatment:   0/10       Vitals        WFL    Oxygen     Stable on RA       GROSS EVALUATION: INTACT IMPAIRED   (See Comments)   UE AROM [] []   UE PROM [] []   Strength []    Generally weak however BUE WFL   Posture / Balance []  Intact sitting balance; fair dynamic standing balance with HHA x SPC   Sensation [x]     Coordination [x]       Tone [x]       Edema [x]    Activity Tolerance []    Generally decreased on RA   Hand Dominance R [] L []      COGNITION/  PERCEPTION: INTACT IMPAIRED   (See Comments)   Orientation [x]     Vision [x]     Hearing [x]     Cognition  []  Hx of dementia   Perception []  Decreased insight into situation/deficits     MOBILITY: I Mod I S SBA CGA Min Mod Max Total  NT x2 Comments:   Bed Mobility    Rolling [] [] [] [] [] [] [] [] [] [x] []    Supine to Sit [] [] [] [] [] [] [] [] [] [x] []    Scooting [] [] [] [] [] [] [] [] [] [x] []    Sit to Supine [] [] [] [] [] [] [] [] [] [x] []    Transfers    Sit to Stand [] [] [] [] [x] [] [] [] [] [] []    Bed to Chair [] [] [] [] [] [] [] [] [] [x] []    Stand to Sit [] [] [] [] [x] [] [] [] [] [] []    Tub/Shower [] [] [] [] [] [] [] [] [] [x] []     Toilet [] [] [] [] [] [] [] [] [] [x] []      [] [] [] [] [] [] [] [] [] [] []    I=Independent, Mod I=Modified Independent, S=Supervision/Setup, SBA=Standby Assistance, CGA=Contact Guard Assistance, Min=Minimal Assistance, Mod=Moderate Assistance, Max=Maximal Assistance, Total=Total Assistance, NT=Not Tested    ACTIVITIES OF DAILY LIVING: I Mod I S SBA CGA Min Mod Max Total NT Comments   BASIC ADLs:              Upper Body Bathing  [] [] [] [] [] [] [] [] [x] []    Lower Body Bathing [] [] [] [] [] [] [] [] [x] []    Toileting [] [] [] [] [] [] [] [] [x] []    Upper Body Dressing [] [] [] [] [] [] [] [] [x] []    Lower Body Dressing [] [] [] [] [] [] [] [] [x] []    Feeding [] [] [] [] [] [] [] [] [x] []    Grooming [] [] [] [] [] [] [] [] [x] []    Personal Device Care [] [] [] [] [] [] [] [] [x] []    Functional Mobility [] [] [] [] [x] [] [] [] [] [] X SPC x verbal cues for safety   I=Independent, Mod I=Modified Independent, S=Supervision/Setup, SBA=Standby Assistance, CGA=Contact Guard Assistance, Min=Minimal Assistance, Mod=Moderate Assistance, Max=Maximal Assistance, Total=Total Assistance, NT=Not Tested    PLAN:     FREQUENCY/DURATION   OT Plan of Care: 3 times/week for duration of hospital stay or until stated goals are met, whichever comes first.    ACUTE OCCUPATIONAL THERAPY GOALS:   (Developed with and agreed upon by patient and/or caregiver.)  1. Patient will complete lower body bathing and dressing with MOD I and adaptive equipment as needed. 2. Patient will complete toileting with MOD I.   3. Patient will tolerate 30 minutes of OT treatment with 1-2 rest breaks to increase activity tolerance for ADLs. 4. Patient will complete functional transfers with MOD I and adaptive equipment as needed. 5. Patient will complete functional mobility for household distances with MOD I and adaptive equipment as needed. 6. Patient will complete self-grooming while standing edge of sink with MOD I and adaptive equipment as needed.     Timeframe: 7 visits         PROBLEM LIST:   (Skilled intervention is medically necessary to address:)  Decreased ADL/Functional Activities  Decreased Activity Tolerance  Decreased AROM/PROM  Decreased Balance  Decreased Cognition  Decreased Coordination  Decreased Gait Ability  Decreased Safety Awareness  Decreased Strength  Decreased Transfer Abilities   INTERVENTIONS PLANNED:  (Benefits and precautions of occupational therapy have been discussed with the patient.)  Self Care Training  Therapeutic Activity  Therapeutic Exercise/HEP  Neuromuscular Re-education  Manual Therapy  Education         TREATMENT:     EVALUATION: LOW COMPLEXITY: (Untimed Charge)    TREATMENT:   Neuromuscular Re-education (15 Minutes): Neuromuscular Re-education included Balance Training, Coordination training, Postural training, Sitting balance training, and Standing balance training to improve Balance, Coordination, and Postural Control. TREATMENT GRID:  N/A    AFTER TREATMENT PRECAUTIONS: Chair, Needs within reach, Visitors at bedside, and sitter at bedside    INTERDISCIPLINARY COLLABORATION:  RN/ PCT, PT/ PTA, and OT/ FREEMAN    EDUCATION:  Education Given To: Patient; Family  Education Provided: Role of Therapy;Plan of Care  Barriers to Learning: Cognition  Education Outcome: Verbalized understanding;Demonstrated understanding      TOTAL TREATMENT DURATION AND TIME:  Time In: 1018  Time Out: 2015 [facility]  Minutes: Jorge L Silva OT

## 2022-08-18 NOTE — ED NOTES
Pt resting comfortably in bed, watching tv, w/ no obvious signs or sx of distress.  Bhargavi Brian RN  08/18/22 0906

## 2022-08-18 NOTE — ED NOTES
Pt resting comfortably in bed w/ no obvious signs or sx of distress.  [address] [place] CAROLINA Campuzano RN  08/18/22 7245

## 2022-08-18 NOTE — ED NOTES
Pt resting comfortably in bed w/ no obvious signs or sx of distress.  Shahriar Campuzano RN  08/17/22 1611

## 2022-08-18 NOTE — ED NOTES
Pt resting comfortably in bed w/ no obvious signs or sx of distress.  [place] [address], DONTA  08/17/22 3815

## 2022-08-18 NOTE — ED NOTES
Pt resting comfortably in bed w/ no obvious signs or sx of distress.  [address] M[address] Zhong RN  08/17/22 9494

## 2022-08-18 NOTE — ED NOTES
Pt resting comfortably in bed w/ no obvious signs or sx of distress.  Sun Campuzano RN  08/17/22 2001
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
## 2022-08-18 NOTE — PROGRESS NOTES
completed initial visit with patient, as requested by patient and sitter. Patient had requested Bible and was expressing distress. Patient was somewhat confused at times and reported that he was here on protective custody. 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  provided pastoral presence, prayer and empathetic listening.    Signed by  Tung Iyer M.Div.

## 2022-08-18 NOTE — ED NOTES
Patient resting at this time. No signs or symptoms of distress. Respirations even and unlabored. Sitter at bedside. Safety precautions in place.      Marysol Hooker RN  08/18/22 7416

## 2022-08-18 NOTE — ED NOTES
Pt resting comfortably in bed, watching tv, w/ no obvious signs or sx of distress.  Tomas Campuzano, DONTA  08/18/22 7815

## 2022-08-18 NOTE — PROGRESS NOTES
PHYSICAL THERAPY Initial Assessment, Daily Note, and AM  (Link to Caseload Tracking: PT Visit Days : 1  Acknowledge Orders  Time In/Out  PT Charge Capture  Rehab Caseload Tracker    Mathew Vasquez is a 80 y.o. male   PRIMARY DIAGNOSIS: <principal problem not specified>  No admission diagnoses are documented for this encounter. Reason for Referral: Generalized Muscle Weakness (M62.81)  Difficulty in walking, Not elsewhere classified (R26.2)  History of falling (Z91.81)  Emergency: Payor: Lulu Hernandez / Plan: [employer] GOLD PLUS HMO / Product Type: *No Product type* /     ASSESSMENT:     REHAB RECOMMENDATIONS:   Recommendation to date pending progress:  Setting:  Short-term Rehab    Equipment:    To Be Determined     ASSESSMENT:  Mr. Oliverio Ashley is a 80year old M who presents for PT evaluation, oriented but noted to demonstrate safety awareness deficits/impulsivity throughout session. Pt received sitting in chair, pt noted to be generally weak with fair posture. He was able to ambulate 40 ft with SPC and SBA-CGA. Pt with very wide TIM with increased trunk sway, required cueing for pacing and safety. Transfer in and our of chair with CGA. Pt in unsafe environment at home and also reports history of recent falls. Pt presents as functioning below his baseline, with deficits in mobility including transfers, gait, balance, and activity tolerance. Pt will benefit from skilled therapy services to address stated deficits to promote return to highest level of function, independence, and safety. Will continue to follow.      [employer] AM-PAC 6 Clicks Basic Mobility Inpatient Short Form  AM-PAC Mobility Inpatient   How much difficulty turning over in bed?: A Little  How much difficulty sitting down on / standing up from a chair with arms?: A Little  How much difficulty moving from lying on back to sitting on side of bed?: A Little  How much help from another person moving to and from a bed to a chair?: A Little  How much help from another person needed to walk in hospital room?: A Little  How much help from another person for climbing 3-5 steps with a railing?: A Lot  AM-PAC Inpatient Mobility Raw Score : 17  AM-PAC Inpatient T-Scale Score : 42.13  Mobility Inpatient CMS 0-100% Score: 50.57  Mobility Inpatient CMS G-Code Modifier : CK    SUBJECTIVE:   Mr. Iman Marroquin states, \"I can't go back home\"     Social/Functional Lives With: Spouse  Type of Home: House  Ambulation Assistance: Independent  Transfer Assistance: Independent  Active : Yes  Occupation: Retired    OBJECTIVE:     PAIN: Julia Herrlich / O2: [place] / Addison Florez / Mary [place]:   Pre Treatment:   Pain Assessment: None - Denies Pain      Post Treatment: 0 Vitals        Oxygen      None    RESTRICTIONS/PRECAUTIONS:  Restrictions/Precautions: Fall Risk                 GROSS EVALUATION: B LE Intact Impaired (Comments):   AROM [x]     PROM [x]    Strength []  Generalized weakness   Balance [] Posture: Fair  Sitting - Static: Good  Sitting - Dynamic: Fair, +  Standing - Static: Fair, +  Standing - Dynamic: Fair   Posture [] Forward Head  Rounded Shoulders   Sensation []     Coordination []      Tone []     Edema []    Activity Tolerance [] Patient limited by fatigue    []      COGNITION/  PERCEPTION: Intact Impaired (Comments):   Orientation [x]     Vision [x]     Hearing [x]     Cognition  [] Overall Cognitive Status: Exceptions  Safety Judgement: Decreased awareness of need for safety  Insights: Decreased awareness of deficits     MOBILITY: I Mod I S SBA CGA Min Mod Max Total  NT x2 Comments:   Bed Mobility    Rolling [] [] [] [] [] [] [] [] [] [x] []    Supine to Sit [] [] [] [] [] [] [] [] [] [x] []    Scooting [] [] [] [] [] [] [] [] [] [x] []    Sit to Supine [] [] [] [] [] [] [] [] [] [x] []    Transfers    Sit to Stand [] [] [] [] [x] [] [] [] [] [] []    Bed to Chair [] [] [] [] [x] [] [] [] [] [] []    Stand to Sit [] [] [] [] [x] [] [] [] [] [] []     [] [] [] [] [] [] [] [] [] [] []    I=Independent, Mod I=Modified Independent, S=Supervision, SBA=Standby Assistance, CGA=Contact Guard Assistance,   Min=Minimal Assistance, Mod=Moderate Assistance, Max=Maximal Assistance, Total=Total Assistance, NT=Not Tested    GAIT: I Mod I S SBA CGA Min Mod Max Total  NT x2 Comments:   Level of Assistance [] [] [] [x] [x] [] [] [] [] [] []    Distance 40 feet    DME SPC    Gait Quality Decreased step clearance, Decreased step length, Trunk sway increased, Wide base of support, and cues for slowing pace, safety    Weightbearing Status Restrictions/Precautions  Restrictions/Precautions: Fall Risk    Stairs      I=Independent, Mod I=Modified Independent, S=Supervision, SBA=Standby Assistance, CGA=Contact Guard Assistance,   Min=Minimal Assistance, Mod=Moderate Assistance, Max=Maximal Assistance, Total=Total Assistance, NT=Not Tested    PLAN:   ACUTE PHYSICAL THERAPY GOALS:   (Developed with and agreed upon by patient and/or caregiver.)    (1.) Allen Keen  will move from supine to sit and sit to supine  with INDEPENDENCE within 7 treatment day(s). (2.) Allen Keen will transfer from bed to chair and chair to bed with SUPERVISION using the least restrictive device within 7 treatment day(s). (3.) Allen Keen will ambulate with SUPERVISION for 250 feet with the least restrictive device within 7 treatment day(s). (4.) Allen Keen will perform standing static and dynamic balance activities x 20 minutes with SUPERVISION to improve safety within 7 treatment day(s). (5.) Allen Keen will perform bilateral lower extremity exercises x 20 min for HEP with INDEPENDENCE to improve strength, endurance, and functional mobility within 7 treatment day(s).        FREQUENCY AND DURATION: 3 times/week for duration of hospital stay or until stated goals are met, whichever comes first.    THERAPY PROGNOSIS: Good    PROBLEM LIST:   (Skilled intervention is medically necessary to address:)  Decreased Activity Tolerance  Decreased Balance  Decreased Gait Ability  Decreased Safety Awareness  Decreased Strength  Decreased Transfer Abilities INTERVENTIONS PLANNED:   (Benefits and precautions of physical therapy have been discussed with the patient.)  Therapeutic Activity  Therapeutic Exercise/HEP  Neuromuscular Re-education  Gait Training  Education       TREATMENT:   EVALUATION: LOW COMPLEXITY: (Untimed Charge)    TREATMENT:   Co-Treatment PT/OT necessary due to patient's decreased overall endurance/tolerance levels, as well as need for high level skilled assistance to complete functional transfers/mobility and functional tasks  Gait Training (8 Minutes): Gait training for 40 feet utilizing SPC. Patient required Tactile and Verbal cueing to improve Activity Pacing and Assistive Device Utilization. TREATMENT GRID:  N/A    AFTER TREATMENT PRECAUTIONS: Bed/Chair Locked, Call light within reach, Chair, Needs within reach, RN notified, and Visitors at bedside    INTERDISCIPLINARY COLLABORATION:  RN/ PCT, OT/ FREEMAN, and RN Case Manager/      EDUCATION: Education Given To: Patient  Education Provided: Role of Therapy;Plan of Care;Transfer Training;Precautions; Fall Prevention Strategies; Equipment  Education Method: Verbal  Barriers to Learning: None  Education Outcome: Verbalized understanding    TIME IN/OUT:  Time In: 1031  Time Out: 2015 [facility]  Minutes: [facility] Bebo MILES, PT
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
## 2022-08-18 NOTE — ED NOTES
Patient resting at this time. No signs or symptoms of distress. Respirations even and unlabored. Sitter at bedside. Safety precautions in place.       Idalmis Pepe RN  08/18/22 6796

## 2022-08-18 NOTE — ED NOTES
Pt resting comfortably in bed w/ no obvious signs or sx of distress.  Daniele Campuzano RN  08/17/22 8665

## 2022-08-18 NOTE — ED NOTES
Patient resting at this time. No signs or symptoms of distress. Respirations even and unlabored. Sitter at bedside. Safety precautions in place.      Giancarlo Mosqueda RN  08/18/22 0607

## 2022-08-18 NOTE — ED NOTES
Pt resting comfortably in bed, watching tv, w/ no obvious signs or sx of distress.  Danielle Campuzano RN  08/18/22 8892

## 2022-08-19 LAB
ALBUMIN SERPL-MCNC: 3.6 G/DL (ref 3.2–4.6)
ALBUMIN/GLOB SERPL: 1 {RATIO} (ref 1.2–3.5)
ALP SERPL-CCNC: 126 U/L (ref 50–136)
ALT SERPL-CCNC: 19 U/L (ref 12–65)
ANION GAP SERPL CALC-SCNC: 3 MMOL/L (ref 7–16)
AST SERPL-CCNC: 21 U/L (ref 15–37)
BASOPHILS # BLD: 0.1 K/UL (ref 0–0.2)
BASOPHILS NFR BLD: 1 % (ref 0–2)
BILIRUB SERPL-MCNC: 0.5 MG/DL (ref 0.2–1.1)
BUN SERPL-MCNC: 20 MG/DL (ref 8–23)
CALCIUM SERPL-MCNC: 9.4 MG/DL (ref 8.3–10.4)
CHLORIDE SERPL-SCNC: 104 MMOL/L (ref 98–107)
CO2 SERPL-SCNC: 28 MMOL/L (ref 21–32)
CREAT SERPL-MCNC: 1.5 MG/DL (ref 0.8–1.5)
DIFFERENTIAL METHOD BLD: ABNORMAL
EOSINOPHIL # BLD: 0.2 K/UL (ref 0–0.8)
EOSINOPHIL NFR BLD: 3 % (ref 0.5–7.8)
ERYTHROCYTE [DISTWIDTH] IN BLOOD BY AUTOMATED COUNT: 15.6 % (ref 11.9–14.6)
GLOBULIN SER CALC-MCNC: 3.7 G/DL (ref 2.3–3.5)
GLUCOSE BLD STRIP.AUTO-MCNC: 147 MG/DL (ref 65–100)
GLUCOSE BLD STRIP.AUTO-MCNC: 226 MG/DL (ref 65–100)
GLUCOSE BLD STRIP.AUTO-MCNC: 260 MG/DL (ref 65–100)
GLUCOSE SERPL-MCNC: 266 MG/DL (ref 65–100)
HCT VFR BLD AUTO: 47.4 % (ref 41.1–50.3)
HGB BLD-MCNC: 15.3 G/DL (ref 13.6–17.2)
IMM GRANULOCYTES # BLD AUTO: 0 K/UL (ref 0–0.5)
IMM GRANULOCYTES NFR BLD AUTO: 0 % (ref 0–5)
LYMPHOCYTES # BLD: 1.1 K/UL (ref 0.5–4.6)
LYMPHOCYTES NFR BLD: 16 % (ref 13–44)
MCH RBC QN AUTO: 28.8 PG (ref 26.1–32.9)
MCHC RBC AUTO-ENTMCNC: 32.3 G/DL (ref 31.4–35)
MCV RBC AUTO: 89.3 FL (ref 79.6–97.8)
MM INDURATION, POC: 0 MM (ref 0–5)
MONOCYTES # BLD: 0.4 K/UL (ref 0.1–1.3)
MONOCYTES NFR BLD: 5 % (ref 4–12)
NEUTS SEG # BLD: 5.2 K/UL (ref 1.7–8.2)
NEUTS SEG NFR BLD: 75 % (ref 43–78)
NRBC # BLD: 0 K/UL (ref 0–0.2)
PLATELET # BLD AUTO: 272 K/UL (ref 150–450)
PMV BLD AUTO: 10.3 FL (ref 9.4–12.3)
POTASSIUM SERPL-SCNC: 4.1 MMOL/L (ref 3.5–5.1)
PPD, POC: NEGATIVE
PROT SERPL-MCNC: 7.3 G/DL (ref 6.3–8.2)
RBC # BLD AUTO: 5.31 M/UL (ref 4.23–5.6)
SERVICE CMNT-IMP: ABNORMAL
SODIUM SERPL-SCNC: 135 MMOL/L (ref 138–145)
WBC # BLD AUTO: 7 K/UL (ref 4.3–11.1)

## 2022-08-19 PROCEDURE — 87635 SARS-COV-2 COVID-19 AMP PRB: CPT

## 2022-08-19 PROCEDURE — 6370000000 HC RX 637 (ALT 250 FOR IP): Performed by: HOSPITALIST

## 2022-08-19 PROCEDURE — G0378 HOSPITAL OBSERVATION PER HR: HCPCS

## 2022-08-19 PROCEDURE — 85025 COMPLETE CBC W/AUTO DIFF WBC: CPT

## 2022-08-19 PROCEDURE — 82962 GLUCOSE BLOOD TEST: CPT

## 2022-08-19 PROCEDURE — 80053 COMPREHEN METABOLIC PANEL: CPT

## 2022-08-19 PROCEDURE — 6370000000 HC RX 637 (ALT 250 FOR IP): Performed by: FAMILY MEDICINE

## 2022-08-19 PROCEDURE — 2580000003 HC RX 258: Performed by: FAMILY MEDICINE

## 2022-08-19 PROCEDURE — 36415 COLL VENOUS BLD VENIPUNCTURE: CPT

## 2022-08-19 RX ORDER — CETIRIZINE HYDROCHLORIDE 10 MG/1
10 TABLET ORAL EVERY EVENING
Status: DISCONTINUED | OUTPATIENT
Start: 2022-08-19 | End: 2022-08-26 | Stop reason: HOSPADM

## 2022-08-19 RX ORDER — LOSARTAN POTASSIUM 50 MG/1
100 TABLET ORAL EVERY EVENING
Status: DISCONTINUED | OUTPATIENT
Start: 2022-08-19 | End: 2022-08-26 | Stop reason: HOSPADM

## 2022-08-19 RX ORDER — ENOXAPARIN SODIUM 100 MG/ML
30 INJECTION SUBCUTANEOUS DAILY
Status: DISCONTINUED | OUTPATIENT
Start: 2022-08-19 | End: 2022-08-24

## 2022-08-19 RX ORDER — POTASSIUM CHLORIDE 750 MG/1
10 TABLET, EXTENDED RELEASE ORAL EVERY EVENING
Status: DISCONTINUED | OUTPATIENT
Start: 2022-08-19 | End: 2022-08-26 | Stop reason: HOSPADM

## 2022-08-19 RX ADMIN — INSULIN LISPRO 2 UNITS: 100 INJECTION, SOLUTION INTRAVENOUS; SUBCUTANEOUS at 16:35

## 2022-08-19 RX ADMIN — SODIUM CHLORIDE, PRESERVATIVE FREE 10 ML: 5 INJECTION INTRAVENOUS at 16:46

## 2022-08-19 RX ADMIN — LOSARTAN POTASSIUM 100 MG: 50 TABLET, FILM COATED ORAL at 18:14

## 2022-08-19 RX ADMIN — CETIRIZINE HYDROCHLORIDE 10 MG: 10 TABLET ORAL at 18:15

## 2022-08-19 RX ADMIN — SODIUM CHLORIDE, PRESERVATIVE FREE 10 ML: 5 INJECTION INTRAVENOUS at 21:27

## 2022-08-19 RX ADMIN — POTASSIUM CHLORIDE 10 MEQ: 10 TABLET, EXTENDED RELEASE ORAL at 18:15
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
## 2022-08-19 NOTE — ED NOTES
Care of pt taken over by elisha amador     Pt resting with eyes closed, resp even and none labored      Majo Vaca RN  08/19/22 1400

## 2022-08-19 NOTE — PLAN OF CARE
Problem: Discharge Planning  Goal: Discharge to home or other facility with appropriate resources  Outcome: Progressing  Flowsheets (Taken 8/19/2022 1934)  Discharge to home or other facility with appropriate resources:   Identify barriers to discharge with patient and caregiver   Identify discharge learning needs (meds, wound care, etc)   Refer to discharge planning if patient needs post-hospital services based on physician order or complex needs related to functional status, cognitive ability or social support system   Arrange for needed discharge resources and transportation as appropriate   Arrange for interpreters to assist at discharge as needed     Problem: Safety - Adult  Goal: Free from fall injury  Outcome: Progressing  Flowsheets (Taken 8/19/2022 1934)  Free From Fall Injury:   Instruct family/caregiver on patient safety   Based on caregiver fall risk screen, instruct family/caregiver to ask for assistance with transferring infant if caregiver noted to have fall risk factors     Problem: ABCDS Injury Assessment  Goal: Absence of physical injury  Outcome: Progressing  Flowsheets (Taken 8/19/2022 1934)  Absence of Physical Injury: Implement safety measures based on patient assessment
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
## 2022-08-19 NOTE — PROGRESS NOTES
TRANSFER - IN REPORT:    Verbal report received from [address] [place] [address]  being received from ED for routine progression of patient care      Report consisted of patient's Situation, Background, Assessment and   Recommendations(SBAR). Information from the following report(s) Nurse Handoff Report was reviewed with the receiving nurse. Opportunity for questions and clarification was provided. Assessment completed upon patient's arrival to unit and care assumed.

## 2022-08-19 NOTE — CARE COORDINATION
Patient has been accepted to [facility] for [employer]. SNF selected. Patient aware. Pre-cert initiated with [employer], pending insurance approval. CM also sent documents  requested by APS , to [address] [place] [address] via e-mail (safemail). CM remains available.

## 2022-08-19 NOTE — ED NOTES
TRANSFER - OUT REPORT:    Verbal report given to DONTA Duarte  on [employer]  being transferred to  for routine progression of patient care       Report consisted of patient's Situation, Background, Assessment and   Recommendations(SBAR). Information from the following report(s) Nurse Handoff Report, ED SBAR, [facility] and Recent Results was reviewed with the receiving nurse. Lines:   Peripheral IV Left Forearm (Active)        Opportunity for questions and clarification was provided.       Patient transported with:  Berta Hammer RN  08/19/22 0521

## 2022-08-20 LAB
ANION GAP SERPL CALC-SCNC: 4 MMOL/L (ref 7–16)
BUN SERPL-MCNC: 25 MG/DL (ref 8–23)
CALCIUM SERPL-MCNC: 9.1 MG/DL (ref 8.3–10.4)
CHLORIDE SERPL-SCNC: 111 MMOL/L (ref 98–107)
CO2 SERPL-SCNC: 24 MMOL/L (ref 21–32)
CREAT SERPL-MCNC: 1.4 MG/DL (ref 0.8–1.5)
GLUCOSE BLD STRIP.AUTO-MCNC: 173 MG/DL (ref 65–100)
GLUCOSE BLD STRIP.AUTO-MCNC: 199 MG/DL (ref 65–100)
GLUCOSE BLD STRIP.AUTO-MCNC: 202 MG/DL (ref 65–100)
GLUCOSE BLD STRIP.AUTO-MCNC: 218 MG/DL (ref 65–100)
GLUCOSE SERPL-MCNC: 225 MG/DL (ref 65–100)
MM INDURATION, POC: 0 MM (ref 0–5)
POTASSIUM SERPL-SCNC: 4.1 MMOL/L (ref 3.5–5.1)
PPD, POC: NEGATIVE
SERVICE CMNT-IMP: ABNORMAL
SODIUM SERPL-SCNC: 139 MMOL/L (ref 136–145)

## 2022-08-20 PROCEDURE — 6370000000 HC RX 637 (ALT 250 FOR IP): Performed by: HOSPITALIST

## 2022-08-20 PROCEDURE — 82962 GLUCOSE BLOOD TEST: CPT

## 2022-08-20 PROCEDURE — 36415 COLL VENOUS BLD VENIPUNCTURE: CPT

## 2022-08-20 PROCEDURE — 96372 THER/PROPH/DIAG INJ SC/IM: CPT

## 2022-08-20 PROCEDURE — G0378 HOSPITAL OBSERVATION PER HR: HCPCS

## 2022-08-20 PROCEDURE — 2580000003 HC RX 258: Performed by: FAMILY MEDICINE

## 2022-08-20 PROCEDURE — 6360000002 HC RX W HCPCS: Performed by: HOSPITALIST

## 2022-08-20 PROCEDURE — 80048 BASIC METABOLIC PNL TOTAL CA: CPT

## 2022-08-20 PROCEDURE — 6370000000 HC RX 637 (ALT 250 FOR IP): Performed by: FAMILY MEDICINE

## 2022-08-20 PROCEDURE — 6370000000 HC RX 637 (ALT 250 FOR IP): Performed by: EMERGENCY MEDICINE

## 2022-08-20 RX ADMIN — FERROUS SULFATE TAB 325 MG (65 MG ELEMENTAL FE) 325 MG: 325 (65 FE) TAB at 08:45

## 2022-08-20 RX ADMIN — POTASSIUM CHLORIDE 10 MEQ: 10 TABLET, EXTENDED RELEASE ORAL at 17:00

## 2022-08-20 RX ADMIN — SODIUM CHLORIDE, PRESERVATIVE FREE 10 ML: 5 INJECTION INTRAVENOUS at 08:45

## 2022-08-20 RX ADMIN — INSULIN LISPRO 2 UNITS: 100 INJECTION, SOLUTION INTRAVENOUS; SUBCUTANEOUS at 08:41

## 2022-08-20 RX ADMIN — CETIRIZINE HYDROCHLORIDE 10 MG: 10 TABLET ORAL at 17:00

## 2022-08-20 RX ADMIN — DOXAZOSIN 4 MG: 4 TABLET ORAL at 08:44

## 2022-08-20 RX ADMIN — CARVEDILOL 3.12 MG: 3.12 TABLET, FILM COATED ORAL at 17:00

## 2022-08-20 RX ADMIN — LOSARTAN POTASSIUM 100 MG: 50 TABLET, FILM COATED ORAL at 17:00

## 2022-08-20 RX ADMIN — SODIUM CHLORIDE, PRESERVATIVE FREE 10 ML: 5 INJECTION INTRAVENOUS at 21:43

## 2022-08-20 RX ADMIN — VITAMIN D, TAB 1000IU (100/BT) 1000 UNITS: 25 TAB at 08:45

## 2022-08-20 RX ADMIN — ENOXAPARIN SODIUM 30 MG: 100 INJECTION SUBCUTANEOUS at 08:45

## 2022-08-20 RX ADMIN — CARVEDILOL 3.12 MG: 3.12 TABLET, FILM COATED ORAL at 08:45

## 2022-08-20 RX ADMIN — GLIPIZIDE 10 MG: 5 TABLET ORAL at 08:45

## 2022-08-20 RX ADMIN — CYANOCOBALAMIN TAB 1000 MCG 1000 MCG: 1000 TAB at 08:45

## 2022-08-20 RX ADMIN — INSULIN LISPRO 2 UNITS: 100 INJECTION, SOLUTION INTRAVENOUS; SUBCUTANEOUS at 17:07
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
## 2022-08-20 NOTE — CARE COORDINATION
CM placed call [facility] [phone] to obtain update regarding pending pre-cert with [facility]). Per Nava Manning with [facility], patient currently under review. Patient pending insurance approval at this time. CM continues to follow. Update 1:08pm- MEAGHAN received a call back from [facility] [employer] [facility], requesting a peer to peer be completed by Monday 8/22/2022 @noon. MEAGHAN notified Dr. Stephanie Patel of request. Tra Ying pending, until further notice.
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
## 2022-08-21 LAB
GLUCOSE BLD STRIP.AUTO-MCNC: 119 MG/DL (ref 65–100)
GLUCOSE BLD STRIP.AUTO-MCNC: 152 MG/DL (ref 65–100)
GLUCOSE BLD STRIP.AUTO-MCNC: 193 MG/DL (ref 65–100)
GLUCOSE BLD STRIP.AUTO-MCNC: 244 MG/DL (ref 65–100)
SARS-COV-2 RDRP RESP QL NAA+PROBE: NOT DETECTED
SERVICE CMNT-IMP: ABNORMAL
SOURCE: NORMAL

## 2022-08-21 PROCEDURE — 96372 THER/PROPH/DIAG INJ SC/IM: CPT

## 2022-08-21 PROCEDURE — 2580000003 HC RX 258: Performed by: FAMILY MEDICINE

## 2022-08-21 PROCEDURE — 82962 GLUCOSE BLOOD TEST: CPT

## 2022-08-21 PROCEDURE — 6370000000 HC RX 637 (ALT 250 FOR IP): Performed by: FAMILY MEDICINE

## 2022-08-21 PROCEDURE — G0378 HOSPITAL OBSERVATION PER HR: HCPCS

## 2022-08-21 PROCEDURE — 6360000002 HC RX W HCPCS: Performed by: HOSPITALIST

## 2022-08-21 PROCEDURE — 6370000000 HC RX 637 (ALT 250 FOR IP): Performed by: EMERGENCY MEDICINE

## 2022-08-21 PROCEDURE — 6370000000 HC RX 637 (ALT 250 FOR IP): Performed by: HOSPITALIST

## 2022-08-21 RX ADMIN — ENOXAPARIN SODIUM 30 MG: 100 INJECTION SUBCUTANEOUS at 08:34

## 2022-08-21 RX ADMIN — CYANOCOBALAMIN TAB 1000 MCG 1000 MCG: 1000 TAB at 08:34

## 2022-08-21 RX ADMIN — POTASSIUM CHLORIDE 10 MEQ: 10 TABLET, EXTENDED RELEASE ORAL at 17:05

## 2022-08-21 RX ADMIN — CARVEDILOL 3.12 MG: 3.12 TABLET, FILM COATED ORAL at 17:05

## 2022-08-21 RX ADMIN — GLIPIZIDE 10 MG: 5 TABLET ORAL at 08:34

## 2022-08-21 RX ADMIN — SODIUM CHLORIDE, PRESERVATIVE FREE 10 ML: 5 INJECTION INTRAVENOUS at 08:38

## 2022-08-21 RX ADMIN — CETIRIZINE HYDROCHLORIDE 10 MG: 10 TABLET ORAL at 17:05

## 2022-08-21 RX ADMIN — SODIUM CHLORIDE, PRESERVATIVE FREE 10 ML: 5 INJECTION INTRAVENOUS at 21:46

## 2022-08-21 RX ADMIN — LOSARTAN POTASSIUM 100 MG: 50 TABLET, FILM COATED ORAL at 17:05

## 2022-08-21 RX ADMIN — FERROUS SULFATE TAB 325 MG (65 MG ELEMENTAL FE) 325 MG: 325 (65 FE) TAB at 05:55

## 2022-08-21 RX ADMIN — INSULIN LISPRO 2 UNITS: 100 INJECTION, SOLUTION INTRAVENOUS; SUBCUTANEOUS at 11:27

## 2022-08-21 RX ADMIN — VITAMIN D, TAB 1000IU (100/BT) 1000 UNITS: 25 TAB at 08:34

## 2022-08-21 RX ADMIN — DOXAZOSIN 4 MG: 4 TABLET ORAL at 08:34

## 2022-08-21 RX ADMIN — CARVEDILOL 3.12 MG: 3.12 TABLET, FILM COATED ORAL at 08:34
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
## 2022-08-22 LAB
GLUCOSE BLD STRIP.AUTO-MCNC: 144 MG/DL (ref 65–100)
GLUCOSE BLD STRIP.AUTO-MCNC: 145 MG/DL (ref 65–100)
GLUCOSE BLD STRIP.AUTO-MCNC: 170 MG/DL (ref 65–100)
GLUCOSE BLD STRIP.AUTO-MCNC: 228 MG/DL (ref 65–100)
SERVICE CMNT-IMP: ABNORMAL

## 2022-08-22 PROCEDURE — 6370000000 HC RX 637 (ALT 250 FOR IP): Performed by: HOSPITALIST

## 2022-08-22 PROCEDURE — 6370000000 HC RX 637 (ALT 250 FOR IP): Performed by: EMERGENCY MEDICINE

## 2022-08-22 PROCEDURE — G0378 HOSPITAL OBSERVATION PER HR: HCPCS

## 2022-08-22 PROCEDURE — 6370000000 HC RX 637 (ALT 250 FOR IP): Performed by: FAMILY MEDICINE

## 2022-08-22 PROCEDURE — 97530 THERAPEUTIC ACTIVITIES: CPT

## 2022-08-22 PROCEDURE — 82962 GLUCOSE BLOOD TEST: CPT

## 2022-08-22 PROCEDURE — 94760 N-INVAS EAR/PLS OXIMETRY 1: CPT

## 2022-08-22 PROCEDURE — 6360000002 HC RX W HCPCS: Performed by: HOSPITALIST

## 2022-08-22 PROCEDURE — 2580000003 HC RX 258: Performed by: FAMILY MEDICINE

## 2022-08-22 PROCEDURE — 96372 THER/PROPH/DIAG INJ SC/IM: CPT

## 2022-08-22 RX ADMIN — CARVEDILOL 3.12 MG: 3.12 TABLET, FILM COATED ORAL at 16:59

## 2022-08-22 RX ADMIN — ENOXAPARIN SODIUM 30 MG: 100 INJECTION SUBCUTANEOUS at 08:52

## 2022-08-22 RX ADMIN — CETIRIZINE HYDROCHLORIDE 10 MG: 10 TABLET ORAL at 17:00

## 2022-08-22 RX ADMIN — GLIPIZIDE 10 MG: 5 TABLET ORAL at 08:52

## 2022-08-22 RX ADMIN — SODIUM CHLORIDE, PRESERVATIVE FREE 10 ML: 5 INJECTION INTRAVENOUS at 21:15

## 2022-08-22 RX ADMIN — FERROUS SULFATE TAB 325 MG (65 MG ELEMENTAL FE) 325 MG: 325 (65 FE) TAB at 06:35

## 2022-08-22 RX ADMIN — POTASSIUM CHLORIDE 10 MEQ: 10 TABLET, EXTENDED RELEASE ORAL at 16:59

## 2022-08-22 RX ADMIN — VITAMIN D, TAB 1000IU (100/BT) 1000 UNITS: 25 TAB at 08:52

## 2022-08-22 RX ADMIN — SODIUM CHLORIDE, PRESERVATIVE FREE 10 ML: 5 INJECTION INTRAVENOUS at 08:53

## 2022-08-22 RX ADMIN — INSULIN LISPRO 2 UNITS: 100 INJECTION, SOLUTION INTRAVENOUS; SUBCUTANEOUS at 12:09

## 2022-08-22 RX ADMIN — LOSARTAN POTASSIUM 100 MG: 50 TABLET, FILM COATED ORAL at 16:59

## 2022-08-22 RX ADMIN — CYANOCOBALAMIN TAB 1000 MCG 1000 MCG: 1000 TAB at 08:52

## 2022-08-22 RX ADMIN — CARVEDILOL 3.12 MG: 3.12 TABLET, FILM COATED ORAL at 08:52

## 2022-08-22 RX ADMIN — DOXAZOSIN 4 MG: 4 TABLET ORAL at 08:52
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
## 2022-08-22 NOTE — PROGRESS NOTES
PHYSICAL THERAPY: Daily Note AM   (Link to Caseload Tracking: PT Visit Days : 2  Time In/Out PT Charge Capture  Rehab Caseload Tracker  Orders    Rosa Patterson is a 80 y.o. male   PRIMARY DIAGNOSIS: Encounter for person awaiting admission to rehabilitation care setting  Essential hypertension [I10]  Failure to thrive in adult [R62.7]  Encounter for medical screening examination [Z13.9]  Encounter for person awaiting admission to rehabilitation care setting [Z75.1]  Dementia without behavioral disturbance, unspecified dementia type [facility] [F03.90]       Observation: Payor: [employer] MEDICARE / Plan: [employer] GOLD PLUS HMO / Product Type: *No Product type* /     ASSESSMENT:     REHAB RECOMMENDATIONS:   Recommendation to date pending progress:  Setting:  Short-term Rehab (unsafe to return home)    Equipment:    To Be Determined     ASSESSMENT:  Mr. Bobbi Gar presenst resting in chair, agreeable to therapy. Pt able to increase gait distance this AM with use of SPC. Still with wide TIM and increased trunk sway and path deviations, resulting in unsteadiness and decreased balance control. Fall risk. Cues for pacing and technique of transfers and standing balance activities to decrease fall risk. At baseline pt is able to navigate unsteady surfaces and longer distances, so he is well below his funcional baseline at this point. Good progress made towards stated goals which remain ongoing and appropriate; pt will benefit from skilled therapy services to address stated deficits to promote return to highest level of function, independence, and safety. Will continue therapy efforts.      SUBJECTIVE:   Mr. Bobbi Gar states, \"I would love to read the newspaper\"     Social/Functional Lives With: Spouse  Type of Home: House  Ambulation Assistance: Independent  Transfer Assistance: Independent  Active : Yes  Occupation: Retired  OBJECTIVE:     PAIN: VITALS / O2: PRECAUTION / Ignacio Repress / DRAINS:   Pre Treatment:   Pain Assessment: None - Denies Pain      Post Treatment: 0/10 Vitals        Oxygen    None    RESTRICTIONS/PRECAUTIONS:  Restrictions/Precautions  Restrictions/Precautions: Fall Risk  Restrictions/Precautions: Fall Risk     MOBILITY: I Mod I S SBA CGA Min Mod Max Total  NT x2 Comments:   Bed Mobility    Rolling [] [] [] [] [x] [] [] [] [] [] []    Supine to Sit [] [] [] [] [x] [] [] [] [] [] []    Scooting [] [] [] [] [x] [] [] [] [] [] []    Sit to Supine [] [] [] [] [x] [] [] [] [] [] []    Transfers    Sit to Stand [] [] [] [] [x] [] [] [] [] [] []    Bed to Chair [] [] [] [] [x] [] [] [] [] [] []    Stand to Sit [] [] [] [] [x] [] [] [] [] [] []     [] [] [] [] [] [] [] [] [] [] []    I=Independent, Mod I=Modified Independent, S=Supervision, SBA=Standby Assistance, CGA=Contact Guard Assistance,   Min=Minimal Assistance, Mod=Moderate Assistance, Max=Maximal Assistance, Total=Total Assistance, NT=Not Tested    BALANCE: Good Fair+ Fair Fair- Poor NT Comments   Sitting Static [x] [] [] [] [] []    Sitting Dynamic [x] [] [] [] [] []              Standing Static [] [] [x] [] [] []    Standing Dynamic [] [] [x] [x] [] []      GAIT: I Mod I S SBA CGA Min Mod Max Total  NT x2 Comments:   Level of Assistance [] [] [] [] [x] [] [] [] [] [] []    Distance 4 x 50 feet    DME SPC    Gait Quality Decreased brianna , Decreased step clearance, Decreased step length, Path deviations , Shuffling , Trunk sway increased, and Wide base of support    Weightbearing Status      Stairs      I=Independent, Mod I=Modified Independent, S=Supervision, SBA=Standby Assistance, CGA=Contact Guard Assistance,   Min=Minimal Assistance, Mod=Moderate Assistance, Max=Maximal Assistance, Total=Total Assistance, NT=Not Tested    PLAN:   ACUTE PHYSICAL THERAPY GOALS:   (Developed with and agreed upon by patient and/or caregiver.)  (1.) Melissa Cee  will move from supine to sit and sit to supine  with INDEPENDENCE within 7 treatment day(s).     (2.) Melissa Cee will transfer from bed to chair and chair to bed with SUPERVISION using the least restrictive device within 7 treatment day(s). (3.) Joanne Mohamud will ambulate with SUPERVISION for 250 feet with the least restrictive device within 7 treatment day(s). (4.) Joanne Mohamud will perform standing static and dynamic balance activities x 20 minutes with SUPERVISION to improve safety within 7 treatment day(s). (5.) Joanne Mohamud will perform bilateral lower extremity exercises x 20 min for HEP with INDEPENDENCE to improve strength, endurance, and functional mobility within 7 treatment day(s). FREQUENCY AND DURATION: 3 times/week for duration of hospital stay or until stated goals are met, whichever comes first.    TREATMENT:   TREATMENT:   Therapeutic Activity (11 Minutes): Therapeutic activity included Transfer Training, Ambulation on level ground, Sitting balance , and Standing balance to improve functional Activity tolerance, Balance, Coordination, Mobility, and Strength.     TREATMENT GRID:  N/A    AFTER TREATMENT PRECAUTIONS: Chair, Needs within reach, and RN notified    INTERDISCIPLINARY COLLABORATION:  RN/ PCT and PT/ PTA    EDUCATION:      TIME IN/OUT:  Time In: 0930  Time Out: [facility]  Minutes: Milagro Farrell 73, PT

## 2022-08-23 LAB
ANION GAP SERPL CALC-SCNC: 2 MMOL/L (ref 7–16)
BUN SERPL-MCNC: 25 MG/DL (ref 8–23)
CALCIUM SERPL-MCNC: 9 MG/DL (ref 8.3–10.4)
CHLORIDE SERPL-SCNC: 109 MMOL/L (ref 98–107)
CO2 SERPL-SCNC: 25 MMOL/L (ref 21–32)
CREAT SERPL-MCNC: 1.2 MG/DL (ref 0.8–1.5)
ERYTHROCYTE [DISTWIDTH] IN BLOOD BY AUTOMATED COUNT: 15.7 % (ref 11.9–14.6)
GLUCOSE BLD STRIP.AUTO-MCNC: 181 MG/DL (ref 65–100)
GLUCOSE BLD STRIP.AUTO-MCNC: 196 MG/DL (ref 65–100)
GLUCOSE BLD STRIP.AUTO-MCNC: 254 MG/DL (ref 65–100)
GLUCOSE SERPL-MCNC: 169 MG/DL (ref 65–100)
HCT VFR BLD AUTO: 40.5 % (ref 41.1–50.3)
HGB BLD-MCNC: 13 G/DL (ref 13.6–17.2)
MCH RBC QN AUTO: 28.7 PG (ref 26.1–32.9)
MCHC RBC AUTO-ENTMCNC: 32.1 G/DL (ref 31.4–35)
MCV RBC AUTO: 89.4 FL (ref 79.6–97.8)
NRBC # BLD: 0 K/UL (ref 0–0.2)
PLATELET # BLD AUTO: 210 K/UL (ref 150–450)
PMV BLD AUTO: 11.3 FL (ref 9.4–12.3)
POTASSIUM SERPL-SCNC: 4.2 MMOL/L (ref 3.5–5.1)
RBC # BLD AUTO: 4.53 M/UL (ref 4.23–5.6)
SERVICE CMNT-IMP: ABNORMAL
SODIUM SERPL-SCNC: 136 MMOL/L (ref 136–145)
WBC # BLD AUTO: 5.8 K/UL (ref 4.3–11.1)

## 2022-08-23 PROCEDURE — G0378 HOSPITAL OBSERVATION PER HR: HCPCS

## 2022-08-23 PROCEDURE — 97530 THERAPEUTIC ACTIVITIES: CPT

## 2022-08-23 PROCEDURE — 6370000000 HC RX 637 (ALT 250 FOR IP): Performed by: HOSPITALIST

## 2022-08-23 PROCEDURE — 6370000000 HC RX 637 (ALT 250 FOR IP): Performed by: PHYSICIAN ASSISTANT

## 2022-08-23 PROCEDURE — 36415 COLL VENOUS BLD VENIPUNCTURE: CPT

## 2022-08-23 PROCEDURE — 80048 BASIC METABOLIC PNL TOTAL CA: CPT

## 2022-08-23 PROCEDURE — 6370000000 HC RX 637 (ALT 250 FOR IP): Performed by: EMERGENCY MEDICINE

## 2022-08-23 PROCEDURE — 96372 THER/PROPH/DIAG INJ SC/IM: CPT

## 2022-08-23 PROCEDURE — 6360000002 HC RX W HCPCS: Performed by: HOSPITALIST

## 2022-08-23 PROCEDURE — 82962 GLUCOSE BLOOD TEST: CPT

## 2022-08-23 PROCEDURE — 2580000003 HC RX 258: Performed by: FAMILY MEDICINE

## 2022-08-23 PROCEDURE — 85027 COMPLETE CBC AUTOMATED: CPT

## 2022-08-23 RX ORDER — LOPERAMIDE HYDROCHLORIDE 2 MG/1
2 CAPSULE ORAL 4 TIMES DAILY PRN
Status: DISCONTINUED | OUTPATIENT
Start: 2022-08-23 | End: 2022-08-26 | Stop reason: HOSPADM

## 2022-08-23 RX ADMIN — FERROUS SULFATE TAB 325 MG (65 MG ELEMENTAL FE) 325 MG: 325 (65 FE) TAB at 06:01

## 2022-08-23 RX ADMIN — ENOXAPARIN SODIUM 30 MG: 100 INJECTION SUBCUTANEOUS at 08:42

## 2022-08-23 RX ADMIN — CYANOCOBALAMIN TAB 1000 MCG 1000 MCG: 1000 TAB at 08:42

## 2022-08-23 RX ADMIN — CARVEDILOL 3.12 MG: 3.12 TABLET, FILM COATED ORAL at 08:42

## 2022-08-23 RX ADMIN — CARVEDILOL 3.12 MG: 3.12 TABLET, FILM COATED ORAL at 17:55

## 2022-08-23 RX ADMIN — SODIUM CHLORIDE, PRESERVATIVE FREE 10 ML: 5 INJECTION INTRAVENOUS at 21:00

## 2022-08-23 RX ADMIN — POTASSIUM CHLORIDE 10 MEQ: 10 TABLET, EXTENDED RELEASE ORAL at 17:55

## 2022-08-23 RX ADMIN — SODIUM CHLORIDE, PRESERVATIVE FREE 10 ML: 5 INJECTION INTRAVENOUS at 08:49

## 2022-08-23 RX ADMIN — LOPERAMIDE HYDROCHLORIDE 2 MG: 2 CAPSULE ORAL at 12:07

## 2022-08-23 RX ADMIN — DOXAZOSIN 4 MG: 4 TABLET ORAL at 08:42

## 2022-08-23 RX ADMIN — VITAMIN D, TAB 1000IU (100/BT) 1000 UNITS: 25 TAB at 08:42

## 2022-08-23 RX ADMIN — GLIPIZIDE 10 MG: 5 TABLET ORAL at 08:42

## 2022-08-23 RX ADMIN — LOSARTAN POTASSIUM 100 MG: 50 TABLET, FILM COATED ORAL at 17:55

## 2022-08-23 RX ADMIN — CETIRIZINE HYDROCHLORIDE 10 MG: 10 TABLET ORAL at 17:55

## 2022-08-23 NOTE — CARE COORDINATION
Dispo update:  Spoke to Ms. Sera Obrien,  at [facility]. She said that there was a hearing yesterday at [facility] about Mr. Goff Coil emergency protective custody [facility]) that was initiated last week (he was removed from his home by a [facility]). Five family members were present in court, including his wife Ms. Darby Bowling (from [employer] conversation with her yesterday before the trial, she wanted him home). However, the Truett Litten ruled that the [facility] remain in effect, and that Mr. Amberly Edwards remain at [facility] until an assisted living facility ([facility]) can be secured for him by the wife and APS working together to find one. They do not want the hospital involved with looking for an half-way, they will do it. Ms. Arturo Tijerina,  at [place] [address] (desk [phone], cell [phone]) will be working on the case.
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
## 2022-08-23 NOTE — PROGRESS NOTES
Pt resting, denies needs at this time, NAD. Hourly rounds completed. Bed in L/L position, call light and personal items within reach. Will continue to monitor and give bedside report to oncoming nurse.
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
## 2022-08-24 LAB
GLUCOSE BLD STRIP.AUTO-MCNC: 159 MG/DL (ref 65–100)
GLUCOSE BLD STRIP.AUTO-MCNC: 163 MG/DL (ref 65–100)
GLUCOSE BLD STRIP.AUTO-MCNC: 189 MG/DL (ref 65–100)
GLUCOSE BLD STRIP.AUTO-MCNC: 268 MG/DL (ref 65–100)
SERVICE CMNT-IMP: ABNORMAL

## 2022-08-24 PROCEDURE — 97530 THERAPEUTIC ACTIVITIES: CPT

## 2022-08-24 PROCEDURE — 6370000000 HC RX 637 (ALT 250 FOR IP): Performed by: HOSPITALIST

## 2022-08-24 PROCEDURE — 96372 THER/PROPH/DIAG INJ SC/IM: CPT

## 2022-08-24 PROCEDURE — 2580000003 HC RX 258: Performed by: FAMILY MEDICINE

## 2022-08-24 PROCEDURE — G0378 HOSPITAL OBSERVATION PER HR: HCPCS

## 2022-08-24 PROCEDURE — 6370000000 HC RX 637 (ALT 250 FOR IP): Performed by: EMERGENCY MEDICINE

## 2022-08-24 PROCEDURE — 82962 GLUCOSE BLOOD TEST: CPT

## 2022-08-24 PROCEDURE — 6370000000 HC RX 637 (ALT 250 FOR IP): Performed by: FAMILY MEDICINE

## 2022-08-24 PROCEDURE — 6360000002 HC RX W HCPCS: Performed by: HOSPITALIST

## 2022-08-24 RX ORDER — ENOXAPARIN SODIUM 100 MG/ML
40 INJECTION SUBCUTANEOUS DAILY
Status: DISCONTINUED | OUTPATIENT
Start: 2022-08-25 | End: 2022-08-26 | Stop reason: HOSPADM

## 2022-08-24 RX ADMIN — SODIUM CHLORIDE, PRESERVATIVE FREE 10 ML: 5 INJECTION INTRAVENOUS at 09:04

## 2022-08-24 RX ADMIN — VITAMIN D, TAB 1000IU (100/BT) 1000 UNITS: 25 TAB at 09:02

## 2022-08-24 RX ADMIN — CYANOCOBALAMIN TAB 1000 MCG 1000 MCG: 1000 TAB at 09:03

## 2022-08-24 RX ADMIN — FERROUS SULFATE TAB 325 MG (65 MG ELEMENTAL FE) 325 MG: 325 (65 FE) TAB at 05:37

## 2022-08-24 RX ADMIN — CARVEDILOL 3.12 MG: 3.12 TABLET, FILM COATED ORAL at 08:16

## 2022-08-24 RX ADMIN — DOXAZOSIN 4 MG: 4 TABLET ORAL at 09:02

## 2022-08-24 RX ADMIN — GLIPIZIDE 10 MG: 5 TABLET ORAL at 09:02

## 2022-08-24 RX ADMIN — SODIUM CHLORIDE, PRESERVATIVE FREE 10 ML: 5 INJECTION INTRAVENOUS at 21:44

## 2022-08-24 RX ADMIN — CETIRIZINE HYDROCHLORIDE 10 MG: 10 TABLET ORAL at 17:57

## 2022-08-24 RX ADMIN — ENOXAPARIN SODIUM 30 MG: 100 INJECTION SUBCUTANEOUS at 09:03

## 2022-08-24 RX ADMIN — POTASSIUM CHLORIDE 10 MEQ: 10 TABLET, EXTENDED RELEASE ORAL at 17:57

## 2022-08-24 RX ADMIN — INSULIN LISPRO 4 UNITS: 100 INJECTION, SOLUTION INTRAVENOUS; SUBCUTANEOUS at 12:22

## 2022-08-24 RX ADMIN — LOSARTAN POTASSIUM 100 MG: 50 TABLET, FILM COATED ORAL at 17:56

## 2022-08-24 RX ADMIN — CARVEDILOL 3.12 MG: 3.12 TABLET, FILM COATED ORAL at 17:00

## 2022-08-24 NOTE — PROGRESS NOTES
Hourly rounds complete this shift, no new complaints at this time, p bed in low, locked position, call light and bedside table within reach,  all needs met. Will continue to monitor Report to day shift nurse.
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
## 2022-08-24 NOTE — PROGRESS NOTES
PHYSICAL THERAPY: Daily Note PM   (Link to Caseload Tracking: PT Visit Days : 3  Time In/Out PT Charge Capture  Rehab Caseload Tracker  Orders    Iris Sheffield is a 80 y.o. male   PRIMARY DIAGNOSIS: Encounter for person awaiting admission to rehabilitation care setting  Essential hypertension [I10]  Failure to thrive in adult [R62.7]  Encounter for medical screening examination [Z13.9]  Encounter for person awaiting admission to rehabilitation care setting [Z75.1]  Dementia without behavioral disturbance, unspecified dementia type ([facility] [F03.90]       Observation: Payor: [employer] MEDICARE / Plan: [employer] GOLD PLUS HMO / Product Type: *No Product type* /     ASSESSMENT:     REHAB RECOMMENDATIONS:   Recommendation to date pending progress:  Setting:  USP  (unsafe to return home)    Equipment:    To Be Determined     ASSESSMENT:  Mr. John Paul Loera resting in bed, agreeable ot hterapy. Today pt performed all mobility including  bed mobility, sitting balance activities, sit <> stand transfers, standing balance activities, and ambulation in room and hallway with supervision, use of SPC. Gait  speed and overall brianna improved. Initial standing balance a bit unsteady, but pt reports this is consistent with his baseline, and he is able to correct without assistance. He reports he is able to be up walking with staff assistance throughout the day. Pt no longer has any needs for acute PT. Will DC from caseload. Please re-consult if any new needs raise. Thank you.      SUBJECTIVE:   Mr. Vineet Riddle states, \"I went for a walk earlier today\"     Social/Functional Lives With: Spouse  Type of Home: House  Ambulation Assistance: Independent  Transfer Assistance: Independent  Active : Yes  Occupation: Retired  OBJECTIVE:     PAIN: VITALS / O2: PRECAUTION / Ileene Elmer / DRAINS:   Pre Treatment:   Pain Assessment: None - Denies Pain      Post Treatment: 0/10 Vitals        Oxygen None    RESTRICTIONS/PRECAUTIONS:  Restrictions/Precautions  Restrictions/Precautions: Fall Risk  Restrictions/Precautions: Fall Risk     MOBILITY: I Mod I S SBA CGA Min Mod Max Total  NT x2 Comments:   Bed Mobility    Rolling [] [] [x] [] [] [] [] [] [] [] []    Supine to Sit [] [] [x] [] [] [] [] [] [] [] []    Scooting [] [] [x] [] [] [] [] [] [] [] []    Sit to Supine [] [] [x] [] [] [] [] [] [] [] []    Transfers    Sit to Stand [] [] [x] [] [] [] [] [] [] [] []    Bed to Chair [] [] [x] [] [] [] [] [] [] [] []    Stand to Sit [] [] [x] [] [] [] [] [] [] [] []     [] [] [] [] [] [] [] [] [] [] []    I=Independent, Mod I=Modified Independent, S=Supervision, SBA=Standby Assistance, CGA=Contact Guard Assistance,   Min=Minimal Assistance, Mod=Moderate Assistance, Max=Maximal Assistance, Total=Total Assistance, NT=Not Tested    BALANCE: Good Fair+ Fair Fair- Poor NT Comments   Sitting Static [x] [] [] [] [] []    Sitting Dynamic [x] [] [] [] [] []              Standing Static [] [x] [x] [] [] []    Standing Dynamic [] [] [x] [] [] []      GAIT: I Mod I S SBA CGA Min Mod Max Total  NT x2 Comments:   Level of Assistance [] [] [x] [] [] [] [] [] [] [] []    Distance 250 feet    DME SPC    Gait Quality Decreased brianna , Decreased step clearance, Decreased step length, Path deviations , Shuffling , Trunk sway increased, and Wide base of support    Weightbearing Status      Stairs      I=Independent, Mod I=Modified Independent, S=Supervision, SBA=Standby Assistance, CGA=Contact Guard Assistance,   Min=Minimal Assistance, Mod=Moderate Assistance, Max=Maximal Assistance, Total=Total Assistance, NT=Not Tested    PLAN:   ACUTE PHYSICAL THERAPY GOALS: GOALS MET 8/24/22  (Developed with and agreed upon by patient and/or caregiver.)  (1.) Alford Leyden  will move from supine to sit and sit to supine  with INDEPENDENCE within 7 treatment day(s).     (2.) Alford Leyden will transfer from bed to chair and chair to bed with SUPERVISION using the least restrictive device within 7 treatment day(s). (3.) Kenisha Abbott will ambulate with SUPERVISION for 250 feet with the least restrictive device within 7 treatment day(s). (4.) Kenisha Abbott will perform standing static and dynamic balance activities x 20 minutes with SUPERVISION to improve safety within 7 treatment day(s). (5.) Kenisha Abbott will perform bilateral lower extremity exercises x 20 min for HEP with INDEPENDENCE to improve strength, endurance, and functional mobility within 7 treatment day(s). FREQUENCY AND DURATION: 3 times/week for duration of hospital stay or until stated goals are met, whichever comes first.    TREATMENT:   TREATMENT:   Therapeutic Activity (8 Minutes): Therapeutic activity included Supine to Sit, Sit to Supine, Transfer Training, Ambulation on level ground, Sitting balance , and Standing balance to improve functional Activity tolerance, Balance, Coordination, Mobility, and Strength.     TREATMENT GRID:  N/A    AFTER TREATMENT PRECAUTIONS: Bed, Bed/Chair Locked, Call light within reach, Needs within reach, and RN notified    INTERDISCIPLINARY COLLABORATION:  RN/ PCT and PT/ PTA    EDUCATION:      TIME IN/OUT:  Time In: 1400  Time Out: [address]  Minutes: Tameka 24, PT
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
## 2022-08-24 NOTE — PROGRESS NOTES
OCCUPATIONAL THERAPY: Daily Note AM   OT Visit Days: 3   Time  OT Charge Capture  Rehab Caseload Tracker  OT Orders    Jeff Blank is a 80 y.o. male   PRIMARY DIAGNOSIS: Encounter for person awaiting admission to rehabilitation care setting  Essential hypertension [I10]  Failure to thrive in adult [R62.7]  Encounter for medical screening examination [Z13.9]  Encounter for person awaiting admission to rehabilitation care setting [Z75.1]  Dementia without behavioral disturbance, unspecified dementia type ([facility] [F03.90]       Observation: Payor: [employer] MEDICARE / Plan: [employer] GOLD PLUS HMO / Product Type: *No Product type* /     ASSESSMENT:     REHAB RECOMMENDATIONS: CURRENT LEVEL OF FUNCTION:  (Most Recently Demonstrated)   Recommendation to date pending progress:  Setting:  YVETTE    Equipment:    To Be Determined Bathing:  Independent  Dressing:  Independent  Feeding/Grooming:  Not Tested  Toileting:  Independent  Functional Mobility:  Independent with SPC     ASSESSMENT:  Mr. Katia Alba is completely independent with all self care activities and mobility. Pt does not demonstrate any further needs for OT. Will d/c from caseload. SUBJECTIVE:     Mr. Katia Alba states, \"I want to go home. \"     Social/Functional Lives With: Spouse  Type of Home: House  Ambulation Assistance: Independent  Transfer Assistance: Independent  Active : Yes  Occupation: Retired    OBJECTIVE:     RICKIE / Cally Whelan / Aldo Shall: NA    RESTRICTIONS/PRECAUTIONS:  Restrictions/Precautions  Restrictions/Precautions: Fall Risk        PAIN: [place] / O2:   Pre Treatment:   Pain Assessment: None - Denies Pain          Post Treatment: none Vitals          Oxygen            MOBILITY: I Mod I S SBA CGA Min Mod Max Total  NT x2 Comments:   Bed Mobility    Rolling [x] [] [] [] [] [] [] [] [] [] []    Supine to Sit [x] [] [] [] [] [] [] [] [] [] []    Scooting [x] [] [] [] [] [] [] [] [] [] []    Sit to Supine [x] [] [] [] [] [] [] [] [] [] []    Transfers Sit to Stand [x] [] [] [] [] [] [] [] [] [] []    Bed to Chair [x] [] [] [] [] [] [] [] [] [] []    Stand to Sit [x] [] [] [] [] [] [] [] [] [] []    Tub/Shower [x] [] [] [] [] [] [] [] [] [] []     Toilet [x] [] [] [] [] [] [] [] [] [] []      [] [] [] [] [] [] [] [] [] [] []    I=Independent, Mod I=Modified Independent, S=Supervision/Setup, SBA=Standby Assistance, CGA=Contact Guard Assistance, Min=Minimal Assistance, Mod=Moderate Assistance, Max=Maximal Assistance, Total=Total Assistance, NT=Not Tested    ACTIVITIES OF DAILY LIVING: I Mod I S SBA CGA Min Mod Max Total NT Comments   BASIC ADLs:              Upper Body   Bathing [x] [] [] [] [] [] [] [] [] []    Lower Body Bathing [x] [] [] [] [] [] [] [] [] []    Toileting [x] [] [] [] [] [] [] [] [] []    Upper Body Dressing [x] [] [] [] [] [] [] [] [] []    Lower Body Dressing [x] [] [] [] [] [] [] [] [] []    Feeding [x] [] [] [] [] [] [] [] [] []    Grooming [x] [] [] [] [] [] [] [] [] []    Personal Device Care [] [] [] [] [] [] [] [] [] []    Functional Mobility [x] [] [] [] [] [] [] [] [] []    I=Independent, Mod I=Modified Independent, S=Supervision/Setup, SBA=Standby Assistance, CGA=Contact Guard Assistance, Min=Minimal Assistance, Mod=Moderate Assistance, Max=Maximal Assistance, Total=Total Assistance, NT=Not Tested    BALANCE: Good Fair+ Fair Fair- Poor NT Comments   Sitting Static [x] [] [] [] [] []    Sitting Dynamic [x] [] [] [] [] []              Standing Static [] [x] [] [] [] []    Standing Dynamic [] [x] [] [] [] []        PLAN:     FREQUENCY/DURATION   D/C d/t no further OT needs    ACUTE OCCUPATIONAL THERAPY GOALS:   (Developed with and agreed upon by patient and/or caregiver.)    1. Patient will complete lower body bathing and dressing with MOD I and adaptive equipment as needed. 2. Patient will complete toileting with MOD I.   3. Patient will tolerate 30 minutes of OT treatment with 1-2 rest breaks to increase activity tolerance for ADLs. 4. Patient will complete functional transfers with MOD I and adaptive equipment as needed. 5. Patient will complete functional mobility for household distances with MOD I and adaptive equipment as needed. 6. Patient will complete self-grooming while standing edge of sink with MOD I and adaptive equipment as needed. TREATMENT:     TREATMENT:   Therapeutic Activity (23 Minutes): Therapeutic activity included Supine to Sit, Transfer Training, Ambulation on level ground, Standing balance, and self care activities  to improve functional Activity tolerance, Balance, Mobility, and Strength.     TREATMENT GRID:  N/A    AFTER TREATMENT PRECAUTIONS: Call light within reach, Chair, and Needs within reach    INTERDISCIPLINARY COLLABORATION:  RN/ PCT and OT/ FREEMAN    EDUCATION:       TOTAL TREATMENT DURATION AND TIME:  Time In: 1130  Time Out: Rene Blanton 105  Minutes: [facility]

## 2022-08-25 LAB
GLUCOSE BLD STRIP.AUTO-MCNC: 161 MG/DL (ref 65–100)
GLUCOSE BLD STRIP.AUTO-MCNC: 176 MG/DL (ref 65–100)
GLUCOSE BLD STRIP.AUTO-MCNC: 200 MG/DL (ref 65–100)
SERVICE CMNT-IMP: ABNORMAL

## 2022-08-25 PROCEDURE — 6360000002 HC RX W HCPCS: Performed by: INTERNAL MEDICINE

## 2022-08-25 PROCEDURE — 82962 GLUCOSE BLOOD TEST: CPT

## 2022-08-25 PROCEDURE — 6370000000 HC RX 637 (ALT 250 FOR IP): Performed by: EMERGENCY MEDICINE

## 2022-08-25 PROCEDURE — 96372 THER/PROPH/DIAG INJ SC/IM: CPT

## 2022-08-25 PROCEDURE — G0378 HOSPITAL OBSERVATION PER HR: HCPCS

## 2022-08-25 PROCEDURE — 6370000000 HC RX 637 (ALT 250 FOR IP): Performed by: PHYSICIAN ASSISTANT

## 2022-08-25 PROCEDURE — 6370000000 HC RX 637 (ALT 250 FOR IP): Performed by: HOSPITALIST

## 2022-08-25 PROCEDURE — 2580000003 HC RX 258: Performed by: FAMILY MEDICINE

## 2022-08-25 RX ADMIN — DOXAZOSIN 4 MG: 4 TABLET ORAL at 08:55

## 2022-08-25 RX ADMIN — LOSARTAN POTASSIUM 100 MG: 50 TABLET, FILM COATED ORAL at 18:16

## 2022-08-25 RX ADMIN — SODIUM CHLORIDE, PRESERVATIVE FREE 10 ML: 5 INJECTION INTRAVENOUS at 20:25

## 2022-08-25 RX ADMIN — CARVEDILOL 3.12 MG: 3.12 TABLET, FILM COATED ORAL at 08:58

## 2022-08-25 RX ADMIN — SODIUM CHLORIDE, PRESERVATIVE FREE 10 ML: 5 INJECTION INTRAVENOUS at 09:00

## 2022-08-25 RX ADMIN — LOPERAMIDE HYDROCHLORIDE 2 MG: 2 CAPSULE ORAL at 04:52

## 2022-08-25 RX ADMIN — CETIRIZINE HYDROCHLORIDE 10 MG: 10 TABLET ORAL at 18:16

## 2022-08-25 RX ADMIN — CYANOCOBALAMIN TAB 1000 MCG 1000 MCG: 1000 TAB at 08:58

## 2022-08-25 RX ADMIN — LOPERAMIDE HYDROCHLORIDE 2 MG: 2 CAPSULE ORAL at 08:55

## 2022-08-25 RX ADMIN — FERROUS SULFATE TAB 325 MG (65 MG ELEMENTAL FE) 325 MG: 325 (65 FE) TAB at 08:58

## 2022-08-25 RX ADMIN — ENOXAPARIN SODIUM 40 MG: 100 INJECTION SUBCUTANEOUS at 08:59

## 2022-08-25 RX ADMIN — VITAMIN D, TAB 1000IU (100/BT) 1000 UNITS: 25 TAB at 08:54

## 2022-08-25 RX ADMIN — POTASSIUM CHLORIDE 10 MEQ: 10 TABLET, EXTENDED RELEASE ORAL at 18:16

## 2022-08-25 RX ADMIN — GLIPIZIDE 10 MG: 5 TABLET ORAL at 08:58
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
## 2022-08-26 VITALS
SYSTOLIC BLOOD PRESSURE: 142 MMHG | HEART RATE: 60 BPM | WEIGHT: 128.75 LBS | BODY MASS INDEX: 20.21 KG/M2 | HEIGHT: 67 IN | RESPIRATION RATE: 18 BRPM | OXYGEN SATURATION: 96 % | TEMPERATURE: 98.4 F | DIASTOLIC BLOOD PRESSURE: 72 MMHG

## 2022-08-26 LAB
GLUCOSE BLD STRIP.AUTO-MCNC: 162 MG/DL (ref 65–100)
SERVICE CMNT-IMP: ABNORMAL

## 2022-08-26 PROCEDURE — 6360000002 HC RX W HCPCS: Performed by: INTERNAL MEDICINE

## 2022-08-26 PROCEDURE — 2580000003 HC RX 258: Performed by: FAMILY MEDICINE

## 2022-08-26 PROCEDURE — 82962 GLUCOSE BLOOD TEST: CPT

## 2022-08-26 PROCEDURE — 96372 THER/PROPH/DIAG INJ SC/IM: CPT

## 2022-08-26 PROCEDURE — G0378 HOSPITAL OBSERVATION PER HR: HCPCS

## 2022-08-26 PROCEDURE — 6370000000 HC RX 637 (ALT 250 FOR IP): Performed by: EMERGENCY MEDICINE

## 2022-08-26 RX ORDER — DOCUSATE SODIUM 100 MG/1
100 CAPSULE, LIQUID FILLED ORAL DAILY
Qty: 30 CAPSULE | Refills: 0 | Status: SHIPPED | OUTPATIENT
Start: 2022-08-26 | End: 2022-09-08

## 2022-08-26 RX ORDER — AMLODIPINE BESYLATE 10 MG/1
10 TABLET ORAL DAILY
Qty: 30 TABLET | Refills: 2 | Status: SHIPPED | OUTPATIENT
Start: 2022-08-26 | End: 2022-09-08

## 2022-08-26 RX ORDER — GLIPIZIDE 5 MG/1
10 TABLET ORAL
Status: DISCONTINUED | OUTPATIENT
Start: 2022-08-26 | End: 2022-08-26 | Stop reason: HOSPADM

## 2022-08-26 RX ORDER — DOXAZOSIN 2 MG/1
4 TABLET ORAL DAILY
Qty: 30 TABLET | Refills: 2 | Status: SHIPPED | OUTPATIENT
Start: 2022-08-26

## 2022-08-26 RX ORDER — CETIRIZINE HYDROCHLORIDE 10 MG/1
10 TABLET ORAL DAILY
Qty: 30 TABLET | Refills: 2 | Status: SHIPPED | OUTPATIENT
Start: 2022-08-26

## 2022-08-26 RX ORDER — ALUMINUM ZIRCONIUM OCTACHLOROHYDREX GLY 16 G/100G
1 GEL TOPICAL DAILY
Qty: 30 CAPSULE | Refills: 2 | Status: SHIPPED | OUTPATIENT
Start: 2022-08-26

## 2022-08-26 RX ORDER — FERROUS SULFATE 325(65) MG
325 TABLET ORAL
Qty: 30 TABLET | Refills: 2 | Status: SHIPPED | OUTPATIENT
Start: 2022-08-26

## 2022-08-26 RX ORDER — IRBESARTAN 300 MG/1
300 TABLET ORAL DAILY
Qty: 90 TABLET | Refills: 2 | Status: ON HOLD | OUTPATIENT
Start: 2022-08-26 | End: 2022-09-11 | Stop reason: HOSPADM

## 2022-08-26 RX ORDER — GLIPIZIDE 5 MG/1
10 TABLET ORAL DAILY
Qty: 60 TABLET | Refills: 1 | Status: SHIPPED | OUTPATIENT
Start: 2022-08-26

## 2022-08-26 RX ORDER — ALBUTEROL SULFATE 90 UG/1
2 AEROSOL, METERED RESPIRATORY (INHALATION) EVERY 6 HOURS PRN
Qty: 18 G | Refills: 3 | Status: SHIPPED | OUTPATIENT
Start: 2022-08-26

## 2022-08-26 RX ORDER — LOPERAMIDE HYDROCHLORIDE 2 MG/1
2 CAPSULE ORAL 4 TIMES DAILY PRN
Qty: 20 CAPSULE | Refills: 0 | Status: SHIPPED | OUTPATIENT
Start: 2022-08-26 | End: 2022-09-05

## 2022-08-26 RX ORDER — CHOLECALCIFEROL (VITAMIN D3) 125 MCG
1 CAPSULE ORAL NIGHTLY
Qty: 30 TABLET | Refills: 2 | Status: SHIPPED | OUTPATIENT
Start: 2022-08-26

## 2022-08-26 RX ORDER — POTASSIUM CHLORIDE 750 MG/1
10 TABLET, FILM COATED, EXTENDED RELEASE ORAL DAILY
Qty: 30 TABLET | Refills: 0 | Status: ON HOLD | OUTPATIENT
Start: 2022-08-26 | End: 2022-09-11 | Stop reason: HOSPADM

## 2022-08-26 RX ORDER — CARVEDILOL 3.12 MG/1
3.12 TABLET ORAL 2 TIMES DAILY WITH MEALS
Qty: 60 TABLET | Refills: 2 | Status: ON HOLD | OUTPATIENT
Start: 2022-08-26 | End: 2022-09-11 | Stop reason: HOSPADM

## 2022-08-26 RX ORDER — DEXTROSE MONOHYDRATE 100 MG/ML
INJECTION, SOLUTION INTRAVENOUS CONTINUOUS PRN
Status: DISCONTINUED | OUTPATIENT
Start: 2022-08-26 | End: 2022-08-26 | Stop reason: HOSPADM

## 2022-08-26 RX ADMIN — ENOXAPARIN SODIUM 40 MG: 100 INJECTION SUBCUTANEOUS at 08:45

## 2022-08-26 RX ADMIN — VITAMIN D, TAB 1000IU (100/BT) 1000 UNITS: 25 TAB at 08:47

## 2022-08-26 RX ADMIN — DOXAZOSIN 4 MG: 4 TABLET ORAL at 08:47

## 2022-08-26 RX ADMIN — CYANOCOBALAMIN TAB 1000 MCG 1000 MCG: 1000 TAB at 08:47

## 2022-08-26 RX ADMIN — SODIUM CHLORIDE, PRESERVATIVE FREE 10 ML: 5 INJECTION INTRAVENOUS at 08:47

## 2022-08-26 RX ADMIN — FERROUS SULFATE TAB 325 MG (65 MG ELEMENTAL FE) 325 MG: 325 (65 FE) TAB at 05:31

## 2022-08-26 RX ADMIN — CARVEDILOL 3.12 MG: 3.12 TABLET, FILM COATED ORAL at 08:46

## 2022-08-26 NOTE — CARE COORDINATION
Faxed signed physician admission orders to [facility] at [address] [facility] fax [phone]. Await their review, and if acceptable, plan for transfer later today. Addendum:  Confirmed with Ms. Mani Espino at [facility], Mr. Oliverio Ashley ok to come. Confirmed with Ms. Penny Will of [facility] (he is under [facility]). She said yes, ok to transfer, and yes, ok to send via Uber/Lyft. Ride arranged for 12 noon, and packet with physician orders and all hard scripts sent with patient. Mr. Oliverio Ashley is in agreement with this plan.

## 2022-08-26 NOTE — PROGRESS NOTES
Discharge instructions given and placed in personal belongings bag for pt to give to staff upon arrival at facility. Transportation scheduled for  at 1200. Pt is ready for discharge.

## 2022-08-26 NOTE — PLAN OF CARE
Problem: Discharge Planning  Goal: Discharge to home or other facility with appropriate resources  8/26/2022 1129 by Maria Cifuentes RN  Outcome: Completed  8/26/2022 0314 by Dara Mathias RN  Outcome: Progressing     Problem: Safety - Adult  Goal: Free from fall injury  8/26/2022 1129 by Maria Cifuentes RN  Outcome: Completed  8/26/2022 0314 by Dara Mathias RN  Outcome: Progressing     Problem: ABCDS Injury Assessment  Goal: Absence of physical injury  8/26/2022 1129 by Maria Cifuentes RN  Outcome: Completed  8/26/2022 0314 by Dara Mathias RN  Outcome: Progressing
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
## 2022-08-29 ENCOUNTER — CARE COORDINATION (OUTPATIENT)
Dept: CARE COORDINATION | Facility: CLINIC | Age: 87
End: 2022-08-29

## 2022-08-29 NOTE — CARE COORDINATION
[facility], needs met by facility staff. No further outreach is indicated at this time.

## 2022-09-08 ENCOUNTER — HOSPITAL ENCOUNTER (INPATIENT)
Age: 87
LOS: 5 days | Discharge: NURSING, REHAB OR LONG-TERM CARE | DRG: 872 | End: 2022-09-13
Attending: EMERGENCY MEDICINE | Admitting: FAMILY MEDICINE
Payer: MEDICARE

## 2022-09-08 ENCOUNTER — APPOINTMENT (OUTPATIENT)
Dept: GENERAL RADIOLOGY | Age: 87
DRG: 872 | End: 2022-09-08
Payer: MEDICARE

## 2022-09-08 DIAGNOSIS — N17.9 ACUTE KIDNEY INJURY (HCC): ICD-10-CM

## 2022-09-08 DIAGNOSIS — R33.8 ACUTE URINARY RETENTION: ICD-10-CM

## 2022-09-08 DIAGNOSIS — R73.9 HYPERGLYCEMIA: ICD-10-CM

## 2022-09-08 DIAGNOSIS — A41.9 SEPTICEMIA (HCC): Primary | ICD-10-CM

## 2022-09-08 PROBLEM — F03.90 DEMENTIA (HCC): Status: ACTIVE | Noted: 2022-06-21

## 2022-09-08 PROBLEM — Z86.79 HISTORY OF ATRIAL FLUTTER: Status: ACTIVE | Noted: 2021-12-06

## 2022-09-08 PROBLEM — E55.9 VITAMIN D DEFICIENCY: Status: ACTIVE | Noted: 2022-06-21

## 2022-09-08 PROBLEM — E11.9 TYPE 2 DIABETES MELLITUS (HCC): Status: ACTIVE | Noted: 2021-01-07

## 2022-09-08 PROBLEM — I25.10 CORONARY ARTERIOSCLEROSIS: Status: ACTIVE | Noted: 2022-06-21

## 2022-09-08 PROBLEM — N40.0 BENIGN PROSTATIC HYPERPLASIA: Status: ACTIVE | Noted: 2022-06-21

## 2022-09-08 PROBLEM — E78.2 MIXED HYPERLIPIDEMIA: Status: ACTIVE | Noted: 2021-09-27

## 2022-09-08 PROBLEM — N18.31 STAGE 3A CHRONIC KIDNEY DISEASE (HCC): Status: ACTIVE | Noted: 2018-12-18

## 2022-09-08 PROBLEM — N39.0 ACUTE UTI: Status: ACTIVE | Noted: 2022-09-08

## 2022-09-08 PROBLEM — I10 ESSENTIAL HYPERTENSION, BENIGN: Status: ACTIVE | Noted: 2021-12-06

## 2022-09-08 PROBLEM — R39.15 URGENCY OF URINATION: Status: ACTIVE | Noted: 2022-01-07

## 2022-09-08 PROBLEM — R79.89 PSEUDOHYPONATREMIA: Status: ACTIVE | Noted: 2022-09-08

## 2022-09-08 PROBLEM — I50.9 CONGESTIVE HEART FAILURE (HCC): Status: ACTIVE | Noted: 2022-07-20

## 2022-09-08 PROBLEM — E11.65 HYPERGLYCEMIA DUE TO TYPE 2 DIABETES MELLITUS (HCC): Status: ACTIVE | Noted: 2022-09-08

## 2022-09-08 PROBLEM — D64.9 ANEMIA: Status: ACTIVE | Noted: 2021-10-18

## 2022-09-08 LAB
ALBUMIN SERPL-MCNC: 2 G/DL (ref 3.2–4.6)
ALBUMIN/GLOB SERPL: 0.4 {RATIO} (ref 1.2–3.5)
ALP SERPL-CCNC: 101 U/L (ref 50–136)
ALT SERPL-CCNC: 11 U/L (ref 12–65)
ANION GAP SERPL CALC-SCNC: 7 MMOL/L (ref 4–13)
APPEARANCE UR: ABNORMAL
AST SERPL-CCNC: 7 U/L (ref 15–37)
BACTERIA URNS QL MICRO: ABNORMAL /HPF
BASOPHILS # BLD: 0.1 K/UL (ref 0–0.2)
BASOPHILS NFR BLD: 0 % (ref 0–2)
BILIRUB SERPL-MCNC: 0.7 MG/DL (ref 0.2–1.1)
BILIRUB UR QL: NEGATIVE
BUN SERPL-MCNC: 91 MG/DL (ref 8–23)
CALCIUM SERPL-MCNC: 8.6 MG/DL (ref 8.3–10.4)
CHLORIDE SERPL-SCNC: 102 MMOL/L (ref 101–110)
CHP ED QC CHECK: YES
CO2 SERPL-SCNC: 23 MMOL/L (ref 21–32)
COLOR UR: ABNORMAL
CREAT SERPL-MCNC: 3.2 MG/DL (ref 0.8–1.5)
DIFFERENTIAL METHOD BLD: ABNORMAL
EOSINOPHIL # BLD: 0 K/UL (ref 0–0.8)
EOSINOPHIL NFR BLD: 0 % (ref 0.5–7.8)
EPI CELLS #/AREA URNS HPF: ABNORMAL /HPF
ERYTHROCYTE [DISTWIDTH] IN BLOOD BY AUTOMATED COUNT: 16.7 % (ref 11.9–14.6)
EST. AVERAGE GLUCOSE BLD GHB EST-MCNC: 217 MG/DL
GLOBULIN SER CALC-MCNC: 4.5 G/DL (ref 2.3–3.5)
GLUCOSE BLD STRIP.AUTO-MCNC: 360 MG/DL (ref 65–100)
GLUCOSE BLD STRIP.AUTO-MCNC: 450 MG/DL (ref 65–100)
GLUCOSE BLD STRIP.AUTO-MCNC: 532 MG/DL (ref 65–100)
GLUCOSE BLD-MCNC: 450 MG/DL
GLUCOSE SERPL-MCNC: 598 MG/DL (ref 65–100)
GLUCOSE UR STRIP.AUTO-MCNC: NEGATIVE MG/DL
HBA1C MFR BLD: 9.2 % (ref 4.8–5.6)
HCT VFR BLD AUTO: 47.1 % (ref 41.1–50.3)
HGB BLD-MCNC: 14.8 G/DL (ref 13.6–17.2)
HGB UR QL STRIP: ABNORMAL
IMM GRANULOCYTES # BLD AUTO: 0.1 K/UL (ref 0–0.5)
IMM GRANULOCYTES NFR BLD AUTO: 1 % (ref 0–5)
KETONES UR QL STRIP.AUTO: ABNORMAL MG/DL
LACTATE SERPL-SCNC: 2 MMOL/L (ref 0.4–2)
LACTATE SERPL-SCNC: 2 MMOL/L (ref 0.4–2)
LEUKOCYTE ESTERASE UR QL STRIP.AUTO: ABNORMAL
LYMPHOCYTES # BLD: 0.8 K/UL (ref 0.5–4.6)
LYMPHOCYTES NFR BLD: 5 % (ref 13–44)
MCH RBC QN AUTO: 28.8 PG (ref 26.1–32.9)
MCHC RBC AUTO-ENTMCNC: 31.4 G/DL (ref 31.4–35)
MCV RBC AUTO: 91.6 FL (ref 79.6–97.8)
MONOCYTES # BLD: 0.5 K/UL (ref 0.1–1.3)
MONOCYTES NFR BLD: 4 % (ref 4–12)
NEUTS SEG # BLD: 13.8 K/UL (ref 1.7–8.2)
NEUTS SEG NFR BLD: 90 % (ref 43–78)
NITRITE UR QL STRIP.AUTO: NEGATIVE
NRBC # BLD: 0 K/UL (ref 0–0.2)
PH UR STRIP: 6 [PH] (ref 5–9)
PLATELET # BLD AUTO: 318 K/UL (ref 150–450)
PMV BLD AUTO: 11.4 FL (ref 9.4–12.3)
POTASSIUM SERPL-SCNC: 5.1 MMOL/L (ref 3.5–5.1)
PROCALCITONIN SERPL-MCNC: 0.51 NG/ML (ref 0–0.49)
PROT SERPL-MCNC: 6.5 G/DL (ref 6.3–8.2)
PROT UR STRIP-MCNC: 100 MG/DL
RBC # BLD AUTO: 5.14 M/UL (ref 4.23–5.6)
SERVICE CMNT-IMP: ABNORMAL
SODIUM SERPL-SCNC: 132 MMOL/L (ref 138–145)
SP GR UR REFRACTOMETRY: 1.01 (ref 1–1.02)
UROBILINOGEN UR QL STRIP.AUTO: 0.2 EU/DL (ref 0.2–1)
WBC # BLD AUTO: 15.2 K/UL (ref 4.3–11.1)
WBC URNS QL MICRO: >100 /HPF

## 2022-09-08 PROCEDURE — 6360000002 HC RX W HCPCS: Performed by: EMERGENCY MEDICINE

## 2022-09-08 PROCEDURE — 80053 COMPREHEN METABOLIC PANEL: CPT

## 2022-09-08 PROCEDURE — 87088 URINE BACTERIA CULTURE: CPT

## 2022-09-08 PROCEDURE — 81001 URINALYSIS AUTO W/SCOPE: CPT

## 2022-09-08 PROCEDURE — 83605 ASSAY OF LACTIC ACID: CPT

## 2022-09-08 PROCEDURE — 71045 X-RAY EXAM CHEST 1 VIEW: CPT

## 2022-09-08 PROCEDURE — 87086 URINE CULTURE/COLONY COUNT: CPT

## 2022-09-08 PROCEDURE — 82962 GLUCOSE BLOOD TEST: CPT

## 2022-09-08 PROCEDURE — 6370000000 HC RX 637 (ALT 250 FOR IP): Performed by: FAMILY MEDICINE

## 2022-09-08 PROCEDURE — 87150 DNA/RNA AMPLIFIED PROBE: CPT

## 2022-09-08 PROCEDURE — 99285 EMERGENCY DEPT VISIT HI MDM: CPT

## 2022-09-08 PROCEDURE — 96361 HYDRATE IV INFUSION ADD-ON: CPT

## 2022-09-08 PROCEDURE — 87205 SMEAR GRAM STAIN: CPT

## 2022-09-08 PROCEDURE — 85025 COMPLETE CBC W/AUTO DIFF WBC: CPT

## 2022-09-08 PROCEDURE — 96360 HYDRATION IV INFUSION INIT: CPT

## 2022-09-08 PROCEDURE — 87040 BLOOD CULTURE FOR BACTERIA: CPT

## 2022-09-08 PROCEDURE — 51702 INSERT TEMP BLADDER CATH: CPT

## 2022-09-08 PROCEDURE — 2580000003 HC RX 258: Performed by: EMERGENCY MEDICINE

## 2022-09-08 PROCEDURE — 87186 SC STD MICRODIL/AGAR DIL: CPT

## 2022-09-08 PROCEDURE — 84145 PROCALCITONIN (PCT): CPT

## 2022-09-08 PROCEDURE — 83036 HEMOGLOBIN GLYCOSYLATED A1C: CPT

## 2022-09-08 PROCEDURE — 87077 CULTURE AEROBIC IDENTIFY: CPT

## 2022-09-08 PROCEDURE — 1100000003 HC PRIVATE W/ TELEMETRY

## 2022-09-08 RX ORDER — CARVEDILOL 3.12 MG/1
3.12 TABLET ORAL 2 TIMES DAILY WITH MEALS
Status: DISCONTINUED | OUTPATIENT
Start: 2022-09-08 | End: 2022-09-13 | Stop reason: HOSPADM

## 2022-09-08 RX ORDER — DOXAZOSIN MESYLATE 4 MG/1
TABLET ORAL
COMMUNITY
Start: 2022-07-29 | End: 2022-09-08

## 2022-09-08 RX ORDER — AMLODIPINE BESYLATE 10 MG/1
TABLET ORAL
COMMUNITY
Start: 2022-05-04 | End: 2022-09-08

## 2022-09-08 RX ORDER — CITALOPRAM 20 MG/1
TABLET ORAL
COMMUNITY
End: 2022-09-08

## 2022-09-08 RX ORDER — 0.9 % SODIUM CHLORIDE 0.9 %
1000 INTRAVENOUS SOLUTION INTRAVENOUS ONCE
Status: COMPLETED | OUTPATIENT
Start: 2022-09-08 | End: 2022-09-08

## 2022-09-08 RX ORDER — INSULIN GLARGINE 100 [IU]/ML
10 INJECTION, SOLUTION SUBCUTANEOUS NIGHTLY
Status: DISCONTINUED | OUTPATIENT
Start: 2022-09-08 | End: 2022-09-09

## 2022-09-08 RX ORDER — TAMSULOSIN HYDROCHLORIDE 0.4 MG/1
0.4 CAPSULE ORAL DAILY
COMMUNITY
Start: 2022-01-21 | End: 2022-09-08

## 2022-09-08 RX ORDER — INSULIN LISPRO 100 [IU]/ML
5 INJECTION, SOLUTION INTRAVENOUS; SUBCUTANEOUS ONCE
Status: COMPLETED | OUTPATIENT
Start: 2022-09-08 | End: 2022-09-08

## 2022-09-08 RX ORDER — CALCIUM CITRATE/VITAMIN D3 200MG-6.25
TABLET ORAL
COMMUNITY
Start: 2022-06-15

## 2022-09-08 RX ORDER — TAMSULOSIN HYDROCHLORIDE 0.4 MG/1
0.4 CAPSULE ORAL DAILY
Status: CANCELLED | OUTPATIENT
Start: 2022-09-08

## 2022-09-08 RX ORDER — 0.9 % SODIUM CHLORIDE 0.9 %
1000 INTRAVENOUS SOLUTION INTRAVENOUS
Status: COMPLETED | OUTPATIENT
Start: 2022-09-08 | End: 2022-09-08

## 2022-09-08 RX ORDER — OLMESARTAN MEDOXOMIL 40 MG/1
TABLET ORAL
COMMUNITY
End: 2022-09-08

## 2022-09-08 RX ADMIN — SODIUM CHLORIDE 1000 ML: 9 INJECTION, SOLUTION INTRAVENOUS at 18:15

## 2022-09-08 RX ADMIN — INSULIN HUMAN 10 UNITS: 100 INJECTION, SOLUTION PARENTERAL at 18:15

## 2022-09-08 RX ADMIN — PIPERACILLIN AND TAZOBACTAM 3375 MG: 3; .375 INJECTION, POWDER, FOR SOLUTION INTRAVENOUS at 18:15

## 2022-09-08 RX ADMIN — INSULIN GLARGINE 10 UNITS: 100 INJECTION, SOLUTION SUBCUTANEOUS at 23:41

## 2022-09-08 RX ADMIN — SODIUM CHLORIDE 1000 ML: 9 INJECTION, SOLUTION INTRAVENOUS at 16:22

## 2022-09-08 RX ADMIN — INSULIN LISPRO 5 UNITS: 100 INJECTION, SOLUTION INTRAVENOUS; SUBCUTANEOUS at 23:44

## 2022-09-08 ASSESSMENT — ENCOUNTER SYMPTOMS
PHOTOPHOBIA: 0
WHEEZING: 0
ABDOMINAL PAIN: 0
VOMITING: 0
NAUSEA: 0
SHORTNESS OF BREATH: 0
VOICE CHANGE: 0
BLURRED VISION: 0
BACK PAIN: 0
COLOR CHANGE: 0
EYE REDNESS: 0

## 2022-09-08 ASSESSMENT — PAIN - FUNCTIONAL ASSESSMENT: PAIN_FUNCTIONAL_ASSESSMENT: 0-10

## 2022-09-08 ASSESSMENT — PAIN SCALES - GENERAL: PAINLEVEL_OUTOF10: 0
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
## 2022-09-08 NOTE — ED TRIAGE NOTES
Pt brought in by EMS from [facility] assisted living. EMS called for hyperglycemia, . Pt does not take insulin, only taking glipizide. Pt reports no other complaints.
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
## 2022-09-08 NOTE — ACP (ADVANCE CARE PLANNING)
[facility] Hospitalist Service  At the heart of better care     Advance Care Planning   Admit Date:  2022  3:27 PM   Name:  Marcia Forman   Age:  80 y.o. Sex:  male  :  1933   MRN:  [id number]   Room:  [facility] is able to make his own decisions:   Yes    If pt unable to make decisions, POA/surrogate decision maker:  Niece Paulina Nolan    Pt alert and oriented x3 on admission, decisional. When discussing code status, pt was firm on his niece Paulina Nolan being his POA and firm on DNR/DNI status. \"Don't put me through all that, just let me go in peace. \"    Patient or surrogate consented to discussion of the current conditions, workup, management plans, prognosis, and the risk for further deterioration. Time spent: 17 minutes in direct discussion. Signed:   eKily Hawley DO

## 2022-09-09 ENCOUNTER — APPOINTMENT (OUTPATIENT)
Dept: ULTRASOUND IMAGING | Age: 87
DRG: 872 | End: 2022-09-09
Payer: MEDICARE

## 2022-09-09 LAB
ACCESSION NUMBER, LLC1M: ABNORMAL
ACINETOBACTER CALCOAC BAUMANNII COMPLEX BY PCR: NOT DETECTED
ANION GAP SERPL CALC-SCNC: 8 MMOL/L (ref 4–13)
BACTEROIDES FRAGILIS BY PCR: NOT DETECTED
BASOPHILS # BLD: 0 K/UL (ref 0–0.2)
BASOPHILS NFR BLD: 0 % (ref 0–2)
BIOFIRE TEST COMMENT: ABNORMAL
BLACTX-M ISLT/SPM QL: NOT DETECTED
BLAIMP ISLT/SPM QL: NOT DETECTED
BLAKPC BLD POS QL NAA+NON-PROBE: NOT DETECTED
BLAOXA-48-LIKE ISLT/SPM QL: NOT DETECTED
BLAVIM ISLT/SPM QL: NOT DETECTED
BUN SERPL-MCNC: 77 MG/DL (ref 8–23)
C ALBICANS DNA BLD POS QL NAA+NON-PROBE: NOT DETECTED
C GLABRATA DNA BLD POS QL NAA+NON-PROBE: NOT DETECTED
C KRUSEI DNA BLD POS QL NAA+NON-PROBE: NOT DETECTED
C PARAP DNA BLD POS QL NAA+NON-PROBE: NOT DETECTED
C TROPICLS DNA BLD POS QL NAA+NON-PROBE: NOT DETECTED
CALCIUM SERPL-MCNC: 8.4 MG/DL (ref 8.3–10.4)
CANDIDA AURIS BY PCR: NOT DETECTED
CHLORIDE SERPL-SCNC: 110 MMOL/L (ref 101–110)
CO2 SERPL-SCNC: 17 MMOL/L (ref 21–32)
COLISTIN RES MCR-1 ISLT/SPM QL: NOT DETECTED
CREAT SERPL-MCNC: 2.2 MG/DL (ref 0.8–1.5)
CREAT UR-MCNC: 64 MG/DL
CRYPTOCOCCUS NEOFORMANS/GATTII BY PCR: NOT DETECTED
DIFFERENTIAL METHOD BLD: ABNORMAL
E CLOAC COMP DNA BLD POS NAA+NON-PROBE: NOT DETECTED
E COLI DNA BLD POS QL NAA+NON-PROBE: NOT DETECTED
ENTEROBACTERALES BY PCR: DETECTED
ENTEROCOCCUS FAECALIS BY PCR: NOT DETECTED
ENTEROCOCCUS FAECIUM BY PCR: NOT DETECTED
EOSINOPHIL # BLD: 0 K/UL (ref 0–0.8)
EOSINOPHIL NFR BLD: 0 % (ref 0.5–7.8)
ERYTHROCYTE [DISTWIDTH] IN BLOOD BY AUTOMATED COUNT: 16.5 % (ref 11.9–14.6)
GLUCOSE BLD STRIP.AUTO-MCNC: 185 MG/DL (ref 65–100)
GLUCOSE BLD STRIP.AUTO-MCNC: 251 MG/DL (ref 65–100)
GLUCOSE BLD STRIP.AUTO-MCNC: 257 MG/DL (ref 65–100)
GLUCOSE BLD STRIP.AUTO-MCNC: 318 MG/DL (ref 65–100)
GLUCOSE SERPL-MCNC: 340 MG/DL (ref 65–100)
GP B STREP DNA BLD POS QL NAA+NON-PROBE: NOT DETECTED
HAEM INFLU DNA BLD POS QL NAA+NON-PROBE: NOT DETECTED
HCT VFR BLD AUTO: 47.6 % (ref 41.1–50.3)
HGB BLD-MCNC: 14.5 G/DL (ref 13.6–17.2)
IMM GRANULOCYTES # BLD AUTO: 0.1 K/UL (ref 0–0.5)
IMM GRANULOCYTES NFR BLD AUTO: 1 % (ref 0–5)
K OXYTOCA DNA BLD POS QL NAA+NON-PROBE: NOT DETECTED
KLEBSIELLA AEROGENES BY PCR: NOT DETECTED
KLEBSIELLA PNEUMONIAE GROUP BY PCR: DETECTED
L MONOCYTOG DNA BLD POS QL NAA+NON-PROBE: NOT DETECTED
LYMPHOCYTES # BLD: 0.6 K/UL (ref 0.5–4.6)
LYMPHOCYTES NFR BLD: 4 % (ref 13–44)
MCH RBC QN AUTO: 28.4 PG (ref 26.1–32.9)
MCHC RBC AUTO-ENTMCNC: 30.5 G/DL (ref 31.4–35)
MCV RBC AUTO: 93.3 FL (ref 79.6–97.8)
MONOCYTES # BLD: 0.6 K/UL (ref 0.1–1.3)
MONOCYTES NFR BLD: 3 % (ref 4–12)
N MEN DNA BLD POS QL NAA+NON-PROBE: NOT DETECTED
NEUTS SEG # BLD: 16 K/UL (ref 1.7–8.2)
NEUTS SEG NFR BLD: 92 % (ref 43–78)
NRBC # BLD: 0 K/UL (ref 0–0.2)
OSMOLALITY UR: 562 MOSM/KG H2O (ref 50–1400)
P AERUGINOSA DNA BLD POS NAA+NON-PROBE: NOT DETECTED
PLATELET # BLD AUTO: 244 K/UL (ref 150–450)
PMV BLD AUTO: 11.8 FL (ref 9.4–12.3)
POTASSIUM SERPL-SCNC: 4.7 MMOL/L (ref 3.5–5.1)
PROT UR-MCNC: 139 MG/DL
PROT/CREAT UR-RTO: 2.2
PROTEUS SP DNA BLD POS QL NAA+NON-PROBE: NOT DETECTED
RBC # BLD AUTO: 5.1 M/UL (ref 4.23–5.6)
RESISTANT GENE NDM BY PCR: NOT DETECTED
RESISTANT GENE TARGETS: ABNORMAL
S AUREUS DNA BLD POS QL NAA+NON-PROBE: NOT DETECTED
S AUREUS+CONS DNA BLD POS NAA+NON-PROBE: NOT DETECTED
S MARCESCENS DNA BLD POS NAA+NON-PROBE: NOT DETECTED
S PNEUM DNA BLD POS QL NAA+NON-PROBE: NOT DETECTED
S PYO DNA BLD POS QL NAA+NON-PROBE: NOT DETECTED
SALMONELLA SPECIES BY PCR: NOT DETECTED
SARS-COV-2 RDRP RESP QL NAA+PROBE: NOT DETECTED
SERVICE CMNT-IMP: ABNORMAL
SODIUM SERPL-SCNC: 135 MMOL/L (ref 138–145)
SOURCE: NORMAL
STAPHYLOCOCCUS EPIDERMIDIS BY PCR: NOT DETECTED
STAPHYLOCOCCUS LUGDUNENSIS BY PCR: NOT DETECTED
STENOTROPHOMONAS MALTOPHILIA BY PCR: NOT DETECTED
STREPTOCOCCUS DNA BLD POS NAA+NON-PROBE: NOT DETECTED
WBC # BLD AUTO: 17.4 K/UL (ref 4.3–11.1)

## 2022-09-09 PROCEDURE — 87205 SMEAR GRAM STAIN: CPT

## 2022-09-09 PROCEDURE — 85025 COMPLETE CBC W/AUTO DIFF WBC: CPT

## 2022-09-09 PROCEDURE — 97165 OT EVAL LOW COMPLEX 30 MIN: CPT

## 2022-09-09 PROCEDURE — 6370000000 HC RX 637 (ALT 250 FOR IP): Performed by: STUDENT IN AN ORGANIZED HEALTH CARE EDUCATION/TRAINING PROGRAM

## 2022-09-09 PROCEDURE — 97112 NEUROMUSCULAR REEDUCATION: CPT

## 2022-09-09 PROCEDURE — 6360000002 HC RX W HCPCS: Performed by: FAMILY MEDICINE

## 2022-09-09 PROCEDURE — 36415 COLL VENOUS BLD VENIPUNCTURE: CPT

## 2022-09-09 PROCEDURE — 87150 DNA/RNA AMPLIFIED PROBE: CPT

## 2022-09-09 PROCEDURE — 87635 SARS-COV-2 COVID-19 AMP PRB: CPT

## 2022-09-09 PROCEDURE — 1100000003 HC PRIVATE W/ TELEMETRY

## 2022-09-09 PROCEDURE — 97530 THERAPEUTIC ACTIVITIES: CPT

## 2022-09-09 PROCEDURE — 80048 BASIC METABOLIC PNL TOTAL CA: CPT

## 2022-09-09 PROCEDURE — 97161 PT EVAL LOW COMPLEX 20 MIN: CPT

## 2022-09-09 PROCEDURE — 87040 BLOOD CULTURE FOR BACTERIA: CPT

## 2022-09-09 PROCEDURE — 2580000003 HC RX 258: Performed by: FAMILY MEDICINE

## 2022-09-09 PROCEDURE — 6370000000 HC RX 637 (ALT 250 FOR IP): Performed by: FAMILY MEDICINE

## 2022-09-09 PROCEDURE — 2500000003 HC RX 250 WO HCPCS: Performed by: FAMILY MEDICINE

## 2022-09-09 PROCEDURE — 83935 ASSAY OF URINE OSMOLALITY: CPT

## 2022-09-09 PROCEDURE — 84156 ASSAY OF PROTEIN URINE: CPT

## 2022-09-09 PROCEDURE — 82962 GLUCOSE BLOOD TEST: CPT

## 2022-09-09 PROCEDURE — 76775 US EXAM ABDO BACK WALL LIM: CPT

## 2022-09-09 RX ORDER — DEXTROSE MONOHYDRATE 100 MG/ML
INJECTION, SOLUTION INTRAVENOUS CONTINUOUS PRN
Status: DISCONTINUED | OUTPATIENT
Start: 2022-09-09 | End: 2022-09-13 | Stop reason: HOSPADM

## 2022-09-09 RX ORDER — INSULIN LISPRO 100 [IU]/ML
0-8 INJECTION, SOLUTION INTRAVENOUS; SUBCUTANEOUS
Status: DISCONTINUED | OUTPATIENT
Start: 2022-09-09 | End: 2022-09-13 | Stop reason: HOSPADM

## 2022-09-09 RX ORDER — SODIUM CHLORIDE 9 MG/ML
INJECTION, SOLUTION INTRAVENOUS PRN
Status: DISCONTINUED | OUTPATIENT
Start: 2022-09-09 | End: 2022-09-13 | Stop reason: HOSPADM

## 2022-09-09 RX ORDER — POLYETHYLENE GLYCOL 3350 17 G/17G
17 POWDER, FOR SOLUTION ORAL DAILY PRN
Status: DISCONTINUED | OUTPATIENT
Start: 2022-09-09 | End: 2022-09-13 | Stop reason: HOSPADM

## 2022-09-09 RX ORDER — INSULIN LISPRO 100 [IU]/ML
2 INJECTION, SOLUTION INTRAVENOUS; SUBCUTANEOUS
Status: DISCONTINUED | OUTPATIENT
Start: 2022-09-09 | End: 2022-09-13 | Stop reason: HOSPADM

## 2022-09-09 RX ORDER — SODIUM CHLORIDE 9 MG/ML
INJECTION, SOLUTION INTRAVENOUS CONTINUOUS
Status: DISCONTINUED | OUTPATIENT
Start: 2022-09-09 | End: 2022-09-11

## 2022-09-09 RX ORDER — INSULIN LISPRO 100 [IU]/ML
0-4 INJECTION, SOLUTION INTRAVENOUS; SUBCUTANEOUS NIGHTLY
Status: DISCONTINUED | OUTPATIENT
Start: 2022-09-09 | End: 2022-09-13 | Stop reason: HOSPADM

## 2022-09-09 RX ORDER — ALBUTEROL SULFATE 90 UG/1
2 AEROSOL, METERED RESPIRATORY (INHALATION) EVERY 6 HOURS PRN
Status: DISCONTINUED | OUTPATIENT
Start: 2022-09-09 | End: 2022-09-13 | Stop reason: HOSPADM

## 2022-09-09 RX ORDER — ACETAMINOPHEN 325 MG/1
650 TABLET ORAL EVERY 6 HOURS PRN
Status: DISCONTINUED | OUTPATIENT
Start: 2022-09-09 | End: 2022-09-13 | Stop reason: HOSPADM

## 2022-09-09 RX ORDER — FAMOTIDINE 20 MG/1
10 TABLET, FILM COATED ORAL DAILY
Status: DISCONTINUED | OUTPATIENT
Start: 2022-09-09 | End: 2022-09-13 | Stop reason: HOSPADM

## 2022-09-09 RX ORDER — SODIUM CHLORIDE 0.9 % (FLUSH) 0.9 %
5-40 SYRINGE (ML) INJECTION EVERY 12 HOURS SCHEDULED
Status: DISCONTINUED | OUTPATIENT
Start: 2022-09-09 | End: 2022-09-13 | Stop reason: HOSPADM

## 2022-09-09 RX ORDER — INSULIN GLARGINE 100 [IU]/ML
15 INJECTION, SOLUTION SUBCUTANEOUS NIGHTLY
Status: DISCONTINUED | OUTPATIENT
Start: 2022-09-09 | End: 2022-09-13

## 2022-09-09 RX ORDER — LEVOFLOXACIN 500 MG/1
750 TABLET, FILM COATED ORAL ONCE
Status: COMPLETED | OUTPATIENT
Start: 2022-09-10 | End: 2022-09-10

## 2022-09-09 RX ORDER — L. ACIDOPHILUS/L.BULGARICUS 1MM CELL
1 TABLET ORAL DAILY
Status: DISCONTINUED | OUTPATIENT
Start: 2022-09-09 | End: 2022-09-13 | Stop reason: HOSPADM

## 2022-09-09 RX ORDER — DOXAZOSIN MESYLATE 4 MG/1
4 TABLET ORAL DAILY
Status: DISCONTINUED | OUTPATIENT
Start: 2022-09-09 | End: 2022-09-13 | Stop reason: HOSPADM

## 2022-09-09 RX ORDER — ONDANSETRON 4 MG/1
4 TABLET, ORALLY DISINTEGRATING ORAL EVERY 8 HOURS PRN
Status: DISCONTINUED | OUTPATIENT
Start: 2022-09-09 | End: 2022-09-13 | Stop reason: HOSPADM

## 2022-09-09 RX ORDER — LANOLIN ALCOHOL/MO/W.PET/CERES
1000 CREAM (GRAM) TOPICAL DAILY
Status: DISCONTINUED | OUTPATIENT
Start: 2022-09-09 | End: 2022-09-13 | Stop reason: HOSPADM

## 2022-09-09 RX ORDER — LANOLIN ALCOHOL/MO/W.PET/CERES
4.5 CREAM (GRAM) TOPICAL NIGHTLY
Status: DISCONTINUED | OUTPATIENT
Start: 2022-09-09 | End: 2022-09-13 | Stop reason: HOSPADM

## 2022-09-09 RX ORDER — ONDANSETRON 2 MG/ML
4 INJECTION INTRAMUSCULAR; INTRAVENOUS EVERY 6 HOURS PRN
Status: DISCONTINUED | OUTPATIENT
Start: 2022-09-09 | End: 2022-09-13 | Stop reason: HOSPADM

## 2022-09-09 RX ORDER — ACETAMINOPHEN 650 MG/1
650 SUPPOSITORY RECTAL EVERY 6 HOURS PRN
Status: DISCONTINUED | OUTPATIENT
Start: 2022-09-09 | End: 2022-09-13 | Stop reason: HOSPADM

## 2022-09-09 RX ORDER — FERROUS SULFATE 325(65) MG
325 TABLET ORAL
Status: DISCONTINUED | OUTPATIENT
Start: 2022-09-09 | End: 2022-09-13 | Stop reason: HOSPADM

## 2022-09-09 RX ORDER — DOCUSATE SODIUM 100 MG/1
100 CAPSULE, LIQUID FILLED ORAL DAILY
Status: DISCONTINUED | OUTPATIENT
Start: 2022-09-09 | End: 2022-09-13 | Stop reason: HOSPADM

## 2022-09-09 RX ORDER — SODIUM CHLORIDE 0.9 % (FLUSH) 0.9 %
5-40 SYRINGE (ML) INJECTION PRN
Status: DISCONTINUED | OUTPATIENT
Start: 2022-09-09 | End: 2022-09-13 | Stop reason: HOSPADM

## 2022-09-09 RX ORDER — HEPARIN SODIUM 5000 [USP'U]/ML
5000 INJECTION, SOLUTION INTRAVENOUS; SUBCUTANEOUS EVERY 8 HOURS SCHEDULED
Status: DISCONTINUED | OUTPATIENT
Start: 2022-09-09 | End: 2022-09-13 | Stop reason: HOSPADM

## 2022-09-09 RX ORDER — CETIRIZINE HYDROCHLORIDE 10 MG/1
10 TABLET ORAL DAILY
Status: DISCONTINUED | OUTPATIENT
Start: 2022-09-09 | End: 2022-09-13 | Stop reason: HOSPADM

## 2022-09-09 RX ORDER — LEVOFLOXACIN 500 MG/1
500 TABLET, FILM COATED ORAL EVERY OTHER DAY
Status: DISCONTINUED | OUTPATIENT
Start: 2022-09-12 | End: 2022-09-11

## 2022-09-09 RX ORDER — VITAMIN B COMPLEX
1000 TABLET ORAL DAILY
Status: DISCONTINUED | OUTPATIENT
Start: 2022-09-09 | End: 2022-09-13 | Stop reason: HOSPADM

## 2022-09-09 RX ADMIN — CYANOCOBALAMIN TAB 1000 MCG 1000 MCG: 1000 TAB at 18:44

## 2022-09-09 RX ADMIN — VITAMIN D, TAB 1000IU (100/BT) 1000 UNITS: 25 TAB at 11:33

## 2022-09-09 RX ADMIN — FAMOTIDINE 10 MG: 20 TABLET, FILM COATED ORAL at 11:33

## 2022-09-09 RX ADMIN — ONDANSETRON 4 MG: 2 INJECTION INTRAMUSCULAR; INTRAVENOUS at 20:11

## 2022-09-09 RX ADMIN — CEFTRIAXONE 1000 MG: 1 INJECTION, POWDER, FOR SOLUTION INTRAMUSCULAR; INTRAVENOUS at 03:35

## 2022-09-09 RX ADMIN — INSULIN LISPRO 2 UNITS: 100 INJECTION, SOLUTION INTRAVENOUS; SUBCUTANEOUS at 17:01

## 2022-09-09 RX ADMIN — FERROUS SULFATE TAB 325 MG (65 MG ELEMENTAL FE) 325 MG: 325 (65 FE) TAB at 06:09

## 2022-09-09 RX ADMIN — SODIUM CHLORIDE, PRESERVATIVE FREE 10 ML: 5 INJECTION INTRAVENOUS at 20:11

## 2022-09-09 RX ADMIN — INSULIN LISPRO 2 UNITS: 100 INJECTION, SOLUTION INTRAVENOUS; SUBCUTANEOUS at 12:56

## 2022-09-09 RX ADMIN — LACTOBACILLUS TAB 1 TABLET: TAB at 18:44

## 2022-09-09 RX ADMIN — Medication 4.5 MG: at 20:36

## 2022-09-09 RX ADMIN — SODIUM CHLORIDE: 9 INJECTION, SOLUTION INTRAVENOUS at 03:01

## 2022-09-09 RX ADMIN — CETIRIZINE HYDROCHLORIDE 10 MG: 10 TABLET ORAL at 11:33

## 2022-09-09 RX ADMIN — INSULIN GLARGINE 15 UNITS: 100 INJECTION, SOLUTION SUBCUTANEOUS at 20:36

## 2022-09-09 RX ADMIN — HEPARIN SODIUM 5000 UNITS: 5000 INJECTION INTRAVENOUS; SUBCUTANEOUS at 16:17

## 2022-09-09 RX ADMIN — INSULIN LISPRO 4 UNITS: 100 INJECTION, SOLUTION INTRAVENOUS; SUBCUTANEOUS at 12:55

## 2022-09-09 RX ADMIN — SODIUM CHLORIDE, PRESERVATIVE FREE 10 ML: 5 INJECTION INTRAVENOUS at 16:16

## 2022-09-09 RX ADMIN — DOCUSATE SODIUM 100 MG: 100 CAPSULE, LIQUID FILLED ORAL at 11:33

## 2022-09-09 RX ADMIN — INSULIN LISPRO 4 UNITS: 100 INJECTION, SOLUTION INTRAVENOUS; SUBCUTANEOUS at 17:00

## 2022-09-09 RX ADMIN — INSULIN LISPRO 6 UNITS: 100 INJECTION, SOLUTION INTRAVENOUS; SUBCUTANEOUS at 09:06

## 2022-09-09 RX ADMIN — TUBERCULIN PURIFIED PROTEIN DERIVATIVE 5 UNITS: 5 INJECTION, SOLUTION INTRADERMAL at 02:56

## 2022-09-09 RX ADMIN — INSULIN LISPRO 2 UNITS: 100 INJECTION, SOLUTION INTRAVENOUS; SUBCUTANEOUS at 09:07

## 2022-09-09 RX ADMIN — HEPARIN SODIUM 5000 UNITS: 5000 INJECTION INTRAVENOUS; SUBCUTANEOUS at 21:17

## 2022-09-09 RX ADMIN — HEPARIN SODIUM 5000 UNITS: 5000 INJECTION INTRAVENOUS; SUBCUTANEOUS at 06:09

## 2022-09-09 ASSESSMENT — PAIN SCALES - GENERAL
PAINLEVEL_OUTOF10: 0

## 2022-09-09 NOTE — PROGRESS NOTES
Dr Sedrick Moay notified of positive blood culture results of gram negative rods and PCR is Kiebsiella and Enterobacter.

## 2022-09-09 NOTE — CONSULTS
Infectious Disease Consult    Today's Date: 9/9/2022   Admit Date: 9/8/2022    Impression:   Complicated UTI  Klebsiella bacteremia  Diabetes. Hgb A1C 9.2 only on glipizide but high on   Urinary retention with mild left hydronephrosis seen on ultrasound. Presumably this is related to urinary retention. BPH? Plan:    I will change to levofloxacin 500 mg po daily starting tomorrow. This is 96% susceptible rate locally. We will ensure susceptibility based on culture. I anticipate a total of 10 days of antibiotic therapy    Anti-infectives:   S/p pip/tazo x 1  S/p ceftriaxone x 1    Subjective:   Date of Consultation:  September 9, 2022  Referring Physician: Josselin Markie    Patient is a 80 y.o. male with diabetes who was admitted yesterday with hyperglycemia which was > 500. He is on glipizide only outpatient. He was having urinary retention. No fever/chills. He was found to have GIANNI with creatinine 3.2, and leukocytosis (WBC > 15). UA had ketones, large LE, 4+ bacteria, and  > 100 WBC. He has been treated with pip/tazo, IVF, and insulin. Blood cultures are growing K pneumoniae. Urine culture is pending. Renal ultrasound showed mild left hydronephrosis. His only present complaint is extreme fatigue.         Patient Active Problem List   Diagnosis    Essential hypertension, benign    Mixed hyperlipidemia    Bradycardia    Acute metabolic encephalopathy    Decubitus ulcer of buttock, stage 2 (HCC)    Hypoxia    Type 2 diabetes mellitus ([address]    Acute respiratory failure with hypoxia (HCC)    1st degree AV block    Dyspnea on exertion    COVID-19    History of atrial flutter    Gastrointestinal hemorrhage associated with anorectal source    Subdural hematoma ([address]    Encounter for person awaiting admission to rehabilitation care setting    GIANNI (acute kidney injury) ([address]    Acute UTI    Hyperglycemia due to type 2 diabetes mellitus ([address]    Benign prostatic hyperplasia    Anemia    Congestive heart failure ([address] Prior to Admission medications    Medication Sig Start Date End Date Taking?  Authorizing Provider   glipiZIDE (GLUCOTROL) 5 MG tablet Take 2 tablets by mouth daily 8/26/22  Yes Baylee Dunbar, DO   cetirizine (ZYRTEC) 10 MG tablet Take 1 tablet by mouth daily 8/26/22  Yes Baylee Dunbar, DO   doxazosin (CARDURA) 2 MG tablet Take 2 tablets by mouth daily 8/26/22  Yes Baylee Dunbar, DO   irbesartan (AVAPRO) 300 MG tablet Take 1 tablet by mouth daily 8/26/22  Yes Baylee Dunbar, DO   carvedilol (COREG) 3.125 MG tablet Take 1 tablet by mouth 2 times daily (with meals) 8/26/22  Yes Baylee Dunbar, DO   cyanocobalamin 1000 MCG tablet Take 1 tablet by mouth daily 8/26/22  Yes Bran Yoon, DO   ferrous sulfate (IRON 325) 325 (65 Fe) MG tablet Take 1 tablet by mouth every morning (before breakfast) 8/26/22  Yes Baylee Dunbar DO   melatonin 5 MG TABS tablet Take 1 tablet by mouth nightly 8/26/22  Yes Baylee Dunbar, DO   potassium chloride (KLOR-CON) 10 MEQ extended release tablet Take 1 tablet by mouth daily 8/26/22  Yes Baylee Dunbar, DO   TRUE METRIX BLOOD GLUCOSE TEST strip  6/15/22   Historical Provider, MD   albuterol sulfate HFA (PROVENTIL;VENTOLIN;PROAIR) 108 (90 Base) MCG/ACT inhaler Inhale 2 puffs into the lungs every 6 hours as needed for Wheezing or Shortness of Breath 8/26/22   Baylee Dunbar, DO   Probiotic Product (ACIDOPHILUS PROBIOTIC) CAPS capsule Take 1 capsule by mouth daily 8/26/22   Baylee Dunbar, DO   vitamin D 25 MCG (1000 UT) CAPS Take 1 capsule by mouth daily 8/26/22   Baylee Dunbar, DO   acetaminophen (TYLENOL) 500 MG tablet Take 500 mg by mouth every 6 hours as needed    Ar Automatic Reconciliation       Allergies   Allergen Reactions    Povidone-Iodine Swelling     And Blisters on skin         Review of Systems:   Per HPI; otherwise complete system review is negative    Objective:     Visit Vitals  BP (!) 97/34   Pulse 53   Temp 98.3 °F (36.8 °C) (Oral)   Resp 26   Ht 5' 7\" (1.702 m)   Wt 128 lb (58.1 kg)   SpO2 96%   BMI 20.05 kg/m²     Temp (24hrs), Av.1 °F (36.7 °C), Min:97.7 °F (36.5 °C), Max:98.4 °F (36.9 °C)       Lines:  Peripheral IV:       Physical Exam:    General:  Alert, cooperative, very thin, appears stated age   Eyes:  Sclera anicteric. Pupils equally round and reactive to light. Mouth/Throat: Mucous membranes normal, oral pharynx clear   Neck: Supple   Lungs:   Clear to auscultation bilaterally, good effort   CV:  Regular rate and rhythm, no murmur, click, rub or gallop   Abdomen:   Soft, no suprapubic or CVA tenderness.  bowel sounds normal. non-distended   Extremities: No cyanosis or edema   Skin: Skin color, texture, turgor normal. no acute rash or lesions   Lymph nodes: Cervical and supraclavicular normal   Musculoskeletal: No swelling or deformity   Lines/Devices:  Intact, no erythema, drainage or tenderness   Psych: Alert and oriented, normal mood affect given the setting       Data Review:     CBC:  Recent Labs     22  1553   WBC 15.2*   HGB 14.8   HCT 47.1          BMP:  Recent Labs     22  1553   BUN 91*   *   K 5.1      CO2 23       LFTS:  Recent Labs     22  1553   ALT 11*       Microbiology:   Blood culture (2022) K pneumonia  Urine culture pending    Imaging:   Renal ultrasound (2022): mild left hydronephrosis      Signed By: Raz Napier MD     2022

## 2022-09-09 NOTE — CARE COORDINATION
1640 pm Clara Asbbvqlk-[phone]-client's niece per Yola Mo is the preferred contact for pt. I have also placed a copy of the pts court orders for his  on the chart. That  is Ramandeep Cartagena cell is [phone]    CM received visit from [address] [place] [address] [phone], states pt is under [facility] and they have a court order for pt. Pt is from an [facility] called [facility], pt is noted as confidential pt at this time and they would like for this to remain, how ever per Imer Calvo pt has a niece Vanessa Figueroa that could be contacted if needed she is going to return a call to this RN CM to provide contact, she has asked that the spouse not be contacted at this time and pt remain a confidential pt. Per Ms Bharat Chow pt will return to the [facility] when medically ready for dc. CM has also requested a copy of the court order to be placed in chart for reference as needed.        09/09/22 1423   Service Assessment   History Provided By Other (see comment)  (DSS  Yola Mo [phone])   Can patient return to prior living arrangement Yes   Social/Functional History   Type of Home Assisted living  ([facility])

## 2022-09-09 NOTE — DIABETES MGMT
Patient admitted 9/9/22 with GIANNI. History of type 2 DM, hyperlipidemia, HTN, 1st degree heart block, CKD, dementia, and CHF. Blood glucose on admission 598. . Creatinine:2.20. GFR: 30. HbA1c 9.2%. Previous HbA1c 7.5%. Pt lives in an assisted living facility, [facility]. Pt is very drowsy. Pt states that he does not know the names of any of his medications that he takes and his blood glucose is monitored by the staff at the assisted living facility. Pt thinks the staff at the assisted living facility give him insulin, but per chart review prior to admission medications only include Glipizide 10 mg daily for management of diabetes. Reviewed current hospital regimen: Lantus 15 units hs (which was increased from previous Lantus 10 units hs), Humalog 2 units 3x/day ac and Humalog correctional scale coverage 4x/day ac and hs. Plan is for pt to return to [facility] at discharge.

## 2022-09-09 NOTE — ED NOTES
Report received from [facility].      Daija Palacios RN  09/08/22 [id number]

## 2022-09-09 NOTE — PROGRESS NOTES
Physician Progress Note      PATIENTRosia Second  CSN #:                  [id number]  :                       1933  ADMIT DATE:       2022 3:27 PM  [address] [place] DATE:  RESPONDING  PROVIDER #:        Penny Lopez MD          QUERY TEXT:    Pt admitted with a UTI, GIANNI. Alfonso Christo Pt noted to have SIRS criteria with an   infection . If possible, please document in the progress notes and discharge   summary if you are evaluating and /or treating any of the following: The medical record reflects the following:  Risk Factors: UTI, GIANNI, Hyperglycemia, dementia  Clinical Indicators: ua--+4 bacteria, wbc >100, large LE, procal--.51, resps   24, positive blood cultures, cr 3.2, blood glucose 598 but pt is diabetic. Treatment: iv rocephin, blood glucose correction, labs, essential home meds. Options provided:  -- Sepsis, present on admission  -- Sepsis, present on admission, now resolved  -- Sepsis, not present on admission  -- UTI  without Sepsis  -- Other - I will add my own diagnosis  -- Disagree - Not applicable / Not valid  -- Disagree - Clinically unable to determine / Unknown  -- Refer to Clinical Documentation Reviewer    PROVIDER RESPONSE TEXT:    This patient has sepsis which was present on admission.     Query created by: Latha Brink on 2022 8:16 AM      Electronically signed by:  Penny Lopez MD 2022 8:49 AM

## 2022-09-09 NOTE — PROGRESS NOTES
OCCUPATIONAL THERAPY Initial Assessment       OT Visit Days: 1  Acknowledge Orders  Time  OT Charge Capture  Rehab Caseload Tracker      Bridgette Stevenson is a 80 y.o. male   PRIMARY DIAGNOSIS: GIANNI (acute kidney injury) ([facility]  Septicemia ([facility] [A41.9]  Hyperglycemia [R73.9]  GIANNI (acute kidney injury) ([facility] [N17.9]  Acute kidney injury ([facility] [N17.9]  Acute urinary retention [R33.8]       Reason for Referral: Generalized Muscle Weakness (M62.81)  Inpatient: Payor: [employer] MEDICARE / Plan: [employer] GOLD PLUS HMO / Product Type: *No Product type* /     ASSESSMENT:     REHAB RECOMMENDATIONS:   Recommendation to date pending progress:  Setting:  [facility] vs. [facility]    Equipment:    To Be Determined     ASSESSMENT:  Mr. Cintia Nieto is a 80 y M with a hx of DMII, PAF, HTN. Pt was admitted for ARF and hyopglycemia. Pt was received supine in bed in ED as inpatient. Bed mobility Min A x2 due to generalized weakness. Sitting EOB requiring Min A for dynamic sitting balance while sipping through straw from cup. Pt held cup himself. STS Min A x2 for just enough time to stand for BP then needed to sit due to fatigue and weakness. Pt has deficits in strength, balance, activity tolerance, and performing ADLs. Pt requires continued skilled OT services due to performing functionally below baseline.       [facility] [address] [id number] AM-PAC 6 Clicks Daily Activity Inpatient Short Form:    AM-PAC Daily Activity Inpatient   How much help for putting on and taking off regular lower body clothing?: A Lot  How much help for Bathing?: A Lot  How much help for Toileting?: A Lot  How much help for putting on and taking off regular upper body clothing?: A Little  How much help for taking care of personal grooming?: None  How much help for eating meals?: None  AM-[facility] Inpatient Daily Activity Raw Score: 17  AM-PAC Inpatient ADL T-Scale Score : 37.26  ADL Inpatient CMS 0-100% Score: 50.11  ADL Inpatient CMS G-Code Modifier : CK           SUBJECTIVE:     Mr. Cintia Nieto states, \"I feel crummy. \"     Social/Functional Lives With:  (care home)  Type of Home: Assisted living  ADL Assistance: Needs assistance  Ambulation Assistance:  (Mod I Cane, but also uses RW)    OBJECTIVE:     Jonny Cárdenas / Farzaneh Parekh / Luke Hook: IV and Telemetry     RESTRICTIONS/PRECAUTIONS:  Restrictions/Precautions: Fall Risk    PAIN: VITALS / O2:   Pre Treatment:   Pain Assessment: None - Denies Pain      Post Treatment: none       Vitals          Oxygen            GROSS EVALUATION: INTACT IMPAIRED   (See Comments)   UE AROM WFL[] []   UE PROM [] []   Strength []  Generalized weakness     Posture / Balance [] Standing - Dynamic: Fair   Sensation []     Coordination [x]       Tone [x]       Edema [x]    Activity Tolerance [] Patient limited by fatigue     Hand Dominance R [x] L []      COGNITION/  PERCEPTION: INTACT IMPAIRED   (See Comments)   Orientation [x]     Vision []     Hearing []  Hearing  aids   Cognition  []  Pleasantly confused   Perception []       MOBILITY: I Mod I S SBA CGA Min Mod Max Total  NT x2 Comments:   Bed Mobility    Rolling [] [] [] [] [] [] [] [] [] [] []    Supine to Sit [] [] [] [] [] [x] [] [] [] [] [x]    Scooting [] [] [] [] [] [] [] [] [] [] []    Sit to Supine [] [] [] [] [] [x] [] [] [] [] [x]    Transfers    Sit to Stand [] [] [] [] [] [x] [] [] [] [] [x]    Bed to Chair [] [] [] [] [] [] [] [] [] [] []    Stand to Sit [] [] [] [] [] [] [] [] [] [] []    Tub/Shower [] [] [] [] [] [] [] [] [] [] []     Toilet [] [] [] [] [] [] [] [] [] [] []      [] [] [] [] [] [] [] [] [] [] []    I=Independent, Mod I=Modified Independent, S=Supervision/Setup, SBA=Standby Assistance, CGA=Contact Guard Assistance, Min=Minimal Assistance, Mod=Moderate Assistance, Max=Maximal Assistance, Total=Total Assistance, NT=Not Tested    ACTIVITIES OF DAILY LIVING: I Mod I S SBA CGA Min Mod Max Total NT Comments   BASIC ADLs:              Upper Body Bathing  [] [] [] [] [] [] [] [] [] []    Lower Body Bathing [] [] [] [] [] [] [] [] [] []    Toileting [] [] [] [] [] [] [] [] [] []    Upper Body Dressing [] [] [] [] [] [] [] [] [] []    Lower Body Dressing [] [] [] [] [] [] [] [] [] []    Feeding [] [] [] [] [] [x] [] [] [] []    Grooming [] [] [] [] [] [] [] [] [] []    Personal Device Care [] [] [] [] [] [] [] [] [] []    Functional Mobility [] [] [] [] [] [x] [] [] [] [] X2 STS at RW   I=Independent, Mod I=Modified Independent, S=Supervision/Setup, SBA=Standby Assistance, CGA=Contact Guard Assistance, Min=Minimal Assistance, Mod=Moderate Assistance, Max=Maximal Assistance, Total=Total Assistance, NT=Not Tested    PLAN:     [facility] of Care: 3 times/week for duration of hospital stay or until stated goals are met, whichever comes first.    ACUTE OCCUPATIONAL THERAPY GOALS:   (Developed with and agreed upon by patient and/or caregiver.)  1. Pt will complete LB ADL Min A with AE as needed. 2. Pt will complete toileting Min A with AE as needed. 3. Pt will complete UB ADL set up only. 4. Pt will tolerate 25 minutes of OT treatment requiring 1-2 breaks as needed. 5. Pt will complete grooming tasks while standing at sink CGA. 6. Pt will complete functional mobility via RW CGA. 7. Pt will tolerate BUE exercises to increase strength for safe, functional transfers/mobility, ADL participation.      PROBLEM LIST:   (Skilled intervention is medically necessary to address:)  Decreased ADL/Functional Activities  Decreased Activity Tolerance  Decreased Balance  Decreased Cognition  Decreased Coordination  Decreased Gait Ability  Decreased Safety Awareness  Decreased Strength  Decreased Transfer Abilities   INTERVENTIONS PLANNED:  (Benefits and precautions of occupational therapy have been discussed with the patient.)  Self Care Training  Therapeutic Activity  Therapeutic Exercise/HEP  Neuromuscular Re-education  Education         TREATMENT:     EVALUATION: MODERATE COMPLEXITY: (Untimed Charge)    TREATMENT: Co-Treatment PT/OT necessary due to patient's decreased overall endurance/tolerance levels, as well as need for high level skilled assistance to complete functional transfers/mobility and functional tasks  Therapeutic Activity (15 Minutes): Therapeutic activity included Supine to Sit, Sit to Supine, Sitting balance , and Standing balance to improve functional Activity tolerance, Balance, Coordination, Mobility, and Strength.     TREATMENT GRID:  N/A    AFTER TREATMENT PRECAUTIONS: Bed, Bed/Chair Locked, Call light within reach, Needs within reach, RN notified, and Side rails x3    INTERDISCIPLINARY COLLABORATION:  RN/ PCT, PT/ PTA, and OT/ FREEMAN    EDUCATION:  Education Given To: Patient  Education Provided: Role of Therapy;Plan of Care  Education Outcome: Verbalized understanding;Continued education needed    TOTAL TREATMENT DURATION AND TIME:  Time In: 1115  Time Out: 1132  Minutes: [address] [place] [address], OT

## 2022-09-09 NOTE — PROGRESS NOTES
Noted patient is being admitted to floor from ER.       LOCAL     GIANNI     DEMENTIA            WILL FOLLOW AS NEEDED

## 2022-09-09 NOTE — PROGRESS NOTES
TRANSFER - OUT REPORT:    Verbal report given to Emmanuel Barba on Critical access hospital  being transferred to [phone] for routine progression of patient care       Report consisted of patient's Situation, Background, Assessment and   Recommendations(SBAR). Information from the following report(s) Nurse Handoff Report, ED SBAR, Adult Overview, and Recent Results was reviewed with the receiving nurse. Lines:   Peripheral IV 09/08/22 Distal;Right Cephalic (Active)   Site Assessment Clean, dry & intact 09/09/22 0427   Line Status Capped 09/09/22 0427   Line Care Connections checked and tightened 09/09/22 0427   Phlebitis Assessment No symptoms 09/09/22 0427   Infiltration Assessment 0 09/09/22 0427   Alcohol Cap Used Yes 09/09/22 0427   Dressing Status Clean, dry & intact 09/09/22 0427   Dressing Type Transparent 09/09/22 0427       Peripheral IV 09/08/22 Left Antecubital (Active)   Site Assessment Clean, dry & intact 09/09/22 0427   Line Status Infusing 09/09/22 0427   Line Care Connections checked and tightened 09/09/22 0427   Phlebitis Assessment No symptoms 09/09/22 0427   Infiltration Assessment 0 09/09/22 0427   Alcohol Cap Used Yes 09/09/22 0427   Dressing Status Clean, dry & intact 09/09/22 0427   Dressing Type Transparent 09/09/22 0427        Opportunity for questions and clarification was provided.       Patient transported with:  [employer]

## 2022-09-09 NOTE — CARE COORDINATION
Chart screened by  for discharge planning. No needs identified at this time. Abx changed to PO. Please consult  if any new issues arise.       ASSESSMENT NOTE    Attending Physician: Albert Mendiola MD  Admit Problem: Septicemia ([address] [A41.9]  Hyperglycemia [R73.9]  GIANNI (acute kidney injury) ([address] [N17.9]  Acute kidney injury ([address] [N17.9]  Acute urinary retention [R33.8]  Date/Time of Admission: 9/8/2022  3:27 PM  Problem List:  Patient Active Problem List   Diagnosis    Essential hypertension, benign    Mixed hyperlipidemia    Bradycardia    Acute metabolic encephalopathy    Decubitus ulcer of buttock, stage 2 (HCC)    Hypoxia    Type 2 diabetes mellitus (HCC)    Acute respiratory failure with hypoxia (HCC)    1st degree AV block    Dyspnea on exertion    COVID-19    History of atrial flutter    Gastrointestinal hemorrhage associated with anorectal source    Subdural hematoma ([address]    Encounter for person awaiting admission to rehabilitation care setting    GIANNI (acute kidney injury) ([address]    Acute UTI    Hyperglycemia due to type 2 diabetes mellitus ([address]    Benign prostatic hyperplasia    Anemia    Congestive heart failure (HCC)    Coronary arteriosclerosis    Dementia (HCC)    Vitamin D deficiency    Stage 3a chronic kidney disease ([address]    Urgency of urination    Pseudohyponatremia           Services At/After Discharge  Transition of Care Consult (CM Consult): Discharge Planning           FLORENTIN FALK MSW 09/09/22 11:30 AM

## 2022-09-09 NOTE — PROGRESS NOTES
studies and renal US     Hyperglycemia  DMII  Hold home Glipizide  Lantus 15 units and humalog 2 units tid   Continue SSI  Check hA1C  Consult diabetes management to assist     Urinary retention  BPH  UTI  most likely contributing  Cont home Cardura  Leave plascencia in place for 2-3 days then voiding trial. F/u with Urology outpatient     Chronic diastolic CHF  Hx of aflutter  TTE in 11/2021 with EF 60-65% with mild Grade 1DD. [facility] Cardiology. Cont home Coreg, not AC d/t hx of frequent falls and previous subdural hematoma     HTN  Hold home Olmesartan d/t GIANNI  Hold home Norvasc to avoid hypotension as BP low-normal on admission     LAUREN  Cont home ferrous sulfate     Vitamin D and B12 def  Cont home supplement     Hx of dementia  Noted. Pt alert and oriented x3 on admission     Anticipated discharge needs: Pending STR     Diet: No diet orders on file  VTE ppx: Heparin SQ  Code status: Prior      Hospital Problems             Last Modified POA    * (Principal) GIANNI (acute kidney injury) ([address] 9/8/2022 Yes    Acute UTI 9/8/2022 Yes    Hyperglycemia due to type 2 diabetes mellitus ([address] 9/8/2022 Yes    Benign prostatic hyperplasia 9/8/2022 Yes    Anemia 9/8/2022 Yes    Overview Signed 9/8/2022 11:07 PM by Jem Burns DO     Last Assessment & Plan:   Formatting of this note might be different from the original.  Patient expresses compliance with oral iron. Pending iron panel today.          Congestive heart failure ([address] 9/8/2022 Yes    Dementia ([address] 9/8/2022 Yes    Vitamin D deficiency 9/8/2022 Yes    Stage 3a chronic kidney disease ([address] 9/8/2022 Yes    Overview Signed 9/8/2022 11:07 PM by Jem Burns,      Last Assessment & Plan:   Formatting of this note might be different from the original.  Ordered CMP, monitor while on metformin         Pseudohyponatremia 9/8/2022 Yes    Essential hypertension, benign 9/8/2022 Yes    Mixed hyperlipidemia 9/8/2022 Yes    Type 2 diabetes mellitus ([address] 9/8/2022 Yes    History of atrial flutter 9/8/2022 Yes       Objective:   Patient Vitals for the past 24 hrs:   Temp Pulse Resp BP SpO2   09/09/22 1455 98.3 °F (36.8 °C) 61 23 (!) 102/43 96 %   09/09/22 1020 98.3 °F (36.8 °C) 66 29 (!) 95/48 --   09/09/22 0822 98.3 °F (36.8 °C) 53 26 (!) 97/34 --   09/09/22 0454 97.9 °F (36.6 °C) 60 20 (!) 114/43 96 %   09/09/22 0430 -- 60 20 (!) 105/42 --   09/09/22 0000 98.4 °F (36.9 °C) 56 22 (!) 119/48 96 %   09/08/22 2109 98.2 °F (36.8 °C) 56 22 (!) 92/47 96 %   09/08/22 2100 -- 57 -- (!) 107/47 98 %   09/08/22 2059 -- -- 18 -- --   09/08/22 2045 -- 59 24 (!) 106/46 96 %   09/08/22 2039 -- 60 21 103/72 --   09/08/22 2030 -- -- -- (!) 100/47 98 %   09/08/22 2017 -- -- -- -- 97 %   09/08/22 2015 -- -- -- (!) 105/45 --   09/08/22 1845 -- -- -- (!) 120/47 --   09/08/22 1800 -- -- -- (!) 110/51 --   09/08/22 1730 -- -- -- (!) 110/45 --       Estimated body mass index is 20.05 kg/m² as calculated from the following:    Height as of this encounter: 5' 7\" (1.702 m). Weight as of this encounter: 128 lb (58.1 kg). Intake/Output Summary (Last 24 hours) at 9/9/2022 1724  Last data filed at 9/9/2022 1401  Gross per 24 hour   Intake 990 ml   Output 510 ml   Net 480 ml         Physical Exam:     Blood pressure (!) 102/43, pulse 61, temperature 98.3 °F (36.8 °C), temperature source Oral, resp. rate 23, height 5' 7\" (1.702 m), weight 128 lb (58.1 kg), SpO2 96 %. General:    Well nourished. No overt distress. Head:  Normocephalic, atraumatic  Eyes:  Sclerae appear normal. Pupils equally round. ENT:  Nares appear normal, no drainage. Moist oral mucosa  Neck:  No restricted ROM. Trachea midline. CV:   RRR. No m/r/g. No jugular venous distension. Lungs:   CTAB. No wheezing, rhonchi, or rales. Respirations even, unlabored. Abdomen: Bowel sounds present. Soft, nontender, nondistended. Extremities: No cyanosis or clubbing. No edema. Skin:     No rashes and normal coloration. Warm and dry.     Neuro:  CN II-XII grossly intact. Sensation intact. A&Ox2  Psych:  Normal mood and affect.       I have reviewed ordered lab tests and independently visualized imaging below:    Recent Labs:  Recent Results (from the past 48 hour(s))   POCT Glucose    Collection Time: 09/08/22  3:49 PM   Result Value Ref Range    POC Glucose 532 (HH) 65 - 100 mg/dL    Performed by: Ilene    CBC with Auto Differential    Collection Time: 09/08/22  3:53 PM   Result Value Ref Range    WBC 15.2 (H) 4.3 - 11.1 K/uL    RBC 5.14 4.23 - 5.6 M/uL    Hemoglobin 14.8 13.6 - 17.2 g/dL    Hematocrit 47.1 41.1 - 50.3 %    MCV 91.6 79.6 - 97.8 FL    MCH 28.8 26.1 - 32.9 PG    MCHC 31.4 31.4 - 35.0 g/dL    RDW 16.7 (H) 11.9 - 14.6 %    Platelets 054 079 - 270 K/uL    MPV 11.4 9.4 - 12.3 FL    nRBC 0.00 0.0 - 0.2 K/uL    Differential Type AUTOMATED      Seg Neutrophils 90 (H) 43 - 78 %    Lymphocytes 5 (L) 13 - 44 %    Monocytes 4 4.0 - 12.0 %    Eosinophils % 0 (L) 0.5 - 7.8 %    Basophils 0 0.0 - 2.0 %    Immature Granulocytes 1 0.0 - 5.0 %    Segs Absolute 13.8 (H) 1.7 - 8.2 K/UL    Absolute Lymph # 0.8 0.5 - 4.6 K/UL    Absolute Mono # 0.5 0.1 - 1.3 K/UL    Absolute Eos # 0.0 0.0 - 0.8 K/UL    Basophils Absolute 0.1 0.0 - 0.2 K/UL    Absolute Immature Granulocyte 0.1 0.0 - 0.5 K/UL   CMP    Collection Time: 09/08/22  3:53 PM   Result Value Ref Range    Sodium 132 (L) 138 - 145 mmol/L    Potassium 5.1 3.5 - 5.1 mmol/L    Chloride 102 101 - 110 mmol/L    CO2 23 21 - 32 mmol/L    Anion Gap 7 4 - 13 mmol/L    Glucose 598 (HH) 65 - 100 mg/dL    BUN 91 (H) 8 - 23 MG/DL    Creatinine 3.20 (H) 0.8 - 1.5 MG/DL    GFR  24 (L) >60 ml/min/1.73m2    GFR Non- 20 (L) >60 ml/min/1.73m2    Calcium 8.6 8.3 - 10.4 MG/DL    Total Bilirubin 0.7 0.2 - 1.1 MG/DL    ALT 11 (L) 12 - 65 U/L    AST 7 (L) 15 - 37 U/L    Alk Phosphatase 101 50 - 136 U/L    Total Protein 6.5 6.3 - 8.2 g/dL    Albumin 2.0 (L) 3.2 - 4.6 g/dL    Globulin 4.5 (H) 2.3 - 3.5 g/dL    Albumin/Globulin Ratio 0.4 (L) 1.2 - 3.5     Hemoglobin A1C    Collection Time: 09/08/22  3:53 PM   Result Value Ref Range    Hemoglobin A1C 9.2 (H) 4.8 - 5.6 %    eAG 217 mg/dL   Procalcitonin    Collection Time: 09/08/22  4:20 PM   Result Value Ref Range    Procalcitonin 0.51 (H) 0.00 - 0.49 ng/mL   Urinalysis w rflx microscopic    Collection Time: 09/08/22  4:21 PM   Result Value Ref Range    Color, UA YELLOW/STRAW      Appearance TURBID      Specific Gravity, UA 1.014 1.001 - 1.023      pH, Urine 6.0 5.0 - 9.0      Protein,  (A) NEG mg/dL    Glucose, UA Negative mg/dL    Ketones, Urine TRACE (A) NEG mg/dL    Bilirubin Urine Negative NEG      Blood, Urine MODERATE (A) NEG      Urobilinogen, Urine 0.2 0.2 - 1.0 EU/dL    Nitrite, Urine Negative NEG      Leukocyte Esterase, Urine LARGE (A) NEG      WBC, UA >100 0 /hpf    Epithelial Cells UA 0-3 0 /hpf    BACTERIA, URINE 4+ (H) 0 /hpf   Lactic Acid    Collection Time: 09/08/22  5:10 PM   Result Value Ref Range    Lactic Acid, Plasma 2.0 0.4 - 2.0 MMOL/L   Blood Culture 1    Collection Time: 09/08/22  5:13 PM    Specimen: Blood   Result Value Ref Range    Special Requests LEFT  FOREARM        Gram stain GRAM NEGATIVE RODS      Gram stain ANAEROBIC BOTTLE POSITIVE      Gram stain        RESULTS VERIFIED, PHONED TO AND READ BACK BY DONTA Rubi @4636 9.9.22 SC    Culture CULTURE IN PROGRESS,FURTHER UPDATES TO FOLLOW      Culture Refer to Blood Culture ID Panel Accession [id number]     Culture, Blood, PCR ID Panel    Collection Time: 09/08/22  5:13 PM    Specimen: Blood   Result Value Ref Range    ACCESSION NUMBER, [id number]     Enterococcus faecalis by PCR NOT DETECTED NOTDET      Enterococcus faecium by PCR NOT DETECTED NOTDET      Listeria monocytogenes by PCR NOT DETECTED NOTDET      STAPHYLOCOCCUS NOT DETECTED NOTDET      Staphylococcus Aureus NOT DETECTED NOTDET      Staphylococcus epidermidis by PCR NOT DETECTED NOTDET Staphylococcus lugdunensis by PCR NOT DETECTED NOTDET      STREPTOCOCCUS NOT DETECTED NOTDET      Streptococcus agalactiae (Group B) NOT DETECTED NOTDET      Strep pneumoniae NOT DETECTED NOTDET      Strep pyogenes,(Grp. A) NOT DETECTED NOTDET      Acinetobacter calcoac baumannii complex by PCR NOT DETECTED NOTDET      Bacteroides fragilis by PCR NOT DETECTED NOTDET      Enterobacteriaceae by PCR Detected (A) NOTDET      Enterobacter cloacae complex by PCR NOT DETECTED NOTDET      Escherichia Coli NOT DETECTED NOTDET      Klebsiella aerogenes by PCR NOT DETECTED NOTDET      Klebsiella oxytoca by PCR NOT DETECTED NOTDET      Klebsiella pneumoniae group by PCR Detected (A) NOTDET      Proteus by PCR NOT DETECTED NOTDET      Salmonella species by PCR NOT DETECTED NOTDET      Serratia marcescens by PCR NOT DETECTED NOTDET      Haemophilus Influenzae by PCR NOT DETECTED NOTDET      Neisseria meningitidis by PCR NOT DETECTED NOTDET      Pseudomonas aeruginosa NOT DETECTED NOTDET      Stenotrophomonas maltophilia by PCR NOT DETECTED NOTDET      Candida albicans by PCR NOT DETECTED NOTDET      Candida auris by PCR NOT DETECTED NOTDET      Candida glabrata NOT DETECTED NOTDET      Candida krusei by PCR NOT DETECTED NOTDET      Candida parapsilosis by PCR NOT DETECTED NOTDET      Candida tropicalis by PCR NOT DETECTED NOTDET      Cryptococcus neoformans/gattii by PCR NOT DETECTED NOTDET      Resistant gene targets          Resistant gene ctx-m by PCR NOT DETECTED NOTDET      Resistant gene imp by PCR NOT DETECTED NOTDET      KPC (CARBAPENEM RESISTANCE GENE) NOT DETECTED NOTDET      Colistin Resistance mcr-1 gene by PCR NOT DETECTED NOTDET      Resistant gene ndm by PCR NOT DETECTED NOTDET      Resistant gene oxa-48-like by pcr NOT DETECTED NOTDET      Resistant gene vim by PCR NOT DETECTED NOTDET      [employer] test comment        False positive results may rarely occur.  Correlate with clinical,epidemiologic, and other laboratory findings   POCT Glucose    Collection Time: 09/08/22  8:06 PM   Result Value Ref Range    POC Glucose 450 (H) 65 - 100 mg/dL    Performed by: Aida    Lactic Acid    Collection Time: 09/08/22  8:09 PM   Result Value Ref Range    Lactic Acid, Plasma 2.0 0.4 - 2.0 MMOL/L   POCT Glucose    Collection Time: 09/08/22  8:19 PM   Result Value Ref Range    Glucose 450 mg/dL    QC OK? yes    Culture, Urine    Collection Time: 09/08/22  9:27 PM    Specimen: URINE-CLEAN CATCH   Result Value Ref Range    Special Requests NO SPECIAL REQUESTS      Culture        No growth after short period of incubation. Further results to follow after overnight incubation.    POCT Glucose    Collection Time: 09/08/22 11:37 PM   Result Value Ref Range    POC Glucose 360 (H) 65 - 100 mg/dL    Performed by: [place]    Protein / creatinine ratio, urine    Collection Time: 09/09/22  3:07 AM   Result Value Ref Range    Protein, Urine, Random 139 (H) <11.9 mg/dL    Creatinine, Ur 64.00 mg/dL    PROTEIN/CREAT RATIO URINE RAN 2.2     Osmolality, Urine    Collection Time: 09/09/22  3:07 AM   Result Value Ref Range    Osmolality, Ur 562 50 - 1400 MOSM/kg H2O   COVID-19, Rapid    Collection Time: 09/09/22  3:10 AM    Specimen: Nasopharyngeal   Result Value Ref Range    Source NASAL      SARS-CoV-2, Rapid Not detected NOTD     POCT Glucose    Collection Time: 09/09/22  7:25 AM   Result Value Ref Range    POC Glucose 318 (H) 65 - 100 mg/dL    Performed by: [place]    Basic Metabolic Panel w/ Reflex to MG    Collection Time: 09/09/22  7:27 AM   Result Value Ref Range    Sodium 135 (L) 138 - 145 mmol/L    Potassium 4.7 3.5 - 5.1 mmol/L    Chloride 110 101 - 110 mmol/L    CO2 17 (L) 21 - 32 mmol/L    Anion Gap 8 4 - 13 mmol/L    Glucose 340 (H) 65 - 100 mg/dL    BUN 77 (H) 8 - 23 MG/DL    Creatinine 2.20 (H) 0.8 - 1.5 MG/DL    GFR  36 (L) >60 ml/min/1.73m2    GFR Non- 30 (L) >60 ml/min/1.73m2 Calcium 8.4 8.3 - 10.4 MG/DL   CBC with Auto Differential    Collection Time: 09/09/22  7:27 AM   Result Value Ref Range    WBC 17.4 (H) 4.3 - 11.1 K/uL    RBC 5.10 4.23 - 5.6 M/uL    Hemoglobin 14.5 13.6 - 17.2 g/dL    Hematocrit 47.6 41.1 - 50.3 %    MCV 93.3 79.6 - 97.8 FL    MCH 28.4 26.1 - 32.9 PG    MCHC 30.5 (L) 31.4 - 35.0 g/dL    RDW 16.5 (H) 11.9 - 14.6 %    Platelets 134 273 - 563 K/uL    MPV 11.8 9.4 - 12.3 FL    nRBC 0.00 0.0 - 0.2 K/uL    Differential Type AUTOMATED      Seg Neutrophils 92 (H) 43 - 78 %    Lymphocytes 4 (L) 13 - 44 %    Monocytes 3 (L) 4.0 - 12.0 %    Eosinophils % 0 (L) 0.5 - 7.8 %    Basophils 0 0.0 - 2.0 %    Immature Granulocytes 1 0.0 - 5.0 %    Segs Absolute 16.0 (H) 1.7 - 8.2 K/UL    Absolute Lymph # 0.6 0.5 - 4.6 K/UL    Absolute Mono # 0.6 0.1 - 1.3 K/UL    Absolute Eos # 0.0 0.0 - 0.8 K/UL    Basophils Absolute 0.0 0.0 - 0.2 K/UL    Absolute Immature Granulocyte 0.1 0.0 - 0.5 K/UL   POCT Glucose    Collection Time: 09/09/22 12:14 PM   Result Value Ref Range    POC Glucose 251 (H) 65 - 100 mg/dL    Performed by: [employer]    POCT Glucose    Collection Time: 09/09/22  4:37 PM   Result Value Ref Range    POC Glucose 257 (H) 65 - 100 mg/dL    Performed by: [employer]          Other Studies:  XR CHEST 1 VIEW    Result Date: 9/8/2022  EXAM: XR CHEST 1 VIEW INDICATION: sepsis r/o pulm source COMPARISON: Chest radiograph, 8/18/2022 through 1/25/2021 FINDINGS: The cardiomediastinal silhouette is normal in size with atherosclerotic calcifications in the aorta. No pleural effusion or pneumothorax. Unchanged diffuse coarse interstitial lung markings which remain similar over multiple comparisons. No superimposed consolidation. No acute osseous abnormality. Stable chronic appearing lung changes most likely reflecting interstitial lung disease. No superimposed acute process evident.      US RETROPERITONEAL LIMITED    Result Date: 9/9/2022  Clinical history: Acute kidney injury with UTI. Evaluate for obstruction. TECHNIQUE: Grayscale renal ultrasound. FINDINGS: Kidneys are echogenic, consistent with medical renal disease. The kidneys are normal in contour and size. Right kidney measures 10.7 cm and the left measures 10 cm. There is mild left hydronephrosis. There is tiny left perinephric fluid, nonspecific. No right hydronephrosis. Urinary bladder appears decompressed. 1. Echogenic kidneys, consistent with medical renal disease. 2. Mild left hydronephrosis. No obstructive etiology is identified.       Current Meds:  Current Facility-Administered Medications   Medication Dose Route Frequency    albuterol sulfate HFA (PROVENTIL;VENTOLIN;PROAIR) 108 (90 Base) MCG/ACT inhaler 2 puff  2 puff Inhalation Q6H PRN    cetirizine (ZYRTEC) tablet 10 mg  10 mg Oral Daily    vitamin B-12 (CYANOCOBALAMIN) tablet 1,000 mcg  1,000 mcg Oral Daily    docusate sodium (COLACE) capsule 100 mg  100 mg Oral Daily    doxazosin (CARDURA) tablet 4 mg  4 mg Oral Daily    ferrous sulfate (IRON 325) tablet 325 mg  325 mg Oral QAM AC    melatonin tablet 4.5 mg  4.5 mg Oral Nightly    lactobacillus acidophilus (FLORANEX) 1 tablet  1 tablet Oral Daily    Vitamin D (CHOLECALCIFEROL) tablet 1,000 Units  1,000 Units Oral Daily    sodium chloride flush 0.9 % injection 5-40 mL  5-40 mL IntraVENous 2 times per day    sodium chloride flush 0.9 % injection 5-40 mL  5-40 mL IntraVENous PRN    0.9 % sodium chloride infusion   IntraVENous PRN    heparin (porcine) injection 5,000 Units  5,000 Units SubCUTAneous 3 times per day    ondansetron (ZOFRAN-ODT) disintegrating tablet 4 mg  4 mg Oral Q8H PRN    Or    ondansetron (ZOFRAN) injection 4 mg  4 mg IntraVENous Q6H PRN    polyethylene glycol (GLYCOLAX) packet 17 g  17 g Oral Daily PRN    acetaminophen (TYLENOL) tablet 650 mg  650 mg Oral Q6H PRN    Or    acetaminophen (TYLENOL) suppository 650 mg  650 mg Rectal Q6H PRN    0.9 % sodium chloride infusion   IntraVENous Continuous    tuberculin injection 5 Units  5 Units IntraDERmal Once    famotidine (PEPCID) tablet 10 mg  10 mg Oral Daily    glucose chewable tablet 16 g  4 tablet Oral PRN    dextrose bolus 10% 125 mL  125 mL IntraVENous PRN    Or    dextrose bolus 10% 250 mL  250 mL IntraVENous PRN    glucagon (rDNA) injection 1 mg  1 mg SubCUTAneous PRN    dextrose 10 % infusion   IntraVENous Continuous PRN    insulin lispro (HUMALOG) injection vial 0-8 Units  0-8 Units SubCUTAneous TID WC    insulin lispro (HUMALOG) injection vial 0-4 Units  0-4 Units SubCUTAneous Nightly    insulin glargine (LANTUS) injection vial 15 Units  15 Units SubCUTAneous Nightly    insulin lispro (HUMALOG) injection vial 2 Units  2 Units SubCUTAneous TID WC    [START ON 9/10/2022] levoFLOXacin (LEVAQUIN) tablet 750 mg  750 mg Oral Once    [START ON 9/12/2022] levoFLOXacin (LEVAQUIN) tablet 500 mg  500 mg Oral Every Other Day    [Held by provider] carvedilol (COREG) tablet 3.125 mg  3.125 mg Oral BID        Signed:  Nancy Bettencourt MD    Part of this note may have been written by using a voice dictation software. The note has been proof read but may still contain some grammatical/other typographical errors.

## 2022-09-09 NOTE — PROGRESS NOTES
PHYSICAL THERAPY Initial Assessment, Daily Note, and AM  (Link to Caseload Tracking: PT Visit Days : 1  Acknowledge Orders  Time In/Out  PT Charge Capture  Rehab Caseload Tracker    Russ Dupree is a 80 y.o. male   PRIMARY DIAGNOSIS: GIANNI (acute kidney injury) ([facility] [address]  Septicemia ([facility] [address] [A41.9]  Hyperglycemia [R73.9]  GIANNI (acute kidney injury) ([facility] [address] [N17.9]  Acute kidney injury ([facility] [address] [N17.9]  Acute urinary retention [R33.8]       Reason for Referral: Generalized Muscle Weakness (M62.81)  Difficulty in walking, Not elsewhere classified (R26.2)  Inpatient: Payor: [facility] / Plan: [employer] / Product Type: *No Product type* /     ASSESSMENT:     REHAB RECOMMENDATIONS:   Recommendation to date pending progress:  Setting:  HHPT at [facility]    Equipment:     Recommend utilizing a RW     ASSESSMENT:  Mr. Priscilla Han is a pleasant 80year old male admitted with ARF and hyperglycemia. Patient is seen this AM for initial PT evaluation. At baseline, patient reports he lives at an [facility] where he is ambulatory with SPC>RW and requires assistance for ADLs. Today, patient presents with decreased functional strength, activity tolerance, and balance/gait status. Required minimal assistance x2 to stand today; unable to advance to stepping secondary to fatigue. See detailed assessment below. Recommend HHPT at [facility] at discharge. Will follow with stated plan of care during acute stay.      [employer] AM-PAC 6 Clicks Basic Mobility Inpatient Short Form  AM-PAC Mobility Inpatient   How much difficulty turning over in bed?: A Little  How much difficulty sitting down on / standing up from a chair with arms?: A Lot  How much difficulty moving from lying on back to sitting on side of bed?: A Lot  How much help from another person moving to and from a bed to a chair?: A Lot  How much help from another person needed to walk in hospital room?: A Lot  How much help from another person for climbing 3-5 steps with a railing?: A Lot  AM-PAC Inpatient Mobility Raw Score : 13  AM-PAC Inpatient T-Scale Score : 36.74  Mobility Inpatient CMS 0-100% Score: 64.91  Mobility Inpatient CMS G-Code Modifier : CL    SUBJECTIVE:   Mr. Esther Mcneal states, \"Thank you. \"     Social/Functional Lives With:  (YVETTE)  Type of Home: Assisted living  ADL Assistance: Needs assistance  Ambulation Assistance:  (With SPC>RW)    OBJECTIVE:     PAIN: VITALS / O2: PRECAUTION / Molinda Cooks / DRAINS:   Pre Treatment:   Pain Assessment: None - Denies Pain      Post Treatment: 0/10 Vitals        Oxygen      Buchanan Catheter and IV    RESTRICTIONS/PRECAUTIONS:  Restrictions/Precautions: Fall Risk                 GROSS EVALUATION: Intact Impaired (Comments):   AROM []  Mild hamstring tightness   PROM []    Strength []  Generalized trunk and B LE   Balance []  Fair- dynamically in sitting and statically in standing   Posture [] Forward Head  Trunk Flexion   Sensation []     Coordination []      Tone []     Edema []    Activity Tolerance []  Impaired secondary to acute illness    []      COGNITION/  PERCEPTION: Intact Impaired (Comments):   Orientation []  Oriented to person, place, and intermittently situation   Vision []     Hearing []     Cognition  []       MOBILITY: I Mod I S SBA CGA Min Mod Max Total  NT x2 Comments:   Bed Mobility    Rolling [] [] [] [] [] [x] [] [] [] [] []    Supine to Sit [] [] [] [] [] [x] [] [] [] [] [x]    Scooting [] [] [] [] [] [x] [] [] [] [] []    Sit to Supine [] [] [] [] [] [x] [] [] [] [] [x]    Transfers    Sit to Stand [] [] [] [] [] [x] [] [] [] [] [x]    Bed to Chair [] [] [] [] [] [] [] [] [] [x] []    Stand to Sit [] [] [] [] [] [x] [] [] [] [] [x]     [] [] [] [] [] [] [] [] [] [] []    I=Independent, Mod I=Modified Independent, S=Supervision, SBA=Standby Assistance, CGA=Contact Guard Assistance,   Min=Minimal Assistance, Mod=Moderate Assistance, Max=Maximal Assistance, Total=Total Assistance, NT=Not Tested    GAIT: I Mod I S SBA CGA Min Mod Max Total NT x2 Comments:   Level of Assistance [] [] [] [] [] [] [] [] [] [] [] Unable to progress to stepping secondary to fatigue    Distance   feet    DME N/A    Gait Quality N/A    Weightbearing Status Restrictions/Precautions  Restrictions/Precautions: Fall Risk    Stairs      I=Independent, Mod I=Modified Independent, S=Supervision, SBA=Standby Assistance, CGA=Contact Guard Assistance,   Min=Minimal Assistance, Mod=Moderate Assistance, Max=Maximal Assistance, Total=Total Assistance, NT=Not Tested    PLAN:   ACUTE PHYSICAL THERAPY GOALS:   (Developed with and agreed upon by patient and/or caregiver.)  ST. Patient will perform bed mobility with CONTACT GUARD ASSISTANCE within 3 days. 2. Patient will transfer bed to chair with MINIMAL ASSISTANCE within 3 days. 3. Patient will demonstrate GOOD DYNAMIC SITTING balance within 3 day(s). 4. Patient will ambulate 25+ using least restrictive assistive device and MINIMAL ASSISTANCE within 3 days. 5. Patient will tolerate 15+ minutes of therapeutic activity/exercise and/or neuromuscular re-education while maintaining stable vitals to improve functional strength and activity tolerance within 3 days. LT. Patient will perform bed mobility with INDEPENDENCE within 7 days. 2. Patient will transfer bed to chair with CONTACT GUARD ASSISTANCE within 7 days. 3. Patient will demonstrate FAIR DYNAMIC STANDING balance within 7 day(s). 4. Patient will ambulate 75ft+ using least restrictive assistive device and MINIMAL ASSISTANCE within 7 days. 5. Patient will tolerate 25+ minutes of therapeutic activity/exercise and/or neuromuscular re-education while maintaining stable vitals to improve functional strength and activity tolerance within 7 days.     FREQUENCY AND DURATION: 3 times/week for duration of hospital stay or until stated goals are met, whichever comes first.    THERAPY PROGNOSIS: Good    PROBLEM LIST:   (Skilled intervention is medically necessary to address:)  Decreased ADL/Functional Activities  Decreased Activity Tolerance  Decreased Balance  Decreased Gait Ability  Decreased Strength  Decreased Transfer Abilities INTERVENTIONS PLANNED:   (Benefits and precautions of physical therapy have been discussed with the patient.)  Therapeutic Activity  Therapeutic Exercise/HEP  Neuromuscular Re-education  Gait Training  Education       TREATMENT:   EVALUATION: LOW COMPLEXITY: (Untimed Charge)  At this time, patient is appropriate for Co-treatment with occupational therapy due to patient's decreased overall endurance/tolerance levels, as well as need for high level skilled assistance to complete functional transfers/mobility and functional tasks. Minna Cuevas is appropriate for a multidisciplinary co-treatment of PT and OT to address goals of both disciplines. TREATMENT:   Neuromuscular Re-education (8 Minutes): Neuromuscular Re-education included Balance Training, Functional mobility with facilitation, Sitting balance training, and Standing balance training to improve Balance, Functional Mobility, and Postural Control. TREATMENT GRID:  N/A    AFTER TREATMENT PRECAUTIONS: Alarm Activated, Bed, Bed/Chair Locked, Call light within reach, Needs within reach, RN at bedside, RN notified, and Side rails x3    INTERDISCIPLINARY COLLABORATION:  RN/ PCT, PT/ PTA, and OT/ FREEMAN    EDUCATION: Education Given To: Patient  Education Provided: Role of Therapy;Plan of Care;Transfer Training;Family Education;Equipment; Fall Prevention Strategies  Education Method: Demonstration;Verbal  Barriers to Learning: None  Education Outcome: Verbalized understanding;Demonstrated understanding;Continued education needed    TIME IN/OUT:  Time In: 1115  Time Out: 1132  Minutes: [address], PT

## 2022-09-10 LAB
ACCESSION NUMBER, LLC1M: ABNORMAL
ACINETOBACTER CALCOAC BAUMANNII COMPLEX BY PCR: NOT DETECTED
ANION GAP SERPL CALC-SCNC: 11 MMOL/L (ref 4–13)
BACTEROIDES FRAGILIS BY PCR: NOT DETECTED
BASOPHILS # BLD: 0.1 K/UL (ref 0–0.2)
BASOPHILS NFR BLD: 0 % (ref 0–2)
BIOFIRE TEST COMMENT: ABNORMAL
BUN SERPL-MCNC: 79 MG/DL (ref 8–23)
C ALBICANS DNA BLD POS QL NAA+NON-PROBE: NOT DETECTED
C GLABRATA DNA BLD POS QL NAA+NON-PROBE: NOT DETECTED
C KRUSEI DNA BLD POS QL NAA+NON-PROBE: NOT DETECTED
C PARAP DNA BLD POS QL NAA+NON-PROBE: NOT DETECTED
C TROPICLS DNA BLD POS QL NAA+NON-PROBE: NOT DETECTED
CALCIUM SERPL-MCNC: 8.1 MG/DL (ref 8.3–10.4)
CANDIDA AURIS BY PCR: NOT DETECTED
CHLORIDE SERPL-SCNC: 113 MMOL/L (ref 101–110)
CO2 SERPL-SCNC: 11 MMOL/L (ref 21–32)
CREAT SERPL-MCNC: 2 MG/DL (ref 0.8–1.5)
CRYPTOCOCCUS NEOFORMANS/GATTII BY PCR: NOT DETECTED
DIFFERENTIAL METHOD BLD: ABNORMAL
E CLOAC COMP DNA BLD POS NAA+NON-PROBE: NOT DETECTED
E COLI DNA BLD POS QL NAA+NON-PROBE: NOT DETECTED
ENTEROBACTERALES BY PCR: NOT DETECTED
ENTEROCOCCUS FAECALIS BY PCR: NOT DETECTED
ENTEROCOCCUS FAECIUM BY PCR: NOT DETECTED
EOSINOPHIL # BLD: 0 K/UL (ref 0–0.8)
EOSINOPHIL NFR BLD: 0 % (ref 0.5–7.8)
ERYTHROCYTE [DISTWIDTH] IN BLOOD BY AUTOMATED COUNT: 16.6 % (ref 11.9–14.6)
GLUCOSE BLD STRIP.AUTO-MCNC: 130 MG/DL (ref 65–100)
GLUCOSE BLD STRIP.AUTO-MCNC: 131 MG/DL (ref 65–100)
GLUCOSE BLD STRIP.AUTO-MCNC: 159 MG/DL (ref 65–100)
GLUCOSE BLD STRIP.AUTO-MCNC: 165 MG/DL (ref 65–100)
GLUCOSE BLD STRIP.AUTO-MCNC: 80 MG/DL (ref 65–100)
GLUCOSE SERPL-MCNC: 152 MG/DL (ref 65–100)
GP B STREP DNA BLD POS QL NAA+NON-PROBE: NOT DETECTED
HAEM INFLU DNA BLD POS QL NAA+NON-PROBE: NOT DETECTED
HCT VFR BLD AUTO: 40.1 % (ref 41.1–50.3)
HGB BLD-MCNC: 12.8 G/DL (ref 13.6–17.2)
IMM GRANULOCYTES # BLD AUTO: 0.2 K/UL (ref 0–0.5)
IMM GRANULOCYTES NFR BLD AUTO: 1 % (ref 0–5)
K OXYTOCA DNA BLD POS QL NAA+NON-PROBE: NOT DETECTED
KLEBSIELLA AEROGENES BY PCR: NOT DETECTED
KLEBSIELLA PNEUMONIAE GROUP BY PCR: NOT DETECTED
L MONOCYTOG DNA BLD POS QL NAA+NON-PROBE: NOT DETECTED
LYMPHOCYTES # BLD: 1.1 K/UL (ref 0.5–4.6)
LYMPHOCYTES NFR BLD: 7 % (ref 13–44)
MCH RBC QN AUTO: 29.4 PG (ref 26.1–32.9)
MCHC RBC AUTO-ENTMCNC: 31.9 G/DL (ref 31.4–35)
MCV RBC AUTO: 92 FL (ref 79.6–97.8)
MECA+MECC ISLT/SPM QL: DETECTED
MM INDURATION, POC: 0 MM (ref 0–5)
MONOCYTES # BLD: 0.7 K/UL (ref 0.1–1.3)
MONOCYTES NFR BLD: 5 % (ref 4–12)
N MEN DNA BLD POS QL NAA+NON-PROBE: NOT DETECTED
NEUTS SEG # BLD: 14.5 K/UL (ref 1.7–8.2)
NEUTS SEG NFR BLD: 87 % (ref 43–78)
NRBC # BLD: 0 K/UL (ref 0–0.2)
P AERUGINOSA DNA BLD POS NAA+NON-PROBE: NOT DETECTED
PLATELET # BLD AUTO: 279 K/UL (ref 150–450)
PMV BLD AUTO: 11.8 FL (ref 9.4–12.3)
POTASSIUM SERPL-SCNC: 4.7 MMOL/L (ref 3.5–5.1)
PPD, POC: NEGATIVE
PROTEUS SP DNA BLD POS QL NAA+NON-PROBE: NOT DETECTED
RBC # BLD AUTO: 4.36 M/UL (ref 4.23–5.6)
RESISTANT GENE TARGETS: ABNORMAL
S AUREUS DNA BLD POS QL NAA+NON-PROBE: NOT DETECTED
S AUREUS+CONS DNA BLD POS NAA+NON-PROBE: DETECTED
S MARCESCENS DNA BLD POS NAA+NON-PROBE: NOT DETECTED
S PNEUM DNA BLD POS QL NAA+NON-PROBE: NOT DETECTED
S PYO DNA BLD POS QL NAA+NON-PROBE: NOT DETECTED
SALMONELLA SPECIES BY PCR: NOT DETECTED
SERVICE CMNT-IMP: ABNORMAL
SERVICE CMNT-IMP: NORMAL
SODIUM SERPL-SCNC: 135 MMOL/L (ref 136–145)
STAPHYLOCOCCUS EPIDERMIDIS BY PCR: DETECTED
STAPHYLOCOCCUS LUGDUNENSIS BY PCR: NOT DETECTED
STENOTROPHOMONAS MALTOPHILIA BY PCR: NOT DETECTED
STREPTOCOCCUS DNA BLD POS NAA+NON-PROBE: NOT DETECTED
WBC # BLD AUTO: 16.6 K/UL (ref 4.3–11.1)

## 2022-09-10 PROCEDURE — 85025 COMPLETE CBC W/AUTO DIFF WBC: CPT

## 2022-09-10 PROCEDURE — 36415 COLL VENOUS BLD VENIPUNCTURE: CPT

## 2022-09-10 PROCEDURE — 6370000000 HC RX 637 (ALT 250 FOR IP): Performed by: STUDENT IN AN ORGANIZED HEALTH CARE EDUCATION/TRAINING PROGRAM

## 2022-09-10 PROCEDURE — 82962 GLUCOSE BLOOD TEST: CPT

## 2022-09-10 PROCEDURE — 6370000000 HC RX 637 (ALT 250 FOR IP): Performed by: FAMILY MEDICINE

## 2022-09-10 PROCEDURE — 2580000003 HC RX 258: Performed by: FAMILY MEDICINE

## 2022-09-10 PROCEDURE — 80048 BASIC METABOLIC PNL TOTAL CA: CPT

## 2022-09-10 PROCEDURE — 1100000000 HC RM PRIVATE

## 2022-09-10 PROCEDURE — 94760 N-INVAS EAR/PLS OXIMETRY 1: CPT

## 2022-09-10 PROCEDURE — 6370000000 HC RX 637 (ALT 250 FOR IP): Performed by: INTERNAL MEDICINE

## 2022-09-10 PROCEDURE — 6360000002 HC RX W HCPCS: Performed by: FAMILY MEDICINE

## 2022-09-10 RX ORDER — 0.9 % SODIUM CHLORIDE 0.9 %
500 INTRAVENOUS SOLUTION INTRAVENOUS ONCE
Status: COMPLETED | OUTPATIENT
Start: 2022-09-10 | End: 2022-09-10

## 2022-09-10 RX ADMIN — SODIUM CHLORIDE, PRESERVATIVE FREE 5 ML: 5 INJECTION INTRAVENOUS at 09:26

## 2022-09-10 RX ADMIN — CYANOCOBALAMIN TAB 1000 MCG 1000 MCG: 1000 TAB at 09:25

## 2022-09-10 RX ADMIN — DOCUSATE SODIUM 100 MG: 100 CAPSULE, LIQUID FILLED ORAL at 09:25

## 2022-09-10 RX ADMIN — HEPARIN SODIUM 5000 UNITS: 5000 INJECTION INTRAVENOUS; SUBCUTANEOUS at 05:52

## 2022-09-10 RX ADMIN — HEPARIN SODIUM 5000 UNITS: 5000 INJECTION INTRAVENOUS; SUBCUTANEOUS at 12:50

## 2022-09-10 RX ADMIN — INSULIN LISPRO 2 UNITS: 100 INJECTION, SOLUTION INTRAVENOUS; SUBCUTANEOUS at 12:47

## 2022-09-10 RX ADMIN — SODIUM CHLORIDE, PRESERVATIVE FREE 10 ML: 5 INJECTION INTRAVENOUS at 21:51

## 2022-09-10 RX ADMIN — CETIRIZINE HYDROCHLORIDE 10 MG: 10 TABLET ORAL at 09:25

## 2022-09-10 RX ADMIN — Medication 4.5 MG: at 21:34

## 2022-09-10 RX ADMIN — VITAMIN D, TAB 1000IU (100/BT) 1000 UNITS: 25 TAB at 09:27

## 2022-09-10 RX ADMIN — INSULIN LISPRO 2 UNITS: 100 INJECTION, SOLUTION INTRAVENOUS; SUBCUTANEOUS at 08:10

## 2022-09-10 RX ADMIN — FERROUS SULFATE TAB 325 MG (65 MG ELEMENTAL FE) 325 MG: 325 (65 FE) TAB at 05:52

## 2022-09-10 RX ADMIN — LACTOBACILLUS TAB 1 TABLET: TAB at 10:19

## 2022-09-10 RX ADMIN — SODIUM CHLORIDE 500 ML: 9 INJECTION, SOLUTION INTRAVENOUS at 03:56

## 2022-09-10 RX ADMIN — INSULIN GLARGINE 15 UNITS: 100 INJECTION, SOLUTION SUBCUTANEOUS at 22:54

## 2022-09-10 RX ADMIN — HEPARIN SODIUM 5000 UNITS: 5000 INJECTION INTRAVENOUS; SUBCUTANEOUS at 21:47

## 2022-09-10 RX ADMIN — INSULIN LISPRO 2 UNITS: 100 INJECTION, SOLUTION INTRAVENOUS; SUBCUTANEOUS at 17:05

## 2022-09-10 RX ADMIN — LEVOFLOXACIN 750 MG: 500 TABLET, FILM COATED ORAL at 09:26

## 2022-09-10 RX ADMIN — FAMOTIDINE 10 MG: 20 TABLET, FILM COATED ORAL at 09:25

## 2022-09-10 ASSESSMENT — PAIN SCALES - GENERAL
PAINLEVEL_OUTOF10: 0
PAINLEVEL_OUTOF10: 0

## 2022-09-10 NOTE — PLAN OF CARE
Problem: Discharge Planning  Goal: Discharge to home or other facility with appropriate resources  Outcome: Progressing  Flowsheets (Taken 9/9/2022 1915)  Discharge to home or other facility with appropriate resources:   Identify barriers to discharge with patient and caregiver   Arrange for needed discharge resources and transportation as appropriate   Identify discharge learning needs (meds, wound care, etc)     Problem: Pain  Goal: Verbalizes/displays adequate comfort level or baseline comfort level  Outcome: Progressing  Flowsheets (Taken 9/9/2022 1930)  Verbalizes/displays adequate comfort level or baseline comfort level:   Encourage patient to monitor pain and request assistance   Assess pain using appropriate pain scale   Administer analgesics based on type and severity of pain and evaluate response     Problem: Safety - Adult  Goal: Free from fall injury  Outcome: Progressing Pt called in and said that her covidc test was   negative and a had a vv and she is wanting to go back to work she is   feeling better, she is asking for a return to work date and slip she has   asking that she is wanting it email to her [email or web address] and to   [facility] as well please follow up

## 2022-09-10 NOTE — PROGRESS NOTES
Patient with MAPs in the 50s-60s. No change in mentation and patient easily arousable. Hospitalist notified and no new orders at this time.

## 2022-09-10 NOTE — PROGRESS NOTES
TRANSFER - OUT REPORT:    Verbal report given to Jasen Jung on Ludivina Whiting  being transferred to [id number] for routine progression of patient care       Report consisted of patient's Situation, Background, Assessment and   Recommendations(SBAR). Information from the following report(s) Nurse Handoff Report, Adult Overview, Intake/Output, and Cardiac Rhythm Sinus doris  was reviewed with the receiving nurse. Lines:   Peripheral IV 09/08/22 Distal;Right Cephalic (Active)   Site Assessment Clean, dry & intact 09/09/22 2300   Line Status Capped 09/09/22 2300   Line Care Connections checked and tightened 09/09/22 2300   Phlebitis Assessment No symptoms 09/09/22 2300   Infiltration Assessment 0 09/09/22 2300   Alcohol Cap Used Yes 09/09/22 2300   Dressing Status Clean, dry & intact 09/09/22 2300   Dressing Type Transparent 09/09/22 2300       Peripheral IV 09/08/22 Left Antecubital (Active)   Site Assessment Clean, dry & intact 09/09/22 2300   Line Status Infusing 09/09/22 [id number] Connections checked and tightened 09/09/22 2300   Phlebitis Assessment No symptoms 09/09/22 2300   Infiltration Assessment 0 09/09/22 2300   Alcohol Cap Used Yes 09/09/22 2300   Dressing Status Clean, dry & intact 09/09/22 2300   Dressing Type Transparent 09/09/22 2300        Opportunity for questions and clarification was provided.       Patient transported with:  [employer]
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
## 2022-09-11 PROBLEM — Z86.79 HISTORY OF ATRIAL FLUTTER: Chronic | Status: ACTIVE | Noted: 2021-12-06

## 2022-09-11 PROBLEM — I10 ESSENTIAL HYPERTENSION, BENIGN: Chronic | Status: ACTIVE | Noted: 2021-12-06

## 2022-09-11 PROBLEM — F03.90 DEMENTIA (HCC): Chronic | Status: ACTIVE | Noted: 2022-06-21

## 2022-09-11 PROBLEM — D64.9 ANEMIA: Chronic | Status: ACTIVE | Noted: 2021-10-18

## 2022-09-11 PROBLEM — E11.65 HYPERGLYCEMIA DUE TO TYPE 2 DIABETES MELLITUS (HCC): Chronic | Status: ACTIVE | Noted: 2022-09-08

## 2022-09-11 PROBLEM — E78.2 MIXED HYPERLIPIDEMIA: Chronic | Status: ACTIVE | Noted: 2021-09-27

## 2022-09-11 PROBLEM — E11.9 TYPE 2 DIABETES MELLITUS (HCC): Chronic | Status: ACTIVE | Noted: 2021-01-07

## 2022-09-11 PROBLEM — N40.0 BENIGN PROSTATIC HYPERPLASIA: Chronic | Status: ACTIVE | Noted: 2022-06-21

## 2022-09-11 PROBLEM — I50.9 CONGESTIVE HEART FAILURE (HCC): Chronic | Status: ACTIVE | Noted: 2022-07-20

## 2022-09-11 PROBLEM — N39.0 ACUTE UTI: Status: RESOLVED | Noted: 2022-09-08 | Resolved: 2022-09-11

## 2022-09-11 PROBLEM — R79.89 PSEUDOHYPONATREMIA: Status: RESOLVED | Noted: 2022-09-08 | Resolved: 2022-09-11

## 2022-09-11 PROBLEM — E55.9 VITAMIN D DEFICIENCY: Chronic | Status: ACTIVE | Noted: 2022-06-21

## 2022-09-11 PROBLEM — N18.31 STAGE 3A CHRONIC KIDNEY DISEASE (HCC): Chronic | Status: ACTIVE | Noted: 2018-12-18

## 2022-09-11 PROBLEM — N17.9 AKI (ACUTE KIDNEY INJURY) (HCC): Status: RESOLVED | Noted: 2022-09-08 | Resolved: 2022-09-11

## 2022-09-11 LAB
ANION GAP SERPL CALC-SCNC: 5 MMOL/L (ref 4–13)
BACTERIA SPEC CULT: ABNORMAL
BASOPHILS # BLD: 0 K/UL (ref 0–0.2)
BASOPHILS NFR BLD: 0 % (ref 0–2)
BUN SERPL-MCNC: 61 MG/DL (ref 8–23)
CALCIUM SERPL-MCNC: 8 MG/DL (ref 8.3–10.4)
CHLORIDE SERPL-SCNC: 116 MMOL/L (ref 101–110)
CO2 SERPL-SCNC: 19 MMOL/L (ref 21–32)
CREAT SERPL-MCNC: 1.6 MG/DL (ref 0.8–1.5)
DIFFERENTIAL METHOD BLD: ABNORMAL
EOSINOPHIL # BLD: 0.3 K/UL (ref 0–0.8)
EOSINOPHIL NFR BLD: 3 % (ref 0.5–7.8)
ERYTHROCYTE [DISTWIDTH] IN BLOOD BY AUTOMATED COUNT: 16.4 % (ref 11.9–14.6)
GLUCOSE BLD STRIP.AUTO-MCNC: 101 MG/DL (ref 65–100)
GLUCOSE BLD STRIP.AUTO-MCNC: 180 MG/DL (ref 65–100)
GLUCOSE BLD STRIP.AUTO-MCNC: 188 MG/DL (ref 65–100)
GLUCOSE BLD STRIP.AUTO-MCNC: 236 MG/DL (ref 65–100)
GLUCOSE SERPL-MCNC: 126 MG/DL (ref 65–100)
GRAM STN SPEC: ABNORMAL
HCT VFR BLD AUTO: 40.6 % (ref 41.1–50.3)
HGB BLD-MCNC: 12.4 G/DL (ref 13.6–17.2)
IMM GRANULOCYTES # BLD AUTO: 0.2 K/UL (ref 0–0.5)
IMM GRANULOCYTES NFR BLD AUTO: 1 % (ref 0–5)
LYMPHOCYTES # BLD: 1 K/UL (ref 0.5–4.6)
LYMPHOCYTES NFR BLD: 10 % (ref 13–44)
MCH RBC QN AUTO: 28.7 PG (ref 26.1–32.9)
MCHC RBC AUTO-ENTMCNC: 30.5 G/DL (ref 31.4–35)
MCV RBC AUTO: 94 FL (ref 79.6–97.8)
MM INDURATION, POC: 0 MM (ref 0–5)
MM INDURATION, POC: 0 MM (ref 0–5)
MONOCYTES # BLD: 0.6 K/UL (ref 0.1–1.3)
MONOCYTES NFR BLD: 6 % (ref 4–12)
NEUTS SEG # BLD: 8.4 K/UL (ref 1.7–8.2)
NEUTS SEG NFR BLD: 80 % (ref 43–78)
NRBC # BLD: 0 K/UL (ref 0–0.2)
PLATELET # BLD AUTO: 260 K/UL (ref 150–450)
PMV BLD AUTO: 10.9 FL (ref 9.4–12.3)
POTASSIUM SERPL-SCNC: 4 MMOL/L (ref 3.5–5.1)
PPD, POC: NEGATIVE
PPD, POC: NEGATIVE
RBC # BLD AUTO: 4.32 M/UL (ref 4.23–5.6)
SERVICE CMNT-IMP: ABNORMAL
SODIUM SERPL-SCNC: 140 MMOL/L (ref 136–145)
WBC # BLD AUTO: 10.5 K/UL (ref 4.3–11.1)

## 2022-09-11 PROCEDURE — 1100000000 HC RM PRIVATE

## 2022-09-11 PROCEDURE — 82962 GLUCOSE BLOOD TEST: CPT

## 2022-09-11 PROCEDURE — 6370000000 HC RX 637 (ALT 250 FOR IP): Performed by: STUDENT IN AN ORGANIZED HEALTH CARE EDUCATION/TRAINING PROGRAM

## 2022-09-11 PROCEDURE — 94760 N-INVAS EAR/PLS OXIMETRY 1: CPT

## 2022-09-11 PROCEDURE — 2580000003 HC RX 258: Performed by: FAMILY MEDICINE

## 2022-09-11 PROCEDURE — 80048 BASIC METABOLIC PNL TOTAL CA: CPT

## 2022-09-11 PROCEDURE — 51798 US URINE CAPACITY MEASURE: CPT

## 2022-09-11 PROCEDURE — 6370000000 HC RX 637 (ALT 250 FOR IP): Performed by: FAMILY MEDICINE

## 2022-09-11 PROCEDURE — 85025 COMPLETE CBC W/AUTO DIFF WBC: CPT

## 2022-09-11 PROCEDURE — 6370000000 HC RX 637 (ALT 250 FOR IP): Performed by: INTERNAL MEDICINE

## 2022-09-11 PROCEDURE — 36415 COLL VENOUS BLD VENIPUNCTURE: CPT

## 2022-09-11 PROCEDURE — 6360000002 HC RX W HCPCS: Performed by: FAMILY MEDICINE

## 2022-09-11 RX ORDER — DOCUSATE SODIUM 100 MG/1
100 CAPSULE, LIQUID FILLED ORAL DAILY
Qty: 30 CAPSULE | Refills: 0 | Status: SHIPPED | OUTPATIENT
Start: 2022-09-11

## 2022-09-11 RX ORDER — CIPROFLOXACIN 500 MG/1
500 TABLET, FILM COATED ORAL DAILY
Qty: 7 TABLET | Refills: 0 | Status: SHIPPED | OUTPATIENT
Start: 2022-09-12 | End: 2022-09-13 | Stop reason: SDUPTHER

## 2022-09-11 RX ORDER — CIPROFLOXACIN 500 MG/1
500 TABLET, FILM COATED ORAL DAILY
Status: DISCONTINUED | OUTPATIENT
Start: 2022-09-11 | End: 2022-09-13 | Stop reason: HOSPADM

## 2022-09-11 RX ADMIN — FAMOTIDINE 10 MG: 20 TABLET, FILM COATED ORAL at 08:17

## 2022-09-11 RX ADMIN — SODIUM CHLORIDE, PRESERVATIVE FREE 5 ML: 5 INJECTION INTRAVENOUS at 08:17

## 2022-09-11 RX ADMIN — INSULIN LISPRO 2 UNITS: 100 INJECTION, SOLUTION INTRAVENOUS; SUBCUTANEOUS at 17:12

## 2022-09-11 RX ADMIN — CYANOCOBALAMIN TAB 1000 MCG 1000 MCG: 1000 TAB at 08:17

## 2022-09-11 RX ADMIN — SODIUM CHLORIDE, PRESERVATIVE FREE 10 ML: 5 INJECTION INTRAVENOUS at 21:36

## 2022-09-11 RX ADMIN — Medication 4.5 MG: at 21:31

## 2022-09-11 RX ADMIN — HEPARIN SODIUM 5000 UNITS: 5000 INJECTION INTRAVENOUS; SUBCUTANEOUS at 06:07

## 2022-09-11 RX ADMIN — HEPARIN SODIUM 5000 UNITS: 5000 INJECTION INTRAVENOUS; SUBCUTANEOUS at 15:00

## 2022-09-11 RX ADMIN — HEPARIN SODIUM 5000 UNITS: 5000 INJECTION INTRAVENOUS; SUBCUTANEOUS at 21:31

## 2022-09-11 RX ADMIN — LACTOBACILLUS TAB 1 TABLET: TAB at 08:17

## 2022-09-11 RX ADMIN — CETIRIZINE HYDROCHLORIDE 10 MG: 10 TABLET ORAL at 12:36

## 2022-09-11 RX ADMIN — CIPROFLOXACIN 500 MG: 500 TABLET, FILM COATED ORAL at 11:42

## 2022-09-11 RX ADMIN — FERROUS SULFATE TAB 325 MG (65 MG ELEMENTAL FE) 325 MG: 325 (65 FE) TAB at 06:07

## 2022-09-11 RX ADMIN — VITAMIN D, TAB 1000IU (100/BT) 1000 UNITS: 25 TAB at 08:17

## 2022-09-11 RX ADMIN — INSULIN LISPRO 2 UNITS: 100 INJECTION, SOLUTION INTRAVENOUS; SUBCUTANEOUS at 11:41

## 2022-09-11 RX ADMIN — DOCUSATE SODIUM 100 MG: 100 CAPSULE, LIQUID FILLED ORAL at 08:17

## 2022-09-11 RX ADMIN — INSULIN LISPRO 2 UNITS: 100 INJECTION, SOLUTION INTRAVENOUS; SUBCUTANEOUS at 07:54

## 2022-09-11 ASSESSMENT — PAIN SCALES - GENERAL
PAINLEVEL_OUTOF10: 0
PAINLEVEL_OUTOF10: 0

## 2022-09-11 NOTE — PROGRESS NOTES
Chart reviewed; patient not seen. Tmax 97.5,  WBC 10.5. BC 09/09 Staph epi (contaminant). BC, UC 09/08 Klebsiella Pneumoniae, sensitive to FQ    Impression   Klebsiella bacteremia secondary to complicated UTI      Recommendation  Complete 10 days po levaquin therapy: EOT 09/17/22  No treatment for Staph epi on 09/09 BC  No other recommendations, will sign off. Please call again if we can be of further assistance.
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
## 2022-09-11 NOTE — CARE COORDINATION
Patient is medically stable for discharge. Meaghan contacted [facility] to confirm that patient can return today in which she states that patient will have to return on Monday before 4:00pm but cannot come back today. MEAGHAN spoke with Viraj from [facility]. MD made aware.

## 2022-09-11 NOTE — PROGRESS NOTES
Urinary retention  BPH  Stop plascencia today. Resume cardura     Chronic diastolic CHF  Hx of aflutter  -holding coreg due to bradycardia and hypotension     HTN  -holding home meds     LAUREN  Cont home ferrous sulfate     Vitamin D and B12 def  Cont home supplement     Hx of dementia  Noted. Anticipated discharge needs: To return to [facility] tomorrow     Diet: ADULT DIET; Regular; 3 carb choices (45 gm/meal)  ADULT ORAL NUTRITION SUPPLEMENT; Breakfast, Lunch, Dinner; Diabetic Oral Supplement  Code status: DNR      Principal Problem (Resolved):    GIANIN (acute kidney injury) ([facility] [address]  Active Problems:    Hyperglycemia due to type 2 diabetes mellitus (HCC)    Benign prostatic hyperplasia    Anemia    Congestive heart failure (HCC)    Dementia (HCC)    Vitamin D deficiency    Stage 3a chronic kidney disease (HCC)    Essential hypertension, benign    Mixed hyperlipidemia    Type 2 diabetes mellitus ([facility] [address]    History of atrial flutter  Resolved Problems:    Acute UTI    Pseudohyponatremia      Objective:   Patient Vitals for the past 24 hrs:   Temp Pulse Resp BP SpO2   09/11/22 1131 -- 57 18 (!) 128/47 97 %   09/11/22 0814 97.5 °F (36.4 °C) 51 18 (!) 133/49 97 %   09/11/22 0354 97.3 °F (36.3 °C) (!) 49 18 (!) 123/47 95 %   09/10/22 2335 97.3 °F (36.3 °C) 54 20 (!) 112/51 97 %   09/10/22 2039 97.9 °F (36.6 °C) 54 20 (!) 109/57 99 %   09/10/22 1617 97.9 °F (36.6 °C) 53 18 (!) 116/52 99 %         Estimated body mass index is 20.05 kg/m² as calculated from the following:    Height as of this encounter: 5' 7\" (1.702 m). Weight as of this encounter: 128 lb (58.1 kg). Intake/Output Summary (Last 24 hours) at 9/11/2022 1209  Last data filed at 9/10/2022 2153  Gross per 24 hour   Intake 596 ml   Output 600 ml   Net -4 ml           Physical Exam:     Blood pressure (!) 128/47, pulse 57, temperature 97.5 °F (36.4 °C), resp. rate 18, height 5' 7\" (1.702 m), weight 128 lb (58.1 kg), SpO2 97 %. General:    Well nourished.  No overt distress. Head:  Normocephalic, atraumatic  Eyes:  Sclerae appear normal. Pupils equally round. ENT:  Nares appear normal, no drainage. Moist oral mucosa  Neck:  No restricted ROM. Trachea midline. CV:   RRR. No jugular venous distension. Lungs:   CTAB. Respirations even, unlabored. Abdomen:   nondistended. Extremities: No cyanosis or clubbing. No edema. Skin:     No rashes and normal coloration. Warm and dry. Neuro:  CN II-XII grossly intact. Sensation intact. Alert  Psych:  Normal mood and affect.       I have reviewed ordered lab tests and independently visualized imaging below:    Recent Labs:  Recent Results (from the past 48 hour(s))   POCT Glucose    Collection Time: 09/09/22 12:14 PM   Result Value Ref Range    POC Glucose 251 (H) 65 - 100 mg/dL    Performed by: [employer]    POCT Glucose    Collection Time: 09/09/22  4:37 PM   Result Value Ref Range    POC Glucose 257 (H) 65 - 100 mg/dL    Performed by: [employer]    POCT Glucose    Collection Time: 09/09/22  8:10 PM   Result Value Ref Range    POC Glucose 185 (H) 65 - 100 mg/dL    Performed by: Jimena Bird    Basic Metabolic Panel w/ Reflex to MG    Collection Time: 09/10/22 12:09 AM   Result Value Ref Range    Sodium 135 (L) 136 - 145 mmol/L    Potassium 4.7 3.5 - 5.1 mmol/L    Chloride 113 (H) 101 - 110 mmol/L    CO2 11 (L) 21 - 32 mmol/L    Anion Gap 11 4 - 13 mmol/L    Glucose 152 (H) 65 - 100 mg/dL    BUN 79 (H) 8 - 23 MG/DL    Creatinine 2.00 (H) 0.8 - 1.5 MG/DL    GFR  41 (L) >60 ml/min/1.73m2    GFR Non- 34 (L) >60 ml/min/1.73m2    Calcium 8.1 (L) 8.3 - 10.4 MG/DL   CBC with Auto Differential    Collection Time: 09/10/22 12:09 AM   Result Value Ref Range    WBC 16.6 (H) 4.3 - 11.1 K/uL    RBC 4.36 4.23 - 5.6 M/uL    Hemoglobin 12.8 (L) 13.6 - 17.2 g/dL    Hematocrit 40.1 (L) 41.1 - 50.3 %    MCV 92.0 79.6 - 97.8 FL    MCH 29.4 26.1 - 32.9 PG    MCHC 31.9 31.4 - 35.0 g/dL    RDW 16.6 (H) 11.9 - 14.6 %    Platelets 003 743 - 966 K/uL    MPV 11.8 9.4 - 12.3 FL    nRBC 0.00 0.0 - 0.2 K/uL    Differential Type AUTOMATED      Seg Neutrophils 87 (H) 43 - 78 %    Lymphocytes 7 (L) 13 - 44 %    Monocytes 5 4.0 - 12.0 %    Eosinophils % 0 (L) 0.5 - 7.8 %    Basophils 0 0.0 - 2.0 %    Immature Granulocytes 1 0.0 - 5.0 %    Segs Absolute 14.5 (H) 1.7 - 8.2 K/UL    Absolute Lymph # 1.1 0.5 - 4.6 K/UL    Absolute Mono # 0.7 0.1 - 1.3 K/UL    Absolute Eos # 0.0 0.0 - 0.8 K/UL    Basophils Absolute 0.1 0.0 - 0.2 K/UL    Absolute Immature Granulocyte 0.2 0.0 - 0.5 K/UL   PLEASE READ & DOCUMENT PPD TEST IN 24 HRS    Collection Time: 09/10/22  1:56 AM   Result Value Ref Range    PPD, (POC) Negative Negative    mm Induration 0 0 - 5 mm   POCT Glucose    Collection Time: 09/10/22  8:38 AM   Result Value Ref Range    POC Glucose 131 (H) 65 - 100 mg/dL    Performed by: Terrie    POCT Glucose    Collection Time: 09/10/22 12:46 PM   Result Value Ref Range    POC Glucose 165 (H) 65 - 100 mg/dL    Performed by: Terrie    POCT Glucose    Collection Time: 09/10/22  4:18 PM   Result Value Ref Range    POC Glucose 159 (H) 65 - 100 mg/dL    Performed by: Ynes    POCT Glucose    Collection Time: 09/10/22  9:32 PM   Result Value Ref Range    POC Glucose 80 65 - 100 mg/dL    Performed by: Edgar    POCT Glucose    Collection Time: 09/10/22 10:31 PM   Result Value Ref Range    POC Glucose 130 (H) 65 - 100 mg/dL    Performed by: Galindo    PLEASE READ & DOCUMENT PPD TEST IN 48 HRS    Collection Time: 09/11/22 12:00 AM   Result Value Ref Range    PPD, (POC) Negative Negative    mm Induration 0 0 - 5 mm   Basic Metabolic Panel w/ Reflex to MG    Collection Time: 09/11/22  4:09 AM   Result Value Ref Range    Sodium 140 136 - 145 mmol/L    Potassium 4.0 3.5 - 5.1 mmol/L    Chloride 116 (H) 101 - 110 mmol/L    CO2 19 (L) 21 - 32 mmol/L    Anion Gap 5 4 - 13 mmol/L    Glucose 126 (H) 65 - 100 mg/dL    BUN 61 (H) 8 - 23 MG/DL    Creatinine 1.60 (H) 0.8 - 1.5 MG/DL    GFR  53 (L) >60 ml/min/1.73m2    GFR Non- 43 (L) >60 ml/min/1.73m2    Calcium 8.0 (L) 8.3 - 10.4 MG/DL   CBC with Auto Differential    Collection Time: 09/11/22  4:09 AM   Result Value Ref Range    WBC 10.5 4.3 - 11.1 K/uL    RBC 4.32 4.23 - 5.6 M/uL    Hemoglobin 12.4 (L) 13.6 - 17.2 g/dL    Hematocrit 40.6 (L) 41.1 - 50.3 %    MCV 94.0 79.6 - 97.8 FL    MCH 28.7 26.1 - 32.9 PG    MCHC 30.5 (L) 31.4 - 35.0 g/dL    RDW 16.4 (H) 11.9 - 14.6 %    Platelets 904 051 - 680 K/uL    MPV 10.9 9.4 - 12.3 FL    nRBC 0.00 0.0 - 0.2 K/uL    Differential Type AUTOMATED      Seg Neutrophils 80 (H) 43 - 78 %    Lymphocytes 10 (L) 13 - 44 %    Monocytes 6 4.0 - 12.0 %    Eosinophils % 3 0.5 - 7.8 %    Basophils 0 0.0 - 2.0 %    Immature Granulocytes 1 0.0 - 5.0 %    Segs Absolute 8.4 (H) 1.7 - 8.2 K/UL    Absolute Lymph # 1.0 0.5 - 4.6 K/UL    Absolute Mono # 0.6 0.1 - 1.3 K/UL    Absolute Eos # 0.3 0.0 - 0.8 K/UL    Basophils Absolute 0.0 0.0 - 0.2 K/UL    Absolute Immature Granulocyte 0.2 0.0 - 0.5 K/UL   POCT Glucose    Collection Time: 09/11/22  6:26 AM   Result Value Ref Range    POC Glucose 101 (H) 65 - 100 mg/dL    Performed by: Richard Britt    POCT Glucose    Collection Time: 09/11/22 11:29 AM   Result Value Ref Range    POC Glucose 188 (H) 65 - 100 mg/dL    Performed by: [employer]          Other Studies:  XR CHEST 1 VIEW    Result Date: 9/8/2022  EXAM: XR CHEST 1 VIEW INDICATION: sepsis r/o pulm source COMPARISON: Chest radiograph, 8/18/2022 through 1/25/2021 FINDINGS: The cardiomediastinal silhouette is normal in size with atherosclerotic calcifications in the aorta. No pleural effusion or pneumothorax. Unchanged diffuse coarse interstitial lung markings which remain similar over multiple comparisons. No superimposed consolidation. No acute osseous abnormality.      Stable chronic appearing lung changes most likely reflecting interstitial lung disease. No superimposed acute process evident. US RETROPERITONEAL LIMITED    Result Date: 9/9/2022  Clinical history: Acute kidney injury with UTI. Evaluate for obstruction. TECHNIQUE: Grayscale renal ultrasound. FINDINGS: Kidneys are echogenic, consistent with medical renal disease. The kidneys are normal in contour and size. Right kidney measures 10.7 cm and the left measures 10 cm. There is mild left hydronephrosis. There is tiny left perinephric fluid, nonspecific. No right hydronephrosis. Urinary bladder appears decompressed. 1. Echogenic kidneys, consistent with medical renal disease. 2. Mild left hydronephrosis. No obstructive etiology is identified.       Current Meds:  Current Facility-Administered Medications   Medication Dose Route Frequency    ciprofloxacin (CIPRO) tablet 500 mg  500 mg Oral Daily    albuterol sulfate HFA (PROVENTIL;VENTOLIN;PROAIR) 108 (90 Base) MCG/ACT inhaler 2 puff  2 puff Inhalation Q6H PRN    cetirizine (ZYRTEC) tablet 10 mg  10 mg Oral Daily    vitamin B-12 (CYANOCOBALAMIN) tablet 1,000 mcg  1,000 mcg Oral Daily    docusate sodium (COLACE) capsule 100 mg  100 mg Oral Daily    doxazosin (CARDURA) tablet 4 mg  4 mg Oral Daily    ferrous sulfate (IRON 325) tablet 325 mg  325 mg Oral QAM AC    melatonin tablet 4.5 mg  4.5 mg Oral Nightly    lactobacillus acidophilus (FLORANEX) 1 tablet  1 tablet Oral Daily    Vitamin D (CHOLECALCIFEROL) tablet 1,000 Units  1,000 Units Oral Daily    sodium chloride flush 0.9 % injection 5-40 mL  5-40 mL IntraVENous 2 times per day    sodium chloride flush 0.9 % injection 5-40 mL  5-40 mL IntraVENous PRN    0.9 % sodium chloride infusion   IntraVENous PRN    heparin (porcine) injection 5,000 Units  5,000 Units SubCUTAneous 3 times per day    ondansetron (ZOFRAN-ODT) disintegrating tablet 4 mg  4 mg Oral Q8H PRN    Or    ondansetron (ZOFRAN) injection 4 mg  4 mg IntraVENous Q6H PRN    polyethylene glycol (GLYCOLAX) packet 17 g  17 g Oral Daily PRN    acetaminophen (TYLENOL) tablet 650 mg  650 mg Oral Q6H PRN    Or    acetaminophen (TYLENOL) suppository 650 mg  650 mg Rectal Q6H PRN    famotidine (PEPCID) tablet 10 mg  10 mg Oral Daily    glucose chewable tablet 16 g  4 tablet Oral PRN    dextrose bolus 10% 125 mL  125 mL IntraVENous PRN    Or    dextrose bolus 10% 250 mL  250 mL IntraVENous PRN    glucagon (rDNA) injection 1 mg  1 mg SubCUTAneous PRN    dextrose 10 % infusion   IntraVENous Continuous PRN    insulin lispro (HUMALOG) injection vial 0-8 Units  0-8 Units SubCUTAneous TID WC    insulin lispro (HUMALOG) injection vial 0-4 Units  0-4 Units SubCUTAneous Nightly    [Held by provider] insulin glargine (LANTUS) injection vial 15 Units  15 Units SubCUTAneous Nightly    insulin lispro (HUMALOG) injection vial 2 Units  2 Units SubCUTAneous TID WC    [Held by provider] carvedilol (COREG) tablet 3.125 mg  3.125 mg Oral BID        Signed:  Javier Rodriguez MD    Part of this note may have been written by using a voice dictation software. The note has been proof read but may still contain some grammatical/other typographical errors.

## 2022-09-11 NOTE — PLAN OF CARE
Problem: Discharge Planning  Goal: Discharge to home or other facility with appropriate resources  Outcome: Progressing     Problem: Pain  Goal: Verbalizes/displays adequate comfort level or baseline comfort level  Outcome: Progressing     Problem: Safety - Adult  Goal: Free from fall injury  Outcome: Progressing  Flowsheets (Taken 9/10/2022 1950)  Free From Fall Injury: Instruct family/caregiver on patient safety

## 2022-09-12 LAB
ANION GAP SERPL CALC-SCNC: 3 MMOL/L (ref 4–13)
BUN SERPL-MCNC: 45 MG/DL (ref 8–23)
CALCIUM SERPL-MCNC: 7.9 MG/DL (ref 8.3–10.4)
CHLORIDE SERPL-SCNC: 116 MMOL/L (ref 101–110)
CO2 SERPL-SCNC: 20 MMOL/L (ref 21–32)
CREAT SERPL-MCNC: 1.4 MG/DL (ref 0.8–1.5)
GLUCOSE BLD STRIP.AUTO-MCNC: 178 MG/DL (ref 65–100)
GLUCOSE BLD STRIP.AUTO-MCNC: 225 MG/DL (ref 65–100)
GLUCOSE BLD STRIP.AUTO-MCNC: 254 MG/DL (ref 65–100)
GLUCOSE BLD STRIP.AUTO-MCNC: 279 MG/DL (ref 65–100)
GLUCOSE SERPL-MCNC: 185 MG/DL (ref 65–100)
HGB BLD-MCNC: 12.5 G/DL (ref 13.6–17.2)
MM INDURATION, POC: 0 MM (ref 0–5)
POTASSIUM SERPL-SCNC: 4.3 MMOL/L (ref 3.5–5.1)
PPD, POC: NEGATIVE
SERVICE CMNT-IMP: ABNORMAL
SODIUM SERPL-SCNC: 139 MMOL/L (ref 136–145)

## 2022-09-12 PROCEDURE — 82962 GLUCOSE BLOOD TEST: CPT

## 2022-09-12 PROCEDURE — 51702 INSERT TEMP BLADDER CATH: CPT

## 2022-09-12 PROCEDURE — 6370000000 HC RX 637 (ALT 250 FOR IP): Performed by: INTERNAL MEDICINE

## 2022-09-12 PROCEDURE — 6370000000 HC RX 637 (ALT 250 FOR IP): Performed by: FAMILY MEDICINE

## 2022-09-12 PROCEDURE — 2580000003 HC RX 258: Performed by: FAMILY MEDICINE

## 2022-09-12 PROCEDURE — 36415 COLL VENOUS BLD VENIPUNCTURE: CPT

## 2022-09-12 PROCEDURE — 51798 US URINE CAPACITY MEASURE: CPT

## 2022-09-12 PROCEDURE — 85018 HEMOGLOBIN: CPT

## 2022-09-12 PROCEDURE — 97530 THERAPEUTIC ACTIVITIES: CPT

## 2022-09-12 PROCEDURE — 51701 INSERT BLADDER CATHETER: CPT

## 2022-09-12 PROCEDURE — 6360000002 HC RX W HCPCS: Performed by: FAMILY MEDICINE

## 2022-09-12 PROCEDURE — 80048 BASIC METABOLIC PNL TOTAL CA: CPT

## 2022-09-12 PROCEDURE — 1100000000 HC RM PRIVATE

## 2022-09-12 PROCEDURE — 6370000000 HC RX 637 (ALT 250 FOR IP): Performed by: STUDENT IN AN ORGANIZED HEALTH CARE EDUCATION/TRAINING PROGRAM

## 2022-09-12 RX ADMIN — HEPARIN SODIUM 5000 UNITS: 5000 INJECTION INTRAVENOUS; SUBCUTANEOUS at 05:52

## 2022-09-12 RX ADMIN — FAMOTIDINE 10 MG: 20 TABLET, FILM COATED ORAL at 08:26

## 2022-09-12 RX ADMIN — FERROUS SULFATE TAB 325 MG (65 MG ELEMENTAL FE) 325 MG: 325 (65 FE) TAB at 05:52

## 2022-09-12 RX ADMIN — VITAMIN D, TAB 1000IU (100/BT) 1000 UNITS: 25 TAB at 08:26

## 2022-09-12 RX ADMIN — CETIRIZINE HYDROCHLORIDE 10 MG: 10 TABLET ORAL at 08:26

## 2022-09-12 RX ADMIN — INSULIN LISPRO 4 UNITS: 100 INJECTION, SOLUTION INTRAVENOUS; SUBCUTANEOUS at 12:42

## 2022-09-12 RX ADMIN — CYANOCOBALAMIN TAB 1000 MCG 1000 MCG: 1000 TAB at 08:26

## 2022-09-12 RX ADMIN — SODIUM CHLORIDE, PRESERVATIVE FREE 5 ML: 5 INJECTION INTRAVENOUS at 21:32

## 2022-09-12 RX ADMIN — Medication 4.5 MG: at 21:27

## 2022-09-12 RX ADMIN — INSULIN LISPRO 2 UNITS: 100 INJECTION, SOLUTION INTRAVENOUS; SUBCUTANEOUS at 12:42

## 2022-09-12 RX ADMIN — INSULIN LISPRO 2 UNITS: 100 INJECTION, SOLUTION INTRAVENOUS; SUBCUTANEOUS at 07:28

## 2022-09-12 RX ADMIN — CIPROFLOXACIN 500 MG: 500 TABLET, FILM COATED ORAL at 12:39

## 2022-09-12 RX ADMIN — HEPARIN SODIUM 5000 UNITS: 5000 INJECTION INTRAVENOUS; SUBCUTANEOUS at 14:04

## 2022-09-12 RX ADMIN — INSULIN LISPRO 4 UNITS: 100 INJECTION, SOLUTION INTRAVENOUS; SUBCUTANEOUS at 17:02

## 2022-09-12 RX ADMIN — DOXAZOSIN 4 MG: 4 TABLET ORAL at 08:29

## 2022-09-12 RX ADMIN — LACTOBACILLUS TAB 1 TABLET: TAB at 08:26

## 2022-09-12 RX ADMIN — SODIUM CHLORIDE, PRESERVATIVE FREE 5 ML: 5 INJECTION INTRAVENOUS at 08:28

## 2022-09-12 RX ADMIN — INSULIN LISPRO 2 UNITS: 100 INJECTION, SOLUTION INTRAVENOUS; SUBCUTANEOUS at 17:02

## 2022-09-12 RX ADMIN — HEPARIN SODIUM 5000 UNITS: 5000 INJECTION INTRAVENOUS; SUBCUTANEOUS at 21:32

## 2022-09-12 ASSESSMENT — PAIN SCALES - GENERAL: PAINLEVEL_OUTOF10: 0

## 2022-09-12 NOTE — PLAN OF CARE
Problem: Discharge Planning  Goal: Discharge to home or other facility with appropriate resources  Outcome: Progressing     Problem: Pain  Goal: Verbalizes/displays adequate comfort level or baseline comfort level  Outcome: Progressing  Flowsheets (Taken 9/11/2022 0814 by Heena Edwards RN)  Verbalizes/displays adequate comfort level or baseline comfort level: Encourage patient to monitor pain and request assistance     Problem: Safety - Adult  Goal: Free from fall injury  Outcome: Progressing  Flowsheets (Taken 9/11/2022 1925)  Free From Fall Injury: Instruct family/caregiver on patient safety     Problem: Skin/Tissue Integrity  Goal: Absence of new skin breakdown  Description: 1. Monitor for areas of redness and/or skin breakdown  2. Assess vascular access sites hourly  3. Every 4-6 hours minimum:  Change oxygen saturation probe site  4. Every 4-6 hours:  If on nasal continuous positive airway pressure, respiratory therapy assess nares and determine need for appliance change or resting period.   Outcome: Progressing     Problem: ABCDS Injury Assessment  Goal: Absence of physical injury  Outcome: Progressing  Flowsheets (Taken 9/11/2022 1925)  Absence of Physical Injury: Implement safety measures based on patient assessment

## 2022-09-12 NOTE — PROGRESS NOTES
PHYSICAL THERAPY: Daily Note PM   (Link to Caseload Tracking: PT Visit Days : 1  Time In/Out PT Charge Capture  Rehab Caseload Tracker  Orders    Tyesha Elias is a 80 y.o. male   PRIMARY DIAGNOSIS: GIANNI (acute kidney injury) ([facility] [address]  Septicemia ([facility] [address] [A41.9]  Hyperglycemia [R73.9]  GIANNI (acute kidney injury) ([facility] [address] [N17.9]  Acute kidney injury ([facility] [address] [N17.9]  Acute urinary retention [R33.8]       Inpatient: Payor: Sil Crawford / Plan: [employer] G[employer]MO / Product Type: *No Product type* /     ASSESSMENT:     REHAB RECOMMENDATIONS:   Recommendation to date pending progress:  Setting:  Short-term Rehab    Equipment:    To Be Determined     ASSESSMENT:  Mr. Jerry Hernandez is a pleasant 80year old male admitted with ARF and hyperglycemia. Patient is seen this PM for PT treatment session. Patient required minimal assistance x1 to mobilize to chair. Improved from last treatment session, but continues to demonstrate impaired functional strength, mobility, and activity tolerance from Mod I baseline. Recommend STR at discharge. Good rehab potential appreciated. Goals remain ongoing and appropriate; 2 goals met. Continue with plan of care. SUBJECTIVE:   Mr. Jerry Hernandez states, \"I remember you! \"     Social/Functional Lives With:  (YVETTE)  Type of Home: Assisted living  ADL Assistance: Needs assistance  Ambulation Assistance:  (Mod I Cane, but also uses RW)  OBJECTIVE:     PAIN: Christine Dominion / O2: Michael Ro / Gregoria Cornea / Kris Bainsk:   Pre Treatment:   Pain Assessment: None - Denies Pain      Post Treatment: 0/10 Vitals        Oxygen    Buchanan Catheter and IV    RESTRICTIONS/PRECAUTIONS:  Restrictions/Precautions  Restrictions/Precautions: Fall Risk  Restrictions/Precautions: Fall Risk     MOBILITY: I Mod I S SBA CGA Min Mod Max Total  NT x2 Comments:   Bed Mobility    Rolling [] [] [] [] [x] [] [] [] [] [] []    Supine to Sit [] [] [] [] [] [x] [] [] [] [] []    Scooting [] [] [] [] [x] [] [] [] [] [] []    Sit to Supine [] [] [] [] [] [] [] [] [] [x] []    Transfers    Sit to Stand [] [] [] [] [] [x] [] [] [] [] []    Bed to Chair [] [] [] [] [] [x] [] [] [] [] []    Stand to Sit [] [] [] [] [] [x] [] [] [] [] []     [] [] [] [] [] [] [] [] [] [] []    I=Independent, Mod I=Modified Independent, S=Supervision, SBA=Standby Assistance, CGA=Contact Guard Assistance,   Min=Minimal Assistance, Mod=Moderate Assistance, Max=Maximal Assistance, Total=Total Assistance, NT=Not Tested    BALANCE: Good Fair+ Fair Fair- Poor NT Comments   Sitting Static [] [x] [] [] [] []    Sitting Dynamic [] [x] [] [] [] []              Standing Static [] [] [x] [] [] []    Standing Dynamic [] [] [] [x] [] []      GAIT: I Mod I S SBA CGA Min Mod Max Total  NT x2 Comments:   Level of Assistance [] [] [] [] [] [x] [] [] [] [] []    Distance   feet    DME Rolling Walker    Gait Quality Decreased brianna , Decreased step clearance, Decreased step length, and functional B LE weakness    Weightbearing Status      Stairs      I=Independent, Mod I=Modified Independent, S=Supervision, SBA=Standby Assistance, CGA=Contact Guard Assistance,   Min=Minimal Assistance, Mod=Moderate Assistance, Max=Maximal Assistance, Total=Total Assistance, NT=Not Tested    PLAN:   ACUTE PHYSICAL THERAPY GOALS:   (Developed with and agreed upon by patient and/or caregiver.)  ST. Patient will perform bed mobility with CONTACT GUARD ASSISTANCE within 3 days. 2. Patient will transfer bed to chair with MINIMAL ASSISTANCE within 3 days. MET 22  3. Patient will demonstrate GOOD DYNAMIC SITTING balance within 3 day(s). 4. Patient will ambulate 25+ using least restrictive assistive device and MINIMAL ASSISTANCE within 3 days. 5. Patient will tolerate 15+ minutes of therapeutic activity/exercise and/or neuromuscular re-education while maintaining stable vitals to improve functional strength and activity tolerance within 3 days. MET 22     LT.  Patient will perform bed mobility with INDEPENDENCE within 7 days.  2. Patient will transfer bed to chair with CONTACT GUARD ASSISTANCE within 7 days. 3. Patient will demonstrate FAIR DYNAMIC STANDING balance within 7 day(s). 4. Patient will ambulate 75ft+ using least restrictive assistive device and MINIMAL ASSISTANCE within 7 days. 5. Patient will tolerate 25+ minutes of therapeutic activity/exercise and/or neuromuscular re-education while maintaining stable vitals to improve functional strength and activity tolerance within 7 days. FREQUENCY AND DURATION: 3 times/week for duration of hospital stay or until stated goals are met, whichever comes first.    TREATMENT:   TREATMENT:   Therapeutic Activity (30 Minutes): Therapeutic activity included Rolling, Supine to Sit, Scooting, Transfer Training, Ambulation on level ground, Sitting balance , and Standing balance to improve functional Activity tolerance, Balance, Mobility, and Strength.     TREATMENT GRID:  N/A    AFTER TREATMENT PRECAUTIONS: Alarm Activated, Bed/Chair Locked, Call light within reach, Chair, Needs within reach, and Needs met, oriented to call light, NAD    INTERDISCIPLINARY COLLABORATION:  RN/ PCT, PT/ PTA, and OT/ FREEMAN    EDUCATION:      TIME IN/OUT:  Time In: 1505  Time Out: Jorge L Spence  Minutes: 30    Sally Christensen PT

## 2022-09-12 NOTE — CARE COORDINATION
TC to Joshua at [facility] to determine if pt can return to [facility] today. Left message for their  to return the call. Per pt's RN, pt is now with a plascencia or requires self-catheterization. Pt will need to be able to manage this independently to return to [facility]. Therapy evals also recommending STR. CM received call earlier today from pt's , Alba Mo. She stated neither she nor the pt's niece received a HIPPA privacy code so they have not been able to get information about the pt's condition. CM provided her with the code. She verbalized understanding to not share this with the pt's spouse per [facility] APS request.  CM to continue to follow to assist as needed. 1330:  Return call received from Dc Menezes at [facility]. She confirmed that the pt will need to be totally self-sufficient with either plascencia care or self-catheterization. CM reviewed therapy evals with her and she stated that if therapy is recommending rehab then that may be the best plan at present. She will discuss the pt's case with her DON and reach back out to CM. MEAGHAN spoke with Madhuri Luciano of [facility] DSS/APS. Updated her on pt's condition and therapy recommendation for STR. She requested for the referral be made to [facility]. Referral submitted. Awaiting a response. 1630:  Pt has been accepted for admission to [facility] pending insurance approval.  Auth request initaited online today with [employer]. Reference #[id number].

## 2022-09-13 VITALS
SYSTOLIC BLOOD PRESSURE: 128 MMHG | HEART RATE: 61 BPM | DIASTOLIC BLOOD PRESSURE: 62 MMHG | TEMPERATURE: 97.9 F | HEIGHT: 67 IN | WEIGHT: 128 LBS | BODY MASS INDEX: 20.09 KG/M2 | RESPIRATION RATE: 14 BRPM | OXYGEN SATURATION: 95 %

## 2022-09-13 LAB
GLUCOSE BLD STRIP.AUTO-MCNC: 208 MG/DL (ref 65–100)
GLUCOSE BLD STRIP.AUTO-MCNC: 250 MG/DL (ref 65–100)
GLUCOSE BLD STRIP.AUTO-MCNC: 251 MG/DL (ref 65–100)
SERVICE CMNT-IMP: ABNORMAL

## 2022-09-13 PROCEDURE — 82962 GLUCOSE BLOOD TEST: CPT

## 2022-09-13 PROCEDURE — 6360000002 HC RX W HCPCS: Performed by: FAMILY MEDICINE

## 2022-09-13 PROCEDURE — 6370000000 HC RX 637 (ALT 250 FOR IP): Performed by: FAMILY MEDICINE

## 2022-09-13 PROCEDURE — 6370000000 HC RX 637 (ALT 250 FOR IP): Performed by: STUDENT IN AN ORGANIZED HEALTH CARE EDUCATION/TRAINING PROGRAM

## 2022-09-13 PROCEDURE — 6370000000 HC RX 637 (ALT 250 FOR IP): Performed by: INTERNAL MEDICINE

## 2022-09-13 PROCEDURE — 2580000003 HC RX 258: Performed by: FAMILY MEDICINE

## 2022-09-13 RX ORDER — INSULIN GLARGINE 100 [IU]/ML
8 INJECTION, SOLUTION SUBCUTANEOUS NIGHTLY
Status: DISCONTINUED | OUTPATIENT
Start: 2022-09-13 | End: 2022-09-13 | Stop reason: HOSPADM

## 2022-09-13 RX ORDER — CIPROFLOXACIN 500 MG/1
500 TABLET, FILM COATED ORAL DAILY
Qty: 5 TABLET | Refills: 0
Start: 2022-09-13 | End: 2022-09-18

## 2022-09-13 RX ADMIN — HEPARIN SODIUM 5000 UNITS: 5000 INJECTION INTRAVENOUS; SUBCUTANEOUS at 13:29

## 2022-09-13 RX ADMIN — INSULIN LISPRO 2 UNITS: 100 INJECTION, SOLUTION INTRAVENOUS; SUBCUTANEOUS at 17:29

## 2022-09-13 RX ADMIN — CETIRIZINE HYDROCHLORIDE 10 MG: 10 TABLET ORAL at 07:57

## 2022-09-13 RX ADMIN — FERROUS SULFATE TAB 325 MG (65 MG ELEMENTAL FE) 325 MG: 325 (65 FE) TAB at 05:31

## 2022-09-13 RX ADMIN — DOCUSATE SODIUM 100 MG: 100 CAPSULE, LIQUID FILLED ORAL at 07:57

## 2022-09-13 RX ADMIN — INSULIN LISPRO 2 UNITS: 100 INJECTION, SOLUTION INTRAVENOUS; SUBCUTANEOUS at 07:56

## 2022-09-13 RX ADMIN — DOXAZOSIN 4 MG: 4 TABLET ORAL at 07:57

## 2022-09-13 RX ADMIN — INSULIN LISPRO 4 UNITS: 100 INJECTION, SOLUTION INTRAVENOUS; SUBCUTANEOUS at 13:00

## 2022-09-13 RX ADMIN — ACETAMINOPHEN 650 MG: 325 TABLET, FILM COATED ORAL at 15:35

## 2022-09-13 RX ADMIN — LACTOBACILLUS TAB 1 TABLET: TAB at 07:57

## 2022-09-13 RX ADMIN — HEPARIN SODIUM 5000 UNITS: 5000 INJECTION INTRAVENOUS; SUBCUTANEOUS at 05:31

## 2022-09-13 RX ADMIN — CYANOCOBALAMIN TAB 1000 MCG 1000 MCG: 1000 TAB at 07:57

## 2022-09-13 RX ADMIN — INSULIN LISPRO 4 UNITS: 100 INJECTION, SOLUTION INTRAVENOUS; SUBCUTANEOUS at 17:30

## 2022-09-13 RX ADMIN — INSULIN LISPRO 2 UNITS: 100 INJECTION, SOLUTION INTRAVENOUS; SUBCUTANEOUS at 07:54

## 2022-09-13 RX ADMIN — SODIUM CHLORIDE, PRESERVATIVE FREE 10 ML: 5 INJECTION INTRAVENOUS at 07:56

## 2022-09-13 RX ADMIN — CIPROFLOXACIN 500 MG: 500 TABLET, FILM COATED ORAL at 13:00

## 2022-09-13 RX ADMIN — VITAMIN D, TAB 1000IU (100/BT) 1000 UNITS: 25 TAB at 07:57

## 2022-09-13 RX ADMIN — INSULIN LISPRO 2 UNITS: 100 INJECTION, SOLUTION INTRAVENOUS; SUBCUTANEOUS at 13:00

## 2022-09-13 RX ADMIN — FAMOTIDINE 10 MG: 20 TABLET, FILM COATED ORAL at 07:57

## 2022-09-13 ASSESSMENT — PAIN SCALES - GENERAL
PAINLEVEL_OUTOF10: 0
PAINLEVEL_OUTOF10: 3
PAINLEVEL_OUTOF10: 0

## 2022-09-13 ASSESSMENT — PAIN DESCRIPTION - DESCRIPTORS: DESCRIPTORS: DISCOMFORT

## 2022-09-13 ASSESSMENT — PAIN DESCRIPTION - ORIENTATION: ORIENTATION: MID;LOWER

## 2022-09-13 ASSESSMENT — PAIN DESCRIPTION - LOCATION: LOCATION: BACK

## 2022-09-13 NOTE — CARE COORDINATION
[address]  EMERGENCY DEPARTMENT HISTORY AND PHYSICAL EXAM       Date: 10/25/2017   Patient Name: Leilani Jalloh   YOB: 1963  Medical Record Number: [id number]    History of Presenting Illness     Chief Complaint   Patient presents with    Chest Pain    Shortness of Breath        History Provided By:  patient    Additional History: 7:44 PM   Leilani Jalloh is a 47 y.o. female who presents to the emergency department C/O sudden chest pressure and SOB onset 1 hour ago after taking a bath. Alleviating factors include walking and drinking water; worse when sitting still. Associated sxs include bilateral rib pain, intermittent 10/10 chest pressure, heart palpitations, numbness in bilateral arm and chills. Chest pressure in ED is 5/10. Pt states it feels like something is pressing on her chest. Pt had similar sxs 5 times before, most recent was 4-5 months ago and was dx'd with panic attack. Pt states she has been stressed recently. Pt has not had a stress test or seen cardiology. Pt has not eaten anything to upset her stomach. NKDA. PMHx includes anxiety. SHx includes past tobacco use and social EtOH use. Pt denies leg pain, leg swelling, FHx of CAD, recent long distance travel, and any other symptoms or complaints. Written by ESTELA Salazar, as dictated by Sobia Montalvo PA-C     Primary Care Provider: None   Specialist:    Past History     Past Medical History:   Past Medical History:   Diagnosis Date    Anxiety     Kidney stones     Pancreatitis, acute         Past Surgical History:   Past Surgical History:   Procedure Laterality Date    HX APPENDECTOMY      HX LITHOTRIPSY          Family History:   No family history on file.      Social History:   Social History   Substance Use Topics    Smoking status: Former Smoker     Packs/day: 0.25    Smokeless tobacco: Never Used    Alcohol use Yes      Comment: socially        Allergies:   No Known Allergies Insurance approval received. Pt is medically cleared for dc today and will transfer to room 121 at [address] [facility] for STR services. RN to call report to #[phone]. [facility]. CM spoke with pt's niece, Belinda Sal, via phone, to notify her of pt's dc and transfer to rehab. CM also left VM message with Kim Richey of [facility] to notify her of pt's dc and transfer. CM remains available to assist as needed. 09/13/22 1531   Service Assessment   Patient Orientation Alert and Oriented   Cognition Dementia / Early Alzheimer's   History Provided By Medical Record; Other (see comment)  (Lacey Power/AURORA)   Support Systems Spouse/Significant Other;Family Members; /; Other (Comment)  ([facility] staff)   PCP Verified by CM Yes   Prior Functional Level Independent in ADLs/IADLs   Current Functional Level Assistance with the following:;Bathing;Dressing; Toileting;Mobility   Can patient return to prior living arrangement No   Ability to make needs known: Fair   Family able to assist with home care needs: No   Financial Resources [place]   Community Resources Assisted Living   CM/SW Referral Abuse or neglect concerns; Safety/Abuse   Social/Functional History   Lives With Other (comment)  ([facility])   Type of Home Assisted living  ([facility])   Cresenciano Mantel Help From Other (comment)  ([facility] staff)   ADL Assistance Needs assistance   Toileting Needs assistance   Homemaking Responsibilities No   Ambulation Assistance Needs assistance   Transfer Assistance Needs assistance   Active  No   Occupation Retired   Discharge Planning   Type of [address] Spouse/Significant Other   Current Services Prior To [address]   DME Ordered?  No   Potential Assistance Purchasing Medications No Review of Systems   Review of Systems   Constitutional: Positive for chills. Respiratory: Positive for shortness of breath. Cardiovascular: Positive for chest pain and palpitations. Negative for leg swelling. Musculoskeletal: Positive for arthralgias (bilateral rib pain). Negative for myalgias (bilateral leg). Neurological: Positive for numbness (bilateral arms). All other systems reviewed and are negative. Physical Exam  Vitals:    10/25/17 1915 10/25/17 1925 10/26/17 0037   BP:  139/87 128/72   Pulse:  61 62   Resp:  16 18   Temp:  97.9 °F (36.6 °C)    SpO2: 98% 97% 100%   Weight: 59 kg (130 lb)     Height: 5' 4\" (1.626 m)         Physical Exam  Vital signs and nursing notes reviewed. CONSTITUTIONAL: Alert. Well-appearing; well-nourished; in no apparent distress. HEAD: Normocephalic; atraumatic. NECK: Supple; FROM without difficulty, non-tender; no cervical lymphadenopathy. CV: Normal S1, S2; no murmurs, rubs, or gallops. No chest wall tenderness. RESPIRATORY: Normal chest excursion with respiration; breath sounds clear and equal bilaterally; no wheezes, rhonchi, or rales. GI: Normal bowel sounds; non-distended; non-tender; no guarding or rigidity; no palpable organomegaly. No CVA tenderness. BACK:  No evidence of trauma or deformity. Non-tender to palpation. FROM without difficulty. Negative straight leg raise bilaterally. EXT: Normal ROM in all four extremities; non-tender to palpation. No edema. No tenderness. SKIN: Normal for age and race; warm; dry; good turgor; no apparent lesions or exudate. NEURO: A & O x3. PSYCH:  Mood and affect appropriate.       Diagnostic Study Results     Labs -      Recent Results (from the past 12 hour(s))   EKG, 12 LEAD, INITIAL    Collection Time: 10/25/17  7:20 PM   Result Value Ref Range    Ventricular Rate 80 BPM    Atrial Rate 80 BPM    P-R Interval 144 ms    QRS Duration 80 ms    Q-T Interval 398 ms    QTC Calculation (Bezet) 459 Type of Home Care Services None   Patient expects to be discharged to: Im ert 103 Discharge   Transition of Care Consult (CM Consult) Discharge Planning;SNF   Partner SNF No   Reason Why Partner SNF Not Chosen Location; Bed availability   Services At/After Discharge OT;PT;Nursing services;Skilled Nursing Facility (SNF); Transport; In ambulance   [address] Provided? No   Mode of Transport at Discharge BLS   Confirm Follow Up Transport Other (see comment)  (facility transport)   Condition of Participation: Discharge Planning   The Plan for Transition of Care is related to the following treatment goals: STR to improve pt's strength and functional abilities for a safe transition back to long-term   The Patient and/or Patient Representative was provided with a Choice of Provider? Patient Representative   Name of the Patient Representative who was provided with the Choice of Provider and agrees with the Discharge Plan? AURORA Vasquez [phone]   The Patient and/Or Patient Representative agree with the Discharge Plan? Yes   Freedom of Choice list was provided with basic dialogue that supports the patient's individualized plan of care/goals, treatment preferences, and shares the quality data associated with the providers?   Yes ms    Calculated P Axis 78 degrees    Calculated R Axis 84 degrees    Calculated T Axis 74 degrees    Diagnosis       Normal sinus rhythm  Normal ECG  When compared with ECG of 05-MAY-2015 02:27,  Vent. rate has increased BY  30 BPM  QT has lengthened     CBC WITH AUTOMATED DIFF    Collection Time: 10/25/17  8:25 PM   Result Value Ref Range    WBC 5.6 4.6 - 13.2 K/uL    RBC 4.40 4.20 - 5.30 M/uL    HGB 13.4 12.0 - 16.0 g/dL    HCT 39.2 35.0 - 45.0 %    MCV 89.1 74.0 - 97.0 FL    MCH 30.5 24.0 - 34.0 PG    MCHC 34.2 31.0 - 37.0 g/dL    RDW 12.9 11.6 - 14.5 %    PLATELET 413 (L) 961 - 420 K/uL    MPV 10.6 9.2 - 11.8 FL    NEUTROPHILS 62 40 - 73 %    LYMPHOCYTES 28 21 - 52 %    MONOCYTES 7 3 - 10 %    EOSINOPHILS 2 0 - 5 %    BASOPHILS 1 0 - 2 %    ABS. NEUTROPHILS 3.5 1.8 - 8.0 K/UL    ABS. LYMPHOCYTES 1.6 0.9 - 3.6 K/UL    ABS. MONOCYTES 0.4 0.05 - 1.2 K/UL    ABS. EOSINOPHILS 0.1 0.0 - 0.4 K/UL    ABS. BASOPHILS 0.0 0.0 - 0.06 K/UL    DF AUTOMATED     METABOLIC PANEL, COMPREHENSIVE    Collection Time: 10/25/17  8:25 PM   Result Value Ref Range    Sodium 141 136 - 145 mmol/L    Potassium 3.4 (L) 3.5 - 5.5 mmol/L    Chloride 106 100 - 108 mmol/L    CO2 27 21 - 32 mmol/L    Anion gap 8 3.0 - 18 mmol/L    Glucose 102 (H) 74 - 99 mg/dL    BUN 25 (H) 7.0 - 18 MG/DL    Creatinine 0.86 0.6 - 1.3 MG/DL    BUN/Creatinine ratio 29 (H) 12 - 20      GFR est AA >60 >60 ml/min/1.73m2    GFR est non-AA >60 >60 ml/min/1.73m2    Calcium 9.0 8.5 - 10.1 MG/DL    Bilirubin, total 0.4 0.2 - 1.0 MG/DL    ALT (SGPT) 77 (H) 13 - 56 U/L    AST (SGOT) 41 (H) 15 - 37 U/L    Alk.  phosphatase 128 (H) 45 - 117 U/L    Protein, total 6.9 6.4 - 8.2 g/dL    Albumin 3.6 3.4 - 5.0 g/dL    Globulin 3.3 2.0 - 4.0 g/dL    A-G Ratio 1.1 0.8 - 1.7     CARDIAC PANEL,(CK, CKMB & TROPONIN)    Collection Time: 10/25/17  8:25 PM   Result Value Ref Range     26 - 192 U/L    CK - MB 1.5 <3.6 ng/ml    CK-MB Index 0.9 0.0 - 4.0 %    Troponin-I, Qt. <0.02 0.00 - 0.06 NG/ML   CARDIAC PANEL,(CK, CKMB & TROPONIN)    Collection Time: 10/25/17 11:45 PM   Result Value Ref Range     26 - 192 U/L    CK - MB 1.3 <3.6 ng/ml    CK-MB Index 0.9 0.0 - 4.0 %    Troponin-I, Qt. <0.02 0.00 - 0.06 NG/ML       Radiologic Studies -  The following have been ordered and reviewed:  XR CHEST PORT    (Results Pending)     RADIOLOGY FINDINGS  chest X-ray shows NAP  Pending review by Radiologist  Recorded by Donya Pena ED Scribe, as dictated by Lucas Hemphill PA-C        Medical Decision Making   I am the first provider for this patient. I reviewed the vital signs, available nursing notes, past medical history, past surgical history, family history and social history. Vital Signs-Reviewed the patient's vital signs. Patient Vitals for the past 12 hrs:   Temp Pulse Resp BP SpO2   10/26/17 0037 - 62 18 128/72 100 %   10/25/17 1925 97.9 °F (36.6 °C) 61 16 139/87 97 %   10/25/17 1915 - - - - 98 %       Pulse Oximetry Analysis - Normal 98% on RA     Cardiac Monitor:   Rate: 61 bpm  Rhythm: Normal Sinus Rhythm     EKG interpretation: (Preliminary)  Rhythm: NSR. Rate (approx.): 80 bpm; normal ECG   EKG read by Yoseph Li MD at 7:20 PM    EKG interpretation: (Secondary)  Rhythm:sinus bradycardia. Rate: 57 bpm. Minor T wave decrease V1V2, no change from 1st ECG   EKG read by Linda. Niki Freitas MD  at 11:49 PM     Old Medical Records: Old medical records. Nursing notes. Procedures:   Procedures    ED Course:  7:44 PM   Initial assessment performed. The patients presenting problems have been discussed, and they are in agreement with the care plan formulated and outlined with them. I have encouraged them to ask questions as they arise throughout their visit. PROGRESS NOTE:   12:23 AM  Enzymes are negative, pt is ready for d/c. Medications Given in the ED:  Medications - No data to display    Discharge Note:  12:23 AM   Pt has been reexamined.  Patient has no new complaints, changes, or physical findings. Care plan outlined and precautions discussed. Results were reviewed with the patient. All of pt's questions and concerns were addressed. Patient was instructed and agrees to follow up with Cardiology, as well as to return to the ED upon further deterioration. Patient is ready to go home. Diagnosis   Clinical Impression:   1. Atypical chest pain    2. Anxiety         Discussion:  50yo F presents with acute onset of chest pressure with SOB and bilateral hand tingling. Similar sx in the past, seen in EDs and ACS ruled out, never had stress test. Heart score is 2, low risk of MACE; 2 sets of cardiac enzymes negative. No risk factors for PE, not tachycardic, tachypnic or hypoxic. Sx associated with recent increased stress. Will d/c home with cardio follow up. Follow-up Information     Follow up With Details Comments Nayla Roca MD Schedule an appointment as soon as possible for a visit in 2 days for cardiology follow up  [address]      [facility] EMERGENCY DEPT Go to As needed, If symptoms worsen [address] [place] [address]  [phone]          Current Discharge Medication List          _______________________________   Attestations: This note is prepared by Sanjay Ingram , acting as a Scribe for Haydee Friend PA-C  on 7:36 PM on 10/25/2017 . Haydee Friend PA-C : The scribe's documentation has been prepared under my direction and personally reviewed by me in its entirety.   _______________________________

## 2022-09-13 NOTE — PROGRESS NOTES
TRANSFER - OUT REPORT:    Verbal report given to Williams Shai on Ros Bicluis  being transferred to [facility] for routine progression of patient care       Report consisted of patient's Situation, Background, Assessment and   Recommendations(SBAR). Information from the following report(s) Nurse Handoff Report was reviewed with the receiving nurse. Opportunity for questions and clarification was provided.       Patient transported with:  Felix Sen

## 2022-09-13 NOTE — DIABETES MGMT
Patient admitted with hyperglycemia due to type 2 diabetes. Blood glucose ranged 178-279 yesterday with patient receiving Humalog 14 units. Blood glucose this morning was 208. Reviewed patient current regimen: Humalog correctional insulin and Humalog 2 units with meals. Patient would likely benefit from low dose basal insulin if stricter glycemic control is desired. Provider updated via [email or web address] regarding recommendations and patient glycemic control. Per chart review plan is for patient to discharge to rehab pending insurance approval. Will follow along loosely.
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
## 2022-09-22 ENCOUNTER — OFFICE VISIT (OUTPATIENT)
Dept: UROLOGY | Age: 87
End: 2022-09-22
Payer: MEDICARE

## 2022-09-22 DIAGNOSIS — N30.00 ACUTE CYSTITIS WITHOUT HEMATURIA: ICD-10-CM

## 2022-09-22 DIAGNOSIS — R33.9 URINARY RETENTION: Primary | ICD-10-CM

## 2022-09-22 PROCEDURE — 1036F TOBACCO NON-USER: CPT | Performed by: NURSE PRACTITIONER

## 2022-09-22 PROCEDURE — G8427 DOCREV CUR MEDS BY ELIG CLIN: HCPCS | Performed by: NURSE PRACTITIONER

## 2022-09-22 PROCEDURE — G8420 CALC BMI NORM PARAMETERS: HCPCS | Performed by: NURSE PRACTITIONER

## 2022-09-22 PROCEDURE — 1123F ACP DISCUSS/DSCN MKR DOCD: CPT | Performed by: NURSE PRACTITIONER

## 2022-09-22 PROCEDURE — 1111F DSCHRG MED/CURRENT MED MERGE: CPT | Performed by: NURSE PRACTITIONER

## 2022-09-22 PROCEDURE — 99204 OFFICE O/P NEW MOD 45 MIN: CPT | Performed by: NURSE PRACTITIONER

## 2022-09-22 RX ORDER — CIPROFLOXACIN 500 MG/1
500 TABLET, FILM COATED ORAL DAILY
COMMUNITY

## 2022-09-22 ASSESSMENT — ENCOUNTER SYMPTOMS
EYES NEGATIVE: 1
RESPIRATORY NEGATIVE: 1
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
